# Patient Record
Sex: MALE | Race: ASIAN | NOT HISPANIC OR LATINO | Employment: UNEMPLOYED | URBAN - METROPOLITAN AREA
[De-identification: names, ages, dates, MRNs, and addresses within clinical notes are randomized per-mention and may not be internally consistent; named-entity substitution may affect disease eponyms.]

---

## 2017-02-22 ENCOUNTER — HOSPITAL ENCOUNTER (OUTPATIENT)
Facility: CLINIC | Age: 27
Setting detail: OBSERVATION
Discharge: HOME | End: 2017-02-23
Attending: INTERNAL MEDICINE | Admitting: INTERNAL MEDICINE
Payer: COMMERCIAL

## 2017-02-22 DIAGNOSIS — D49.7 PINEAL TUMOR: Primary | ICD-10-CM

## 2017-02-22 PROBLEM — R51.9 HEADACHE: Status: ACTIVE | Noted: 2017-02-22

## 2017-02-22 PROCEDURE — 25000132 ZZH RX MED GY IP 250 OP 250 PS 637: Performed by: INTERNAL MEDICINE

## 2017-02-22 PROCEDURE — 25000128 H RX IP 250 OP 636: Performed by: INTERNAL MEDICINE

## 2017-02-22 PROCEDURE — 99223 1ST HOSP IP/OBS HIGH 75: CPT | Mod: AI | Performed by: INTERNAL MEDICINE

## 2017-02-22 PROCEDURE — 40000141 ZZH STATISTIC PERIPHERAL IV START W/O US GUIDANCE

## 2017-02-22 PROCEDURE — 25000125 ZZHC RX 250: Performed by: INTERNAL MEDICINE

## 2017-02-22 RX ORDER — LORAZEPAM 2 MG/ML
.5-1 INJECTION INTRAMUSCULAR EVERY 6 HOURS PRN
Status: DISCONTINUED | OUTPATIENT
Start: 2017-02-22 | End: 2017-02-24 | Stop reason: HOSPADM

## 2017-02-22 RX ORDER — HYDROMORPHONE HYDROCHLORIDE 1 MG/ML
.3-.5 INJECTION, SOLUTION INTRAMUSCULAR; INTRAVENOUS; SUBCUTANEOUS
Status: DISCONTINUED | OUTPATIENT
Start: 2017-02-22 | End: 2017-02-24 | Stop reason: HOSPADM

## 2017-02-22 RX ORDER — ACETAMINOPHEN 325 MG/1
650 TABLET ORAL EVERY 4 HOURS PRN
Status: DISCONTINUED | OUTPATIENT
Start: 2017-02-22 | End: 2017-02-24 | Stop reason: HOSPADM

## 2017-02-22 RX ORDER — ONDANSETRON 2 MG/ML
4 INJECTION INTRAMUSCULAR; INTRAVENOUS EVERY 6 HOURS PRN
Status: DISCONTINUED | OUTPATIENT
Start: 2017-02-22 | End: 2017-02-24 | Stop reason: HOSPADM

## 2017-02-22 RX ORDER — PROCHLORPERAZINE MALEATE 5 MG
5-10 TABLET ORAL EVERY 6 HOURS PRN
Status: DISCONTINUED | OUTPATIENT
Start: 2017-02-22 | End: 2017-02-24 | Stop reason: HOSPADM

## 2017-02-22 RX ORDER — LEVETIRACETAM 750 MG/1
1500 TABLET ORAL EVERY MORNING
Status: DISCONTINUED | OUTPATIENT
Start: 2017-02-23 | End: 2017-02-24 | Stop reason: HOSPADM

## 2017-02-22 RX ORDER — NALOXONE HYDROCHLORIDE 0.4 MG/ML
.1-.4 INJECTION, SOLUTION INTRAMUSCULAR; INTRAVENOUS; SUBCUTANEOUS
Status: DISCONTINUED | OUTPATIENT
Start: 2017-02-22 | End: 2017-02-24 | Stop reason: HOSPADM

## 2017-02-22 RX ORDER — LEVETIRACETAM 750 MG/1
1875 TABLET ORAL EVERY EVENING
Status: DISCONTINUED | OUTPATIENT
Start: 2017-02-22 | End: 2017-02-23

## 2017-02-22 RX ORDER — LORAZEPAM 0.5 MG/1
.5-1 TABLET ORAL EVERY 6 HOURS PRN
Status: DISCONTINUED | OUTPATIENT
Start: 2017-02-22 | End: 2017-02-24 | Stop reason: HOSPADM

## 2017-02-22 RX ORDER — DESMOPRESSIN ACETATE 0.1 MG/1
200 TABLET ORAL 2 TIMES DAILY
Status: DISCONTINUED | OUTPATIENT
Start: 2017-02-22 | End: 2017-02-24 | Stop reason: HOSPADM

## 2017-02-22 RX ADMIN — LEVETIRACETAM 1875 MG: 750 TABLET, FILM COATED ORAL at 23:24

## 2017-02-22 RX ADMIN — DESMOPRESSIN ACETATE 200 MCG: 0.1 TABLET ORAL at 23:24

## 2017-02-22 RX ADMIN — POTASSIUM CHLORIDE: 2 INJECTION, SOLUTION, CONCENTRATE INTRAVENOUS at 23:25

## 2017-02-22 ASSESSMENT — VISUAL ACUITY: OU: NORMAL ACUITY

## 2017-02-22 NOTE — IP AVS SNAPSHOT
[address] [address]    Phone:  [phone]                                       After Visit Summary   2/22/2017    Kari Turcios    MRN: [id number]           After Visit Summary Signature Page     I have received my discharge instructions, and my questions have been answered. I have discussed any challenges I see with this plan with the nurse or doctor.    ..........................................................................................................................................  Patient/Patient Representative Signature      ..........................................................................................................................................  Patient Representative Print Name and Relationship to Patient    ..................................................               ................................................  Date                                            Time    ..........................................................................................................................................  Reviewed by Signature/Title    ...................................................              ..............................................  Date                                                            Time

## 2017-02-22 NOTE — IP AVS SNAPSHOT
MRN:[id number]                      After Visit Summary   2/22/2017    Kari Turcios    MRN: [id number]           Thank you!     Thank you for choosing [facility] for your care. Our goal is always to provide you with excellent care. Hearing back from our patients is one way we can continue to improve our services. Please take a few minutes to complete the written survey that you may receive in the mail after you visit with us. Thank you!        Patient Information     Date Of Birth          1990        About your hospital stay     You were admitted on:  February 22, 2017 You last received care in the:  [address] [facility]    You were discharged on:  February 23, 2017        Reason for your hospital stay       Admitted r/t HA, r/o acute causes.  F/u with headache specialist scheduled.                  Who to Call     For medical emergencies, please call 911.  For non-urgent questions about your medical care, please call your primary care provider or clinic, [phone]          Attending Provider     Provider Specialty    Albino Blanco MD Hematology    [facility], Edy Anderson,  Internal Medicine-Hematology & Oncology       Primary Care Provider Office Phone # Fax #    Ila Cornell [phone]       [address] [address]         When to contact your care team       Please call the [facility] [phone] for temperature greater than 100.4 F, uncontrolled nausea/vomiting/diarrhea or unrelieved constipation, pain not relieved by medications, bleeding not stopped by pressure, dizziness, chest pain, shortness of breath, changes in level of consciousness, or any other new concerning symptoms.                  After Care Instructions     Activity       Your activity upon discharge: activity as tolerated.            Diet       Follow this diet upon discharge: regular                  Follow-up Appointments      Adult [facility] Follow-up and recommended labs and tests       Please schedule follow up with Dr. Zavala in 3 months (sometime in May 2017).    Appointments on [place] and/or [facility] (with [facility] provider or service). Call [phone] if you haven't heard regarding these appointments within 7 days of discharge.                  Your next 10 appointments already scheduled     Mar 14, 2017  8:30 AM CDT   (Arrive by 8:15 AM)   New Patient Visit with CASPER Jones [facility] Neurology ([facility])    [address]  [address] [facility] [address] [phone]            Apr 17, 2017  3:45 PM CDT   LAB with  LAB   [facility] ([facility])    [address] [place] [address] [phone]           Patient must bring picture ID.  Patient should be prepared to give a urine specimen  Please do not eat 10-12 hours before your appointment if you are coming in fasting for labs on lipids, cholesterol, or glucose (sugar).  Pregnant women should follow their Care Team instructions. Water with medications is okay. Do not drink coffee or other fluids.   If you have concerns about taking  your medications, please ask at office or if scheduling via Shipzi, send a message by clicking on Secure Messaging, Message Your Care Team.            Apr 17, 2017  4:20 PM CDT   (Arrive by 4:05 PM)   CT CHEST/ABDOMEN/PELVIS W CONTRAST with [id number]   [facility] ([facility])    [address] [place] [address] [phone]           Please bring any scans or X-rays taken at other hospitals, if similar tests were done. Also bring a list of your medicines, including vitamins, minerals and over-the-counter drugs. It is safest to leave personal items at home.  Be sure to tell your doctor:   If you have any allergies.   If there s any chance you are  pregnant.   If you are breastfeeding.   If you have any special needs.  You may have contrast for this exam. To prepare:   Do not eat or drink for 2 hours before your exam. If you need to take medicine, you may take it with small sips of water. (We may ask you to take liquid medicine as well.)   The day before your exam, drink extra fluids at least six 8-ounce glasses (unless your doctor tells you to restrict your fluids).  Patients over 70 or patients with diabetes or kidney problems:   If you haven t had a blood test (creatinine test) within the last 30 days, go to your clinic or Diagnostic Imaging Department for this test.  If you have diabetes:   If your kidney function is normal, continue taking your metformin (Avandamet, Glucophage, Glucovance, Metaglip) on the day of your exam.   If your kidney function is abnormal, wait 48 hours before restarting this medicine.  You will have oral contrast for this exam:   You will drink the contrast at home. Get this from your clinic or Diagnostic Imaging Department. Please follow the directions given.  Please wear loose clothing, such as a sweat suit or jogging clothes. Avoid snaps, zippers and other metal. We may ask you to undress and put on a hospital gown.  If you have any questions, please call the Imaging Department where you will have your exam.            Apr 17, 2017  4:45 PM CDT   (Arrive by 4:30 PM)   MR BRAIN W/O & W CONTRAST with [id number]   [facility] ([facility])    [address] [address] [phone]           Take your medicines as usual, unless your doctor tells you not to. Bring a list of your current medicines to your exam (including vitamins, minerals and over-the-counter drugs).  You will be given intravenous contrast for this exam. To prepare:   The day before your exam, drink extra fluids at least six 8-ounce glasses (unless your doctor tells you to restrict your fluids).    Have a blood test (creatinine test) within 30 days of your exam. Go to your clinic or Diagnostic Imaging Department for this test.  The MRI machine uses a strong magnet. Please wear clothes without metal (snaps, zippers). A sweatsuit works well, or we may give you a hospital gown.  Please remove any body piercings and hair extensions before you arrive. You will also remove watches, jewelry, hairpins, wallets, dentures, partial dental plates and hearing aids. You may wear contact lenses, and you may be able to wear your rings. We have a safe place to keep your personal items, but it is safer to leave them at home.   **IMPORTANT** THE INSTRUCTIONS BELOW ARE ONLY FOR THOSE PATIENTS WHO HAVE BEEN TOLD THEY WILL RECEIVE SEDATION OR GENERAL ANESTHESIA DURING THEIR MRI PROCEDURE:  IF YOU WILL RECEIVE SEDATION (take medicine to help you relax during your exam):   You must get the medicine from your doctor before you arrive. Bring the medicine to the exam. Do not take it at home.   Arrive one hour early. Bring someone who can take you home after the test. Your medicine will make you sleepy. After the exam, you may not drive, take a bus or take a taxi by yourself.   No eating 8 hours before your exam. You may have clear liquids up until 4 hours before your exam. (Clear liquids include water, clear tea, black coffee and fruit juice without pulp.)  IF YOU WILL RECEIVE ANESTHESIA (be asleep for your exam):   Arrive 1 1/2 hours early. Bring someone who can take you home after the test. You may not drive, take a bus or take a taxi by yourself.   No eating 8 hours before your exam. You may have clear liquids up until 4 hours before your exam. (Clear liquids include water, clear tea, black coffee and fruit juice without pulp.)  Please call the Imaging Department at your exam site with any questions.            Apr 19, 2017  2:45 PM CDT   (Arrive by 2:30 PM)   Return Visit with Cheikh Ervin MD   [facility]  "Clinic ([facility])    [address]  2nd Floor  [facility] [address] [phone]              Additional Services     Neurology Adult Referral       Referred for acute on chronic HA.                  Pending Results     No orders found for last 3 day(s).            Statement of Approval     Ordered          02/23/17 1746  I have reviewed and agree with all the recommendations and orders detailed in this document.  EFFECTIVE NOW     Approved and electronically signed by:  Giulia Hernandez APRN CNP             Admission Information     Date & Time Provider Department Dept. Phone    2/22/2017 Edy Gray, DO [address] [facility] [place] [phone]      Your Vitals Were     Blood Pressure Pulse Temperature Respirations Height Weight    109/56 (BP Location: Right arm) 62 97.4  F (36.3  C) (Oral) 18 1.702 m (5' 7\") 74 kg (163 lb 1.6 oz)    Pulse Oximetry BMI (Body Mass Index)                99% 25.55 kg/m2          Shippo Information     [employer] gives you secure access to your electronic health record. If you see a primary care provider, you can also send messages to your care team and make appointments. If you have questions, please call your primary care clinic.  If you do not have a primary care provider, please call [phone] and they will assist you.        Care EveryWhere ID     This is your Care EveryWhere ID. This could be used by other organizations to access your [facility] medical records  [id number]           Review of your medicines      CONTINUE these medicines which may have CHANGED, or have new prescriptions. If we are uncertain of the size of tablets/capsules you have at home, strength may be listed as something that might have changed.        Dose / Directions    desmopressin 0.1 MG tablet   Commonly known as:  DDAVP   This may have changed:  Another medication with the same name was removed. Continue taking this medication, and follow the " directions you see here.   Used for:  Diabetes insipidus (H)        TAKE 2 TABLETS (200 MCG) BY MOUTH 2 TIMES DAILY   Quantity:  360 tablet   Refills:  3         CONTINUE these medicines which have NOT CHANGED        Dose / Directions    ACETAMINOPHEN PO   Indication:  Pain        Dose:  650 mg   Take 650 mg by mouth every 4 hours as needed for pain   Refills:  0       levETIRAcetam 750 MG tablet   Commonly known as:  KEPPRA   Used for:  Pineal tumor        Dose:  1500 mg   Take 1,500 mg by mouth every morning Two tablet in am and 2 1/2 tablet in pm   Quantity:  60 tablet   Refills:  0       LORazepam 0.5 MG tablet   Commonly known as:  ATIVAN   Used for:  Pineal tumor        Dose:  0.5 mg   Take 1 tablet (0.5 mg) by mouth every 4 hours as needed (Anxiety, Nausea/Vomiting or Sleep)   Quantity:  30 tablet   Refills:  3       melatonin 1 MG Tabs tablet   Used for:  Pineal tumor        Dose:  1-2 mg   Take 1-2 tablets (1-2 mg) by mouth nightly as needed for sleep   Quantity:  90 tablet   Refills:  0       tobramycin-dexamethasone ophthalmic ointment   Commonly known as:  TOBRADEX        Dose:  1 Application   Place 1 Application into both eyes 3 times daily   Refills:  0       VISINE TEARS OP        Dose:  1-2 drop   Apply 1-2 drops to eye 2 times daily as needed (for dry eyes.)   Refills:  0                Protect others around you: Learn how to safely use, store and throw away your medicines at www.disposemymeds.org.             Medication List: This is a list of all your medications and when to take them. Check marks below indicate your daily home schedule. Keep this list as a reference.      Medications           Morning Afternoon Evening Bedtime As Needed    ACETAMINOPHEN PO   Take 650 mg by mouth every 4 hours as needed for pain                                desmopressin 0.1 MG tablet   Commonly known as:  DDAVP   TAKE 2 TABLETS (200 MCG) BY MOUTH 2 TIMES DAILY   Last time this was given:  200 mcg on 2/23/2017   8:20 AM                                levETIRAcetam 750 MG tablet   Commonly known as:  KEPPRA   Take 1,500 mg by mouth every morning Two tablet in am and 2 1/2 tablet in pm   Last time this was given:  1,500 mg on 2/23/2017  8:21 AM                                LORazepam 0.5 MG tablet   Commonly known as:  ATIVAN   Take 1 tablet (0.5 mg) by mouth every 4 hours as needed (Anxiety, Nausea/Vomiting or Sleep)                                melatonin 1 MG Tabs tablet   Take 1-2 tablets (1-2 mg) by mouth nightly as needed for sleep                                tobramycin-dexamethasone ophthalmic ointment   Commonly known as:  TOBRADEX   Place 1 Application into both eyes 3 times daily                                VISINE TEARS OP   Apply 1-2 drops to eye 2 times daily as needed (for dry eyes.)

## 2017-02-23 ENCOUNTER — APPOINTMENT (OUTPATIENT)
Dept: GENERAL RADIOLOGY | Facility: CLINIC | Age: 27
End: 2017-02-23
Attending: INTERNAL MEDICINE
Payer: COMMERCIAL

## 2017-02-23 ENCOUNTER — APPOINTMENT (OUTPATIENT)
Dept: MRI IMAGING | Facility: CLINIC | Age: 27
End: 2017-02-23
Attending: INTERNAL MEDICINE
Payer: COMMERCIAL

## 2017-02-23 VITALS
WEIGHT: 163.1 LBS | TEMPERATURE: 97.4 F | SYSTOLIC BLOOD PRESSURE: 109 MMHG | RESPIRATION RATE: 18 BRPM | DIASTOLIC BLOOD PRESSURE: 56 MMHG | HEART RATE: 62 BPM | OXYGEN SATURATION: 99 % | BODY MASS INDEX: 25.6 KG/M2 | HEIGHT: 67 IN

## 2017-02-23 LAB
ANION GAP SERPL CALCULATED.3IONS-SCNC: 8 MMOL/L (ref 3–14)
BASOPHILS # BLD AUTO: 0 10E9/L (ref 0–0.2)
BASOPHILS NFR BLD AUTO: 0.2 %
BUN SERPL-MCNC: 12 MG/DL (ref 7–30)
CALCIUM SERPL-MCNC: 9.1 MG/DL (ref 8.5–10.1)
CHLORIDE SERPL-SCNC: 112 MMOL/L (ref 94–109)
CO2 SERPL-SCNC: 24 MMOL/L (ref 20–32)
CREAT SERPL-MCNC: 1.14 MG/DL (ref 0.66–1.25)
DIFFERENTIAL METHOD BLD: ABNORMAL
EOSINOPHIL # BLD AUTO: 0.1 10E9/L (ref 0–0.7)
EOSINOPHIL NFR BLD AUTO: 0.6 %
ERYTHROCYTE [DISTWIDTH] IN BLOOD BY AUTOMATED COUNT: 13.7 % (ref 10–15)
GFR SERPL CREATININE-BSD FRML MDRD: 77 ML/MIN/1.7M2
GLUCOSE SERPL-MCNC: 91 MG/DL (ref 70–99)
HCT VFR BLD AUTO: 42.3 % (ref 40–53)
HGB BLD-MCNC: 14.1 G/DL (ref 13.3–17.7)
IMM GRANULOCYTES # BLD: 0 10E9/L (ref 0–0.4)
IMM GRANULOCYTES NFR BLD: 0.3 %
LYMPHOCYTES # BLD AUTO: 1.4 10E9/L (ref 0.8–5.3)
LYMPHOCYTES NFR BLD AUTO: 11.8 %
MCH RBC QN AUTO: 28 PG (ref 26.5–33)
MCHC RBC AUTO-ENTMCNC: 33.3 G/DL (ref 31.5–36.5)
MCV RBC AUTO: 84 FL (ref 78–100)
MONOCYTES # BLD AUTO: 0.9 10E9/L (ref 0–1.3)
MONOCYTES NFR BLD AUTO: 7.6 %
NEUTROPHILS # BLD AUTO: 9.5 10E9/L (ref 1.6–8.3)
NEUTROPHILS NFR BLD AUTO: 79.5 %
NRBC # BLD AUTO: 0 10*3/UL
NRBC BLD AUTO-RTO: 0 /100
PLATELET # BLD AUTO: 186 10E9/L (ref 150–450)
POTASSIUM SERPL-SCNC: 4.1 MMOL/L (ref 3.4–5.3)
RBC # BLD AUTO: 5.03 10E12/L (ref 4.4–5.9)
SODIUM SERPL-SCNC: 144 MMOL/L (ref 133–144)
WBC # BLD AUTO: 11.9 10E9/L (ref 4–11)

## 2017-02-23 PROCEDURE — A9585 GADOBUTROL INJECTION: HCPCS | Performed by: INTERNAL MEDICINE

## 2017-02-23 PROCEDURE — 25500064 ZZH RX 255 OP 636: Performed by: INTERNAL MEDICINE

## 2017-02-23 PROCEDURE — 36415 COLL VENOUS BLD VENIPUNCTURE: CPT | Performed by: INTERNAL MEDICINE

## 2017-02-23 PROCEDURE — 85025 COMPLETE CBC W/AUTO DIFF WBC: CPT | Performed by: INTERNAL MEDICINE

## 2017-02-23 PROCEDURE — G0378 HOSPITAL OBSERVATION PER HR: HCPCS

## 2017-02-23 PROCEDURE — 25000132 ZZH RX MED GY IP 250 OP 250 PS 637: Performed by: INTERNAL MEDICINE

## 2017-02-23 PROCEDURE — 74020 XR SHUNT MALFUNCTION SURVEY: CPT

## 2017-02-23 PROCEDURE — 70250 X-RAY EXAM OF SKULL: CPT

## 2017-02-23 PROCEDURE — 99238 HOSP IP/OBS DSCHRG MGMT 30/<: CPT | Performed by: INTERNAL MEDICINE

## 2017-02-23 PROCEDURE — 80048 BASIC METABOLIC PNL TOTAL CA: CPT | Performed by: INTERNAL MEDICINE

## 2017-02-23 PROCEDURE — 70553 MRI BRAIN STEM W/O & W/DYE: CPT

## 2017-02-23 RX ORDER — GADOBUTROL 604.72 MG/ML
7.5 INJECTION INTRAVENOUS ONCE
Status: COMPLETED | OUTPATIENT
Start: 2017-02-23 | End: 2017-02-23

## 2017-02-23 RX ADMIN — DESMOPRESSIN ACETATE 200 MCG: 0.1 TABLET ORAL at 08:20

## 2017-02-23 RX ADMIN — LEVETIRACETAM 1500 MG: 750 TABLET, FILM COATED ORAL at 08:21

## 2017-02-23 RX ADMIN — GADOBUTROL 7.5 ML: 604.72 INJECTION INTRAVENOUS at 12:36

## 2017-02-23 ASSESSMENT — VISUAL ACUITY
OU: NORMAL ACUITY
OU: NORMAL ACUITY

## 2017-02-23 ASSESSMENT — PAIN DESCRIPTION - DESCRIPTORS: DESCRIPTORS: ACHING

## 2017-02-23 NOTE — CONSULTS
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  He also had a  shunt placement with a Strata valve set at 1.5 for hydrocephalus. The patient is known to Dr. Sharp who saw the patient on 6/14/2016 for intermittent headaches associated with nausea and vomiting. MR imaging at that time showed no recurrent tumor or hydrocephalus, but because there was some element of positional headache (improved when lying flat), the thesis of overdrainage was acted upon and his valve was increased to 2.0. This seemed to have helped his headaches in the short term.    However, today he returns with headache, nausea and blurred vision that began insidiously this afternoon without a trigger. He states that the pain is holocephalic with a frontal predominance without any exacerbation/alleviating factors. This improved into the evening from a 5/10 to a 3 out of 10. He says that at baseline lives with a 1-2/headache and it was the visual changes that principally brought him to the hospital. He denies diplopia, weakness, sensory changes, gait instability, infectious symptoms, recent trauma or illness.      CT and shunt series     Past Medical/Surgical History  Past Medical History   Diagnosis Date     Hypertropia      Myopia      Seizures (H)      2 months ago     Type 2 diabetes mellitus without complications (H)      Borderline       Past Surgical History   Procedure Laterality Date     Arthrotomy shoulder, bankhart repair, combined  10/29/2012     Procedure: COMBINED ARTHROTOMY SHOULDER, BANKHART REPAIR;  RIGHT OPEN SHOULDER BANKART REPAIR;  Surgeon: Lemuel Ramírez MD;  [facility]     Optical tracking system biopsy brain  3/20/2014     Procedure: OPTICAL TRACKING SYSTEM BIOPSY BRAIN;   Right Frontal Approach For endoscopic Intraventricular Mass Biopsy, 3rd Ventriculostomy, Placement of right ventriculostomy catheter.;  Surgeon: Williams Clement MD;  Location: [facility] OR     Ventriculostomy  3/20/2014     Procedure: VENTRICULOSTOMY;;  Surgeon: Williams Clement MD;  Location: [facility] OR     Optical tracking system craniotomy, excise tumor, combined  3/26/2014     Procedure: COMBINED OPTICAL TRACKING SYSTEM CRANIOTOMY, EXCISE TUMOR;  Stealth Assisted Sub-Occiptial Craniotomy, Excise Tumor ;  Surgeon: Ann Sharp MD;  Location: [facility] OR     Optical tracking system implant shunt ventriculoperitoneal  4/8/2014     Procedure: OPTICAL TRACKING SYSTEM IMPLANT SHUNT VENTRICULOPERITONEAL;  Right Stealth Guided Ventricularperitoneal Shunt Placement ;  Surgeon: Ann Sharp MD;  Location: [facility] OR     Recession resection (repair strabismus) bilateral Bilateral 8/4/2016     Procedure: RECESSION RESECTION (REPAIR STRABISMUS) BILATERAL;  Surgeon: Serafin Menendez MD;  Location: [facility] OR       Family History  Family History   Problem Relation Age of Onset     CANCER Maternal Grandmother      vaginal     CANCER Mother      breast     CANCER Maternal Grandfather      lung cancer     Glaucoma No family hx of      Macular Degeneration No family hx of        Social History  Social History   Substance Use Topics     Smoking status: Never Smoker     Smokeless tobacco: Never Used     Alcohol use No       Medications  Reviewed - keppra for seizures  DDAVP    Allergies  Allergies   Allergen Reactions     Penicillins Hives       ROS: 10 point ROS of systems including Constitutional, Eyes, Respiratory, Cardiovascular, Gastroenterology, Genitourinary, Integumentary, Muscularskeletal, Psychiatric were all negative except for pertinent positives noted in my HPI.     Physical Exam:     General:   Comfortable respiratory effort; normal cardiac rhythm.     Mental Status:   awake, alert and  oriented x3.   fluent, communicative, intact comprehension.    Cranial Nerves:  equal and reactive pupils   extraocular movements preserved   no dysarthria  preserved extraocular movements    Motor/Sensory:  5/5 strength throughout upper and lower extremities.  No deficits to pain, soft touch.     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 He has shunted hydrocephalus and recurrent headaches that are well documented and currently are of the similar character as they have been described historically. No images were yet available for us to compare to prior scans, but it remains possible that perhaps another adjustment to his shunt could alleviate his symptoms.     - please obtain a shunt series prior to MRI imaging   - agree with MRI - we will follow up these results and re-program his shunt afterward   - recommend opthalmology consult to rule out papilledema and assess visual symptoms       John (Jack) M. Leschke, M.D.     Our patient was discussed with my chief resident.

## 2017-02-23 NOTE — H&P
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    Headache, nausea, blurred vision - no focal neurological deficits. No diplopia, visual acuity is intact with good EOM and pupillary response. CT head done at outside hospital was essentially normal with no hydrocephalus, masses, or midline shift.  shunt series Xrays are normal. He had similar episode last year when the  shunt strata valve was adjusted from 1.5 to 2.0 for possible over drainage.   Consulted neurosurgery if this could be a similar problem.  Will also obtain MRI brain.    H/o seizures - well controlled on keppra. Continue home dose.  H/o diabetes insipidus - on DDAVP. Continue same. Renal function,electrolytes normal.     Regular diet as tolerated.  Full code.  Mechanical VTE prophylaxis.         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   Generalized headache, frontal. No diplopia, visual acuity and color vision intact. No emesis.   No fever, chills, voice changes, facial or extremity weakness or numbness. No neck stiffness. No CP, SOB, or any cardiorespiratory or abdominal symptoms. No seizures. He was in good health until this afternoon.         Cancer Treatment History:   Per last oncology note, 10/16-Mr Turcios  (Abelino) is a 26 year old male with history of seizures who presented with headaches accompanied by diplopia over a couple of weeks to a [facility] ED on 3/19 where MRI showed a 3cm pineal mass and hydrocephalus. He was transferred to [facility] for neurosurgical management. A biopsy was performed on 3/20 with placement of third ventriculostomy and external ventricular drain. The biopsy showed a mixed germ cell tumor. Resection of the mass was performed on 3/26/14 by a midline combined occipital and suboccipital craniotomy performed by Trinidad Xiao and Urbano. It was R1 resection and most of the macroscopic disease was resected and pathology did reveal mixed germ cell tumor. He was seen by Medical Oncology (Cheikh Ervin) and Radiation Oncology (Dr. Kayla Song) and recommended chemotherapy followed by radiation therapy per the [facility] protocol for localized CNS Germ Cell Tumors. He basically is following the stratum 1 for patients with localized non-germinomatous tumors to receive 6 cycles of chemotherapy (1, 3 & 5 -carbo/etoposide and 2, 4, & 6 are ifos/etoposide) and depending post chemotherapy radiographic (MRI) and biochemical response (tumor markers), he may be a candidate to receive less radiation therapy. He completed chemo 8/11/14 and follow up brain MRI was negative for residual/recurrence of disease. HCG and AFP markers were within normal limits (AFP was elevated at diagnosis). He received radiation to the ventricles followed by a boost (3000 cGy to ventricles and 5400 cGy to tumor bed) from 9/17/2014 through 10/28/14.         Past Medical History:     Past Medical History   Diagnosis Date     Hypertropia      Myopia      Seizures (H)      2 months ago     Type 2 diabetes mellitus without complications (H)      Borderline            Past Surgical History:      Past Surgical History   Procedure Laterality Date     Arthrotomy shoulder, bankhart repair, combined  10/29/2012     Procedure: COMBINED ARTHROTOMY  SHOULDER, BANKHART REPAIR;  RIGHT OPEN SHOULDER BANKART REPAIR;  Surgeon: Lemuel Ramírez MD;  Location:  [facility]     Optical tracking system biopsy brain  3/20/2014     Procedure: OPTICAL TRACKING SYSTEM BIOPSY BRAIN;  Right Frontal Approach For endoscopic Intraventricular Mass Biopsy, 3rd Ventriculostomy, Placement of right ventriculostomy catheter.;  Surgeon: Williams Clement MD;  [facility]     Ventriculostomy  3/20/2014     Procedure: VENTRICULOSTOMY;;  Surgeon: Williams Clement MD;  [facility]     Optical tracking system craniotomy, excise tumor, combined  3/26/2014     Procedure: COMBINED OPTICAL TRACKING SYSTEM CRANIOTOMY, EXCISE TUMOR;  Stealth Assisted Sub-Occiptial Craniotomy, Excise Tumor ;  Surgeon: Ann Sharp MD;  [facility]     Optical tracking system implant shunt ventriculoperitoneal  4/8/2014     Procedure: OPTICAL TRACKING SYSTEM IMPLANT SHUNT VENTRICULOPERITONEAL;  Right Stealth Guided Ventricularperitoneal Shunt Placement ;  Surgeon: Ann Sharp MD;  [facility]     Recession resection (repair strabismus) bilateral Bilateral 8/4/2016     Procedure: RECESSION RESECTION (REPAIR STRABISMUS) BILATERAL;  Surgeon: Serafin Menendez MD;  [facility] OR            Social History:     Social History   Substance Use Topics     Smoking status: Never Smoker     Smokeless tobacco: Never Used     Alcohol use No            Family History:     Family History   Problem Relation Age of Onset     CANCER Maternal Grandmother      vaginal     CANCER Mother      breast     CANCER Maternal Grandfather      lung cancer     Glaucoma No family hx of      Macular Degeneration No family hx of      Family history reviewed         Immunizations:     Immunization History   Administered Date(s) Administered     TDAP (ADACEL AGES 11-64) 05/15/2012            Allergies:     Allergies   Allergen Reactions     Penicillins Hives            Medications:        Current Facility-Administered Medications on File Prior to Encounter:  gadobutrol (GADAVIST) injection 7.5 mL     Current Outpatient Prescriptions on File Prior to Encounter:  desmopressin (DDAVP) 0.2 MG tablet Take 0.2 mg by mouth   desmopressin (DDAVP) 0.1 MG tablet TAKE 2 TABLETS (200 MCG) BY MOUTH 2 TIMES DAILY   tobramycin-dexamethasone (TOBRADEX) ophthalmic ointment Place 1 Application into both eyes 3 times daily   melatonin 1 MG TABS Take 1-2 tablets (1-2 mg) by mouth nightly as needed for sleep   LORazepam (ATIVAN) 0.5 MG tablet Take 1 tablet (0.5 mg) by mouth every 4 hours as needed (Anxiety, Nausea/Vomiting or Sleep)   levETIRAcetam (KEPPRA) 750 MG tablet Take 1,500 mg by mouth every morning Two tablet in am and 2 1/2 tablet in pm   Glycerin-Hypromellose- (VISINE TEARS OP) Apply 1-2 drops to eye 2 times daily as needed (for dry eyes.)              Review of Systems:   The Review of Systems is negative other than noted in the HPI         Physical Exam:   Temp: 95.8  F (35.4  C) Temp src: Oral BP: 125/63 Pulse: 59   Resp: 16 SpO2: 100 % O2 Device: None (Room air)    Constitutional: Awake, alert, cooperative, no apparent distress, and appears stated age.  Eyes: Lids and lashes normal, pupils equal, round and reactive to light, extra ocular muscles intact, sclera clear, conjunctival injection.  ENT: Normocephalic, without obvious abnormality, atraumatic, no erythema.  Lungs: No increased work of breathing, good air exchange, clear to auscultation bilaterally, no crackles or wheezing.  Cardiovascular: Regular rate and rhythm, normal S1 and S2, no S3 or S4, and no murmur noted.  Abdomen: normal bowel sounds, soft, non-distended, non-tender, no masses palpated, no hepatosplenomegally.  Genitourinary: deferred.  Musculoskeletal: No redness, warmth, or swelling of the joints.  No edema.   Neurologic: Awake, alert, oriented to name, place and time.  Cranial nerves II-XII are grossly intact.  Motor is 5  out of 5 bilaterally.   Sensory is intact.  Babinski down going,  and gait is normal.    Skin: No rashes, erythema, pallor, petechia or purpura.         Data:     CBC, BMP from outside ED, reviewed through careeverywhere are completely normal.    Attestation:  -    Harris Gerard MD

## 2017-02-23 NOTE — PROGRESS NOTES
Brief Neurosurgery Update:     Patient completed MRI.     Medtronic strata valve previously set to 2.0.   Valve setting interrogated and reset to 2.0.   Patient tolerated the procedure well.     MRI unrevealing.     Neurosurgery signing off.     Contact the neurosurgery resident on call with questions.    Dial * * *[phone]: Enter [phone] when prompted.   Herson Stephen MD, PhD  Neurosurgery PGY-2

## 2017-02-23 NOTE — UTILIZATION REVIEW
"  Admission Status; Secondary Review Determination         Under the authority of the Utilization Management Committee, the utilization review process indicated a secondary review on the above patient.  The review outcome is based on review of the medical records, discussions with staff, and applying clinical experience noted on the date of the review.          (x) Observation Status Appropriate - This patient does not meet hospital inpatient criteria and is placed in observation status. If this patient's primary payer is Medicare and was admitted as an inpatient, Condition Code 44 should be used and patient status changed to \"observation\".     RATIONALE FOR DETERMINATION   27-year-old man with mixed germ cell carcinoma, status post  shunt presenting with neurologic symptoms that mostly resolved however concerning for possible shunt malfunctioning, admitted for further workup. No concern for CNS infection, so far workup has been negative, in the past similar symptoms improved with adjustment in the valve pressure.The severity of illness, intensity of service provided, expected LOS and risk for adverse outcome make the care appropriate for further observation; however, doesn't meet criteria for hospital inpatient admission. Dr Gray  notified of this determination.    This document was produced using voice recognition software.      The information on this document is developed by the utilization review team in order for the business office to ensure compliance.  This only denotes the appropriateness of proper admission status and does not reflect the quality of care rendered.         The definitions of Inpatient Status and Observation Status used in making the determination above are those provided in the CMS Coverage Manual, Chapter 1 and Chapter 6, section 70.4.      Sincerely,     TAN SNIDER MD    System Medical Director  Utilization Management  [facility].      "

## 2017-02-23 NOTE — CONSULTS
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 Also c/o nausea and HA. HA improved throughout the day. Endorses redness. Denies diplopia, eye pain, tearing, discharge, flashes or floaters.     Opthalmology was consulted to r/o papilledema and vision change.           Review of systems were otherwise negative except for that which has been stated above.    Med Hx:   Past Medical History   Diagnosis Date     Hypertropia      Myopia      Seizures (H)      2 months ago     Type 2 diabetes mellitus without complications (H)      Borderline     Med Rx:   Current Facility-Administered Medications on File Prior to Encounter:  gadobutrol (GADAVIST) injection 7.5 mL     Current Outpatient Prescriptions on File Prior to Encounter:  melatonin 1 MG TABS Take 1-2 tablets (1-2 mg) by mouth nightly as needed for sleep   levETIRAcetam (KEPPRA) 750 MG tablet Take 1,500 mg by mouth every morning Two tablet in am and 2 1/2 tablet in pm   Glycerin-Hypromellose- (VISINE TEARS OP) Apply 1-2 drops to eye 2 times daily as needed (for dry eyes.)   desmopressin (DDAVP) 0.2 MG tablet Take 0.2 mg by mouth   desmopressin (DDAVP) 0.1 MG tablet TAKE 2 TABLETS (200 MCG) BY MOUTH 2 TIMES DAILY   tobramycin-dexamethasone (TOBRADEX) ophthalmic ointment Place 1 Application into both eyes 3 times daily   LORazepam (ATIVAN) 0.5 MG tablet Take 1 tablet (0.5 mg) by mouth every 4 hours as needed (Anxiety, Nausea/Vomiting or Sleep)     Inpatient Medications:     zz extemporaneous template  1,875 mg Oral QPM     desmopressin  200 mcg Oral BID     levETIRAcetam  1,500 mg Oral QAM      Allergies:   Allergies   Allergen Reactions     Penicillins Hives     Social Hx:   Social History   Substance Use Topics     Smoking status: Never Smoker     Smokeless tobacco: Never Used     Alcohol use No     Fam Hx:    Family History   Problem Relation Age of Onset     CANCER Maternal Grandmother      vaginal     CANCER Mother      breast     CANCER Maternal Grandfather      lung cancer     Glaucoma No family hx of      Macular Degeneration No family hx of        OCULAR/MEDICAL/SURGICAL HISTORIES:     Past Ocular History:  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/p strabismus surgery on 8/4/16    Past Medical History   Diagnosis Date     Hypertropia      Myopia      Seizures (H)      2 months ago     Type 2 diabetes mellitus without complications (H)      Borderline       Past Surgical History   Procedure Laterality Date     Arthrotomy shoulder, bankhart repair, combined  10/29/2012     Procedure: COMBINED ARTHROTOMY SHOULDER, BANKHART REPAIR;  RIGHT OPEN SHOULDER BANKART REPAIR;  Surgeon: Lemuel Ramírez MD;  Location: [facility]     Optical tracking system biopsy brain  3/20/2014     Procedure: OPTICAL TRACKING SYSTEM BIOPSY BRAIN;  Right Frontal Approach For endoscopic Intraventricular Mass Biopsy, 3rd Ventriculostomy, Placement of right ventriculostomy catheter.;  Surgeon: Williams Clement MD;  [facility]     Ventriculostomy  3/20/2014     Procedure: VENTRICULOSTOMY;;  Surgeon: Williams Clement MD;  [facility]     Optical tracking system craniotomy, excise tumor, combined  3/26/2014     Procedure: COMBINED OPTICAL TRACKING SYSTEM CRANIOTOMY, EXCISE TUMOR;  Stealth Assisted Sub-Occiptial Craniotomy, Excise Tumor ;  Surgeon: Ann Sahrp MD;  [facility]     Optical tracking system implant shunt ventriculoperitoneal  4/8/2014     Procedure: OPTICAL TRACKING SYSTEM IMPLANT SHUNT VENTRICULOPERITONEAL;  Right Stealth  Guided Ventricularperitoneal Shunt Placement ;  Surgeon: Ann Sharp MD;  [facility]     Recession resection (repair strabismus) bilateral Bilateral 8/4/2016     Procedure: RECESSION RESECTION (REPAIR STRABISMUS) BILATERAL;  Surgeon: Serafin Menendez MD;  [facility]       EXAMINATION:     Visual Acuity: Right Eye 20/20 ; Left Eye 20/20 sc assess with near card   Pupils: Minimally reactive both eyes, no APD    Intraocular Pressure: RE  14 ; LE 14  mmHg by tonopen (<5% error).   Motility: RE deficient upgaze; LE deficient upgaze  Confrontational Visual Field: RE Full; LE Full     External/Slit Lamp Exam   RIGHT EYE   Lids/Lashes: No Abnormality   Conj/Sclera: White and Quiet   Cornea:  Clear   Ant Chamber:  Deep and Quiet   Iris: Round and Reactive   Lens: Clear  LEFT   Lids/lashes: 1+ ptosis   Conj/Sclera: White and Quiet   Cornea:  Clear   Ant Chamber:  Deep and Quiet   Iris: Round and Reactive   Lens:Clear    Dilated Fundus Exam  Eyes Dilated? Yes   Time: 1210 With Phenylephrine 2.5% and Mydriacyl 1%    (Normally, dilation with the above lasts about 4-6 hours)  RIGHT:   Anterior Vitreous: Clear   Media: Clear   Optic Nerve: Normal Contours and Size   Cup to Disc Ratio: 0.5   Macula: Flat and No Pigment Abnormality   Vessels: Normal Caliber and Distribution   Retinal Periphery: Attached Without Holes or Tears Identified  LEFT:   Anterior Vitreous: Clear   Media: Clear   Optic Nerve: Normal Contours and Size   Cup to Disc Ratio: 0.5   Macula: Flat and No Pigment Abnormality   Vessels: Normal Caliber and Distribution   Retinal Periphery: Attached Without Holes or Tears Identified    Labs/Studies/Imaging Performed  MR brain pending     ASSESSMENT/PLAN:     Kari Turcios is a 27 year old male who presents with intermittent blurry vision x1d in setting of history of 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 Agree with complete neuro work-up given history. Please alert to any changes in condition.      It is our pleasure to participate in this patient's care and treatment. Please contact us with any further questions or concerns.    Discussed with Dr. Edilson Salomon MD,    Keshav Ornelas MD PGY-2  Ophthalmology  Pager [phone]

## 2017-02-23 NOTE — PROGRESS NOTES
Nursing Focus: Admission    D: Arrived at 2100 hours from [place] emergency dep[facility]ment via personal transportation. Patient accompanied by his parents. Admitted for change in vision, headache and nausea. Complains of nausea-will give zofran when available.      I: Admission process began.  Patient oriented to room, enviroment, call light.  Md. notified of patients arrival on unit.     A: Vital signs stable, afebrile.  Patient stable at this time.     P: Implement plan of care when available. Continue to monitor patient. Nursing interventions as appropriate. Notify md with changes in pt status.

## 2017-02-23 NOTE — PLAN OF CARE
"Problem: Goal Outcome Summary  Goal: Goal Outcome Summary  Outcome: No Change  VSS, afebrile. Had soft BP at approximately 0030, denied any dizziness or SOB this time. Neuro checks remain stable. Neurosurgery consulted. Patient continues to report headache but states \"it's much better\". Declined pain intervention at this time. Denies nausea at this time. Up self in room. IVF w/ KCL infusing at 75 mL/hr. Plan for MRI of brain today. Will continue to monitor closely.     Addendum: 0650: Order placed for STAT XR shunt malfunction surgery survey. XRay notified and transport to bring patient shortly.        "

## 2017-02-24 ENCOUNTER — CARE COORDINATION (OUTPATIENT)
Dept: CARDIOLOGY | Facility: CLINIC | Age: 27
End: 2017-02-24

## 2017-02-24 NOTE — PROGRESS NOTES
"[facility]  \"Hello, my name is Joaquina Covarrubias , and I am calling from the [facility].  I want to check in and see how you are doing, after leaving the hospital.  You may also receive a call from your Care Coordinator (care team), but I want to make sure you don t have any urgent needs.  I have a couple questions to review with you:     Post-Discharge Outreach                                                    Kari Turcios is a 27 year old male     Follow-up Appointments           Adult [facility] Follow-up and recommended labs and tests       Please schedule follow up with Dr. Zavala in 3 months (sometime in May 2017).     Appointments on [facility] and/or [facility] (with [facility] or [facility] provider or service). Call [phone] if you haven't heard regarding these appointments within 7 days of discharge.                       Your next 10 appointments already scheduled            Mar 14, 2017 8:30 AM CDT   (Arrive by 8:15 AM)   New Patient Visit with CASPER Jones [facility] Neurology ([facility] [place])     [address]  [address] [facility] [address] [phone]                  Apr 17, 2017 3:45 PM CDT   LAB with [facility] Lab [facility])              Care Team:    Patient Care Team       Relationship Specialty Notifications Start End    Ila Cornell MD PCP - General Family Practice  8/16/12     Phone: [phone] Fax: [phone]         [address] [address]    Cheikh Ervin MD Referring Physician Oncology Admissions 4/28/14     Comment:  referring to endorinology    Phone: [phone] Fax: [phone]         [facility] [address] [facility] [address] [facility] [address]    Lemuel Billy, PhD LP Psychologist Psychology  3/12/15     Phone: [phone] Fax: [phone]         [facility] [address] [facility] [address] " [facility] [address]    Megan Reese APRN CNP Nurse Practitioner Oncology  3/12/15     Phone: [phone] Fax: [phone]         [facility] [address] [facility] [address] [facility] [address]    Katerina Grajeda, RN Nurse Coordinator Oncology Admissions 3/18/15     Phone: [phone] Pager: [phone]        Farida Bean RN Nurse Coordinator Neurosurgery  4/10/15     Comment:  ph. [phone] Pager [phone]    Phone: [phone] Pager: [phone]        Yessenia Ramires MD MD INTERNAL MEDICINE - ENDOCRINOLOGY, DIABETES & METABOLISM  5/4/15     Phone: [phone] Fax: [phone]         [facility] [address] [facility] [address]    Sybil Dockery APRN CNP Nurse Practitioner Nurse Practitioner  2/23/17     Phone: [phone] Fax: [phone]         [facility] [address] [facility] [facility] [address]            Transition of Care Review                                                      Patient was called three times and no answer so post 24 hr DC follow up calls will be closed out               Plan                                                      Thanks for your time.  Your Care Coordinator may follow-up within the next couple days.  In the meantime if you have questions, concerns or problems call your care team.        Joaquina Covarrubias

## 2017-02-24 NOTE — PLAN OF CARE
Problem: Discharge Planning  Goal: Discharge Planning (Adult, OB, Behavioral, Peds)  AVSS. Patient went down for a MRI today.  Took meds as ordered with no difficulty.  Denied pain when asked.  Up ad aguilar.  No c/o. Patient slept most of the day with father at bedside. MD gave note to excuse father from work. Orders to discharge at this time.

## 2017-02-24 NOTE — DISCHARGE SUMMARY
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 Generalized headache, frontal. No diplopia, visual acuity and color vision intact. No emesis. No fever, chills, voice changes, facial or extremity weakness or numbness. No neck stiffness. No CP, SOB, or any cardiorespiratory or abdominal symptoms. No seizures. He was in good health until this afternoon.    Hospital Course   Abelino was admitted on 2/22 from [facility] ED for further work up of his HA.  His CT had been negative at the outside hospital for hydrocephalus, masses, or midline shift and his HA was improving at time of admission.  No neuro deficits were noted.  A neurosurgical consult was placed.  They ran there own  shunt XR consult.  This showed that his  shunt strata valve was set to 2.  These can sometimes get moved (magnets) but cellphone/ipad etc.  He then went on to have a brain MRI that was negative for acute pathology.  Neurosurg reset his strata valve s/p MRI.  He was then sent for an opthalmology consultation to evaluate vision changes he experienced during his HA.  They cleared him, said he could f/u outpatient if vision changes re-occur.  I then double checked with neuro-surg to see if d/c was appropriate.  They said yes.  They said f/u with Dr. Zavala should be set up for 3 months from now.  I sent this request as part of d/c paperwork.  I also set up f/u with Neurology clinic headache specialist (first  available was mid march).      MRI   1. No significant change since 10/17/2016. No evidence of recurrent  germ cell tumor.  2. Stable chronic punctate microhemorrhages, presumably posttreatment  Related.     shunt XR survey  Findings: Right frontal approach ventriculoperitoneal shunt with  programmable strata valve, which is not imaged in profile. Shunt  catheter tubing courses over the right neck, chest and abdomen with  tip in the lateral mid right pelvis. No evidence of catheter  discontinuity or kinking. Suboccipital craniotomy defect.  Clear lungs. Nonobstructive bowel gas pattern.  Impression: Intact right frontal approach ventriculoperitoneal shunt.    Giulia Hernandez  AGACNP-BC  [phone]  Hematology/Oncology  February 23, 2017      Pending Results   These results will be followed up by...  Unresulted Labs Ordered in the Past 30 Days of this Admission     No orders found for last 61 day(s).          Code Status   Full Code    Primary Care Physician   Ila Cornell    Physical Exam   Vital Signs with Ranges  Temp:  [95.8  F (35.4  C)-97.9  F (36.6  C)] 97.4  F (36.3  C)  Pulse:  [59-62] 62  Heart Rate:  [54-68] 54  Resp:  [16-20] 18  BP: ()/(42-63) 109/56  SpO2:  [98 %-100 %] 99 %    Constitutional: Awake, alert, cooperative, no apparent distress, and appears stated age.  Eyes: PERRL, EOMI, sclera clear, conjunctiva normal.  ENT: Normocephalic, without obvious abnormality, atraumatic, oral pharynx with moist mucus membranes, no ulcerations or thrush.  Respiratory: Non-labored breathing on RA, CTAB, no crackles or wheezing.  Cardiovascular: RRR, no murmur noted.  GI: + bowel sounds, soft, non-distended, non-tender, no masses palpated, no hepatosplenomegaly.  Skin: No rash or concerning lesions on exposed areas.   Musculoskeletal: There is no redness, warmth, or swelling of the joints.    Neurologic: A&O x 3. Cranial nerves II-XII are grossly intact.    Neuropsychiatric: Calm, affect  normal.  Vascular Access: PIV is clean and dry, without erythema, swelling, or tenderness.      Discharge Disposition   Home & in stable condition.    Discharge Orders     Neurology Adult Referral     Reason for your hospital stay   Admitted r/t HA, r/o acute causes.  F/u with headache specialist scheduled.     Adult [facility] Follow-up and recommended labs and tests   Please schedule follow up with Dr. Zavala in 3 months (sometime in May 2017).    Appointments on [place] and/or [facility] (with [facility] or [facility] provider or service). Call [phone] if you haven't heard regarding these appointments within 7 days of discharge.     Activity   Your activity upon discharge: activity as tolerated.     When to contact your care team   Please call the [facility] [phone] for temperature greater than 100.4 F, uncontrolled nausea/vomiting/diarrhea or unrelieved constipation, pain not relieved by medications, bleeding not stopped by pressure, dizziness, chest pain, shortness of breath, changes in level of consciousness, or any other new concerning symptoms.     Diet   Follow this diet upon discharge: regular       Discharge Medications   Current Discharge Medication List      CONTINUE these medications which have NOT CHANGED    Details   ACETAMINOPHEN PO Take 650 mg by mouth every 4 hours as needed for pain      melatonin 1 MG TABS Take 1-2 tablets (1-2 mg) by mouth nightly as needed for sleep  Qty: 90 tablet, Refills: 0    Associated Diagnoses: Pineal tumor      levETIRAcetam (KEPPRA) 750 MG tablet Take 1,500 mg by mouth every morning Two tablet in am and 2 1/2 tablet in pm  Qty: 60 tablet, Refills: 0    Associated Diagnoses: Pineal tumor      Glycerin-Hypromellose- (VISINE TEARS OP) Apply 1-2 drops to eye 2 times daily as needed (for dry eyes.)      desmopressin (DDAVP) 0.1 MG tablet TAKE 2 TABLETS (200 MCG) BY MOUTH 2 TIMES DAILY  Qty: 360 tablet, Refills: 3    Associated Diagnoses:  Diabetes insipidus (H)      tobramycin-dexamethasone (TOBRADEX) ophthalmic ointment Place 1 Application into both eyes 3 times daily      LORazepam (ATIVAN) 0.5 MG tablet Take 1 tablet (0.5 mg) by mouth every 4 hours as needed (Anxiety, Nausea/Vomiting or Sleep)  Qty: 30 tablet, Refills: 3    Comments: Called to [facility] in [place], [place] @ [phone] per patient's request.  Associated Diagnoses: Pineal tumor           Allergies   Allergies   Allergen Reactions     Penicillins Hives     Data   Most Recent 3 CBC's:  Recent Labs   Lab Test  02/23/17   0615  08/22/16   2103  10/05/15   1520   WBC  11.9*  7.0  6.3   HGB  14.1  15.6  13.6   MCV  84  84  83   PLT  186  258  195      Most Recent 3 BMP's:  Recent Labs   Lab Test  02/23/17   0615  10/17/16   1603  08/22/16   2103   NA  144  139  143   POTASSIUM  4.1  3.8  3.8   CHLORIDE  112*  107  106   CO2  24  24  34*   BUN  12  14  9   CR  1.14  0.92  1.08   ANIONGAP  8  8  3   DAVIDE  9.1  9.1  9.4   GLC  91  98  83     Most Recent 2 LFT's:  Recent Labs   Lab Test  10/17/16   1603  08/22/16   2103   AST  19  15   ALT  48  31   ALKPHOS  90  98   BILITOTAL  0.3  0.9     Most Recent INR's and Anticoagulation Dosing History:  Anticoagulation Dose History     Recent Dosing and Labs Latest Ref Rng & Units 3/21/2014 3/27/2014 3/28/2014 4/8/2014 4/9/2014 4/16/2014 10/1/2014    INR 0.86 - 1.14 1.06 1.19(H) 1.27(H) 0.93 0.98 0.92 0.94        Most Recent 3 Troponin's:No lab results found.  Most Recent Cholesterol Panel:No lab results found.  Most Recent 6 Bacteria Isolates From Any Culture (See EPIC Reports for Culture Details):  Recent Labs   Lab Test  07/04/14   0833  07/04/14   0830  04/10/14   1450  04/10/14   1427  04/10/14   1421  03/28/14   1830   CULT  No growth  No growth  >100,000 colonies/mL Escherichia coli*  No growth  No growth  >10 Squamous epithelial cells/low power field indicates oral contamination.  Please recollect. Canceled, Test credited*     Most Recent  TSH, T4 and A1c Labs:  Recent Labs   Lab Test  11/03/14   1045   TSH  1.36   T4  0.99

## 2017-03-06 ENCOUNTER — PRE VISIT (OUTPATIENT)
Dept: NEUROLOGY | Facility: CLINIC | Age: 27
End: 2017-03-06

## 2017-03-06 NOTE — TELEPHONE ENCOUNTER
1.  Date/reason for appt:3/14/17, Headaches  2.  Referring provider:SIDNEY MORALEZ  3.  Call to patient (Yes / No - short description): No, referred  4.  Previous care at / records requested from:    ED- progress notes and imaging are in epic.

## 2017-03-14 ENCOUNTER — OFFICE VISIT (OUTPATIENT)
Dept: NEUROLOGY | Facility: CLINIC | Age: 27
End: 2017-03-14

## 2017-03-14 VITALS
WEIGHT: 168 LBS | BODY MASS INDEX: 25.46 KG/M2 | HEART RATE: 65 BPM | HEIGHT: 68 IN | DIASTOLIC BLOOD PRESSURE: 58 MMHG | SYSTOLIC BLOOD PRESSURE: 126 MMHG

## 2017-03-14 DIAGNOSIS — R51.9 CHRONIC DAILY HEADACHE: Primary | ICD-10-CM

## 2017-03-14 DIAGNOSIS — C80.1 MIXED GERM CELL TUMOR (H): ICD-10-CM

## 2017-03-14 RX ORDER — NORTRIPTYLINE HCL 10 MG
10 CAPSULE ORAL AT BEDTIME
Qty: 30 CAPSULE | Refills: 3 | Status: SHIPPED | OUTPATIENT
Start: 2017-03-14 | End: 2017-04-28

## 2017-03-14 ASSESSMENT — ENCOUNTER SYMPTOMS
POLYDIPSIA: 0
SORE THROAT: 1
WEIGHT GAIN: 0
NIGHT SWEATS: 1
EYE REDNESS: 0
EYE IRRITATION: 0
DECREASED APPETITE: 0
ALTERED TEMPERATURE REGULATION: 1
EYE PAIN: 1
SMELL DISTURBANCE: 0
WEIGHT LOSS: 0
FATIGUE: 0
CHILLS: 1
TROUBLE SWALLOWING: 0
EYE WATERING: 0
SINUS PAIN: 0
INCREASED ENERGY: 0
HOARSE VOICE: 0
DOUBLE VISION: 1
TASTE DISTURBANCE: 0
NECK MASS: 0
FEVER: 1
HALLUCINATIONS: 0
POLYPHAGIA: 0
SINUS CONGESTION: 1

## 2017-03-14 ASSESSMENT — PAIN SCALES - GENERAL: PAINLEVEL: NO PAIN (0)

## 2017-03-14 NOTE — MR AVS SNAPSHOT
After Visit Summary   3/14/2017    Kari Turcios    MRN: [id number]           Patient Information     Date Of Birth          1990        Visit Information        Provider Department      3/14/2017 8:30 AM Sybil Dockery APRN CNP [facility]        Today's Diagnoses     Chronic daily headache    -  1      Care Instructions    Plan:  1. Headache diary  2. A trial of nortriptyline 10 mg at bedtime.   3. Reduce computer lights exposure at work.      4. Follow up in 4 weeks or sooner if needed      Patient Education    Nortriptyline Hydrochloride Oral capsule    Nortriptyline Hydrochloride Oral solution  Nortriptyline Hydrochloride Oral capsule  What is this medicine?  NORTRIPTYLINE (nor TRIP ti rosy) is used to treat depression.  This medicine may be used for other purposes; ask your health care provider or pharmacist if you have questions.  What should I tell my health care provider before I take this medicine?  They need to know if you have any of these conditions:    an alcohol problem    bipolar disorder or schizophrenia    difficulty passing urine, prostate trouble    glaucoma    heart disease or recent heart attack    liver disease    over active thyroid    seizures    thoughts or plans of suicide or a previous suicide attempt or family history of suicide attempt    an unusual or allergic reaction to nortriptyline, other medicines, foods, dyes, or preservatives    pregnant or trying to get pregnant    breast-feeding  How should I use this medicine?  Take this medicine by mouth with a glass of water. Follow the directions on the prescription label. Take your doses at regular intervals. Do not take it more often than directed. Do not stop taking this medicine suddenly except upon the advice of your doctor. Stopping this medicine too quickly may cause serious side effects or your condition may worsen.  A special MedGuide will be given to you by the pharmacist with each  prescription and refill. Be sure to read this information carefully each time.  Talk to your pediatrician regarding the use of this medicine in children. Special care may be needed.  Overdosage: If you think you have taken too much of this medicine contact a poison control center or emergency room at once.  NOTE: This medicine is only for you. Do not share this medicine with others.  What if I miss a dose?  If you miss a dose, take it as soon as you can. If it is almost time for your next dose, take only that dose. Do not take double or extra doses.  What may interact with this medicine?  Do not take this medicine with any of the following medications:    arsenic trioxide    certain medicines medicines for irregular heart beat    cisapride    halofantrine    linezolid    MAOIs like Carbex, Eldepryl, Marplan, Nardil, and Parnate    methylene blue (injected into a vein)    other medicines for mental depression    phenothiazines like perphenazine, thioridazine and chlorpromazine    pimozide    probucol    procarbazine    sparfloxacin    Nathaniel's Wort    ziprasidone  This medicine may also interact with any of the following medications:    atropine and related drugs like hyoscyamine, scopolamine, tolterodine and others    barbiturate medicines for inducing sleep or treating seizures, such as phenobarbital    cimetidine    medicines for diabetes    medicines for seizures like carbamazepine or phenytoin    reserpine    thyroid medicine  This list may not describe all possible interactions. Give your health care provider a list of all the medicines, herbs, non-prescription drugs, or dietary supplements you use. Also tell them if you smoke, drink alcohol, or use illegal drugs. Some items may interact with your medicine.  What should I watch for while using this medicine?  Tell your doctor if your symptoms do not get better or if they get worse. Visit your doctor or health care professional for regular checks on your  progress. Because it may take several weeks to see the full effects of this medicine, it is important to continue your treatment as prescribed by your doctor.  Patients and their families should watch out for new or worsening thoughts of suicide or depression. Also watch out for sudden changes in feelings such as feeling anxious, agitated, panicky, irritable, hostile, aggressive, impulsive, severely restless, overly excited and hyperactive, or not being able to sleep. If this happens, especially at the beginning of treatment or after a change in dose, call your health care professional.  You may get drowsy or dizzy. Do not drive, use machinery, or do anything that needs mental alertness until you know how this medicine affects you. Do not stand or sit up quickly, especially if you are an older patient. This reduces the risk of dizzy or fainting spells. Alcohol may interfere with the effect of this medicine. Avoid alcoholic drinks.  Do not treat yourself for coughs, colds, or allergies without asking your doctor or health care professional for advice. Some ingredients can increase possible side effects.  Your mouth may get dry. Chewing sugarless gum or sucking hard candy, and drinking plenty of water may help. Contact your doctor if the problem does not go away or is severe.  This medicine may cause dry eyes and blurred vision. If you wear contact lenses you may feel some discomfort. Lubricating drops may help. See your eye doctor if the problem does not go away or is severe.  This medicine can cause constipation. Try to have a bowel movement at least every 2 to 3 days. If you do not have a bowel movement for 3 days, call your doctor or health care professional.  This medicine can make you more sensitive to the sun. Keep out of the sun. If you cannot avoid being in the sun, wear protective clothing and use sunscreen. Do not use sun lamps or tanning beds/booths.  What side effects may I notice from receiving this  medicine?  Side effects that you should report to your doctor or health care professional as soon as possible:    allergic reactions like skin rash, itching or hives, swelling of the face, lips, or tongue    abnormal production of milk in females    breast enlargement in both males and females    breathing problems    confusion, hallucinations    fever with increased sweating    irregular or fast, pounding heartbeat    muscle stiffness, or spasms    pain or difficulty passing urine, loss of bladder control    seizures    suicidal thoughts or other mood changes    swelling of the testicles    tingling, pain, or numbness in the feet or hands    yellowing of the eyes or skin  Side effects that usually do not require medical attention (report to your doctor or health care professional if they continue or are bothersome):    change in sex drive or performance    diarrhea    nausea, vomiting    weight gain or loss  This list may not describe all possible side effects. Call your doctor for medical advice about side effects. You may report side effects to FDA at 3-723-FDA-2139.  Where should I keep my medicine?  Keep out of the reach of children.  Store at room temperature between 15 and 30 degrees C (59 and 86 degrees F). Keep container tightly closed. Throw away any unused medicine after the expiration date.  NOTE:This sheet is a summary. It may not cover all possible information. If you have questions about this medicine, talk to your doctor, pharmacist, or health care provider. Copyright  2016 [employer]              Follow-ups after your visit        Follow-up notes from your care team     Return in about 4 weeks (around 4/11/2017).      Your next 10 appointments already scheduled     Apr 17, 2017  3:45 PM CDT   LAB with  LAB    [facility] ([facility] [place])    [address] [place] [address] [phone]           Patient must bring picture ID.  Patient should be  prepared to give a urine specimen  Please do not eat 10-12 hours before your appointment if you are coming in fasting for labs on lipids, cholesterol, or glucose (sugar).  Pregnant women should follow their Care Team instructions. Water with medications is okay. Do not drink coffee or other fluids.   If you have concerns about taking  your medications, please ask at office or if scheduling via [facility], send a message by clicking on Secure Messaging, Message Your Care Team.            Apr 17, 2017  4:20 PM CDT   (Arrive by 4:05 PM)   CT CHEST/ABDOMEN/PELVIS W CONTRAST with [id number]   [facility] CT ([facility])    [address]  1st Floor  [facility] [address] [phone]           Please bring any scans or X-rays taken at other hospitals, if similar tests were done. Also bring a list of your medicines, including vitamins, minerals and over-the-counter drugs. It is safest to leave personal items at home.  Be sure to tell your doctor:   If you have any allergies.   If there s any chance you are pregnant.   If you are breastfeeding.   If you have any special needs.  You may have contrast for this exam. To prepare:   Do not eat or drink for 2 hours before your exam. If you need to take medicine, you may take it with small sips of water. (We may ask you to take liquid medicine as well.)   The day before your exam, drink extra fluids at least six 8-ounce glasses (unless your doctor tells you to restrict your fluids).  Patients over 70 or patients with diabetes or kidney problems:   If you haven t had a blood test (creatinine test) within the last 30 days, go to your clinic or Diagnostic Imaging Department for this test.  If you have diabetes:   If your kidney function is normal, continue taking your metformin (Avandamet, Glucophage, Glucovance, Metaglip) on the day of your exam.   If your kidney function is abnormal, wait 48 hours before restarting this medicine.  You will  have oral contrast for this exam:   You will drink the contrast at home. Get this from your clinic or Diagnostic Imaging Department. Please follow the directions given.  Please wear loose clothing, such as a sweat suit or jogging clothes. Avoid snaps, zippers and other metal. We may ask you to undress and put on a hospital gown.  If you have any questions, please call the Imaging Department where you will have your exam.            Apr 17, 2017  4:45 PM CDT   (Arrive by 4:30 PM)   MR BRAIN W/O & W CONTRAST with [id number]   [facility] MRI ([facility] [place])    [address]  1st Floor  [place] [address] [phone]           Take your medicines as usual, unless your doctor tells you not to. Bring a list of your current medicines to your exam (including vitamins, minerals and over-the-counter drugs).  You will be given intravenous contrast for this exam. To prepare:   The day before your exam, drink extra fluids at least six 8-ounce glasses (unless your doctor tells you to restrict your fluids).   Have a blood test (creatinine test) within 30 days of your exam. Go to your clinic or Diagnostic Imaging Department for this test.  The MRI machine uses a strong magnet. Please wear clothes without metal (snaps, zippers). A sweatsuit works well, or we may give you a hospital gown.  Please remove any body piercings and hair extensions before you arrive. You will also remove watches, jewelry, hairpins, wallets, dentures, partial dental plates and hearing aids. You may wear contact lenses, and you may be able to wear your rings. We have a safe place to keep your personal items, but it is safer to leave them at home.   **IMPORTANT** THE INSTRUCTIONS BELOW ARE ONLY FOR THOSE PATIENTS WHO HAVE BEEN TOLD THEY WILL RECEIVE SEDATION OR GENERAL ANESTHESIA DURING THEIR MRI PROCEDURE:  IF YOU WILL RECEIVE SEDATION (take medicine to help you relax during your exam):   You must get the  medicine from your doctor before you arrive. Bring the medicine to the exam. Do not take it at home.   Arrive one hour early. Bring someone who can take you home after the test. Your medicine will make you sleepy. After the exam, you may not drive, take a bus or take a taxi by yourself.   No eating 8 hours before your exam. You may have clear liquids up until 4 hours before your exam. (Clear liquids include water, clear tea, black coffee and fruit juice without pulp.)  IF YOU WILL RECEIVE ANESTHESIA (be asleep for your exam):   Arrive 1 1/2 hours early. Bring someone who can take you home after the test. You may not drive, take a bus or take a taxi by yourself.   No eating 8 hours before your exam. You may have clear liquids up until 4 hours before your exam. (Clear liquids include water, clear tea, black coffee and fruit juice without pulp.)  Please call the Imaging Department at your exam site with any questions.            Apr 19, 2017  2:45 PM CDT   (Arrive by 2:30 PM)   Return Visit with Cheikh Ervin MD   [facility] ([facility])    [address] [place] [address] [phone]            Apr 28, 2017 11:00 AM CDT   (Arrive by 10:45 AM)   Return Visit with CASPER Jones [facility]rology ([facility])    [address] [place] [address] [phone]              Who to contact     Please call your clinic at [phone] to:    Ask questions about your health    Make or cancel appointments    Discuss your medicines    Learn about your test results    Speak to your doctor   If you have compliments or concerns about an experience at your clinic, or if you wish to file a complaint, please contact [facility] Patient Relations at [phone] or email us at [email or web address]         Additional Information About Your  "Visit        [employer] Information     [employer] gives you secure access to your electronic health record. If you see a primary care provider, you can also send messages to your care team and make appointments. If you have questions, please call your primary care clinic.  If you do not have a primary care provider, please call [phone] and they will assist you.      [employer] is an electronic gateway that provides easy, online access to your medical records. With [employer], you can request a clinic appointment, read your test results, renew a prescription or communicate with your care team.     To access your existing account, please contact your [facility] or call [phone] for assistance.        Care EveryWhere ID     This is your Care EveryWhere ID. This could be used by other organizations to access your [facility] medical records  [id number]        Your Vitals Were     Pulse Height BMI (Body Mass Index)             65 1.727 m (5' 8\") 25.54 kg/m2          Blood Pressure from Last 3 Encounters:   03/14/17 126/58   02/23/17 109/56   10/19/16 109/70    Weight from Last 3 Encounters:   03/14/17 76.2 kg (168 lb)   02/22/17 74 kg (163 lb 1.6 oz)   10/19/16 73.8 kg (162 lb 12.8 oz)              Today, you had the following     No orders found for display         Today's Medication Changes          These changes are accurate as of: 3/14/17  9:48 AM.  If you have any questions, ask your nurse or doctor.               Start taking these medicines.        Dose/Directions    nortriptyline 10 MG capsule   Commonly known as:  PAMELOR   Used for:  Chronic daily headache   Started by:  Sybil Dockery APRN CNP        Dose:  10 mg   Take 1 capsule (10 mg) by mouth At Bedtime   Quantity:  30 capsule   Refills:  3            Where to get your medicines      These medications were sent to [facility]/pharmacy #[id number] [place], [place] - [address], [place] " [address]     Phone:  [phone]     nortriptyline 10 MG capsule                Primary Care Provider Office Phone # Fax #    Ila Cornell [phone]       [facility] [place] [address] [facility]  [place] [address]        Thank you!     Thank you for choosing [facility]  for your care. Our goal is always to provide you with excellent care. Hearing back from our patients is one way we can continue to improve our services. Please take a few minutes to complete the written survey that you may receive in the mail after your visit with us. Thank you!             Your Updated Medication List - Protect others around you: Learn how to safely use, store and throw away your medicines at www.disposemymeds.org.          This list is accurate as of: 3/14/17  9:48 AM.  Always use your most recent med list.                   Brand Name Dispense Instructions for use    ACETAMINOPHEN PO      Take 650 mg by mouth every 4 hours as needed for pain       desmopressin 0.1 MG tablet    DDAVP    360 tablet    TAKE 2 TABLETS (200 MCG) BY MOUTH 2 TIMES DAILY       levETIRAcetam 750 MG tablet    KEPPRA    60 tablet    Take 1,500 mg by mouth every morning Two tablet in am and 2 1/2 tablet in pm       LORazepam 0.5 MG tablet    ATIVAN    30 tablet    Take 1 tablet (0.5 mg) by mouth every 4 hours as needed (Anxiety, Nausea/Vomiting or Sleep)       melatonin 1 MG Tabs tablet     90 tablet    Take 1-2 tablets (1-2 mg) by mouth nightly as needed for sleep       nortriptyline 10 MG capsule    PAMELOR    30 capsule    Take 1 capsule (10 mg) by mouth At Bedtime       tobramycin-dexamethasone ophthalmic ointment    TOBRADEX     Place 1 Application into both eyes 3 times daily Reported on 3/14/2017       VISINE TEARS OP      Apply 1-2 drops to eye 2 times daily as needed (for dry eyes.)

## 2017-03-14 NOTE — NURSING NOTE
Chief Complaint   Patient presents with     Consult     Headaches- blurred vision     Toyin cameron

## 2017-03-14 NOTE — PATIENT INSTRUCTIONS
Plan:  1. Headache diary  2. A trial of nortriptyline 10 mg at bedtime.   3. Reduce computer lights exposure at work.      4. Follow up in 4 weeks or sooner if needed      Patient Education    Nortriptyline Hydrochloride Oral capsule    Nortriptyline Hydrochloride Oral solution  Nortriptyline Hydrochloride Oral capsule  What is this medicine?  NORTRIPTYLINE (nor TRIP ti rosy) is used to treat depression.  This medicine may be used for other purposes; ask your health care provider or pharmacist if you have questions.  What should I tell my health care provider before I take this medicine?  They need to know if you have any of these conditions:    an alcohol problem    bipolar disorder or schizophrenia    difficulty passing urine, prostate trouble    glaucoma    heart disease or recent heart attack    liver disease    over active thyroid    seizures    thoughts or plans of suicide or a previous suicide attempt or family history of suicide attempt    an unusual or allergic reaction to nortriptyline, other medicines, foods, dyes, or preservatives    pregnant or trying to get pregnant    breast-feeding  How should I use this medicine?  Take this medicine by mouth with a glass of water. Follow the directions on the prescription label. Take your doses at regular intervals. Do not take it more often than directed. Do not stop taking this medicine suddenly except upon the advice of your doctor. Stopping this medicine too quickly may cause serious side effects or your condition may worsen.  A special MedGuide will be given to you by the pharmacist with each prescription and refill. Be sure to read this information carefully each time.  Talk to your pediatrician regarding the use of this medicine in children. Special care may be needed.  Overdosage: If you think you have taken too much of this medicine contact a poison control center or emergency room at once.  NOTE: This medicine is only for you. Do not share this medicine  with others.  What if I miss a dose?  If you miss a dose, take it as soon as you can. If it is almost time for your next dose, take only that dose. Do not take double or extra doses.  What may interact with this medicine?  Do not take this medicine with any of the following medications:    arsenic trioxide    certain medicines medicines for irregular heart beat    cisapride    halofantrine    linezolid    MAOIs like Carbex, Eldepryl, Marplan, Nardil, and Parnate    methylene blue (injected into a vein)    other medicines for mental depression    phenothiazines like perphenazine, thioridazine and chlorpromazine    pimozide    probucol    procarbazine    sparfloxacin    Nathaniel's Wort    ziprasidone  This medicine may also interact with any of the following medications:    atropine and related drugs like hyoscyamine, scopolamine, tolterodine and others    barbiturate medicines for inducing sleep or treating seizures, such as phenobarbital    cimetidine    medicines for diabetes    medicines for seizures like carbamazepine or phenytoin    reserpine    thyroid medicine  This list may not describe all possible interactions. Give your health care provider a list of all the medicines, herbs, non-prescription drugs, or dietary supplements you use. Also tell them if you smoke, drink alcohol, or use illegal drugs. Some items may interact with your medicine.  What should I watch for while using this medicine?  Tell your doctor if your symptoms do not get better or if they get worse. Visit your doctor or health care professional for regular checks on your progress. Because it may take several weeks to see the full effects of this medicine, it is important to continue your treatment as prescribed by your doctor.  Patients and their families should watch out for new or worsening thoughts of suicide or depression. Also watch out for sudden changes in feelings such as feeling anxious, agitated, panicky, irritable, hostile,  aggressive, impulsive, severely restless, overly excited and hyperactive, or not being able to sleep. If this happens, especially at the beginning of treatment or after a change in dose, call your health care professional.  You may get drowsy or dizzy. Do not drive, use machinery, or do anything that needs mental alertness until you know how this medicine affects you. Do not stand or sit up quickly, especially if you are an older patient. This reduces the risk of dizzy or fainting spells. Alcohol may interfere with the effect of this medicine. Avoid alcoholic drinks.  Do not treat yourself for coughs, colds, or allergies without asking your doctor or health care professional for advice. Some ingredients can increase possible side effects.  Your mouth may get dry. Chewing sugarless gum or sucking hard candy, and drinking plenty of water may help. Contact your doctor if the problem does not go away or is severe.  This medicine may cause dry eyes and blurred vision. If you wear contact lenses you may feel some discomfort. Lubricating drops may help. See your eye doctor if the problem does not go away or is severe.  This medicine can cause constipation. Try to have a bowel movement at least every 2 to 3 days. If you do not have a bowel movement for 3 days, call your doctor or health care professional.  This medicine can make you more sensitive to the sun. Keep out of the sun. If you cannot avoid being in the sun, wear protective clothing and use sunscreen. Do not use sun lamps or tanning beds/booths.  What side effects may I notice from receiving this medicine?  Side effects that you should report to your doctor or health care professional as soon as possible:    allergic reactions like skin rash, itching or hives, swelling of the face, lips, or tongue    abnormal production of milk in females    breast enlargement in both males and females    breathing problems    confusion, hallucinations    fever with increased  sweating    irregular or fast, pounding heartbeat    muscle stiffness, or spasms    pain or difficulty passing urine, loss of bladder control    seizures    suicidal thoughts or other mood changes    swelling of the testicles    tingling, pain, or numbness in the feet or hands    yellowing of the eyes or skin  Side effects that usually do not require medical attention (report to your doctor or health care professional if they continue or are bothersome):    change in sex drive or performance    diarrhea    nausea, vomiting    weight gain or loss  This list may not describe all possible side effects. Call your doctor for medical advice about side effects. You may report side effects to FDA at 7-909-FDA-6538.  Where should I keep my medicine?  Keep out of the reach of children.  Store at room temperature between 15 and 30 degrees C (59 and 86 degrees F). Keep container tightly closed. Throw away any unused medicine after the expiration date.  NOTE:This sheet is a summary. It may not cover all possible information. If you have questions about this medicine, talk to your doctor, pharmacist, or health care provider. Copyright  2016 [employer]

## 2017-03-14 NOTE — PROGRESS NOTES
"Re: Kari Turcios      MRN# [id number]  YOB: 1990  Date of Visit: 3/14/2017    OUTPATIENT NEUROLOGY VISIT NOTE    Chief Complaint:  Headache evaluation and Hospital discharge2/22/2017 follow up    History of Present Illness  Kari Turcios is a -year-old right-handed presents to the clinic today for headache evaluation and hospital discharge. 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  Per Dr Gray, on 2/22/2017\"Abelino was admitted on 2/22 from [facility] ED for further work up of his HA. His CT had been negative at the outside hospital for hydrocephalus, masses, or midline shift and his HA was improving at time of admission. No neuro deficits were noted. A neurosurgical consult was placed. They ran there own  shunt XR consult. This showed that his  shunt strata valve was set to 2. These can sometimes get moved (magnets) but cellphone/ipad etc. He then went on to have a brain MRI that was negative for acute pathology. Neurosurg reset his strata valve s/p MRI. He was then sent for an opthalmology consultation to evaluate vision changes he experienced during his HA. They cleared him, said he could f/u outpatient if vision changes re-occur. I then double checked with neuro-surg to see if d/c was appropriate. They said yes. They said f/u with Dr. Zavala should be set up for 3 months from now. I sent this request as part of d/c paperwork. I also set up f/u with Neurology clinic headache specialist (first available was mid march)\".   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 Agree with complete neuro work-up given history. Please alert to any changes in condition.    Headache History:      Onset History: 6 months headaches and blurry vision. Reports that he smokes marijuana which ease the pain. Headaches are not new and \"on\" and \" off\" since \"tumor surgery\" in 2014    Current Headache Pattern:      Frequency (How many headache days per month?): Daily and mainly in the afternoon for about 6 months. Patient does not wake up with headache. States that he is good in the morning, gets to work every day and headache comes in the afternoon. Goes to the Gym and headache gets a little bit worse after the gym. Goes back home and cooks dinner. Sleeps about 8 hours of a good sleep.      Duration of Headache: 4+ hours     Associated Symptoms:  vision changes blurry vision, intermittent nausea, light sensitivity. Headache starts typically at work-patient has 3 computer monitor at his desk. Patient is a  at [employer]. Stress level is high.                             Headache is worse with light and coughing, \"heavy\" activity.  Headache is not positional.                              Headache is better with laying down or walking around outside, drinking cold water, tylenol 500 mg once per day.                            Patient runs a mile everyday and weight lifting for 30 minutes daily with some headache worsening.         Description of Headache Pain & Location:  bilateral sharp or throbbing pain in the frontal area      Level of Pain as headache is starting:  3-4/10      Level of Pain during the worst headache:  7-8/10      Do headaches interfere with or prevent usual activities or diminish your productivity at home or work?  No      Treatments Tried:  Tylenol    Have you needed to utilize the " Emergency Room to treat your headache symptoms?  If so, how often and when was the last time used:  Yes - 2/22/2017    Are Headaches worsening over time?  No    What makes your headaches worse or triggers your headaches?   Environment: light at work   Stress: stress  Has one cup of coffee in am and none in the afternoon    Denies history of head or neck trauma, dizziness, vertigo, loss of consciousness, double vision, hearing difficulty, speech or swallowing difficulty, weakness or numbness in face, arms or legs, urinary or bowel incontinence, coordination problems or gait difficulty, fever or chills.    Neurodiagnostic Testing    MRI results reviewed  MR BRAIN W/O & W CONTRAST 2/23/2017 1:02 PM     Provided History: Pineal gland mixture of cell tumor resection status  post  shunt placement, chemotherapy, and radiation.     Comparison: Brain MRI 10/17/2016.     Technique: Multiplanar T1-weighted, axial FLAIR, and susceptibility  images were obtained without intravenous contrast. Following  intravenous gadolinium-based contrast administration, axial  T2-weighted, diffusion, and T1-weighted images (in multiple planes)  were obtained.     Contrast: 7.5 cc Gadavist     Findings:  Postsurgical changes of occipital craniotomy for resection of pineal  gland tumor with associated underlying mild dural thickening and  enhancement, unchanged. Additional postsurgical changes of right  frontal rashawn hole with a large focus of metallic susceptibility  weighted artifact centered in the right frontal calvarium obscuring  evaluation of the high right frontal lobe. There is a right frontal  approach ventriculostomy catheter with tip resting in the right  foramen of Harper. The ventricles are decompressed and normal in size.     There is no mass effect, midline shift, or evidence of intracranial  hemorrhage.     Postcontrast images demonstrate no abnormal intracranial enhancing  lesions.     Small round focus of blooming artifact in  the left subinsular white  matter and lateral occipital lobes, presumably posttreatment related.  No new intracranial hemorrhage. Stable developmental venous anomalies  in the right cerebellar hemisphere, left frontal lobe white matter,  and right occipital lobe. No new signal abnormality of the skull  marrow signal is noted. The major vascular intracranial flow-voids are  present. Small mucus retention cyst left maxillary sinus. Otherwise,  the visualized portions of paranasal sinuses, and mastoid air cells  are relatively clear. The orbits are grossly unremarkable.         Impression:  1. No significant change since 10/17/2016. No evidence of recurrent  germ cell tumor.  2. Stable chronic punctate microhemorrhages, presumably posttreatment  related.     I have personally reviewed the examination and initial interpretation  and I agree with the findings.     ZENA GARCIA MD  Exam: XR SHUNT MALFUNCTION SURVEY 2/23/2017 7:26 AM      History: Concern for shunt malfunction      Comparison: Brain MRI 10/17/2016, CT CAP 10/17/2016.     Findings: Right frontal approach ventriculoperitoneal shunt with  programmable strata valve, which is not imaged in profile. Shunt  catheter tubing courses over the right neck, chest and abdomen with  tip in the lateral mid right pelvis. No evidence of catheter  discontinuity or kinking. Suboccipital craniotomy defect.     Clear lungs. Nonobstructive bowel gas pattern.         Impression: Intact right frontal approach ventriculoperitoneal shunt.     ISABELLA CARPENTER MD    Lab  Results for PANDA WOODARD (MRN [id number]) as of 3/14/2017 09:04   Ref. Range 2/23/2017 06:15   Sodium Latest Ref Range: 133 - 144 mmol/L 144   Potassium Latest Ref Range: 3.4 - 5.3 mmol/L 4.1   Chloride Latest Ref Range: 94 - 109 mmol/L 112 (H)   Carbon Dioxide Latest Ref Range: 20 - 32 mmol/L 24   Urea Nitrogen Latest Ref Range: 7 - 30 mg/dL 12   Creatinine Latest Ref Range: 0.66 - 1.25 mg/dL 1.14   GFR  Estimate Latest Ref Range: >60 mL/min/1.7m2 77   GFR Estimate If Black Latest Ref Range: >60 mL/min/1.7m2 >90...   Calcium Latest Ref Range: 8.5 - 10.1 mg/dL 9.1   Anion Gap Latest Ref Range: 3 - 14 mmol/L 8   Glucose Latest Ref Range: 70 - 99 mg/dL 91   WBC Latest Ref Range: 4.0 - 11.0 10e9/L 11.9 (H)   Hemoglobin Latest Ref Range: 13.3 - 17.7 g/dL 14.1   Hematocrit Latest Ref Range: 40.0 - 53.0 % 42.3   Platelet Count Latest Ref Range: 150 - 450 10e9/L 186   RBC Count Latest Ref Range: 4.4 - 5.9 10e12/L 5.03   MCV Latest Ref Range: 78 - 100 fl 84   MCH Latest Ref Range: 26.5 - 33.0 pg 28.0   MCHC Latest Ref Range: 31.5 - 36.5 g/dL 33.3   RDW Latest Ref Range: 10.0 - 15.0 % 13.7   Diff Method Unknown Automated Method   % Neutrophils Latest Units: % 79.5   % Lymphocytes Latest Units: % 11.8   % Monocytes Latest Units: % 7.6   % Eosinophils Latest Units: % 0.6   % Basophils Latest Units: % 0.2   % Immature Granulocytes Latest Units: % 0.3   Nucleated RBCs Latest Ref Range: 0 /100 0   Absolute Neutrophil Latest Ref Range: 1.6 - 8.3 10e9/L 9.5 (H)   Absolute Lymphocytes Latest Ref Range: 0.8 - 5.3 10e9/L 1.4   Absolute Monocytes Latest Ref Range: 0.0 - 1.3 10e9/L 0.9   Absolute Eosinophils Latest Ref Range: 0.0 - 0.7 10e9/L 0.1   Absolute Basophils Latest Ref Range: 0.0 - 0.2 10e9/L 0.0   Abs Immature Granulocytes Latest Ref Range: 0 - 0.4 10e9/L 0.0   Absolute Nucleated RBC Unknown 0.0     Past Medical History reviewed and verified with the patient  Past Medical History   Diagnosis Date     Hypertropia      Myopia      Seizures (H)      2 months ago     Type 2 diabetes mellitus without complications (H)      Borderline       Past Surgical History reviewed and verified with the patient  Past Surgical History   Procedure Laterality Date     Arthrotomy shoulder, bankhart repair, combined  10/29/2012     Procedure: COMBINED ARTHROTOMY SHOULDER, BANKHART REPAIR;  RIGHT OPEN SHOULDER BANKART REPAIR;  Surgeon: Darrell  MD Lemuel;  Location:  [facility]     Optical tracking system biopsy brain  3/20/2014     Procedure: OPTICAL TRACKING SYSTEM BIOPSY BRAIN;  Right Frontal Approach For endoscopic Intraventricular Mass Biopsy, 3rd Ventriculostomy, Placement of right ventriculostomy catheter.;  Surgeon: Williams Clement MD;  Location: [facility] OR     Ventriculostomy  3/20/2014     Procedure: VENTRICULOSTOMY;;  Surgeon: Williams Clement MD;  Location: [facility]     Optical tracking system craniotomy, excise tumor, combined  3/26/2014     Procedure: COMBINED OPTICAL TRACKING SYSTEM CRANIOTOMY, EXCISE TUMOR;  Stealth Assisted Sub-Occiptial Craniotomy, Excise Tumor ;  Surgeon: Ann Sharp MD;  Location: [facility] OR     Optical tracking system implant shunt ventriculoperitoneal  4/8/2014     Procedure: OPTICAL TRACKING SYSTEM IMPLANT SHUNT VENTRICULOPERITONEAL;  Right Stealth Guided Ventricularperitoneal Shunt Placement ;  Surgeon: Ann Sharp MD;  Location: [facility] OR     Recession resection (repair strabismus) bilateral Bilateral 8/4/2016     Procedure: RECESSION RESECTION (REPAIR STRABISMUS) BILATERAL;  Surgeon: Serafin Menendez MD;  Location: [facility] OR       Family History reviewed and verified with the patient  No neurological history  Social History:  Lives on his own, works full time  Social History   Substance Use Topics     Smoking status: Never Smoker     Smokeless tobacco: Never Used     Alcohol use No    reviewed and verified with the patient     Allergies   Allergen Reactions     Penicillins Hives       Current Outpatient Prescriptions   Medication Sig Dispense Refill     desmopressin (DDAVP) 0.1 MG tablet TAKE 2 TABLETS (200 MCG) BY MOUTH 2 TIMES DAILY 360 tablet 3     melatonin 1 MG TABS Take 1-2 tablets (1-2 mg) by mouth nightly as needed for sleep 90 tablet 0     levETIRAcetam (KEPPRA) 750 MG tablet Take 1,500 mg by mouth every morning Two tablet in am and 2 1/2 tablet in pm 60 tablet 0      "Glycerin-Hypromellose- (VISINE TEARS OP) Apply 1-2 drops to eye 2 times daily as needed (for dry eyes.)       ACETAMINOPHEN PO Take 650 mg by mouth every 4 hours as needed for pain       tobramycin-dexamethasone (TOBRADEX) ophthalmic ointment Place 1 Application into both eyes 3 times daily Reported on 3/14/2017       LORazepam (ATIVAN) 0.5 MG tablet Take 1 tablet (0.5 mg) by mouth every 4 hours as needed (Anxiety, Nausea/Vomiting or Sleep) (Patient not taking: Reported on 3/14/2017) 30 tablet 3   reviewed and verified with the patient    Review of Systems:   A 12-point ROS including constitutional, eyes, ENT, respiratory, cardiovascular, gastroenterology, genitourinary, integumentary, musculoskeletal, neurology, hematology and psychiatric were all reviewed with the patient and completed at the [facility] Question [facility] and as mentioned in the HPI.     General Exam:   /58  Pulse 65  Ht 1.727 m (5' 8\")  Wt 76.2 kg (168 lb)  BMI 25.54 kg/m2  GEN: Awake, NAD; good eye contact, responses appropriately, no headache reported     HEENT: Head atraumatic/Normocephalic. Scalp normal. Right  shunt. Pupils equally round, 2 mm, minimally reactive to light and accommodation, sclera and conjunctiva normal. Fundoscopic examination reveals normal vessels no papilledema.   Neck: Easily moveable without resistance  Heart: S1/S2 appreciated, RRR, no m/r/g, no carotid bruits  Lungs:Lungs are clear to auscultation bilaterally, no wheezes or crackles.   Neurological Examination:  The patient is alert and oriented times four. Has good attention and concentration.  Speech is fluent without dysarthria.   Cranial nerves:  CN I deferred.   CN II: Intact and full visual fields to confrontation bilaterally. CN III, IV, VI: Up gaze deficit (no new see opthalmology note), intact down and lateral gaze bilaterally.  There is no nystagmus. Has conjugated gaze. Intact direct and consensual pupillary light reflexes.   CN V: " Intact and symmetrical to facial sensation in the V1 through V3 bilaterally.   CN VII: Intact and symmetrical eyebrow and lid raise and eyelid closure, smiles and frown.   CN VIII: Intact to finger rub bilaterally.   CN IX and X: The palates elevates symmetrical. The uvula is midline.   CN XII: The tongue protrudes midline with no atrophy or fasciculations.   Motor exam: The patient has a normal bulk and tone throughout. There is no atrophy, fasciculations, clonus, or abnormal movements appreciated.   Strength Exam:  5/5 strength at shoulder abduction, elbow flexion or extension, wrist flexion or extension, finger abduction, , hip flexion and extension, knee flexion and extension, and dorsiflexion and plantarflexion bilaterally.   Sensation is intact to light touch  throughout. Reflexes are 2+ and symmetrical at biceps, triceps, brachioradialis, patellar, and Achilles.   Negative Babinski with downgoing toes bilaterally.   Coordination reveals finger-nose-finger, rapid alternating movements, finger tapping with normal speed and accuracy.   Station and gait is normal. There is no ataxia. Can walk on the toes, heels, and tandem walk without difficulty. Has no drift and a negative Romberg. Josemanuele's negative       Assessment and Plan:  Headache chronic for 6 months.  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 Non focal neurological exam except upgaze deficit and minimally reactive   bilaterally which is not new. Brain MRI, Xray shunt all are stable. No new changes per opthalmology evaluation 2/23/2017.  Plan:  1. Headache diary  2. A trial of nortriptyline 10 mg at bedtime.  3. Reduce computer lights exposure at work.    4. Follow up in 4 weeks or sooner if needed    Prescription nortriptyline provided. Correct use and course provided.  Expected benefits and typical side effects reviewed. Safety of concomitant medications and interactions reviewed. Patient taught signs and symptoms of adverse reactions and allergies. Patient understands teaching and accepts risks of prescribed medication regimen.    I discussed all my recommendation with Kari Turcios. The patient verbalizes understanding and comfortable with the plan. The patient has our clinic phone number to call with any questions or concerns. All of the patient's questions were answered from the best of my current knowledge.     Thank you for letting me be a part of the treatment team for Kari Turcios    Time spent with pt answering questions, discussing findings, counseling and coordinating care was more than 50% the appointment time, 57  minutes.     CASPER Black, CNP  [facility]    Answers for HPI/ROS submitted by the patient on 3/14/2017   General Symptoms: Yes  Skin Symptoms: No  HENT Symptoms: Yes  EYE SYMPTOMS: Yes  HEART SYMPTOMS: No  LUNG SYMPTOMS: No  INTESTINAL SYMPTOMS: No  URINARY SYMPTOMS: No  REPRODUCTIVE SYMPTOMS: No  SKELETAL SYMPTOMS: No  BLOOD SYMPTOMS: No  NERVOUS SYSTEM SYMPTOMS: No  MENTAL HEALTH SYMPTOMS: No  Fever: Yes  Loss of appetite: No  Weight loss: No  Weight gain: No  Fatigue: No  Night sweats: Yes  Chills: Yes  Increased stress: No  Excessive hunger: No  Excessive thirst: No  Feeling hot or cold when others believe the temperature is normal: Yes  Loss of height: No  Surgical site pain: No  Hallucinations: No  Change in or Loss of Energy: No  Hyperactivity: No  Confusion: No  Ear pain: No  Ear discharge: No  Hearing loss: No  Tinnitus: No  Nosebleeds: No  Congestion: Yes  Sinus pain: No  Trouble swallowing: No   Voice hoarseness: No  Mouth sores: No  Sore throat: Yes  Tooth pain: No  Gum tenderness: No  Bleeding gums: No  Change in taste: No  Change in sense of smell: No  Dry mouth: No  Hearing aid used: No  Neck lump: No  Eye pain:  Yes  Vision loss: No  Dry eyes: Yes  Watery eyes: No  Eye bulging: No  Double vision: Yes  Flashing of lights: No  Spots: No  Floaters: No  Redness: No  Crossed eyes: No  Tunnel Vision: No  Yellowing of eyes: No  Eye irritation: No

## 2017-03-14 NOTE — LETTER
"3/14/2017       RE: Kari Turcios  [id number]  [facility] [id number]     Dear Colleague,    Thank you for referring your patient, Kari Turcios, to the [facility] NEUROLOGY at [facility]. Please see a copy of my visit note below.    Re: Kari Turcios      MRN# [id number]  YOB: 1990  Date of Visit: 3/14/2017    OUTPATIENT NEUROLOGY VISIT NOTE    Chief Complaint:  Headache evaluation and Hospital discharge2/22/2017 follow up    History of Present Illness  Kari Turcios is a -year-old right-handed presents to the clinic today for headache evaluation and hospital discharge. 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  Per Dr Gray, on 2/22/2017\"Abelino was admitted on 2/22 from [facility] ED for further work up of his HA. His CT had been negative at the outside hospital for hydrocephalus, masses, or midline shift and his HA was improving at time of admission. No neuro deficits were noted. A neurosurgical consult was placed. They ran there own  shunt XR consult. This showed that his  shunt strata valve was set to 2. These can sometimes get moved (magnets) but cellphone/ipad etc. He then went on to have a brain MRI that was negative for acute pathology. Neurosurg reset his strata valve s/p MRI. He was then sent for an opthalmology consultation to evaluate vision changes he experienced during his HA. They cleared him, said he could f/u outpatient if vision changes re-occur. I then double checked with neuro-surg to see if d/c was appropriate. They said yes. They said f/u with Dr. Zavala should be set up for 3 months from now. I sent this request as part of d/c paperwork. I also set up f/u with Neurology clinic headache specialist (first available was mid march)\".   Opthalmology " "evaluation on 2/23/2017, \"1) transient visual disturbance  -BCVA 20/20 both eyes  -no optic disc edema on exam  -this does not rule out elevated ICP, correlate with MRI brain (pending)  - no signs on clinical exam to explain transient visual disturbance, now back to baseline      2) Supranuclear upgaze deficit and left hypertropia  -s/p strabismus surgery 8/4/2016  -stable  -no c/o diplopia     Patient can follow-up in clinic for visual fields if needed. Agree with complete neuro work-up given history. Please alert to any changes in condition.    Headache History:      Onset History: 6 months headaches and blurry vision. Reports that he smokes marijuana which ease the pain. Headaches are not new and \"on\" and \" off\" since \"tumor surgery\" in 2014    Current Headache Pattern:      Frequency (How many headache days per month?): Daily and mainly in the afternoon for about 6 months. Patient does not wake up with headache. States that he is good in the morning, gets to work every day and headache comes in the afternoon. Goes to the Gym and headache gets a little bit worse after the gym. Goes back home and cooks dinner. Sleeps about 8 hours of a good sleep.      Duration of Headache: 4+ hours     Associated Symptoms:  vision changes blurry vision, intermittent nausea, light sensitivity. Headache starts typically at work-patient has 3 computer monitor at his desk. Patient is a  at [employer]. Stress level is high.                             Headache is worse with light and coughing, \"heavy\" activity.  Headache is not positional.                              Headache is better with laying down or walking around outside, drinking cold water, tylenol 500 mg once per day.                            Patient runs a mile everyday and weight lifting for 30 minutes daily with some headache worsening.         Description of Headache Pain & Location:  bilateral sharp or throbbing pain in the frontal area      Level " of Pain as headache is starting:  3-4/10      Level of Pain during the worst headache:  7-8/10      Do headaches interfere with or prevent usual activities or diminish your productivity at home or work?  No      Treatments Tried:  Tylenol    Have you needed to utilize the Emergency Room to treat your headache symptoms?  If so, how often and when was the last time used:  Yes - 2/22/2017    Are Headaches worsening over time?  No    What makes your headaches worse or triggers your headaches?   Environment: light at work   Stress: stress  Has one cup of coffee in am and none in the afternoon    Denies history of head or neck trauma, dizziness, vertigo, loss of consciousness, double vision, hearing difficulty, speech or swallowing difficulty, weakness or numbness in face, arms or legs, urinary or bowel incontinence, coordination problems or gait difficulty, fever or chills.    Neurodiagnostic Testing    MRI results reviewed  MR BRAIN W/O & W CONTRAST 2/23/2017 1:02 PM     Provided History: Pineal gland mixture of cell tumor resection status  post  shunt placement, chemotherapy, and radiation.     Comparison: Brain MRI 10/17/2016.     Technique: Multiplanar T1-weighted, axial FLAIR, and susceptibility  images were obtained without intravenous contrast. Following  intravenous gadolinium-based contrast administration, axial  T2-weighted, diffusion, and T1-weighted images (in multiple planes)  were obtained.     Contrast: 7.5 cc Gadavist     Findings:  Postsurgical changes of occipital craniotomy for resection of pineal  gland tumor with associated underlying mild dural thickening and  enhancement, unchanged. Additional postsurgical changes of right  frontal rashawn hole with a large focus of metallic susceptibility  weighted artifact centered in the right frontal calvarium obscuring  evaluation of the high right frontal lobe. There is a right frontal  approach ventriculostomy catheter with tip resting in the right  foramen  of Harper. The ventricles are decompressed and normal in size.     There is no mass effect, midline shift, or evidence of intracranial  hemorrhage.     Postcontrast images demonstrate no abnormal intracranial enhancing  lesions.     Small round focus of blooming artifact in the left subinsular white  matter and lateral occipital lobes, presumably posttreatment related.  No new intracranial hemorrhage. Stable developmental venous anomalies  in the right cerebellar hemisphere, left frontal lobe white matter,  and right occipital lobe. No new signal abnormality of the skull  marrow signal is noted. The major vascular intracranial flow-voids are  present. Small mucus retention cyst left maxillary sinus. Otherwise,  the visualized portions of paranasal sinuses, and mastoid air cells  are relatively clear. The orbits are grossly unremarkable.         Impression:  1. No significant change since 10/17/2016. No evidence of recurrent  germ cell tumor.  2. Stable chronic punctate microhemorrhages, presumably posttreatment  related.     I have personally reviewed the examination and initial interpretation  and I agree with the findings.     ZENA GARCIA MD  Exam: XR SHUNT MALFUNCTION SURVEY 2/23/2017 7:26 AM      History: Concern for shunt malfunction      Comparison: Brain MRI 10/17/2016, CT CAP 10/17/2016.     Findings: Right frontal approach ventriculoperitoneal shunt with  programmable strata valve, which is not imaged in profile. Shunt  catheter tubing courses over the right neck, chest and abdomen with  tip in the lateral mid right pelvis. No evidence of catheter  discontinuity or kinking. Suboccipital craniotomy defect.     Clear lungs. Nonobstructive bowel gas pattern.         Impression: Intact right frontal approach ventriculoperitoneal shunt.     ISABELLA CARPENTER MD    Lab  Results for PANDA WOODARD (MRN [id number]) as of 3/14/2017 09:04   Ref. Range 2/23/2017 06:15   Sodium Latest Ref Range: 133 - 144 mmol/L  144   Potassium Latest Ref Range: 3.4 - 5.3 mmol/L 4.1   Chloride Latest Ref Range: 94 - 109 mmol/L 112 (H)   Carbon Dioxide Latest Ref Range: 20 - 32 mmol/L 24   Urea Nitrogen Latest Ref Range: 7 - 30 mg/dL 12   Creatinine Latest Ref Range: 0.66 - 1.25 mg/dL 1.14   GFR Estimate Latest Ref Range: >60 mL/min/1.7m2 77   GFR Estimate If Black Latest Ref Range: >60 mL/min/1.7m2 >90...   Calcium Latest Ref Range: 8.5 - 10.1 mg/dL 9.1   Anion Gap Latest Ref Range: 3 - 14 mmol/L 8   Glucose Latest Ref Range: 70 - 99 mg/dL 91   WBC Latest Ref Range: 4.0 - 11.0 10e9/L 11.9 (H)   Hemoglobin Latest Ref Range: 13.3 - 17.7 g/dL 14.1   Hematocrit Latest Ref Range: 40.0 - 53.0 % 42.3   Platelet Count Latest Ref Range: 150 - 450 10e9/L 186   RBC Count Latest Ref Range: 4.4 - 5.9 10e12/L 5.03   MCV Latest Ref Range: 78 - 100 fl 84   MCH Latest Ref Range: 26.5 - 33.0 pg 28.0   MCHC Latest Ref Range: 31.5 - 36.5 g/dL 33.3   RDW Latest Ref Range: 10.0 - 15.0 % 13.7   Diff Method Unknown Automated Method   % Neutrophils Latest Units: % 79.5   % Lymphocytes Latest Units: % 11.8   % Monocytes Latest Units: % 7.6   % Eosinophils Latest Units: % 0.6   % Basophils Latest Units: % 0.2   % Immature Granulocytes Latest Units: % 0.3   Nucleated RBCs Latest Ref Range: 0 /100 0   Absolute Neutrophil Latest Ref Range: 1.6 - 8.3 10e9/L 9.5 (H)   Absolute Lymphocytes Latest Ref Range: 0.8 - 5.3 10e9/L 1.4   Absolute Monocytes Latest Ref Range: 0.0 - 1.3 10e9/L 0.9   Absolute Eosinophils Latest Ref Range: 0.0 - 0.7 10e9/L 0.1   Absolute Basophils Latest Ref Range: 0.0 - 0.2 10e9/L 0.0   Abs Immature Granulocytes Latest Ref Range: 0 - 0.4 10e9/L 0.0   Absolute Nucleated RBC Unknown 0.0     Past Medical History reviewed and verified with the patient  Past Medical History   Diagnosis Date     Hypertropia      Myopia      Seizures (H)      2 months ago     Type 2 diabetes mellitus without complications (H)      Borderline       Past Surgical History  reviewed and verified with the patient  Past Surgical History   Procedure Laterality Date     Arthrotomy shoulder, bankhart repair, combined  10/29/2012     Procedure: COMBINED ARTHROTOMY SHOULDER, BANKHART REPAIR;  RIGHT OPEN SHOULDER BANKART REPAIR;  Surgeon: Lemuel Ramírez MD;  Location:  [facility]     Optical tracking system biopsy brain  3/20/2014     Procedure: OPTICAL TRACKING SYSTEM BIOPSY BRAIN;  Right Frontal Approach For endoscopic Intraventricular Mass Biopsy, 3rd Ventriculostomy, Placement of right ventriculostomy catheter.;  Surgeon: Williams Clement MD;  Location: [facility] OR     Ventriculostomy  3/20/2014     Procedure: VENTRICULOSTOMY;;  Surgeon: Williams Clement MD;  Location: [facility] OR     Optical tracking system craniotomy, excise tumor, combined  3/26/2014     Procedure: COMBINED OPTICAL TRACKING SYSTEM CRANIOTOMY, EXCISE TUMOR;  Stealth Assisted Sub-Occiptial Craniotomy, Excise Tumor ;  Surgeon: Ann Sharp MD;  Location: [facility] OR     Optical tracking system implant shunt ventriculoperitoneal  4/8/2014     Procedure: OPTICAL TRACKING SYSTEM IMPLANT SHUNT VENTRICULOPERITONEAL;  Right Stealth Guided Ventricularperitoneal Shunt Placement ;  Surgeon: Ann Sharp MD;  Location: [facility] OR     Recession resection (repair strabismus) bilateral Bilateral 8/4/2016     Procedure: RECESSION RESECTION (REPAIR STRABISMUS) BILATERAL;  Surgeon: Serafin Menendez MD;  Location: [facility] OR       Family History reviewed and verified with the patient  No neurological history  Social History:  Lives on his own, works full time  Social History   Substance Use Topics     Smoking status: Never Smoker     Smokeless tobacco: Never Used     Alcohol use No    reviewed and verified with the patient     Allergies   Allergen Reactions     Penicillins Hives       Current Outpatient Prescriptions   Medication Sig Dispense Refill     desmopressin (DDAVP) 0.1 MG tablet TAKE 2 TABLETS (200 MCG) BY  "MOUTH 2 TIMES DAILY 360 tablet 3     melatonin 1 MG TABS Take 1-2 tablets (1-2 mg) by mouth nightly as needed for sleep 90 tablet 0     levETIRAcetam (KEPPRA) 750 MG tablet Take 1,500 mg by mouth every morning Two tablet in am and 2 1/2 tablet in pm 60 tablet 0     Glycerin-Hypromellose- (VISINE TEARS OP) Apply 1-2 drops to eye 2 times daily as needed (for dry eyes.)       ACETAMINOPHEN PO Take 650 mg by mouth every 4 hours as needed for pain       tobramycin-dexamethasone (TOBRADEX) ophthalmic ointment Place 1 Application into both eyes 3 times daily Reported on 3/14/2017       LORazepam (ATIVAN) 0.5 MG tablet Take 1 tablet (0.5 mg) by mouth every 4 hours as needed (Anxiety, Nausea/Vomiting or Sleep) (Patient not taking: Reported on 3/14/2017) 30 tablet 3   reviewed and verified with the patient    Review of Systems:   A 12-point ROS including constitutional, eyes, ENT, respiratory, cardiovascular, gastroenterology, genitourinary, integumentary, musculoskeletal, neurology, hematology and psychiatric were all reviewed with the patient and completed at the Neuroscience Services Question nary and as mentioned in the HPI.     General Exam:   /58  Pulse 65  Ht 1.727 m (5' 8\")  Wt 76.2 kg (168 lb)  BMI 25.54 kg/m2  GEN: Awake, NAD; good eye contact, responses appropriately, no headache reported     HEENT: Head atraumatic/Normocephalic. Scalp normal. Right  shunt. Pupils equally round, 2 mm, minimally reactive to light and accommodation, sclera and conjunctiva normal. Fundoscopic examination reveals normal vessels no papilledema.   Neck: Easily moveable without resistance  Heart: S1/S2 appreciated, RRR, no m/r/g, no carotid bruits  Lungs:Lungs are clear to auscultation bilaterally, no wheezes or crackles.   Neurological Examination:  The patient is alert and oriented times four. Has good attention and concentration.  Speech is fluent without dysarthria.   Cranial nerves:  CN I deferred.   CN II: Intact and " full visual fields to confrontation bilaterally. CN III, IV, VI: Up gaze deficit (no new see opthalmology note), intact down and lateral gaze bilaterally.  There is no nystagmus. Has conjugated gaze. Intact direct and consensual pupillary light reflexes.   CN V: Intact and symmetrical to facial sensation in the V1 through V3 bilaterally.   CN VII: Intact and symmetrical eyebrow and lid raise and eyelid closure, smiles and frown.   CN VIII: Intact to finger rub bilaterally.   CN IX and X: The palates elevates symmetrical. The uvula is midline.   CN XII: The tongue protrudes midline with no atrophy or fasciculations.   Motor exam: The patient has a normal bulk and tone throughout. There is no atrophy, fasciculations, clonus, or abnormal movements appreciated.   Strength Exam:  5/5 strength at shoulder abduction, elbow flexion or extension, wrist flexion or extension, finger abduction, , hip flexion and extension, knee flexion and extension, and dorsiflexion and plantarflexion bilaterally.   Sensation is intact to light touch  throughout. Reflexes are 2+ and symmetrical at biceps, triceps, brachioradialis, patellar, and Achilles.   Negative Babinski with downgoing toes bilaterally.   Coordination reveals finger-nose-finger, rapid alternating movements, finger tapping with normal speed and accuracy.   Station and gait is normal. There is no ataxia. Can walk on the toes, heels, and tandem walk without difficulty. Has no drift and a negative Romberg. Delia's negative       Assessment and Plan:  Headache chronic for 6 months.  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 Non focal neurological exam except upgaze deficit and minimally reactive   bilaterally which is not new. Brain MRI, Xray shunt all are stable. No new changes per  opthalmology evaluation 2/23/2017.  Plan:  1. Headache diary  2. A trial of nortriptyline 10 mg at bedtime.  3. Reduce computer lights exposure at work.    4. Follow up in 4 weeks or sooner if needed    Prescription nortriptyline provided. Correct use and course provided. Expected benefits and typical side effects reviewed. Safety of concomitant medications and interactions reviewed. Patient taught signs and symptoms of adverse reactions and allergies. Patient understands teaching and accepts risks of prescribed medication regimen.    I discussed all my recommendation with Kari Turcios. The patient verbalizes understanding and comfortable with the plan. The patient has our clinic phone number to call with any questions or concerns. All of the patient's questions were answered from the best of my current knowledge.     Thank you for letting me be a part of the treatment team for Charlotteva Tam    Time spent with pt answering questions, discussing findings, counseling and coordinating care was more than 50% the appointment time, 57  minutes.     CASPER Black, CNP  [facility]

## 2017-04-12 ENCOUNTER — CARE COORDINATION (OUTPATIENT)
Dept: ONCOLOGY | Facility: CLINIC | Age: 27
End: 2017-04-12

## 2017-04-12 NOTE — PROGRESS NOTES
Called pt and LMVM reminding him of appointments on 4/17 for labs and scans and visit with Dr. Ervin on 4/19. Encouraged pt to contact clinic with any questions.

## 2017-04-17 DIAGNOSIS — C71.9 GERM CELL TUMOR OF BRAIN (H): ICD-10-CM

## 2017-04-17 LAB
AFP SERPL-MCNC: 2.2 UG/L (ref 0–8)
ALBUMIN SERPL-MCNC: 4 G/DL (ref 3.4–5)
ALP SERPL-CCNC: 74 U/L (ref 40–150)
ALT SERPL W P-5'-P-CCNC: 14 U/L (ref 0–70)
ANION GAP SERPL CALCULATED.3IONS-SCNC: 6 MMOL/L (ref 3–14)
AST SERPL W P-5'-P-CCNC: 7 U/L (ref 0–45)
BASOPHILS # BLD AUTO: 0 10E9/L (ref 0–0.2)
BASOPHILS NFR BLD AUTO: 0.6 %
BILIRUB SERPL-MCNC: 0.3 MG/DL (ref 0.2–1.3)
BUN SERPL-MCNC: 10 MG/DL (ref 7–30)
CALCIUM SERPL-MCNC: 8.3 MG/DL (ref 8.5–10.1)
CHLORIDE SERPL-SCNC: 101 MMOL/L (ref 94–109)
CO2 SERPL-SCNC: 29 MMOL/L (ref 20–32)
CREAT SERPL-MCNC: 0.91 MG/DL (ref 0.66–1.25)
DIFFERENTIAL METHOD BLD: ABNORMAL
EOSINOPHIL # BLD AUTO: 0 10E9/L (ref 0–0.7)
EOSINOPHIL NFR BLD AUTO: 0.4 %
ERYTHROCYTE [DISTWIDTH] IN BLOOD BY AUTOMATED COUNT: 13.2 % (ref 10–15)
GFR SERPL CREATININE-BSD FRML MDRD: ABNORMAL ML/MIN/1.7M2
GLUCOSE SERPL-MCNC: 98 MG/DL (ref 70–99)
HCT VFR BLD AUTO: 39.4 % (ref 40–53)
HGB BLD-MCNC: 12.9 G/DL (ref 13.3–17.7)
IMM GRANULOCYTES # BLD: 0 10E9/L (ref 0–0.4)
IMM GRANULOCYTES NFR BLD: 0.2 %
LDH SERPL L TO P-CCNC: 136 U/L (ref 85–227)
LYMPHOCYTES # BLD AUTO: 0.7 10E9/L (ref 0.8–5.3)
LYMPHOCYTES NFR BLD AUTO: 13.1 %
MCH RBC QN AUTO: 27.5 PG (ref 26.5–33)
MCHC RBC AUTO-ENTMCNC: 32.7 G/DL (ref 31.5–36.5)
MCV RBC AUTO: 84 FL (ref 78–100)
MONOCYTES # BLD AUTO: 0.4 10E9/L (ref 0–1.3)
MONOCYTES NFR BLD AUTO: 7.2 %
NEUTROPHILS # BLD AUTO: 4.3 10E9/L (ref 1.6–8.3)
NEUTROPHILS NFR BLD AUTO: 78.5 %
NRBC # BLD AUTO: 0 10*3/UL
NRBC BLD AUTO-RTO: 0 /100
PLATELET # BLD AUTO: 188 10E9/L (ref 150–450)
POTASSIUM SERPL-SCNC: 3.4 MMOL/L (ref 3.4–5.3)
PROT SERPL-MCNC: 7.6 G/DL (ref 6.8–8.8)
RBC # BLD AUTO: 4.69 10E12/L (ref 4.4–5.9)
SODIUM SERPL-SCNC: 136 MMOL/L (ref 133–144)
WBC # BLD AUTO: 5.4 10E9/L (ref 4–11)

## 2017-04-18 LAB — HCG-TM SERPL-ACNC: NORMAL IU/L

## 2017-04-19 ENCOUNTER — ONCOLOGY VISIT (OUTPATIENT)
Dept: ONCOLOGY | Facility: CLINIC | Age: 27
End: 2017-04-19
Attending: INTERNAL MEDICINE
Payer: COMMERCIAL

## 2017-04-19 VITALS
HEART RATE: 77 BPM | HEIGHT: 68 IN | WEIGHT: 155.1 LBS | BODY MASS INDEX: 23.51 KG/M2 | TEMPERATURE: 98.2 F | OXYGEN SATURATION: 100 % | RESPIRATION RATE: 17 BRPM | SYSTOLIC BLOOD PRESSURE: 109 MMHG | DIASTOLIC BLOOD PRESSURE: 73 MMHG

## 2017-04-19 DIAGNOSIS — C71.9 GERM CELL TUMOR OF BRAIN (H): Primary | ICD-10-CM

## 2017-04-19 PROCEDURE — 99212 OFFICE O/P EST SF 10 MIN: CPT | Mod: ZF

## 2017-04-19 PROCEDURE — 99214 OFFICE O/P EST MOD 30 MIN: CPT | Mod: ZP | Performed by: INTERNAL MEDICINE

## 2017-04-19 ASSESSMENT — PAIN SCALES - GENERAL: PAINLEVEL: NO PAIN (0)

## 2017-04-19 NOTE — PROGRESS NOTES
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 He was transferred to [facility] for neurosurgical management. A biopsy was performed on 3/20 with placement of third ventriculostomy and external ventricular drain. The biopsy showed a mixed germ cell tumor. Resection of the mass was performed on 3/26/14 by a midline combined occipital and suboccipital craniotomy performed by Trinidad Xiao and Urbano. It was R1 resection and most of the macroscopic disease was resected and pathology did reveal mixed germ cell tumor. He was seen by Medical Oncology (Cheikh Ervin) and Radiation Oncology (Dr. Kayla Song) and recommended chemotherapy followed by radiation therapy per the [facility] protocol for localized CNS Germ Cell Tumors.  He basically is following the stratum 1 for patients with localized non-germinomatous tumors to receive 6 cycles of chemotherapy (1, 3 & 5 -carbo/etoposide and 2, 4, & 6 are ifos/etoposide) and depending post chemotherapy radiographic (MRI) and biochemical response (tumor markers), he may be a candidate to receive less radiation therapy. He completed chemo 8/11/14 and follow up brain MRI was negative for residual/recurrence of disease. HCG and AFP markers were within normal limits (AFP was elevated at diagnosis). He received radiation to the ventricles followed by a boost (3000 cGy to ventricles and 5400 cGy to tumor bed) from 9/17/2014 through 10/28/14.    VIVI Roca  "(Kari) is here with his fried for a follow up visit. He did not show up for his last scheduled follow up 6 months ago.  He reports that he has noticed increasing issues with his memory, mostly short term.  He otherwise denies any new symptoms. He has stable headaches. He is able to control these. He has no neurologic complaints. Reports his seizures are well controlled. He reports his mood is good.  He denies any pain.         PAST MEDICAL HISTORY     1. Primary CNS germ cell tumor as detailed above.   2. Secondary Diabetes Insipidus  3. Secondary seizure disorder      CURRENT OUTPATIENT MEDICATIONS     Current Outpatient Prescriptions   Medication Sig     nortriptyline (PAMELOR) 10 MG capsule Take 1 capsule (10 mg) by mouth At Bedtime     ACETAMINOPHEN PO Take 650 mg by mouth every 4 hours as needed for pain     desmopressin (DDAVP) 0.1 MG tablet TAKE 2 TABLETS (200 MCG) BY MOUTH 2 TIMES DAILY     tobramycin-dexamethasone (TOBRADEX) ophthalmic ointment Place 1 Application into both eyes 3 times daily Reported on 3/14/2017     melatonin 1 MG TABS Take 1-2 tablets (1-2 mg) by mouth nightly as needed for sleep     LORazepam (ATIVAN) 0.5 MG tablet Take 1 tablet (0.5 mg) by mouth every 4 hours as needed (Anxiety, Nausea/Vomiting or Sleep)     levETIRAcetam (KEPPRA) 750 MG tablet Take 1,500 mg by mouth every morning Two tablet in am and 2 1/2 tablet in pm     Glycerin-Hypromellose- (VISINE TEARS OP) Apply 1-2 drops to eye 2 times daily as needed (for dry eyes.)     No current facility-administered medications for this visit.      Facility-Administered Medications Ordered in Other Visits   Medication     gadobutrol (GADAVIST) injection 7.5 mL           ALLERGIES     Allergies   Allergen Reactions     Penicillins Hives        REVIEW OF SYSTEMS   10 point ROS negative unless noted in HPI     PHYSICAL EXAM   /73  Pulse 77  Temp 98.2  F (36.8  C) (Oral)  Resp 17  Ht 1.727 m (5' 8\")  Wt 70.4 kg (155 lb 1.6 " oz)  SpO2 100%  BMI 23.58 kg/m2    Wt Readings from Last 4 Encounters:   04/19/17 70.4 kg (155 lb 1.6 oz)   03/14/17 76.2 kg (168 lb)   02/22/17 74 kg (163 lb 1.6 oz)   10/19/16 73.8 kg (162 lb 12.8 oz)   GEN: NAD, appears well, alert. His affect is somewhat flat.  HEENT: PERRL, EOMI, no icterus, injection or pallor. Mild esotropia of the left eye. Oropharynx is clear.  NECK: no cervical or supraclavicular lymphadenopathy  LUNGS: clear bilaterally  CV: RRR, no murmurs, rubs, or gallops  ABDOMEN: soft, non-tender, non-distended, normal bowel sounds, no hepatosplenomegaly by percussion or palpation.  Small 1 inch incision from shunt catheter  EXT: warm, well perfused, no edema  NEURO: alert, no obvious focal deficit other than esotropia of left eye  SKIN: no rashes     LABORATORY AND IMAGING STUDIES     Recent Labs   Lab Test  04/17/17   1613  02/23/17   0615  10/17/16   1603  08/22/16   2103  08/04/16   0744  10/05/15   1520   NA  136  144  139  143   --   140   POTASSIUM  3.4  4.1  3.8  3.8  4.1  4.0   CHLORIDE  101  112*  107  106   --   105   CO2  29  24  24  34*   --   30   ANIONGAP  6  8  8  3   --   5   BUN  10  12  14  9   --   11   CR  0.91  1.14  0.92  1.08  0.92  1.03   GLC  98  91  98  83  92  101*   DAVIDE  8.3*  9.1  9.1  9.4   --   9.3     Recent Labs   Lab Test  08/18/14   1305  08/15/14   0725  08/14/14   0801  08/13/14   0619  08/12/14   0713   MAG  1.8  1.7  1.9  1.8  1.9   PHOS  2.7  3.2  4.3  3.8  4.0     Recent Labs   Lab Test  04/17/17   1613  02/23/17   0615  08/22/16   2103  10/05/15   1520  07/06/15   0800   WBC  5.4  11.9*  7.0  6.3  4.3   HGB  12.9*  14.1  15.6  13.6  13.1*   PLT  188  186  258  195  184   MCV  84  84  84  83  84   NEUTROPHIL  78.5  79.5  60.3  73.7  65.5     Recent Labs   Lab Test  04/17/17   1613  10/17/16   1603  08/22/16   2103  10/05/15   1520  07/06/15   0800   BILITOTAL  0.3  0.3  0.9  0.6  1.0   ALKPHOS  74  90  98  76  85   ALT  14  48  31  18  17   AST  7  19  15   8  15   ALBUMIN  4.0  4.4  4.9  4.6  4.5   LDH  136   --    --   137  166     TSH   Date Value Ref Range Status   11/03/2014 1.36 0.40 - 4.00 mU/L Final     Comment:     Effective 7/30/2014, the reference range for this assay has changed to reflect   new instrumentation/methodology.     04/18/2014 2.18 0.4 - 5.0 mU/L Final   03/29/2014 0.36 (L) 0.4 - 5.0 mU/L Final     No results for input(s): CEA in the last 44164 hours.  Results for orders placed or performed in visit on 04/17/17   CT Chest/Abdomen/Pelvis w Contrast    Narrative    EXAMINATION: CT CHEST/ABDOMEN/PELVIS W CONTRAST, 4/17/2017 4:09 PM    TECHNIQUE:  Helical CT images from the thoracic inlet through the  symphysis pubis were obtained  with contrast.     CONTRAST DOSE: [id number]  103cc    COMPARISON: CT 10/13/2016, MR 2/23/2017    HISTORY: Pineal gland mixed germ cell tumor, status post resection in  2014 followed by chemotherapy and radiation therapy    FINDINGS:    Chest:   The thyroid gland is unremarkable. No axillary lymphadenopathy.    The heart size is within normal limits. No pericardial effusion. No  mediastinal lymphadenopathy.  The central tracheobronchial tree is patent. Clear lungs. No pleural  effusion or pneumothorax.     Abdomen and pelvis:   Stable  shunt tip in the right lower quadrant.    The liver, gallbladder, spleen and pancreas are unremarkable. Stable  subcentimeter right renal cyst. No hydronephrosis or nephrolithiasis.    The urinary bladder and prostate are unremarkable. No pelvic mass. No  abnormal bowel wall thickening or bowel obstruction. Normal appendix.    No abdominopelvic lymphadenopathy. Slightly increased small amount of  free fluid in the pelvis. No pneumatosis, free air or portal venous  gas.    Bones and soft tissues: No suspicious osseous findings.      Impression    IMPRESSION:  1. No evidence of metastatic disease in chest, abdomen or pelvis.   2. Slightly increased small amount of free fluid in the pelvis,  likely  secondary to the  shunt.     I have personally reviewed the examination and initial interpretation  and I agree with the findings.    SHAYNA DIAZ MD          ASSESSMENT AND PLAN   1. 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 DI- taking desmopressin  3. Seizure episode after cycle 3  4. Prior provoked PE, off of lovenox    ONC: S/p resection and 6 cycles of chemotherapy  (1, 3 & 5 -carbo/etoposide and 2, 4, & 6 were ifos/etoposide). MRI brain and tumor markers showed no recurrence/residual disease. He received radiation to the ventricles followed by a boost (3000 cGy to ventricles and 5400 cGy to tumor bed) from 9/17/2014 through 10/28/14.    - Restaging MRI done in February was negative for any evidence of recurrent disease  - Restaging CT scan Chest/abd/pelvis done Monday have been reviewed and negative for recurrent metastatic disease  - His tumor markers (AFP, LDH and HCG) remain negative. We will have him follow up in 6 months with tumor markers, and gets scans CT c/a/p and brain MRI annually    Depression: He has been following his pscyhologist.     PE: discontinued lovenox after 6 months of therapy (provoked clot)    ENDO:  Diabetes insipidus:  -Continue desmopressin, FU Dr. Yessenia Ramires    NEURO:    -H/o Seizures: on keppra,  -Esotropia with diplopia: stable. FU opthalmology   - Headaches. He notes that he feels better only with THC. He wants medical medical marijuana

## 2017-04-19 NOTE — NURSING NOTE
"Kari Turcios is a 27 year old male who presents for:  Chief Complaint   Patient presents with     Oncology Clinic Visit     return patient visit for EKG/MRI results/follow up related to Mixed germ cell tumor (H)        Initial Vitals:  /73  Pulse 77  Temp 98.2  F (36.8  C) (Oral)  Resp 17  Ht 1.727 m (5' 8\")  Wt 70.4 kg (155 lb 1.6 oz)  SpO2 100%  BMI 23.58 kg/m2 Estimated body mass index is 23.58 kg/(m^2) as calculated from the following:    Height as of this encounter: 1.727 m (5' 8\").    Weight as of this encounter: 70.4 kg (155 lb 1.6 oz).. Body surface area is 1.84 meters squared. BP completed using cuff size: large  No Pain (0) No LMP for male patient. Allergies and medications reviewed.     Medications: Medication refills not needed today.  Pharmacy name entered into [employer]:    [facility] [address] [place], [place] - [address]  [id number] [place], [place] - [address]    Comments: patient denied pain/discomfort.     5 minutes for nursing intake (face to face time)   Monique Little CMA        "

## 2017-04-19 NOTE — MR AVS SNAPSHOT
After Visit Summary   4/19/2017    Kari Turcios    MRN: [id number]           Patient Information     Date Of Birth          1990        Visit Information        Provider Department      4/19/2017 2:45 PM Cheikh Ervin MD [facility]        Today's Diagnoses     Germ cell tumor of brain (H)    -  1       Follow-ups after your visit        Your next 10 appointments already scheduled     Oct 16, 2017  3:30 PM CDT   [facility] Lab Draw with [facility] LAB DRAW   [facility] Lab Draw ([facility])    [address] [place] [id number]   [phone]            Oct 16, 2017  4:00 PM CDT   (Arrive by 3:45 PM)   Return Visit with Deborah Mantilla PA-C   [facility] ([facility])    [address] [place] [id number]   [phone]            Apr 16, 2018  3:45 PM CDT   Lab with  LAB   [facility] ([facility])    [address] [place] [id number]   [phone]            Apr 16, 2018  4:20 PM CDT   (Arrive by 4:05 PM)   CT CHEST/ABDOMEN/PELVIS W CONTRAST with [id number]   [facility] [place] [facility] ([facility])    [address] [place] [id number] [phone]           Please bring any scans or X-rays taken at other hospitals, if similar tests were done. Also bring a list of your medicines, including vitamins, minerals and over-the-counter drugs. It is safest to leave personal items at home.  Be sure to tell your doctor:   If you have any allergies.   If there s any chance you are pregnant.   If you are breastfeeding.   If you have any special needs.  You may have contrast for this exam. To prepare:   Do not eat or drink for 2 hours before your exam. If you need to take medicine, you may take it with small sips of water. (We may ask you to take liquid medicine  as well.)   The day before your exam, drink extra fluids at least six 8-ounce glasses (unless your doctor tells you to restrict your fluids).  Patients over 70 or patients with diabetes or kidney problems:   If you haven t had a blood test (creatinine test) within the last 30 days, go to your clinic or Diagnostic Imaging Department for this test.  If you have diabetes:   If your kidney function is normal, continue taking your metformin (Avandamet, Glucophage, Glucovance, Metaglip) on the day of your exam.   If your kidney function is abnormal, wait 48 hours before restarting this medicine.  You will have oral contrast for this exam:   You will drink the contrast at home. Get this from your clinic or Diagnostic Imaging Department. Please follow the directions given.  Please wear loose clothing, such as a sweat suit or jogging clothes. Avoid snaps, zippers and other metal. We may ask you to undress and put on a hospital gown.  If you have any questions, please call the Imaging Department where you will have your exam.            Apr 16, 2018  4:45 PM CDT   (Arrive by 4:30 PM)   MR BRAIN W/O & W CONTRAST with [id number]   [facility] ([facility])    [address] [address] [phone]           Take your medicines as usual, unless your doctor tells you not to. Bring a list of your current medicines to your exam (including vitamins, minerals and over-the-counter drugs).  You will be given intravenous contrast for this exam. To prepare:   The day before your exam, drink extra fluids at least six 8-ounce glasses (unless your doctor tells you to restrict your fluids).   Have a blood test (creatinine test) within 30 days of your exam. Go to your clinic or Diagnostic Imaging Department for this test.  The MRI machine uses a strong magnet. Please wear clothes without metal (snaps, zippers). A sweatsuit works well, or we may give you a hospital gown.   Please remove any body piercings and hair extensions before you arrive. You will also remove watches, jewelry, hairpins, wallets, dentures, partial dental plates and hearing aids. You may wear contact lenses, and you may be able to wear your rings. We have a safe place to keep your personal items, but it is safer to leave them at home.   **IMPORTANT** THE INSTRUCTIONS BELOW ARE ONLY FOR THOSE PATIENTS WHO HAVE BEEN TOLD THEY WILL RECEIVE SEDATION OR GENERAL ANESTHESIA DURING THEIR MRI PROCEDURE:  IF YOU WILL RECEIVE SEDATION (take medicine to help you relax during your exam):   You must get the medicine from your doctor before you arrive. Bring the medicine to the exam. Do not take it at home.   Arrive one hour early. Bring someone who can take you home after the test. Your medicine will make you sleepy. After the exam, you may not drive, take a bus or take a taxi by yourself.   No eating 8 hours before your exam. You may have clear liquids up until 4 hours before your exam. (Clear liquids include water, clear tea, black coffee and fruit juice without pulp.)  IF YOU WILL RECEIVE ANESTHESIA (be asleep for your exam):   Arrive 1 1/2 hours early. Bring someone who can take you home after the test. You may not drive, take a bus or take a taxi by yourself.   No eating 8 hours before your exam. You may have clear liquids up until 4 hours before your exam. (Clear liquids include water, clear tea, black coffee and fruit juice without pulp.)  Please call the Imaging Department at your exam site with any questions.            Apr 18, 2018  3:15 PM CDT   (Arrive by 3:00 PM)   Return Visit with Cheikh Ervin MD   [facility] ([facility])    [address]  2nd Floor  [facility] [address] [phone]              Future tests that were ordered for you today     Open Future Orders        Priority Expected Expires Ordered    CT Chest/Abdomen/Pelvis w Contrast Routine  "4/18/2018 5/31/2018 5/3/2017    MRI Brain w & w/o contrast Routine 4/18/2018 5/31/2018 5/3/2017            Who to contact     If you have questions or need follow up information about today's clinic visit or your schedule please contact [facility] directly at [phone].  Normal or non-critical lab and imaging results will be communicated to you by MyChart, letter or phone within 4 business days after the clinic has received the results. If you do not hear from us within 7 days, please contact the clinic through [employer] or phone. If you have a critical or abnormal lab result, we will notify you by phone as soon as possible.  Submit refill requests through [employer] or call your pharmacy and they will forward the refill request to us. Please allow 3 business days for your refill to be completed.          Additional Information About Your Visit        [employer] Information     [employer] gives you secure access to your electronic health record. If you see a primary care provider, you can also send messages to your care team and make appointments. If you have questions, please call your primary care clinic.  If you do not have a primary care provider, please call [phone] and they will assist you.        Care EveryWhere ID     This is your Care EveryWhere ID. This could be used by other organizations to access your [place] medical records  [id number]        Your Vitals Were     Pulse Temperature Respirations Height Pulse Oximetry BMI (Body Mass Index)    77 98.2  F (36.8  C) (Oral) 17 1.727 m (5' 8\") 100% 23.58 kg/m2       Blood Pressure from Last 3 Encounters:   04/28/17 134/80   04/19/17 109/73   03/14/17 126/58    Weight from Last 3 Encounters:   04/28/17 70.3 kg (155 lb)   04/19/17 70.4 kg (155 lb 1.6 oz)   03/14/17 76.2 kg (168 lb)              Today, you had the following     No orders found for display       Primary Care Provider Office Phone # Fax #    Ila Cornell [phone] " [phone]       [facility] [address]  [place] [address]        Thank you!     Thank you for choosing [facility]  for your care. Our goal is always to provide you with excellent care. Hearing back from our patients is one way we can continue to improve our services. Please take a few minutes to complete the written survey that you may receive in the mail after your visit with us. Thank you!             Your Updated Medication List - Protect others around you: Learn how to safely use, store and throw away your medicines at www.disposemymeds.org.          This list is accurate as of: 4/19/17 11:59 PM.  Always use your most recent med list.                   Brand Name Dispense Instructions for use    ACETAMINOPHEN PO      Take 650 mg by mouth every 4 hours as needed for pain       desmopressin 0.1 MG tablet    DDAVP    360 tablet    TAKE 2 TABLETS (200 MCG) BY MOUTH 2 TIMES DAILY       levETIRAcetam 750 MG tablet    KEPPRA    60 tablet    Take 1,500 mg by mouth every morning Two tablet in am and 2 1/2 tablet in pm       LORazepam 0.5 MG tablet    ATIVAN    30 tablet    Take 1 tablet (0.5 mg) by mouth every 4 hours as needed (Anxiety, Nausea/Vomiting or Sleep)       melatonin 1 MG Tabs tablet     90 tablet    Take 1-2 tablets (1-2 mg) by mouth nightly as needed for sleep       tobramycin-dexamethasone ophthalmic ointment    TOBRADEX     Place 1 Application into both eyes 3 times daily Reported on 3/14/2017       VISINE TEARS OP      Apply 1-2 drops to eye 2 times daily as needed (for dry eyes.)

## 2017-04-19 NOTE — LETTER
4/19/2017       RE: Kari Turcios  [id number]  [facility] [address]     Dear Colleague,    Thank you for referring your patient, Kari Turcios, to the [facility]. Please see a copy of my visit note below.    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 He was transferred to [facility] for neurosurgical management. A biopsy was performed on 3/20 with placement of third ventriculostomy and external ventricular drain. The biopsy showed a mixed germ cell tumor. Resection of the mass was performed on 3/26/14 by a midline combined occipital and suboccipital craniotomy performed by Trinidad Xiao and Urbano. It was R1 resection and most of the macroscopic disease was resected and pathology did reveal mixed germ cell tumor. He was seen by Medical Oncology (Cheikh Ervin) and Radiation Oncology (Dr. Kayla Song) and recommended chemotherapy followed by radiation therapy per the [facility] protocol for localized CNS Germ Cell Tumors.  He basically is following the stratum 1 for patients with localized non-germinomatous tumors to receive 6 cycles of chemotherapy (1, 3 & 5 -carbo/etoposide and 2, 4, & 6 are ifos/etoposide) and depending post chemotherapy radiographic (MRI) and biochemical response (tumor markers), he may be a candidate to receive less radiation therapy. He completed chemo 8/11/14 and follow up brain MRI was negative for residual/recurrence of disease.  HCG and AFP markers were within normal limits (AFP was elevated at diagnosis). He received radiation to the ventricles followed by a boost (3000 cGy to ventricles and 5400 cGy to tumor bed) from 9/17/2014 through 10/28/14.    VIVI Roca ([place]) is here with his fried for a follow up visit. He did not show up for his last scheduled follow up 6 months ago.  He reports that he has noticed increasing issues with his memory, mostly short term.  He otherwise denies any new symptoms. He has stable headaches. He is able to control these. He has no neurologic complaints. Reports his seizures are well controlled. He reports his mood is good.  He denies any pain.         PAST MEDICAL HISTORY     1. Primary CNS germ cell tumor as detailed above.   2. Secondary Diabetes Insipidus  3. Secondary seizure disorder      CURRENT OUTPATIENT MEDICATIONS     Current Outpatient Prescriptions   Medication Sig     nortriptyline (PAMELOR) 10 MG capsule Take 1 capsule (10 mg) by mouth At Bedtime     ACETAMINOPHEN PO Take 650 mg by mouth every 4 hours as needed for pain     desmopressin (DDAVP) 0.1 MG tablet TAKE 2 TABLETS (200 MCG) BY MOUTH 2 TIMES DAILY     tobramycin-dexamethasone (TOBRADEX) ophthalmic ointment Place 1 Application into both eyes 3 times daily Reported on 3/14/2017     melatonin 1 MG TABS Take 1-2 tablets (1-2 mg) by mouth nightly as needed for sleep     LORazepam (ATIVAN) 0.5 MG tablet Take 1 tablet (0.5 mg) by mouth every 4 hours as needed (Anxiety, Nausea/Vomiting or Sleep)     levETIRAcetam (KEPPRA) 750 MG tablet Take 1,500 mg by mouth every morning Two tablet in am and 2 1/2 tablet in pm     Glycerin-Hypromellose- (VISINE TEARS OP) Apply 1-2 drops to eye 2 times daily as needed (for dry eyes.)     No current facility-administered medications for this visit.      Facility-Administered Medications Ordered in Other Visits   Medication     gadobutrol (GADAVIST) injection 7.5 mL           ALLERGIES  "    Allergies   Allergen Reactions     Penicillins Hives        REVIEW OF SYSTEMS   10 point ROS negative unless noted in HPI     PHYSICAL EXAM   /73  Pulse 77  Temp 98.2  F (36.8  C) (Oral)  Resp 17  Ht 1.727 m (5' 8\")  Wt 70.4 kg (155 lb 1.6 oz)  SpO2 100%  BMI 23.58 kg/m2    Wt Readings from Last 4 Encounters:   04/19/17 70.4 kg (155 lb 1.6 oz)   03/14/17 76.2 kg (168 lb)   02/22/17 74 kg (163 lb 1.6 oz)   10/19/16 73.8 kg (162 lb 12.8 oz)   GEN: NAD, appears well, alert. His affect is somewhat flat.  HEENT: PERRL, EOMI, no icterus, injection or pallor. Mild esotropia of the left eye. Oropharynx is clear.  NECK: no cervical or supraclavicular lymphadenopathy  LUNGS: clear bilaterally  CV: RRR, no murmurs, rubs, or gallops  ABDOMEN: soft, non-tender, non-distended, normal bowel sounds, no hepatosplenomegaly by percussion or palpation.  Small 1 inch incision from shunt catheter  EXT: warm, well perfused, no edema  NEURO: alert, no obvious focal deficit other than esotropia of left eye  SKIN: no rashes     LABORATORY AND IMAGING STUDIES     Recent Labs   Lab Test  04/17/17   1613  02/23/17   0615  10/17/16   1603  08/22/16   2103  08/04/16   0744  10/05/15   1520   NA  136  144  139  143   --   140   POTASSIUM  3.4  4.1  3.8  3.8  4.1  4.0   CHLORIDE  101  112*  107  106   --   105   CO2  29  24  24  34*   --   30   ANIONGAP  6  8  8  3   --   5   BUN  10  12  14  9   --   11   CR  0.91  1.14  0.92  1.08  0.92  1.03   GLC  98  91  98  83  92  101*   DAVIDE  8.3*  9.1  9.1  9.4   --   9.3     Recent Labs   Lab Test  08/18/14   1305  08/15/14   0725  08/14/14   0801  08/13/14   0619  08/12/14   0713   MAG  1.8  1.7  1.9  1.8  1.9   PHOS  2.7  3.2  4.3  3.8  4.0     Recent Labs   Lab Test  04/17/17   1613  02/23/17   0615  08/22/16   2103  10/05/15   1520  07/06/15   0800   WBC  5.4  11.9*  7.0  6.3  4.3   HGB  12.9*  14.1  15.6  13.6  13.1*   PLT  188  186  258  195  184   MCV  84  84  84  83  84 "   NEUTROPHIL  78.5  79.5  60.3  73.7  65.5     Recent Labs   Lab Test  04/17/17   1613  10/17/16   1603  08/22/16   2103  10/05/15   1520  07/06/15   0800   BILITOTAL  0.3  0.3  0.9  0.6  1.0   ALKPHOS  74  90  98  76  85   ALT  14  48  31  18  17   AST  7  19  15  8  15   ALBUMIN  4.0  4.4  4.9  4.6  4.5   LDH  136   --    --   137  166     TSH   Date Value Ref Range Status   11/03/2014 1.36 0.40 - 4.00 mU/L Final     Comment:     Effective 7/30/2014, the reference range for this assay has changed to reflect   new instrumentation/methodology.     04/18/2014 2.18 0.4 - 5.0 mU/L Final   03/29/2014 0.36 (L) 0.4 - 5.0 mU/L Final     No results for input(s): CEA in the last 99225 hours.  Results for orders placed or performed in visit on 04/17/17   CT Chest/Abdomen/Pelvis w Contrast    Narrative    EXAMINATION: CT CHEST/ABDOMEN/PELVIS W CONTRAST, 4/17/2017 4:09 PM    TECHNIQUE:  Helical CT images from the thoracic inlet through the  symphysis pubis were obtained  with contrast.     CONTRAST DOSE: [id number]  103cc    COMPARISON: CT 10/13/2016, MR 2/23/2017    HISTORY: Pineal gland mixed germ cell tumor, status post resection in  2014 followed by chemotherapy and radiation therapy    FINDINGS:    Chest:   The thyroid gland is unremarkable. No axillary lymphadenopathy.    The heart size is within normal limits. No pericardial effusion. No  mediastinal lymphadenopathy.  The central tracheobronchial tree is patent. Clear lungs. No pleural  effusion or pneumothorax.     Abdomen and pelvis:   Stable  shunt tip in the right lower quadrant.    The liver, gallbladder, spleen and pancreas are unremarkable. Stable  subcentimeter right renal cyst. No hydronephrosis or nephrolithiasis.    The urinary bladder and prostate are unremarkable. No pelvic mass. No  abnormal bowel wall thickening or bowel obstruction. Normal appendix.    No abdominopelvic lymphadenopathy. Slightly increased small amount of  free fluid in the pelvis. No  pneumatosis, free air or portal venous  gas.    Bones and soft tissues: No suspicious osseous findings.      Impression    IMPRESSION:  1. No evidence of metastatic disease in chest, abdomen or pelvis.   2. Slightly increased small amount of free fluid in the pelvis, likely  secondary to the  shunt.     I have personally reviewed the examination and initial interpretation  and I agree with the findings.    SHAYNA DIAZ MD          ASSESSMENT AND PLAN   1. 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 DI- taking desmopressin  3. Seizure episode after cycle 3  4. Prior provoked PE, off of lovenox    ONC: S/p resection and 6 cycles of chemotherapy  (1, 3 & 5 -carbo/etoposide and 2, 4, & 6 were ifos/etoposide). MRI brain and tumor markers showed no recurrence/residual disease. He received radiation to the ventricles followed by a boost (3000 cGy to ventricles and 5400 cGy to tumor bed) from 9/17/2014 through 10/28/14.    - Restaging MRI done in February was negative for any evidence of recurrent disease  - Restaging CT scan Chest/abd/pelvis done Monday have been reviewed and negative for recurrent metastatic disease  - His tumor markers (AFP, LDH and HCG) remain negative. We will have him follow up in 6 months with tumor markers, and gets scans CT c/a/p and brain MRI annually    Depression: He has been following his pscyhologist.     PE: discontinued lovenox after 6 months of therapy (provoked clot)    ENDO:  Diabetes insipidus:  -Continue desmopressin, FU Dr. Yessenia Ramires    NEURO:    -H/o Seizures: on keppra,  -Esotropia with diplopia: stable. FU opthalmology   - Headaches. He notes that he feels better only with THC. He wants medical medical marijuana      Again, thank you for allowing me to participate in the care of your patient.      Sincerely,    Cheikh Ervin MD

## 2017-04-28 ENCOUNTER — OFFICE VISIT (OUTPATIENT)
Dept: NEUROLOGY | Facility: CLINIC | Age: 27
End: 2017-04-28

## 2017-04-28 VITALS
HEART RATE: 87 BPM | HEIGHT: 68 IN | RESPIRATION RATE: 16 BRPM | OXYGEN SATURATION: 100 % | DIASTOLIC BLOOD PRESSURE: 80 MMHG | BODY MASS INDEX: 23.49 KG/M2 | SYSTOLIC BLOOD PRESSURE: 134 MMHG | WEIGHT: 155 LBS

## 2017-04-28 DIAGNOSIS — R51.9 CHRONIC DAILY HEADACHE: ICD-10-CM

## 2017-04-28 RX ORDER — NORTRIPTYLINE HCL 10 MG
20 CAPSULE ORAL AT BEDTIME
Qty: 60 CAPSULE | Refills: 6 | Status: SHIPPED | OUTPATIENT
Start: 2017-04-28 | End: 2017-11-22

## 2017-04-28 ASSESSMENT — ENCOUNTER SYMPTOMS
EYE REDNESS: 0
TROUBLE SWALLOWING: 0
SINUS CONGESTION: 0
SINUS PAIN: 0
TASTE DISTURBANCE: 0
EYE PAIN: 1
SMELL DISTURBANCE: 0
NECK MASS: 0
SORE THROAT: 0
EYE IRRITATION: 1
EYE WATERING: 0
DOUBLE VISION: 1
HOARSE VOICE: 0

## 2017-04-28 ASSESSMENT — PAIN SCALES - GENERAL: PAINLEVEL: NO PAIN (0)

## 2017-04-28 NOTE — PROGRESS NOTES
"Re: Kari Turcios      MRN# [id number]  YOB: 1990  Date of Visit:4/28/2017     OUTPATIENT NEUROLOGY VISIT NOTE    Reason for Visit:  Headache follow up    Interval History:  Kari Turcios is a 27-year-old male presents to the clinic today for headache follow up  Per Dr Ervin, oncologist from last ocology visit note on 4/19/2017, \"ONC: S/p resection and 6 cycles of chemotherapy (1, 3 & 5 -carbo/etoposide and 2, 4, & 6 were ifos/etoposide). MRI brain and tumor markers showed no recurrence/residual disease. He received radiation to the ventricles followed by a boost (3000 cGy to ventricles and 5400 cGy to tumor bed) from 9/17/2014 through 10/28/14.   - Restaging MRI done in February was negative for any evidence of recurrent disease  - Restaging CT scan Chest/abd/pelvis done Monday have been reviewed and negative for recurrent metastatic disease  - His tumor markers (AFP, LDH and HCG) remain negative. We will have him follow up in 6 months with tumor markers, and gets scans CT c/a/p and brain MRI annually    Today reports that nortriptyline may be helping with headache. Reports that medication helps to sleep and he wakes up refreshed. Headache re occures while at work triggered by computer screens. Reports that an ergonomic team helped with readjusting his workplace. Headaches are still daily but somewhat less intense. THC helps a lot with the pain and patient got registered for THC. Otherwise he is doing well and no new concerns today.   Plan:  Continue nortriptyline. Increase dose to 20 mg at bedtime.   EKG due to nortriptyline dose increase in 3-4 weeks  Follow up 3 months or sooner if needed    Past Medical History reviewed   Social History   Substance Use Topics     Smoking status: Never Smoker     Smokeless tobacco: Never Used     Alcohol use 1.2 - 1.8 oz/week     2 - 3 Standard drinks or equivalent per week      Comment: OCASIONALLY    reviewed and verified with the patient    Current Outpatient " "Prescriptions   Medication Sig Dispense Refill     nortriptyline (PAMELOR) 10 MG capsule Take 1 capsule (10 mg) by mouth At Bedtime 30 capsule 3     ACETAMINOPHEN PO Take 650 mg by mouth every 4 hours as needed for pain       desmopressin (DDAVP) 0.1 MG tablet TAKE 2 TABLETS (200 MCG) BY MOUTH 2 TIMES DAILY 360 tablet 3     tobramycin-dexamethasone (TOBRADEX) ophthalmic ointment Place 1 Application into both eyes 3 times daily Reported on 3/14/2017       melatonin 1 MG TABS Take 1-2 tablets (1-2 mg) by mouth nightly as needed for sleep 90 tablet 0     LORazepam (ATIVAN) 0.5 MG tablet Take 1 tablet (0.5 mg) by mouth every 4 hours as needed (Anxiety, Nausea/Vomiting or Sleep) 30 tablet 3     levETIRAcetam (KEPPRA) 750 MG tablet Take 1,500 mg by mouth every morning Two tablet in am and 2 1/2 tablet in pm 60 tablet 0     Glycerin-Hypromellose- (VISINE TEARS OP) Apply 1-2 drops to eye 2 times daily as needed (for dry eyes.)     reviewed and verified with the patient    Review of Systems:   A 10-point ROS including constitutional, eyes, respiratory, cardiovascular, gastroenterology, genitourinary, integumentary, musculoskeletal, neurology and psychiatric were all negative except as mentioned in the interval history. Esotropia with diplopia   General Exam:   /80 (BP Location: Right arm, Patient Position: Chair, Cuff Size: Adult Regular)  Pulse 87  Resp 16  Ht 1.715 m (5' 7.5\")  Wt 70.3 kg (155 lb)  SpO2 100%  BMI 23.92 kg/m2  GEN: Awake, NAD; good eye contact, responses appropriately, head discomfort 1-2/10   HEENT: Head atraumatic/Normocephalic. Scalp normal.The patient is alert and oriented times four. Has good attention and concentration. Speech is fluent without dysarthria. Face is symmetrical. Intact and symmetrical eyebrow and lid raise and eyelid closure, smiles. Hearing Intact to conversation speech.  Normal casual gait.    Assessment and Plan:  See Interval History for discussion and plan    I " discussed all my recommendation with Kari Turcios. The patient verbalizes understanding and comfortable with the plan. The patient has our clinic phone number to call with any questions or concerns. All of the patient's questions were answered from the best of my current knowledge.   Time spent with pt answering questions, discussing findings, counseling and coordinating care was more than 50% the appointment time, 20 minutes.     CASPER Black, CNP  [facility]    Answers for HPI/ROS submitted by the patient on 4/28/2017   General Symptoms: No  Skin Symptoms: No  HENT Symptoms: Yes  EYE SYMPTOMS: Yes  HEART SYMPTOMS: No  LUNG SYMPTOMS: No  INTESTINAL SYMPTOMS: No  URINARY SYMPTOMS: No  REPRODUCTIVE SYMPTOMS: No  SKELETAL SYMPTOMS: No  BLOOD SYMPTOMS: No  NERVOUS SYSTEM SYMPTOMS: No  MENTAL HEALTH SYMPTOMS: No  Ear pain: No  Ear discharge: No  Hearing loss: No  Tinnitus: No  Nosebleeds: No  Congestion: No  Sinus pain: No  Trouble swallowing: No   Voice hoarseness: No  Mouth sores: No  Sore throat: No  Tooth pain: No  Gum tenderness: No  Bleeding gums: No  Change in taste: No  Change in sense of smell: No  Dry mouth: No  Hearing aid used: No  Neck lump: No  Eye pain: Yes  Vision loss: No  Dry eyes: Yes  Watery eyes: No  Eye bulging: No  Double vision: Yes  Flashing of lights: No  Spots: No  Floaters: No  Redness: No  Crossed eyes: No  Tunnel Vision: No  Yellowing of eyes: No  Eye irritation: Yes

## 2017-04-28 NOTE — MR AVS SNAPSHOT
After Visit Summary   4/28/2017    Kari Turcios    MRN: [id number]           Patient Information     Date Of Birth          1990        Visit Information        Provider Department      4/28/2017 11:00 AM Sybil Dockery APRN CNP [facility]        Today's Diagnoses     Chronic daily headache          Care Instructions    Plan:  Continue nortriptyline. Increase dose to 20 mg at bedtime.   Follow up 3 months or sooner if needed          Follow-ups after your visit        Future tests that were ordered for you today     Open Future Orders        Priority Expected Expires Ordered    EKG 12-lead complete w/read - Clinics Routine 5/26/2017 9/28/2017 4/28/2017            Who to contact     Please call your clinic at [phone] to:    Ask questions about your health    Make or cancel appointments    Discuss your medicines    Learn about your test results    Speak to your doctor   If you have compliments or concerns about an experience at your clinic, or if you wish to file a complaint, please contact [facility] Patient Relations at [phone] or email us at [email or web address]         Additional Information About Your Visit        MyChart Information     [employer] gives you secure access to your electronic health record. If you see a primary care provider, you can also send messages to your care team and make appointments. If you have questions, please call your primary care clinic.  If you do not have a primary care provider, please call [phone] and they will assist you.      [employer] is an electronic gateway that provides easy, online access to your medical records. With [employer], you can request a clinic appointment, read your test results, renew a prescription or communicate with your care team.     To access your existing account, please contact your [facility] Clinic or call [phone] for assistance.    "     Care EveryWhere ID     This is your Care EveryWhere ID. This could be used by other organizations to access your [facility] medical records  [id number]        Your Vitals Were     Pulse Respirations Height Pulse Oximetry BMI (Body Mass Index)       87 16 1.715 m (5' 7.5\") 100% 23.92 kg/m2        Blood Pressure from Last 3 Encounters:   04/28/17 134/80   04/19/17 109/73   03/14/17 126/58    Weight from Last 3 Encounters:   04/28/17 70.3 kg (155 lb)   04/19/17 70.4 kg (155 lb 1.6 oz)   03/14/17 76.2 kg (168 lb)                 Today's Medication Changes          These changes are accurate as of: 4/28/17 11:47 AM.  If you have any questions, ask your nurse or doctor.               These medicines have changed or have updated prescriptions.        Dose/Directions    nortriptyline 10 MG capsule   Commonly known as:  PAMELOR   This may have changed:  how much to take   Used for:  Chronic daily headache   Changed by:  Sybil Dockery APRN CNP        Dose:  20 mg   Take 2 capsules (20 mg) by mouth At Bedtime   Quantity:  60 capsule   Refills:  6            Where to get your medicines      These medications were sent to [facility]/pharmacy [address] [facility] [address], [place] [address]     Phone:  [phone]     nortriptyline 10 MG capsule                Primary Care Provider Office Phone # Fax #    Ila Cornell [phone]       [address] [address]        Thank you!     Thank you for choosing [facility]  for your care. Our goal is always to provide you with excellent care. Hearing back from our patients is one way we can continue to improve our services. Please take a few minutes to complete the written survey that you may receive in the mail after your visit with us. Thank you!             Your Updated Medication List - Protect others around you: Learn how to safely use, store and throw away " your medicines at www.disposemymeds.org.          This list is accurate as of: 4/28/17 11:47 AM.  Always use your most recent med list.                   Brand Name Dispense Instructions for use    ACETAMINOPHEN PO      Take 650 mg by mouth every 4 hours as needed for pain       desmopressin 0.1 MG tablet    DDAVP    360 tablet    TAKE 2 TABLETS (200 MCG) BY MOUTH 2 TIMES DAILY       levETIRAcetam 750 MG tablet    KEPPRA    60 tablet    Take 1,500 mg by mouth every morning Two tablet in am and 2 1/2 tablet in pm       LORazepam 0.5 MG tablet    ATIVAN    30 tablet    Take 1 tablet (0.5 mg) by mouth every 4 hours as needed (Anxiety, Nausea/Vomiting or Sleep)       melatonin 1 MG Tabs tablet     90 tablet    Take 1-2 tablets (1-2 mg) by mouth nightly as needed for sleep       nortriptyline 10 MG capsule    PAMELOR    60 capsule    Take 2 capsules (20 mg) by mouth At Bedtime       tobramycin-dexamethasone ophthalmic ointment    TOBRADEX     Place 1 Application into both eyes 3 times daily Reported on 3/14/2017       VISINE TEARS OP      Apply 1-2 drops to eye 2 times daily as needed (for dry eyes.)

## 2017-04-28 NOTE — PATIENT INSTRUCTIONS
Plan:  Continue nortriptyline. Increase dose to 20 mg at bedtime.   Follow up 3 months or sooner if needed

## 2017-05-03 DIAGNOSIS — C80.1 MIXED GERM CELL TUMOR (H): Primary | ICD-10-CM

## 2017-05-03 DIAGNOSIS — C71.9 GERM CELL TUMOR OF BRAIN (H): ICD-10-CM

## 2017-06-09 ENCOUNTER — MEDICAL CORRESPONDENCE (OUTPATIENT)
Dept: HEALTH INFORMATION MANAGEMENT | Facility: CLINIC | Age: 27
End: 2017-06-09

## 2017-08-27 DIAGNOSIS — E23.2 DIABETES INSIPIDUS (H): ICD-10-CM

## 2017-08-29 RX ORDER — DESMOPRESSIN ACETATE 0.1 MG/1
0.2 TABLET ORAL 2 TIMES DAILY
Qty: 360 TABLET | Refills: 3 | Status: SHIPPED | OUTPATIENT
Start: 2017-08-29 | End: 2018-03-19

## 2017-08-29 NOTE — TELEPHONE ENCOUNTER
desmopressin (DDAVP) 0.1 MG      Last Written Prescription Date:  8/19/16  Last Fill Quantity: 360,   # refills: 3  Last Office Visit : 9/12/16  Future Office visit:  NONE  Routing refill request to provider for review/approval because:  Drug not on refill protocol or controlled substance

## 2017-08-30 NOTE — TELEPHONE ENCOUNTER
desmopressin (DDAVP) 0.1 MG tablet    [facility]/PHARMACY #[id number] [facility] [address]    Phone: [phone]    Calling again for clarification.  Patient is getting this medication from another provider also.

## 2017-10-16 ENCOUNTER — ONCOLOGY VISIT (OUTPATIENT)
Dept: ONCOLOGY | Facility: CLINIC | Age: 27
End: 2017-10-16
Attending: PHYSICIAN ASSISTANT
Payer: COMMERCIAL

## 2017-10-16 ENCOUNTER — APPOINTMENT (OUTPATIENT)
Dept: LAB | Facility: CLINIC | Age: 27
End: 2017-10-16
Attending: INTERNAL MEDICINE
Payer: COMMERCIAL

## 2017-10-16 VITALS
SYSTOLIC BLOOD PRESSURE: 124 MMHG | BODY MASS INDEX: 25.16 KG/M2 | TEMPERATURE: 98.2 F | DIASTOLIC BLOOD PRESSURE: 70 MMHG | OXYGEN SATURATION: 97 % | HEART RATE: 75 BPM | RESPIRATION RATE: 16 BRPM | HEIGHT: 68 IN | WEIGHT: 166 LBS

## 2017-10-16 DIAGNOSIS — C71.9 GERM CELL TUMOR OF BRAIN (H): ICD-10-CM

## 2017-10-16 LAB
AFP SERPL-MCNC: 2.4 UG/L (ref 0–8)
ALBUMIN SERPL-MCNC: 4.4 G/DL (ref 3.4–5)
ALP SERPL-CCNC: 79 U/L (ref 40–150)
ALT SERPL W P-5'-P-CCNC: 44 U/L (ref 0–70)
ANION GAP SERPL CALCULATED.3IONS-SCNC: 7 MMOL/L (ref 3–14)
AST SERPL W P-5'-P-CCNC: 26 U/L (ref 0–45)
BASOPHILS # BLD AUTO: 0 10E9/L (ref 0–0.2)
BASOPHILS NFR BLD AUTO: 0.6 %
BILIRUB SERPL-MCNC: 0.3 MG/DL (ref 0.2–1.3)
BUN SERPL-MCNC: 9 MG/DL (ref 7–30)
CALCIUM SERPL-MCNC: 9.6 MG/DL (ref 8.5–10.1)
CHLORIDE SERPL-SCNC: 104 MMOL/L (ref 94–109)
CO2 SERPL-SCNC: 25 MMOL/L (ref 20–32)
CREAT SERPL-MCNC: 0.92 MG/DL (ref 0.66–1.25)
DIFFERENTIAL METHOD BLD: NORMAL
EOSINOPHIL # BLD AUTO: 0 10E9/L (ref 0–0.7)
EOSINOPHIL NFR BLD AUTO: 0.6 %
ERYTHROCYTE [DISTWIDTH] IN BLOOD BY AUTOMATED COUNT: 13 % (ref 10–15)
GFR SERPL CREATININE-BSD FRML MDRD: >90 ML/MIN/1.7M2
GLUCOSE SERPL-MCNC: 94 MG/DL (ref 70–99)
HCT VFR BLD AUTO: 44 % (ref 40–53)
HGB BLD-MCNC: 14.7 G/DL (ref 13.3–17.7)
IMM GRANULOCYTES # BLD: 0 10E9/L (ref 0–0.4)
IMM GRANULOCYTES NFR BLD: 0.4 %
LDH SERPL L TO P-CCNC: 238 U/L (ref 85–227)
LYMPHOCYTES # BLD AUTO: 1.6 10E9/L (ref 0.8–5.3)
LYMPHOCYTES NFR BLD AUTO: 21.7 %
MCH RBC QN AUTO: 27.8 PG (ref 26.5–33)
MCHC RBC AUTO-ENTMCNC: 33.4 G/DL (ref 31.5–36.5)
MCV RBC AUTO: 83 FL (ref 78–100)
MONOCYTES # BLD AUTO: 0.5 10E9/L (ref 0–1.3)
MONOCYTES NFR BLD AUTO: 6.5 %
NEUTROPHILS # BLD AUTO: 5 10E9/L (ref 1.6–8.3)
NEUTROPHILS NFR BLD AUTO: 70.2 %
NRBC # BLD AUTO: 0 10*3/UL
NRBC BLD AUTO-RTO: 0 /100
PLATELET # BLD AUTO: 238 10E9/L (ref 150–450)
POTASSIUM SERPL-SCNC: 3.9 MMOL/L (ref 3.4–5.3)
PROT SERPL-MCNC: 8.5 G/DL (ref 6.8–8.8)
RBC # BLD AUTO: 5.28 10E12/L (ref 4.4–5.9)
SODIUM SERPL-SCNC: 136 MMOL/L (ref 133–144)
WBC # BLD AUTO: 7.1 10E9/L (ref 4–11)

## 2017-10-16 PROCEDURE — 36415 COLL VENOUS BLD VENIPUNCTURE: CPT

## 2017-10-16 PROCEDURE — 83615 LACTATE (LD) (LDH) ENZYME: CPT | Performed by: INTERNAL MEDICINE

## 2017-10-16 PROCEDURE — 85025 COMPLETE CBC W/AUTO DIFF WBC: CPT | Performed by: INTERNAL MEDICINE

## 2017-10-16 PROCEDURE — 84702 CHORIONIC GONADOTROPIN TEST: CPT | Performed by: INTERNAL MEDICINE

## 2017-10-16 PROCEDURE — 99214 OFFICE O/P EST MOD 30 MIN: CPT | Mod: ZP | Performed by: PHYSICIAN ASSISTANT

## 2017-10-16 PROCEDURE — 82105 ALPHA-FETOPROTEIN SERUM: CPT | Performed by: INTERNAL MEDICINE

## 2017-10-16 PROCEDURE — 80053 COMPREHEN METABOLIC PANEL: CPT | Performed by: INTERNAL MEDICINE

## 2017-10-16 PROCEDURE — 99213 OFFICE O/P EST LOW 20 MIN: CPT | Mod: ZF

## 2017-10-16 RX ORDER — DESMOPRESSIN ACETATE 0.2 MG/1
0.2 TABLET ORAL 2 TIMES DAILY
COMMUNITY
Start: 2017-08-21 | End: 2018-03-19

## 2017-10-16 ASSESSMENT — PAIN SCALES - GENERAL: PAINLEVEL: NO PAIN (0)

## 2017-10-16 NOTE — MR AVS SNAPSHOT
After Visit Summary   10/16/2017    Kari Turcios    MRN: [id number]           Patient Information     Date Of Birth          1990        Visit Information        Provider Department      10/16/2017 4:00 PM Deborah Mantilla PA-C [facility]        Today's Diagnoses     Germ cell tumor of brain (H)           Follow-ups after your visit        Your next 10 appointments already scheduled     Apr 16, 2018  3:45 PM CDT   Lab with  LAB   [facility] ([facility])    [address] [place] [id number] [phone]            Apr 16, 2018  4:20 PM CDT   (Arrive by 4:05 PM)   CT CHEST/ABDOMEN/PELVIS W CONTRAST with [id number]   [facility] CT ([facility])    [address] [place] [id number] [phone]           Please bring any scans or X-rays taken at other hospitals, if similar tests were done. Also bring a list of your medicines, including vitamins, minerals and over-the-counter drugs. It is safest to leave personal items at home.  Be sure to tell your doctor:   If you have any allergies.   If there s any chance you are pregnant.   If you are breastfeeding.   If you have any special needs.  You may have contrast for this exam. To prepare:   Do not eat or drink for 2 hours before your exam. If you need to take medicine, you may take it with small sips of water. (We may ask you to take liquid medicine as well.)   The day before your exam, drink extra fluids at least six 8-ounce glasses (unless your doctor tells you to restrict your fluids).  Patients over 70 or patients with diabetes or kidney problems:   If you haven t had a blood test (creatinine test) within the last 30 days, go to your clinic or Diagnostic Imaging Department for this test.  If you have diabetes:   If your kidney function is normal, continue taking your metformin (Avandamet, Glucophage, Glucovance,  Metaglip) on the day of your exam.   If your kidney function is abnormal, wait 48 hours before restarting this medicine.  You will have oral contrast for this exam:   You will drink the contrast at home. Get this from your clinic or Diagnostic Imaging Department. Please follow the directions given.  Please wear loose clothing, such as a sweat suit or jogging clothes. Avoid snaps, zippers and other metal. We may ask you to undress and put on a hospital gown.  If you have any questions, please call the Imaging Department where you will have your exam.            Apr 16, 2018  4:45 PM CDT   (Arrive by 4:30 PM)   MR BRAIN W/O & W CONTRAST with [id number]   [facility] ([facility])    [address] [address] [phone]           Take your medicines as usual, unless your doctor tells you not to. Bring a list of your current medicines to your exam (including vitamins, minerals and over-the-counter drugs).  You will be given intravenous contrast for this exam. To prepare:   The day before your exam, drink extra fluids at least six 8-ounce glasses (unless your doctor tells you to restrict your fluids).   Have a blood test (creatinine test) within 30 days of your exam. Go to your clinic or Diagnostic Imaging Department for this test.  The MRI machine uses a strong magnet. Please wear clothes without metal (snaps, zippers). A sweatsuit works well, or we may give you a hospital gown.  Please remove any body piercings and hair extensions before you arrive. You will also remove watches, jewelry, hairpins, wallets, dentures, partial dental plates and hearing aids. You may wear contact lenses, and you may be able to wear your rings. We have a safe place to keep your personal items, but it is safer to leave them at home.   **IMPORTANT** THE INSTRUCTIONS BELOW ARE ONLY FOR THOSE PATIENTS WHO HAVE BEEN TOLD THEY WILL RECEIVE SEDATION OR GENERAL ANESTHESIA  DURING THEIR MRI PROCEDURE:  IF YOU WILL RECEIVE SEDATION (take medicine to help you relax during your exam):   You must get the medicine from your doctor before you arrive. Bring the medicine to the exam. Do not take it at home.   Arrive one hour early. Bring someone who can take you home after the test. Your medicine will make you sleepy. After the exam, you may not drive, take a bus or take a taxi by yourself.   No eating 8 hours before your exam. You may have clear liquids up until 4 hours before your exam. (Clear liquids include water, clear tea, black coffee and fruit juice without pulp.)  IF YOU WILL RECEIVE ANESTHESIA (be asleep for your exam):   Arrive 1 1/2 hours early. Bring someone who can take you home after the test. You may not drive, take a bus or take a taxi by yourself.   No eating 8 hours before your exam. You may have clear liquids up until 4 hours before your exam. (Clear liquids include water, clear tea, black coffee and fruit juice without pulp.)  Please call the Imaging Department at your exam site with any questions.            Apr 18, 2018  3:15 PM CDT   (Arrive by 3:00 PM)   Return Visit with Cheikh Ervin MD   [facility] ([facility])    [address] [address] [phone]              Who to contact     If you have questions or need follow up information about today's clinic visit or your schedule please contact [facility] directly at [phone].  Normal or non-critical lab and imaging results will be communicated to you by MyChart, letter or phone within 4 business days after the clinic has received the results. If you do not hear from us within 7 days, please contact the clinic through 3CLogichart or phone. If you have a critical or abnormal lab result, we will notify you by phone as soon as possible.  Submit refill requests through Just Dial or call your pharmacy and they will  "forward the refill request to us. Please allow 3 business days for your refill to be completed.          Additional Information About Your Visit        Versafehart Information     optionsXpress gives you secure access to your electronic health record. If you see a primary care provider, you can also send messages to your care team and make appointments. If you have questions, please call your primary care clinic.  If you do not have a primary care provider, please call [phone] and they will assist you.        Care EveryWhere ID     This is your Care EveryWhere ID. This could be used by other organizations to access your [facility] medical records  [id number]        Your Vitals Were     Pulse Temperature Respirations Height Pulse Oximetry BMI (Body Mass Index)    75 98.2  F (36.8  C) (Oral) 16 1.715 m (5' 7.52\") 97% 25.6 kg/m2       Blood Pressure from Last 3 Encounters:   10/16/17 124/70   04/28/17 134/80   04/19/17 109/73    Weight from Last 3 Encounters:   10/16/17 75.3 kg (166 lb)   04/28/17 70.3 kg (155 lb)   04/19/17 70.4 kg (155 lb 1.6 oz)              We Performed the Following     AFP tumor marker     CBC with platelets differential     Comprehensive metabolic panel     HCG tumor marker     Lactate Dehydrogenase        Primary Care Provider Office Phone # Fax #    Ila Cornell [phone]       [address] [address]        Equal Access to Services     [facility] : Hadii aad ku hadasho Soomaali, waaxda luqadaha, qaybta kaalmada adeegyada, frank chen adeniecy wagner . So [facility] [phone].    ATENCIÓN: Si habla español, tiene a gaffney disposición servicios gratuitos de asistencia lingüística. Llame al [phone].    We comply with applicable federal civil rights laws and [place] laws. We do not discriminate on the basis of race, color, national origin, age, disability, sex, sexual orientation, or gender identity.            Thank you!     " Thank you for choosing [facility]  for your care. Our goal is always to provide you with excellent care. Hearing back from our patients is one way we can continue to improve our services. Please take a few minutes to complete the written survey that you may receive in the mail after your visit with us. Thank you!             Your Updated Medication List - Protect others around you: Learn how to safely use, store and throw away your medicines at www.disposemymeds.org.          This list is accurate as of: 10/16/17 11:59 PM.  Always use your most recent med list.                   Brand Name Dispense Instructions for use Diagnosis    ACETAMINOPHEN PO      Take 650 mg by mouth every 4 hours as needed for pain        * desmopressin 0.2 MG tablet    DDAVP     Take 0.2 mg by mouth 2 times daily        * desmopressin 0.1 MG tablet    DDAVP    360 tablet    Take 2 tablets (200 mcg) by mouth 2 times daily *PLEASE SCHEDULE ANNUAL MD APPT.    Diabetes insipidus (H)       levETIRAcetam 750 MG tablet    KEPPRA    60 tablet    Take 1,500 mg by mouth every morning Two tablet in am and 2 1/2 tablet in pm    Pineal tumor       LORazepam 0.5 MG tablet    ATIVAN    30 tablet    Take 1 tablet (0.5 mg) by mouth every 4 hours as needed (Anxiety, Nausea/Vomiting or Sleep)    Pineal tumor       melatonin 1 MG Tabs tablet     90 tablet    Take 1-2 tablets (1-2 mg) by mouth nightly as needed for sleep    Pineal tumor       nortriptyline 10 MG capsule    PAMELOR    60 capsule    Take 2 capsules (20 mg) by mouth At Bedtime    Chronic daily headache       tobramycin-dexamethasone ophthalmic ointment    TOBRADEX     Place 1 Application into both eyes 3 times daily Reported on 3/14/2017        VISINE TEARS OP      Apply 1-2 drops to eye 2 times daily as needed (for dry eyes.)        * Notice:  This list has 2 medication(s) that are the same as other medications prescribed for you. Read the directions carefully, and ask your  doctor or other care provider to review them with you.

## 2017-10-16 NOTE — NURSING NOTE
"Oncology Rooming Note    October 16, 2017 3:41 PM   Kari Turcios is a 27 year old male who presents for:    Chief Complaint   Patient presents with     Blood Draw     vpt left arm, vitals taken      Oncology Clinic Visit     Return visit related to Mixed germ cell tumor     Initial Vitals: /70  Pulse 75  Temp 98.2  F (36.8  C) (Oral)  Resp 16  Ht 1.715 m (5' 7.52\")  Wt 75.3 kg (166 lb)  SpO2 97%  BMI 25.6 kg/m2 Estimated body mass index is 25.6 kg/(m^2) as calculated from the following:    Height as of this encounter: 1.715 m (5' 7.52\").    Weight as of this encounter: 75.3 kg (166 lb). Body surface area is 1.89 meters squared.  No Pain (0) Comment: Data Unavailable   No LMP for male patient.  Allergies reviewed: Yes  Medications reviewed: Yes    Medications: Medication refills not needed today.  Pharmacy name entered into [facility]:    [facility] [place], [place] - [address]  [id number] [place], [place] - [address]    Clinical concerns: No new concerns. Provider was notified.    10 minutes for nursing intake (face to face time)     Pattie Echevarria LPN            "

## 2017-10-16 NOTE — PROCEDURES
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 His past medical history is significant for seizures. Briefly, his oncologic history is as follows:    Patient presented with headaches accompanied by diplopia over a couple of weeks to a [facility] ED on 3/19 where MRI showed a 3cm pineal mass and hydrocephalus. He was transferred to [facility] for neurosurgical management. A biopsy was performed on 3/20 with placement of third ventriculostomy and external ventricular drain. The biopsy showed a mixed germ cell tumor. Resection of the mass was performed on 3/26/14 by a midline combined occipital and suboccipital craniotomy performed by Trinidad Xiao and Urbano. It was R1 resection and most of the macroscopic disease was resected and pathology did reveal mixed germ cell tumor. He was seen by Medical Oncology (Cheikh Ervin) and Radiation Oncology (Dr. Kayla Song) and recommended chemotherapy followed by radiation therapy per the COG protocol for localized CNS Germ Cell Tumors.  He basically is following the stratum 1 for patients with localized non-germinomatous tumors to receive 6 cycles of chemotherapy (1, 3 & 5 -carbo/etoposide and 2, 4, & 6 are ifos/etoposide) and depending post chemotherapy radiographic (MRI) and biochemical response (tumor markers), he may be a candidate to receive less radiation therapy. He completed chemo 8/11/14 and follow up brain MRI was negative for residual/recurrence of disease. HCG and AFP markers were within normal limits (AFP was elevated at diagnosis). He received radiation to the ventricles followed by a boost (3000 cGy to ventricles and 5400 cGy to tumor bed) from 9/17/2014 through 10/28/14.    Mr. Turcios was seen by Dr. Ervin in April 2017 and had negative  tumor markers (AFP, bHCG, LDH) and no signs of disease recurrence on CT CAP or Brain MRI. He returns today for follow-up with labs.    Interval History:  Mr. Turcios returns to clinic today unaccompanied. He states he has been doing really well since he was last seen. He continues to get mild headaches but these are well controlled with medical marijuana and nortriptyline 20mg at bedtime. He states he rarely has double vision and otherwise denies any vision changes including blurred vision or blind spots. He does admit he hasn't seen an ophthalmologist for quite some time. He has not had any seizures and states he can't remember the last time he did. He remains on Keppra. He denies any other neurologic symptoms including new numbness or tingling, weakness, difficulty with speech, difficulty with coordination, disequilibrium, or dizziness.     He continues to follow with endocrine for diabetes insipidus. This is well managed with desmopressin. He states his mood has been great and is no longer following with a psychologist.          Review of Systems:  Patient denies fevers, chills, night sweats, unexplained weight changes, dizziness, vision or hearing changes, new lumps or bumps, chest pain, shortness of breath, cough, abdominal pain, nausea, vomiting, changes to bowel or bladder, swelling of extremities, bleeding issues, or rash.    Current Outpatient Prescriptions   Medication Sig Dispense Refill     desmopressin (DDAVP) 0.2 MG tablet Take 0.2 mg by mouth 2 times daily       desmopressin (DDAVP) 0.1 MG tablet Take 2 tablets (200 mcg) by mouth 2 times daily *PLEASE SCHEDULE ANNUAL MD APPT. 360 tablet 3     nortriptyline (PAMELOR) 10 MG capsule Take 2 capsules (20 mg) by mouth At Bedtime 60 capsule 6     ACETAMINOPHEN PO Take 650 mg by mouth every 4 hours as needed for pain       tobramycin-dexamethasone (TOBRADEX) ophthalmic ointment Place 1 Application into both eyes 3 times daily Reported on 3/14/2017        "melatonin 1 MG TABS Take 1-2 tablets (1-2 mg) by mouth nightly as needed for sleep 90 tablet 0     LORazepam (ATIVAN) 0.5 MG tablet Take 1 tablet (0.5 mg) by mouth every 4 hours as needed (Anxiety, Nausea/Vomiting or Sleep) 30 tablet 3     levETIRAcetam (KEPPRA) 750 MG tablet Take 1,500 mg by mouth every morning Two tablet in am and 2 1/2 tablet in pm 60 tablet 0     Glycerin-Hypromellose- (VISINE TEARS OP) Apply 1-2 drops to eye 2 times daily as needed (for dry eyes.)         Physical Examination:  /70  Pulse 75  Temp 98.2  F (36.8  C) (Oral)  Resp 16  Ht 1.715 m (5' 7.52\")  Wt 75.3 kg (166 lb)  SpO2 97%  BMI 25.6 kg/m2  Wt Readings from Last 10 Encounters:   10/16/17 75.3 kg (166 lb)   04/28/17 70.3 kg (155 lb)   04/19/17 70.4 kg (155 lb 1.6 oz)   03/14/17 76.2 kg (168 lb)   02/22/17 74 kg (163 lb 1.6 oz)   10/19/16 73.8 kg (162 lb 12.8 oz)   09/12/16 72.4 kg (159 lb 9.6 oz)   08/22/16 72.6 kg (160 lb)   08/04/16 73.5 kg (162 lb 0.6 oz)   06/14/16 73.5 kg (162 lb)     Constitutional: Well-appearing male in no acute distress.  Eyes: PERRL. No scleral icterus. Impaired upward gaze, worse on the left. No nystagmus. Very mild L eye strabismus.   ENT: Oral mucosa is moist without lesions or thrush.   Lymphatic: Neck is supple without cervical or supraclavicular lymphadenopathy. No axillary lymphadenopathy.  Cardiovascular: Regular rate and rhythm. No murmurs, gallops, or rubs. No peripheral edema.  Respiratory: Clear to auscultation bilaterally. No wheezes or crackles.  Gastrointestinal: Bowel sounds present. Abdomen soft, non-tender. No palpable hepatosplenomegaly or masses.   Neurologic: Cranial nerves II through XII are intact. Full 5/5 strength throughout upper and lower extremities. Alternating hand motion intact. Gait normal. Able to walk on toes, heels, and tandem without difficulty. Negative Rhomberg's.  Skin: No rashes, petechiae, or bruising noted on exposed skin.   Laboratory " Data:  Results for PANDA WOODARD (MRN [id number]) as of 10/16/2017 16:49   10/16/2017 15:29   Sodium 136   Potassium 3.9   Chloride 104   Carbon Dioxide 25   Urea Nitrogen 9   Creatinine 0.92   GFR Estimate >90   GFR Estimate If Black >90   Calcium 9.6   Anion Gap 7   Albumin 4.4   Protein Total 8.5   Bilirubin Total 0.3   Alkaline Phosphatase 79   ALT 44   AST 26   Lactate Dehydrogenase 238 (H)   Glucose 94   WBC 7.1   Hemoglobin 14.7   Hematocrit 44.0   Platelet Count 238   RBC Count 5.28   MCV 83   MCH 27.8   MCHC 33.4   RDW 13.0   Diff Method Automated Method   % Neutrophils 70.2   % Lymphocytes 21.7   % Monocytes 6.5   % Eosinophils 0.6   % Basophils 0.6   % Immature Granulocytes 0.4   Nucleated RBCs 0   Absolute Neutrophil 5.0   Absolute Lymphocytes 1.6   Absolute Monocytes 0.5   Absolute Eosinophils 0.0   Absolute Basophils 0.0   Abs Immature Granulocytes 0.0   Absolute Nucleated RBC 0.0       Assessment and Plan:  1. History of primary CNS germ cell tumor, completed treatment 2014  Patient is s/p 6 cycles of chemotherapy (1,3, & 5 carbo/etoposide and 2,4,6 ifos/etoposide). He also received radiation to the ventricles followed by a boost (3000cGy to ventricles and 5400 cGy to tumor bed) from 9/17/2014 through 10/28/14.   -No symptoms to suggest disease recurrence. LDH is very mildly elevated today, which may be secondary to marijuana use. AFP and bHCG are pending at this time.  -Due for restaging imaging in April with CT CAP and Brain MRI, along with a follow-up with Dr. Ervin.    2. Headaches, stable  3. History of seizures, stable  Patient has had great control of his headaches with medical marijuana and nortriptyline. He was due to see Neurology in July and we will try to get this follow-up scheduled. He has not had any seizures and should continue his Keppra.     4. Mild esotropia with diplopia, stable  Encouraged patient to establish annual care with an ophthalmologist. He is going to do so. No vision  changes at this time.    5. Diabetes insipidus, stable  Continue on desmopressin and following with endocrinology    6. Mood  No current issues.    7. History of provoked PE, completed 6 months Lovenox  No shortness of breath, chest pain, cough, or leg swelling to suggest recurrent VTE.            Trip Woods PA-C  [facility]  [address] [place], [place] [address] [phone]

## 2017-10-16 NOTE — NURSING NOTE
Chief Complaint   Patient presents with     Blood Draw     vpt left arm, vitals taken      Amy Blake CMA

## 2017-10-17 LAB — HCG-TM SERPL-ACNC: <3 IU/L

## 2017-10-24 NOTE — PROGRESS NOTES
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 His past medical history is significant for seizures. Briefly, his oncologic history is as follows:    Patient presented with headaches accompanied by diplopia over a couple of weeks to a [place] ED on 3/19 where MRI showed a 3cm pineal mass and hydrocephalus. He was transferred to [facility] for neurosurgical management. A biopsy was performed on 3/20 with placement of third ventriculostomy and external ventricular drain. The biopsy showed a mixed germ cell tumor. Resection of the mass was performed on 3/26/14 by a midline combined occipital and suboccipital craniotomy performed by Trinidad Xiao and U[place]ano. It was R1 resection and most of the macroscopic disease was resected and pathology did reveal mixed germ cell tumor. He was seen by Medical Oncology (Cheikh Ervin) and Radiation Oncology (Dr. Kayla Song) and recommended chemotherapy followed by radiation therapy per the COG protocol for localized CNS Germ Cell Tumors.  He basically is following the stratum 1 for patients with localized non-germinomatous tumors to receive 6 cycles of chemotherapy (1, 3 & 5 -carbo/etoposide and 2, 4, & 6 are ifos/etoposide) and depending post chemotherapy radiographic (MRI) and biochemical response (tumor markers), he may be a candidate to receive less radiation therapy. He completed chemo 8/11/14 and follow up brain MRI was negative for residual/recurrence of disease. HCG and AFP markers were within normal limits (AFP was elevated at diagnosis). He received radiation to the ventricles followed by a boost (3000 cGy to ventricles and 5400 cGy to tumor bed) from 9/17/2014 through 10/28/14.    Mr. Turcios was seen by Dr. Ervin in April 2017 and had negative  tumor markers (AFP, bHCG, LDH) and no signs of disease recurrence on CT CAP or Brain MRI. He returns today for follow-up with labs.    Interval History:  Mr. Turcios returns to clinic today unaccompanied. He states he has been doing really well since he was last seen. He continues to get mild headaches but these are well controlled with medical marijuana and nortriptyline 20mg at bedtime. He states he rarely has double vision and otherwise denies any vision changes including blurred vision or blind spots. He does admit he hasn't seen an ophthalmologist for quite some time. He has not had any seizures and states he can't remember the last time he did. He remains on Keppra. He denies any other neurologic symptoms including new numbness or tingling, weakness, difficulty with speech, difficulty with coordination, disequilibrium, or dizziness.     He continues to follow with endocrine for diabetes insipidus. This is well managed with desmopressin. He states his mood has been great and is no longer following with a psychologist.          Review of Systems:  Patient denies fevers, chills, night sweats, unexplained weight changes, dizziness, vision or hearing changes, new lumps or bumps, chest pain, shortness of breath, cough, abdominal pain, nausea, vomiting, changes to bowel or bladder, swelling of extremities, bleeding issues, or rash.    Current Outpatient Prescriptions   Medication Sig Dispense Refill     desmopressin (DDAVP) 0.2 MG tablet Take 0.2 mg by mouth 2 times daily       desmopressin (DDAVP) 0.1 MG tablet Take 2 tablets (200 mcg) by mouth 2 times daily *PLEASE SCHEDULE ANNUAL MD APPT. 360 tablet 3     nortriptyline (PAMELOR) 10 MG capsule Take 2 capsules (20 mg) by mouth At Bedtime 60 capsule 6     ACETAMINOPHEN PO Take 650 mg by mouth every 4 hours as needed for pain       tobramycin-dexamethasone (TOBRADEX) ophthalmic ointment Place 1 Application into both eyes 3 times daily Reported on 3/14/2017        "melatonin 1 MG TABS Take 1-2 tablets (1-2 mg) by mouth nightly as needed for sleep 90 tablet 0     LORazepam (ATIVAN) 0.5 MG tablet Take 1 tablet (0.5 mg) by mouth every 4 hours as needed (Anxiety, Nausea/Vomiting or Sleep) 30 tablet 3     levETIRAcetam (KEPPRA) 750 MG tablet Take 1,500 mg by mouth every morning Two tablet in am and 2 1/2 tablet in pm 60 tablet 0     Glycerin-Hypromellose- (VISINE TEARS OP) Apply 1-2 drops to eye 2 times daily as needed (for dry eyes.)         Physical Examination:  /70  Pulse 75  Temp 98.2  F (36.8  C) (Oral)  Resp 16  Ht 1.715 m (5' 7.52\")  Wt 75.3 kg (166 lb)  SpO2 97%  BMI 25.6 kg/m2  Wt Readings from Last 10 Encounters:   10/16/17 75.3 kg (166 lb)   04/28/17 70.3 kg (155 lb)   04/19/17 70.4 kg (155 lb 1.6 oz)   03/14/17 76.2 kg (168 lb)   02/22/17 74 kg (163 lb 1.6 oz)   10/19/16 73.8 kg (162 lb 12.8 oz)   09/12/16 72.4 kg (159 lb 9.6 oz)   08/22/16 72.6 kg (160 lb)   08/04/16 73.5 kg (162 lb 0.6 oz)   06/14/16 73.5 kg (162 lb)     Constitutional: Well-appearing male in no acute distress.  Eyes: PERRL. No scleral icterus. Impaired upward gaze, worse on the left. No nystagmus. Very mild L eye strabismus.   ENT: Oral mucosa is moist without lesions or thrush.   Lymphatic: Neck is supple without cervical or supraclavicular lymphadenopathy. No axillary lymphadenopathy.  Cardiovascular: Regular rate and rhythm. No murmurs, gallops, or rubs. No peripheral edema.  Respiratory: Clear to auscultation bilaterally. No wheezes or crackles.  Gastrointestinal: Bowel sounds present. Abdomen soft, non-tender. No palpable hepatosplenomegaly or masses.   Neurologic: Cranial nerves II through XII are intact. Full 5/5 strength throughout upper and lower extremities. Alternating hand motion intact. Gait normal. Able to walk on toes, heels, and tandem without difficulty. Negative Rhomberg's.  Skin: No rashes, petechiae, or bruising noted on exposed skin.   Laboratory " Data:  Results for PANDA WOODARD (MRN [id number]) as of 10/16/2017 16:49   10/16/2017 15:29   Sodium 136   Potassium 3.9   Chloride 104   Carbon Dioxide 25   Urea Nitrogen 9   Creatinine 0.92   GFR Estimate >90   GFR Estimate If Black >90   Calcium 9.6   Anion Gap 7   Albumin 4.4   Protein Total 8.5   Bilirubin Total 0.3   Alkaline Phosphatase 79   ALT 44   AST 26   Lactate Dehydrogenase 238 (H)   Glucose 94   WBC 7.1   Hemoglobin 14.7   Hematocrit 44.0   Platelet Count 238   RBC Count 5.28   MCV 83   MCH 27.8   MCHC 33.4   RDW 13.0   Diff Method Automated Method   % Neutrophils 70.2   % Lymphocytes 21.7   % Monocytes 6.5   % Eosinophils 0.6   % Basophils 0.6   % Immature Granulocytes 0.4   Nucleated RBCs 0   Absolute Neutrophil 5.0   Absolute Lymphocytes 1.6   Absolute Monocytes 0.5   Absolute Eosinophils 0.0   Absolute Basophils 0.0   Abs Immature Granulocytes 0.0   Absolute Nucleated RBC 0.0       Assessment and Plan:  1. History of primary CNS germ cell tumor, completed treatment 2014  Patient is s/p 6 cycles of chemotherapy (1,3, & 5 carbo/etoposide and 2,4,6 ifos/etoposide). He also received radiation to the ventricles followed by a boost (3000cGy to ventricles and 5400 cGy to tumor bed) from 9/17/2014 through 10/28/14.   -No symptoms to suggest disease recurrence. LDH is very mildly elevated today, which may be secondary to marijuana use. AFP and bHCG are pending at this time.  -Due for restaging imaging in April with CT CAP and Brain MRI, along with a follow-up with Dr. Ervin.    2. Headaches, stable  3. History of seizures, stable  Patient has had great control of his headaches with medical marijuana and nortriptyline. He was due to see Neurology in July and we will try to get this follow-up scheduled. He has not had any seizures and should continue his Keppra.     4. Mild esotropia with diplopia, stable  Encouraged patient to establish annual care with an ophthalmologist. He is going to do so. No vision  changes at this time.    5. Diabetes insipidus, stable  Continue on desmopressin and following with endocrinology    6. Mood  No current issues.    7. History of provoked PE, completed 6 months Lovenox  No shortness of breath, chest pain, cough, or leg swelling to suggest recurrent VTE.            Trip Woods PA-C  [facility] [address] [address]  [phone]    I saw patient and performed the ROS and exam myself.  The assessment and plan were mutually discussed and this note was edited to reflect my findings.  Juliet Mantilla PA-C

## 2017-11-22 ENCOUNTER — OFFICE VISIT (OUTPATIENT)
Dept: NEUROLOGY | Facility: CLINIC | Age: 27
End: 2017-11-22

## 2017-11-22 VITALS
WEIGHT: 164.2 LBS | DIASTOLIC BLOOD PRESSURE: 76 MMHG | HEART RATE: 78 BPM | BODY MASS INDEX: 26.39 KG/M2 | HEIGHT: 66 IN | SYSTOLIC BLOOD PRESSURE: 132 MMHG

## 2017-11-22 DIAGNOSIS — R51.9 CHRONIC DAILY HEADACHE: ICD-10-CM

## 2017-11-22 DIAGNOSIS — C80.1 MIXED GERM CELL TUMOR (H): ICD-10-CM

## 2017-11-22 DIAGNOSIS — R51.9 CHRONIC DAILY HEADACHE: Primary | ICD-10-CM

## 2017-11-22 RX ORDER — NORTRIPTYLINE HCL 10 MG
CAPSULE ORAL
Qty: 60 CAPSULE | Refills: 6 | Status: SHIPPED | OUTPATIENT
Start: 2017-11-22 | End: 2018-07-19

## 2017-11-22 NOTE — MR AVS SNAPSHOT
After Visit Summary   11/22/2017    Kari Turcios    MRN: [id number]           Patient Information     Date Of Birth          1990        Visit Information        Provider Department      11/22/2017 1:30 PM Sybil Dockery APRN [facility]        Today's Diagnoses     Chronic daily headache    -  1    Mixed germ cell tumor (H)          Care Instructions    Plan:  Occupational therapy or occupational medicine referral or ask your employer what you have to do to obtain light protective glasses.   Continue nortriptyline and may try to take 10 mg am and 10 mg at bedtime and see its makes any difference  Vitamin B2 (riboflavin) 400 mg daily OTC for headache prevention  EKG for nortriptyline side effects monitoring  Follow up in 4-6 weeks via MyChart or sooner if needed then if stable follow up in 3-6 months in our Clinic or sooner.       Nortriptyline capsules  Brand Names: Aventyl, Pamelor  What is this medicine?  NORTRIPTYLINE (nor TRIP ti rosy) is used to treat depression.  How should I use this medicine?  Take this medicine by mouth with a glass of water. Follow the directions on the prescription label. Take your doses at regular intervals. Do not take it more often than directed. Do not stop taking this medicine suddenly except upon the advice of your doctor. Stopping this medicine too quickly may cause serious side effects or your condition may worsen.  A special MedGuide will be given to you by the pharmacist with each prescription and refill. Be sure to read this information carefully each time.  Talk to your pediatrician regarding the use of this medicine in children. Special care may be needed.  What side effects may I notice from receiving this medicine?  Side effects that you should report to your doctor or health care professional as soon as possible:    allergic reactions like skin rash, itching or hives, swelling of the face, lips, or  tongue    anxious    breathing problems    changes in vision    confusion    elevated mood, decreased need for sleep, racing thoughts, impulsive behavior    eye pain    fast, irregular heartbeat    feeling faint or lightheaded, falls    feeling agitated, angry, or irritable    fever with increased sweating    hallucination, loss of contact with reality    seizures    stiff muscles    suicidal thoughts or other mood changes    tingling, pain, or numbness in the feet or hands    trouble passing urine or change in the amount of urine    trouble sleeping    unusually weak or tired    vomiting    yellowing of the eyes or skin  Side effects that usually do not require medical attention (report to your doctor or health care professional if they continue or are bothersome):    change in sex drive or performance    change in appetite or weight    constipation    dizziness    dry mouth    nausea    tired    tremors    upset stomach  What may interact with this medicine?  Do not take this medicine with any of the following medications:    arsenic trioxide    certain medicines medicines for irregular heart beat    cisapride    halofantrine    linezolid    MAOIs like Carbex, Eldepryl, Marplan, Nardil, and Parnate    methylene blue (injected into a vein)    other medicines for mental depression    phenothiazines like perphenazine, thioridazine and chlorpromazine    pimozide    probucol    procarbazine    sparfloxacin    Nathaniel's Wort    ziprasidone  This medicine may also interact with any of the following medications:    atropine and related drugs like hyoscyamine, scopolamine, tolterodine and others    barbiturate medicines for inducing sleep or treating seizures, such as phenobarbital    cimetidine    medicines for diabetes    medicines for seizures like carbamazepine or phenytoin    reserpine    thyroid medicine  What if I miss a dose?  If you miss a dose, take it as soon as you can. If it is almost time for your next dose,  take only that dose. Do not take double or extra doses.  Where should I keep my medicine?  Keep out of the reach of children.  Store at room temperature between 15 and 30 degrees C (59 and 86 degrees F). Keep container tightly closed. Throw away any unused medicine after the expiration date.  What should I tell my health care provider before I take this medicine?  They need to know if you have any of these conditions:    an alcohol problem    bipolar disorder or schizophrenia    difficulty passing urine, prostate trouble    glaucoma    heart disease or recent heart attack    liver disease    over active thyroid    seizures    thoughts or plans of suicide or a previous suicide attempt or family history of suicide attempt    an unusual or allergic reaction to nortriptyline, other medicines, foods, dyes, or preservatives    pregnant or trying to get pregnant    breast-feeding  What should I watch for while using this medicine?  Tell your doctor if your symptoms do not get better or if they get worse. Visit your doctor or health care professional for regular checks on your progress. Because it may take several weeks to see the full effects of this medicine, it is important to continue your treatment as prescribed by your doctor.  Patients and their families should watch out for new or worsening thoughts of suicide or depression. Also watch out for sudden changes in feelings such as feeling anxious, agitated, panicky, irritable, hostile, aggressive, impulsive, severely restless, overly excited and hyperactive, or not being able to sleep. If this happens, especially at the beginning of treatment or after a change in dose, call your health care professional.  You may get drowsy or dizzy. Do not drive, use machinery, or do anything that needs mental alertness until you know how this medicine affects you. Do not stand or sit up quickly, especially if you are an older patient. This reduces the risk of dizzy or fainting  spells. Alcohol may interfere with the effect of this medicine. Avoid alcoholic drinks.  Do not treat yourself for coughs, colds, or allergies without asking your doctor or health care professional for advice. Some ingredients can increase possible side effects.  Your mouth may get dry. Chewing sugarless gum or sucking hard candy, and drinking plenty of water may help. Contact your doctor if the problem does not go away or is severe.  This medicine may cause dry eyes and blurred vision. If you wear contact lenses you may feel some discomfort. Lubricating drops may help. See your eye doctor if the problem does not go away or is severe.  This medicine can cause constipation. Try to have a bowel movement at least every 2 to 3 days. If you do not have a bowel movement for 3 days, call your doctor or health care professional.  This medicine can make you more sensitive to the sun. Keep out of the sun. If you cannot avoid being in the sun, wear protective clothing and use sunscreen. Do not use sun lamps or tanning beds/booths.  NOTE:This sheet is a summary. It may not cover all possible information. If you have questions about this medicine, talk to your doctor, pharmacist, or health care provider. Copyright  2017 [employer]                Follow-ups after your visit        Additional Services     OT Referral       *This therapy referral will be filtered to a centralized scheduling office at [facility] and the patient will receive a call to schedule an appointment at a [place] location most convenient for them. *     [facility] provides Occupational Therapy evaluation and treatment and many specialty services across the [place] system.  If requesting a specialty program, please choose from the list below.    If you have not heard from the scheduling office within 2 business days, please call [phone] for all locations, with the exception of [place], please call [phone].  "    Treatment: Evaluation & Treatment  Special Instructions/Modalities: patient needs assessment for work-for light protective or glare protective lenses and other work assessment to decrease his headache re-occurrence. Patient works at [employer] at [employer]. Please let me know if it cannot be done thru OT. Please direct the patient to someone who can do it.   Special Programs: per therapist    Please be aware that coverage of these services is subject to the terms and limitations of your health insurance plan.  Call member services at your health plan with any benefit or coverage questions.      **Note to Provider:  If you are referring outside of [place] for the therapy appointment, please list the name of the location in the \"special instructions\" above, print the referral and give to the patient to schedule the appointment.                  Your next 10 appointments already scheduled     Apr 16, 2018  3:45 PM CDT   Lab with  LAB   [facility] ([facility])    [address] [place] [id number] [phone]            Apr 16, 2018  4:20 PM CDT   (Arrive by 4:05 PM)   CT CHEST/ABDOMEN/PELVIS W CONTRAST with [id number]   [facility] ([facility])    [address] [place] [id number] [phone]           Please bring any scans or X-rays taken at other hospitals, if similar tests were done. Also bring a list of your medicines, including vitamins, minerals and over-the-counter drugs. It is safest to leave personal items at home.  Be sure to tell your doctor:   If you have any allergies.   If there s any chance you are pregnant.   If you are breastfeeding.   If you have any special needs.  You may have contrast for this exam. To prepare:   Do not eat or drink for 2 hours before your exam. If you need to take medicine, you may take it with small sips of water. (We may ask you to take liquid " medicine as well.)   The day before your exam, drink extra fluids at least six 8-ounce glasses (unless your doctor tells you to restrict your fluids).  Patients over 70 or patients with diabetes or kidney problems:   If you haven t had a blood test (creatinine test) within the last 30 days, go to your clinic or Diagnostic Imaging Department for this test.  If you have diabetes:   If your kidney function is normal, continue taking your metformin (Avandamet, Glucophage, Glucovance, Metaglip) on the day of your exam.   If your kidney function is abnormal, wait 48 hours before restarting this medicine.  You will have oral contrast for this exam:   You will drink the contrast at home. Get this from your clinic or Diagnostic Imaging Department. Please follow the directions given.  Please wear loose clothing, such as a sweat suit or jogging clothes. Avoid snaps, zippers and other metal. We may ask you to undress and put on a hospital gown.  If you have any questions, please call the Imaging Department where you will have your exam.            Apr 16, 2018  4:45 PM CDT   (Arrive by 4:30 PM)   MR BRAIN W/O & W CONTRAST with [id number]   [facility] ([facility])    [address] [address] [phone]           Take your medicines as usual, unless your doctor tells you not to. Bring a list of your current medicines to your exam (including vitamins, minerals and over-the-counter drugs).  You will be given intravenous contrast for this exam. To prepare:   The day before your exam, drink extra fluids at least six 8-ounce glasses (unless your doctor tells you to restrict your fluids).   Have a blood test (creatinine test) within 30 days of your exam. Go to your clinic or Diagnostic Imaging Department for this test.  The MRI machine uses a strong magnet. Please wear clothes without metal (snaps, zippers). A sweatsuit works well, or we may give you a  [facility].  Please remove any body piercings and hair extensions before you arrive. You will also remove watches, jewelry, hairpins, wallets, dentures, partial dental plates and hearing aids. You may wear contact lenses, and you may be able to wear your rings. We have a safe place to keep your personal items, but it is safer to leave them at home.   **IMPORTANT** THE INSTRUCTIONS BELOW ARE ONLY FOR THOSE PATIENTS WHO HAVE BEEN TOLD THEY WILL RECEIVE SEDATION OR GENERAL ANESTHESIA DURING THEIR MRI PROCEDURE:  IF YOU WILL RECEIVE SEDATION (take medicine to help you relax during your exam):   You must get the medicine from your doctor before you arrive. Bring the medicine to the exam. Do not take it at home.   Arrive one hour early. Bring someone who can take you home after the test. Your medicine will make you sleepy. After the exam, you may not drive, take a bus or take a taxi by yourself.   No eating 8 hours before your exam. You may have clear liquids up until 4 hours before your exam. (Clear liquids include water, clear tea, black coffee and fruit juice without pulp.)  IF YOU WILL RECEIVE ANESTHESIA (be asleep for your exam):   Arrive 1 1/2 hours early. Bring someone who can take you home after the test. You may not drive, take a bus or take a taxi by yourself.   No eating 8 hours before your exam. You may have clear liquids up until 4 hours before your exam. (Clear liquids include water, clear tea, black coffee and fruit juice without pulp.)  Please call the Imaging Department at your exam site with any questions.            Apr 18, 2018  3:15 PM CDT   (Arrive by 3:00 PM)   Return Visit with Cheikh Ervin MD   [facility] ([facility])    [address]  [address]  [facility] [address] [phone]              Future tests that were ordered for you today     Open Future Orders        Priority Expected Expires Ordered    EKG 12-lead complete w/read  "- Clinics Routine  11/22/2018 11/22/2017            Who to contact     Please call your clinic at [phone] to:    Ask questions about your health    Make or cancel appointments    Discuss your medicines    Learn about your test results    Speak to your doctor   If you have compliments or concerns about an experience at your clinic, or if you wish to file a complaint, please contact [facility] Patient Relations at [phone] or email us at [email or web address]         Additional Information About Your Visit        [employer] Information     WebNotes gives you secure access to your electronic health record. If you see a primary care provider, you can also send messages to your care team and make appointments. If you have questions, please call your primary care clinic.  If you do not have a primary care provider, please call [phone] and they will assist you.      WebNotes is an electronic gateway that provides easy, online access to your medical records. With WebNotes, you can request a clinic appointment, read your test results, renew a prescription or communicate with your care team.     To access your existing account, please contact your [facility] or call [phone] for assistance.        Care EveryWhere ID     This is your Care EveryWhere ID. This could be used by other organizations to access your [place] medical records  [id number]        Your Vitals Were     Pulse Height BMI (Body Mass Index)             78 1.676 m (5' 6\") 26.5 kg/m2          Blood Pressure from Last 3 Encounters:   11/22/17 132/76   10/16/17 124/70   04/28/17 134/80    Weight from Last 3 Encounters:   11/22/17 74.5 kg (164 lb 3.2 oz)   10/16/17 75.3 kg (166 lb)   04/28/17 70.3 kg (155 lb)              We Performed the Following     OT Referral          Today's Medication Changes          These changes are accurate as of: 11/22/17  2:27 PM.  If you have any " questions, ask your nurse or doctor.               These medicines have changed or have updated prescriptions.        Dose/Directions    nortriptyline 10 MG capsule   Commonly known as:  PAMELOR   This may have changed:    - how much to take  - how to take this  - when to take this  - additional instructions   Used for:  Chronic daily headache   Changed by:  Sybil Dockery APRN CNP        Take one tab am and one tab at bedtime   Quantity:  60 capsule   Refills:  6            Where to get your medicines      These medications were sent to [facility]/pharmacy #[id number] [place], [place] - [address], [place] [address]     Phone:  [phone]     nortriptyline 10 MG capsule                Primary Care Provider Office Phone # Fax #    Ila Cornell [phone]       [address] [address]        Equal Access to Services     [facility]: Hadii aad ku hadasho Soomaali, waaxda luqadaha, qaybta kaalmada adeegyada, waxay idiin hayaan griselda wagner . So [facility] [phone].    ATENCIÓN: Si rachellla español, tiene a gaffney disposición servicios gratuitos de asistencia lingüística. Ramez al [phone].    We comply with applicable federal civil rights laws and [place] laws. We do not discriminate on the basis of race, color, national origin, age, disability, sex, sexual orientation, or gender identity.            Thank you!     Thank you for choosing [facility]UR[facility]  for your care. Our goal is always to provide you with excellent care. Hearing back from our patients is one way we can continue to improve our services. Please take a few minutes to complete the written survey that you may receive in the mail after your visit with us. Thank you!             Your Updated Medication List - Protect others around you: Learn how to safely use, store and throw away your medicines at www.disposemymeds.org.          This list is  accurate as of: 11/22/17  2:27 PM.  Always use your most recent med list.                   Brand Name Dispense Instructions for use Diagnosis    ACETAMINOPHEN PO      Take 650 mg by mouth every 4 hours as needed for pain        * desmopressin 0.2 MG tablet    DDAVP     Take 0.2 mg by mouth 2 times daily        * desmopressin 0.1 MG tablet    DDAVP    360 tablet    Take 2 tablets (200 mcg) by mouth 2 times daily *PLEASE SCHEDULE ANNUAL MD APPT.    Diabetes insipidus (H)       levETIRAcetam 750 MG tablet    KEPPRA    60 tablet    Take 1,500 mg by mouth every morning Two tablet in am and 2 1/2 tablet in pm    Pineal tumor       LORazepam 0.5 MG tablet    ATIVAN    30 tablet    Take 1 tablet (0.5 mg) by mouth every 4 hours as needed (Anxiety, Nausea/Vomiting or Sleep)    Pineal tumor       melatonin 1 MG Tabs tablet     90 tablet    Take 1-2 tablets (1-2 mg) by mouth nightly as needed for sleep    Pineal tumor       nortriptyline 10 MG capsule    PAMELOR    60 capsule    Take one tab am and one tab at bedtime    Chronic daily headache       tobramycin-dexamethasone ophthalmic ointment    TOBRADEX     Place 1 Application into both eyes 3 times daily Reported on 3/14/2017        VISINE TEARS OP      Apply 1-2 drops to eye 2 times daily as needed (for dry eyes.)        * Notice:  This list has 2 medication(s) that are the same as other medications prescribed for you. Read the directions carefully, and ask your doctor or other care provider to review them with you.

## 2017-11-22 NOTE — PATIENT INSTRUCTIONS
Plan:  Occupational therapy or occupational medicine referral or ask your employer what you have to do to obtain light protective glasses.   Continue nortriptyline and may try to take 10 mg am and 10 mg at bedtime and see its makes any difference  Vitamin B2 (riboflavin) 400 mg daily OTC for headache prevention  EKG for nortriptyline side effects monitoring  Follow up in 4-6 weeks via MyChart or sooner if needed then if stable follow up in 3-6 months in our Clinic or sooner.       Nortriptyline capsules  Brand Names: James Cortes  What is this medicine?  NORTRIPTYLINE (nor TRIP ti rosy) is used to treat depression.  How should I use this medicine?  Take this medicine by mouth with a glass of water. Follow the directions on the prescription label. Take your doses at regular intervals. Do not take it more often than directed. Do not stop taking this medicine suddenly except upon the advice of your doctor. Stopping this medicine too quickly may cause serious side effects or your condition may worsen.  A special MedGuide will be given to you by the pharmacist with each prescription and refill. Be sure to read this information carefully each time.  Talk to your pediatrician regarding the use of this medicine in children. Special care may be needed.  What side effects may I notice from receiving this medicine?  Side effects that you should report to your doctor or health care professional as soon as possible:    allergic reactions like skin rash, itching or hives, swelling of the face, lips, or tongue    anxious    breathing problems    changes in vision    confusion    elevated mood, decreased need for sleep, racing thoughts, impulsive behavior    eye pain    fast, irregular heartbeat    feeling faint or lightheaded, falls    feeling agitated, angry, or irritable    fever with increased sweating    hallucination, loss of contact with reality    seizures    stiff muscles    suicidal thoughts or other mood  changes    tingling, pain, or numbness in the feet or hands    trouble passing urine or change in the amount of urine    trouble sleeping    unusually weak or tired    vomiting    yellowing of the eyes or skin  Side effects that usually do not require medical attention (report to your doctor or health care professional if they continue or are bothersome):    change in sex drive or performance    change in appetite or weight    constipation    dizziness    dry mouth    nausea    tired    tremors    upset stomach  What may interact with this medicine?  Do not take this medicine with any of the following medications:    arsenic trioxide    certain medicines medicines for irregular heart beat    cisapride    halofantrine    linezolid    MAOIs like Carbex, Eldepryl, Marplan, Nardil, and Parnate    methylene blue (injected into a vein)    other medicines for mental depression    phenothiazines like perphenazine, thioridazine and chlorpromazine    pimozide    probucol    procarbazine    sparfloxacin    [place]'s Wort    ziprasidone  This medicine may also interact with any of the following medications:    atropine and related drugs like hyoscyamine, scopolamine, tolterodine and others    barbiturate medicines for inducing sleep or treating seizures, such as phenobarbital    cimetidine    medicines for diabetes    medicines for seizures like carbamazepine or phenytoin    reserpine    thyroid medicine  What if I miss a dose?  If you miss a dose, take it as soon as you can. If it is almost time for your next dose, take only that dose. Do not take double or extra doses.  Where should I keep my medicine?  Keep out of the reach of children.  Store at room temperature between 15 and 30 degrees C (59 and 86 degrees F). Keep container tightly closed. Throw away any unused medicine after the expiration date.  What should I tell my health care provider before I take this medicine?  They need to know if you have any of these  conditions:    an alcohol problem    bipolar disorder or schizophrenia    difficulty passing urine, prostate trouble    glaucoma    heart disease or recent heart attack    liver disease    over active thyroid    seizures    thoughts or plans of suicide or a previous suicide attempt or family history of suicide attempt    an unusual or allergic reaction to nortriptyline, other medicines, foods, dyes, or preservatives    pregnant or trying to get pregnant    breast-feeding  What should I watch for while using this medicine?  Tell your doctor if your symptoms do not get better or if they get worse. Visit your doctor or health care professional for regular checks on your progress. Because it may take several weeks to see the full effects of this medicine, it is important to continue your treatment as prescribed by your doctor.  Patients and their families should watch out for new or worsening thoughts of suicide or depression. Also watch out for sudden changes in feelings such as feeling anxious, agitated, panicky, irritable, hostile, aggressive, impulsive, severely restless, overly excited and hyperactive, or not being able to sleep. If this happens, especially at the beginning of treatment or after a change in dose, call your health care professional.  You may get drowsy or dizzy. Do not drive, use machinery, or do anything that needs mental alertness until you know how this medicine affects you. Do not stand or sit up quickly, especially if you are an older patient. This reduces the risk of dizzy or fainting spells. Alcohol may interfere with the effect of this medicine. Avoid alcoholic drinks.  Do not treat yourself for coughs, colds, or allergies without asking your doctor or health care professional for advice. Some ingredients can increase possible side effects.  Your mouth may get dry. Chewing sugarless gum or sucking hard candy, and drinking plenty of water may help. Contact your doctor if the problem does not  go away or is severe.  This medicine may cause dry eyes and blurred vision. If you wear contact lenses you may feel some discomfort. Lubricating drops may help. See your eye doctor if the problem does not go away or is severe.  This medicine can cause constipation. Try to have a bowel movement at least every 2 to 3 days. If you do not have a bowel movement for 3 days, call your doctor or health care professional.  This medicine can make you more sensitive to the sun. Keep out of the sun. If you cannot avoid being in the sun, wear protective clothing and use sunscreen. Do not use sun lamps or tanning beds/booths.  NOTE:This sheet is a summary. It may not cover all possible information. If you have questions about this medicine, talk to your doctor, pharmacist, or health care provider. Copyright  2017 [employer]

## 2017-11-22 NOTE — PROGRESS NOTES
Re: Kari Turcios      MRN# [id number]  YOB: 1990  Date of Visit:11/22/2017     OUTPATIENT NEUROLOGY VISIT NOTE    Reason for Visit:  Headache follow up    Interval History:  Kari Turcios is a 27-year-old male presents to the clinic today for headache follow up. Last Neurology visit for headache management 04/28/2017, see clinic visit note for details. 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    Patient saw his oncologist on 10/16/2017 and no symptoms to suggest disease recurrence (history of primary CNS germ cell tumor, s/p treatment in 2014, s/p 6 cycles of chemotherapy) per my review of patient's  Oncology clinic visit note.  Patient was last seen for headache in our Clinic on 04/28/2017 and was recommended to continue treatment with nortriptyline.   Patient reports that he has been using medical marijuana but cartilage stopped working but in overall it was helping with the headaches. Patient reports that headaches are worse at work because he has to look at 3 computer screens. Patient reports that he is better after returning home.   Nortriptyline 20 mg at night and feels that the medication itself is beneficial. Patient did not noticed any drowsiness or any other side effects. Patient still going to the gym and able to do his daily activity.  The concern is when patient is at work and exposed to three computer monitors and screen light irritates his eyes which turns into headache. Headache improves when he is back home and relaxes.   Denies any new headache symptoms. Patient reports that he has screen protectors on his computer and dims light.   Patient reports that he tries drinking water as much as he can. Patient does not drink soda anymore. Reports that alcohol drinking is occasional.   Plan:  Occupational  therapy or occupational medicine referral or ask your employer what you have to do to obtain light protective glasses.   Continue nortriptyline and may try to take 10 mg am and 10 mg at bedtime and see its makes any difference  Vitamin B2 (riboflavin) 400 mg daily OTC for headache prevention  EKG for nortriptyline side effects monitoring  Follow up in 4-6 weeks via MyChart or sooner if needed      Past Medical History reviewed  Social History   Substance Use Topics     Smoking status: Never Smoker     Smokeless tobacco: Never Used     Alcohol use 1.2 - 1.8 oz/week     2 - 3 Standard drinks or equivalent per week      Comment: OCASIONALLY    reviewed and verified with the patient     Allergies   Allergen Reactions     Penicillins Hives       Current Outpatient Prescriptions   Medication Sig Dispense Refill     desmopressin (DDAVP) 0.2 MG tablet Take 0.2 mg by mouth 2 times daily       desmopressin (DDAVP) 0.1 MG tablet Take 2 tablets (200 mcg) by mouth 2 times daily *PLEASE SCHEDULE ANNUAL MD APPT. 360 tablet 3     nortriptyline (PAMELOR) 10 MG capsule Take 2 capsules (20 mg) by mouth At Bedtime 60 capsule 6     ACETAMINOPHEN PO Take 650 mg by mouth every 4 hours as needed for pain       tobramycin-dexamethasone (TOBRADEX) ophthalmic ointment Place 1 Application into both eyes 3 times daily Reported on 3/14/2017       melatonin 1 MG TABS Take 1-2 tablets (1-2 mg) by mouth nightly as needed for sleep 90 tablet 0     LORazepam (ATIVAN) 0.5 MG tablet Take 1 tablet (0.5 mg) by mouth every 4 hours as needed (Anxiety, Nausea/Vomiting or Sleep) 30 tablet 3     levETIRAcetam (KEPPRA) 750 MG tablet Take 1,500 mg by mouth every morning Two tablet in am and 2 1/2 tablet in pm 60 tablet 0     Glycerin-Hypromellose- (VISINE TEARS OP) Apply 1-2 drops to eye 2 times daily as needed (for dry eyes.)     reviewed and verified with the patient    Review of Systems:   A 10-point ROS including constitutional, eyes, respiratory,  "cardiovascular, gastroenterology, genitourinary, integumentary, musculoskeletal, neurology and psychiatric were reviewed and no new concerns were reported today.     General Exam:   /76  Pulse 78  Ht 1.676 m (5' 6\")  Wt 74.5 kg (164 lb 3.2 oz)  BMI 26.5 kg/m2  GEN: Awake, NAD; good eye contact, responses appropriately, healthy appearing.   HEENT: Head atraumatic/Normocephalic. Scalp normal.Speech is fluent without dysarthria. Face is symmetrical. Intact and symmetrical eyebrow and lid raise and eyelid closure, smiles. Hearing Intact to conversation speech. The palates elevates symmetrical.  Strength  5/5 in the upper and lower extremities bilaterally.  Normal casual gait.  Assessment and Plan:  See Interval History for our discussion and plan    I discussed all my recommendation with Kari Turcios. The patient verbalizes understanding and comfortable with the plan. The patient has our clinic phone number to call with any questions or concerns. All of the patient's questions were answered from the best of my current knowledge.     Time spent with pt answering questions, discussing findings, counseling and coordinating care was more than 50% the appointment time, 26  minutes.         CASPER Black, CNP  [facility]    "

## 2017-11-24 LAB — INTERPRETATION ECG - MUSE: NORMAL

## 2017-11-29 ENCOUNTER — TELEPHONE (OUTPATIENT)
Dept: NEUROLOGY | Facility: CLINIC | Age: 27
End: 2017-11-29

## 2017-11-29 NOTE — TELEPHONE ENCOUNTER
Patient called with symptoms. Nortriptyline changed last week. He was taking 2 pills every evening. It was changed to take 1 in the morning and 1 in the evening.     C/o cold, sweaty, anxious and feels like heart is racing. Has not checked temp. He feels this could be related to the change in how he takes the nortriptyline.     Neo, please advise.     Thank you,  Fide Collazo, RN Care Coordinator

## 2017-12-01 NOTE — TELEPHONE ENCOUNTER
"12/1/17: Called patient to see how he was doing.  He has not had anymore \"episodes\". He did switch to taking his nortriptyline in the morning and wonders if maybe taking it with coffee caused this. He skipped his coffee this morning and has been fine. If episode occurs again he will stop nortriptyline. Will go to ED if he experiences any chest pain. Update sent to [facility].   "

## 2017-12-04 ENCOUNTER — ONCOLOGY VISIT (OUTPATIENT)
Dept: ONCOLOGY | Facility: CLINIC | Age: 27
End: 2017-12-04
Attending: PHYSICIAN ASSISTANT
Payer: COMMERCIAL

## 2017-12-04 ENCOUNTER — HOSPITAL ENCOUNTER (OUTPATIENT)
Dept: MRI IMAGING | Facility: CLINIC | Age: 27
Discharge: HOME | End: 2017-12-04
Attending: INTERNAL MEDICINE | Admitting: INTERNAL MEDICINE
Payer: COMMERCIAL

## 2017-12-04 VITALS
HEART RATE: 92 BPM | DIASTOLIC BLOOD PRESSURE: 74 MMHG | TEMPERATURE: 98.3 F | OXYGEN SATURATION: 99 % | HEIGHT: 66 IN | SYSTOLIC BLOOD PRESSURE: 127 MMHG | RESPIRATION RATE: 18 BRPM | BODY MASS INDEX: 26.03 KG/M2 | WEIGHT: 162 LBS

## 2017-12-04 DIAGNOSIS — C71.9 GERM CELL TUMOR OF BRAIN (H): Primary | ICD-10-CM

## 2017-12-04 DIAGNOSIS — C80.1 MIXED GERM CELL TUMOR (H): ICD-10-CM

## 2017-12-04 DIAGNOSIS — C71.9 GERM CELL TUMOR OF BRAIN (H): ICD-10-CM

## 2017-12-04 LAB
AFP SERPL-MCNC: 2.2 UG/L (ref 0–8)
ALBUMIN SERPL-MCNC: 4.2 G/DL (ref 3.4–5)
ALP SERPL-CCNC: 73 U/L (ref 40–150)
ALT SERPL W P-5'-P-CCNC: 28 U/L (ref 0–70)
ANION GAP SERPL CALCULATED.3IONS-SCNC: 6 MMOL/L (ref 3–14)
AST SERPL W P-5'-P-CCNC: 14 U/L (ref 0–45)
BASOPHILS # BLD AUTO: 0 10E9/L (ref 0–0.2)
BASOPHILS NFR BLD AUTO: 0.4 %
BILIRUB SERPL-MCNC: 0.2 MG/DL (ref 0.2–1.3)
BUN SERPL-MCNC: 11 MG/DL (ref 7–30)
CALCIUM SERPL-MCNC: 9 MG/DL (ref 8.5–10.1)
CHLORIDE SERPL-SCNC: 104 MMOL/L (ref 94–109)
CO2 SERPL-SCNC: 30 MMOL/L (ref 20–32)
CREAT SERPL-MCNC: 0.93 MG/DL (ref 0.66–1.25)
DIFFERENTIAL METHOD BLD: NORMAL
EOSINOPHIL # BLD AUTO: 0.1 10E9/L (ref 0–0.7)
EOSINOPHIL NFR BLD AUTO: 1.1 %
ERYTHROCYTE [DISTWIDTH] IN BLOOD BY AUTOMATED COUNT: 12.6 % (ref 10–15)
GFR SERPL CREATININE-BSD FRML MDRD: >90 ML/MIN/1.7M2
GLUCOSE SERPL-MCNC: 114 MG/DL (ref 70–99)
HCT VFR BLD AUTO: 43.7 % (ref 40–53)
HGB BLD-MCNC: 14.2 G/DL (ref 13.3–17.7)
IMM GRANULOCYTES # BLD: 0 10E9/L (ref 0–0.4)
IMM GRANULOCYTES NFR BLD: 0.2 %
LDH SERPL L TO P-CCNC: 163 U/L (ref 85–227)
LYMPHOCYTES # BLD AUTO: 1.5 10E9/L (ref 0.8–5.3)
LYMPHOCYTES NFR BLD AUTO: 14.9 %
MCH RBC QN AUTO: 27.9 PG (ref 26.5–33)
MCHC RBC AUTO-ENTMCNC: 32.5 G/DL (ref 31.5–36.5)
MCV RBC AUTO: 86 FL (ref 78–100)
MONOCYTES # BLD AUTO: 0.5 10E9/L (ref 0–1.3)
MONOCYTES NFR BLD AUTO: 4.7 %
NEUTROPHILS # BLD AUTO: 7.8 10E9/L (ref 1.6–8.3)
NEUTROPHILS NFR BLD AUTO: 78.7 %
NRBC # BLD AUTO: 0 10*3/UL
NRBC BLD AUTO-RTO: 0 /100
PLATELET # BLD AUTO: 226 10E9/L (ref 150–450)
POTASSIUM SERPL-SCNC: 3.8 MMOL/L (ref 3.4–5.3)
PROT SERPL-MCNC: 7.9 G/DL (ref 6.8–8.8)
RBC # BLD AUTO: 5.09 10E12/L (ref 4.4–5.9)
SODIUM SERPL-SCNC: 140 MMOL/L (ref 133–144)
WBC # BLD AUTO: 10 10E9/L (ref 4–11)

## 2017-12-04 PROCEDURE — 80053 COMPREHEN METABOLIC PANEL: CPT | Performed by: PHYSICIAN ASSISTANT

## 2017-12-04 PROCEDURE — 25000128 H RX IP 250 OP 636: Performed by: INTERNAL MEDICINE

## 2017-12-04 PROCEDURE — A9585 GADOBUTROL INJECTION: HCPCS | Performed by: INTERNAL MEDICINE

## 2017-12-04 PROCEDURE — 36415 COLL VENOUS BLD VENIPUNCTURE: CPT | Performed by: PHYSICIAN ASSISTANT

## 2017-12-04 PROCEDURE — 99213 OFFICE O/P EST LOW 20 MIN: CPT | Mod: 25

## 2017-12-04 PROCEDURE — 83615 LACTATE (LD) (LDH) ENZYME: CPT | Performed by: PHYSICIAN ASSISTANT

## 2017-12-04 PROCEDURE — 84702 CHORIONIC GONADOTROPIN TEST: CPT | Performed by: PHYSICIAN ASSISTANT

## 2017-12-04 PROCEDURE — 82105 ALPHA-FETOPROTEIN SERUM: CPT | Performed by: PHYSICIAN ASSISTANT

## 2017-12-04 PROCEDURE — 85025 COMPLETE CBC W/AUTO DIFF WBC: CPT | Performed by: PHYSICIAN ASSISTANT

## 2017-12-04 PROCEDURE — 70553 MRI BRAIN STEM W/O & W/DYE: CPT

## 2017-12-04 PROCEDURE — 99214 OFFICE O/P EST MOD 30 MIN: CPT | Mod: ZP | Performed by: PHYSICIAN ASSISTANT

## 2017-12-04 RX ORDER — GADOBUTROL 604.72 MG/ML
7.5 INJECTION INTRAVENOUS ONCE
Status: COMPLETED | OUTPATIENT
Start: 2017-12-04 | End: 2017-12-04

## 2017-12-04 RX ADMIN — GADOBUTROL 7.5 ML: 604.72 INJECTION INTRAVENOUS at 07:41

## 2017-12-04 ASSESSMENT — PAIN SCALES - GENERAL: PAINLEVEL: NO PAIN (0)

## 2017-12-04 NOTE — MR AVS SNAPSHOT
After Visit Summary   12/4/2017    Kari Turcios    MRN: [id number]           Patient Information     Date Of Birth          1990        Visit Information        Provider Department      12/4/2017 4:00 PM Deboarh Mantilla PA-C [facility]        Today's Diagnoses     Germ cell tumor of brain (H)    -  1       Follow-ups after your visit        Your next 10 appointments already scheduled     Dec 06, 2017  3:00 PM CST   RETURN NEURO with Serafin Menendez MD   [facility] ([facility])    [address]  [address]  [address]  [facility] [address]   [phone]            Dec 11, 2017 10:15 AM CST   Neuro Eval with Liliane Pompa OT   [facility] ([facility])    [address] [address]   [phone]            Mar 19, 2018  8:00 AM CDT   (Arrive by 7:45 AM)   RETURN ENDOCRINE with Yessenia Ramires MD   [facility] ([facility])    [address] [place] [id number] [phone]            Apr 16, 2018  3:45 PM CDT   Lab with  LAB   [facility] ([facility])    [address] [place] [id number] [phone]            Apr 16, 2018  4:20 PM CDT   (Arrive by 4:05 PM)   CT CHEST/ABDOMEN/PELVIS W CONTRAST with UCCT1   [facility] ([facility])    [address] [place] [id number] [phone]           Please bring any scans or X-rays taken at other hospitals, if similar tests were done. Also bring a list of your medicines, including vitamins, minerals and over-the-counter drugs. It is safest to leave personal items at home.  Be sure to tell your doctor:   If you have any allergies.   If there s any chance you are pregnant.   If you are breastfeeding.   If you have any special needs.  You may  have contrast for this exam. To prepare:   Do not eat or drink for 2 hours before your exam. If you need to take medicine, you may take it with small sips of water. (We may ask you to take liquid medicine as well.)   The day before your exam, drink extra fluids at least six 8-ounce glasses (unless your doctor tells you to restrict your fluids).  Patients over 70 or patients with diabetes or kidney problems:   If you haven t had a blood test (creatinine test) within the last 30 days, go to your clinic or Diagnostic Imaging Department for this test.  If you have diabetes:   If your kidney function is normal, continue taking your metformin (Avandamet, Glucophage, Glucovance, Metaglip) on the day of your exam.   If your kidney function is abnormal, wait 48 hours before restarting this medicine.  You will have oral contrast for this exam:   You will drink the contrast at home. Get this from your clinic or Diagnostic Imaging Department. Please follow the directions given.  Please wear loose clothing, such as a sweat suit or jogging clothes. Avoid snaps, zippers and other metal. We may ask you to undress and put on a hospital gown.  If you have any questions, please call the Imaging Department where you will have your exam.            Apr 18, 2018  3:15 PM CDT   (Arrive by 3:00 PM)   Return Visit with Cheikh Ervin MD   [facility] ([facility])    [address] [address] [phone]              Who to contact     If you have questions or need follow up information about today's clinic visit or your schedule please contact [facility] directly at [phone].  Normal or non-critical lab and imaging results will be communicated to you by MyChart, letter or phone within 4 business days after the clinic has received the results. If you do not hear from us within 7 days, please contact the clinic through [employer] or  "phone. If you have a critical or abnormal lab result, we will notify you by phone as soon as possible.  Submit refill requests through [employer] or call your pharmacy and they will forward the refill request to us. Please allow 3 business days for your refill to be completed.          Additional Information About Your Visit        Robert Applebaum MDhart Information     [employer] gives you secure access to your electronic health record. If you see a primary care provider, you can also send messages to your care team and make appointments. If you have questions, please call your primary care clinic.  If you do not have a primary care provider, please call [phone] and they will assist you.        Care EveryWhere ID     This is your Care EveryWhere ID. This could be used by other organizations to access your [place] medical records  [id number]        Your Vitals Were     Pulse Temperature Respirations Height Pulse Oximetry BMI (Body Mass Index)    92 98.3  F (36.8  C) (Tympanic) 18 1.676 m (5' 5.98\") 99% 26.16 kg/m2       Blood Pressure from Last 3 Encounters:   12/04/17 127/74   11/22/17 132/76   10/16/17 124/70    Weight from Last 3 Encounters:   12/04/17 73.5 kg (162 lb)   11/22/17 74.5 kg (164 lb 3.2 oz)   10/16/17 75.3 kg (166 lb)              We Performed the Following     AFP tumor marker     CBC with platelets differential     Comprehensive metabolic panel     HCG tumor marker     Lactate Dehydrogenase        Primary Care Provider Office Phone # Fax #    Ila Cornell [phone]       [address] [address]        Equal Access to Services     [facility]: Hadii zeus fischer hadashray Soshagufta, waaxda luqadaha, qaybta kaalmainga mcfadden, frank alvarez. So [facility] [phone].    ATENCIÓN: Si habla español, tiene a gaffney disposición servicios gratuitos de asistencia lingüística. Llame al [phone].    We comply with applicable federal civil rights " laws and [place] laws. We do not discriminate on the basis of race, color, national origin, age, disability, sex, sexual orientation, or gender identity.            Thank you!     Thank you for choosing [facility]  for your care. Our goal is always to provide you with excellent care. Hearing back from our patients is one way we can continue to improve our services. Please take a few minutes to complete the written survey that you may receive in the mail after your visit with us. Thank you!             Your Updated Medication List - Protect others around you: Learn how to safely use, store and throw away your medicines at www.disposemymeds.org.          This list is accurate as of: 12/4/17  5:19 PM.  Always use your most recent med list.                   Brand Name Dispense Instructions for use Diagnosis    ACETAMINOPHEN PO      Take 650 mg by mouth every 4 hours as needed for pain        * desmopressin 0.2 MG tablet    DDAVP     Take 0.2 mg by mouth 2 times daily        * desmopressin 0.1 MG tablet    DDAVP    360 tablet    Take 2 tablets (200 mcg) by mouth 2 times daily *PLEASE SCHEDULE ANNUAL MD APPT.    Diabetes insipidus (H)       levETIRAcetam 750 MG tablet    KEPPRA    60 tablet    Take 1,500 mg by mouth every morning Two tablet in am and 2 1/2 tablet in pm    Pineal tumor       LORazepam 0.5 MG tablet    ATIVAN    30 tablet    Take 1 tablet (0.5 mg) by mouth every 4 hours as needed (Anxiety, Nausea/Vomiting or Sleep)    Pineal tumor       melatonin 1 MG Tabs tablet     90 tablet    Take 1-2 tablets (1-2 mg) by mouth nightly as needed for sleep    Pineal tumor       nortriptyline 10 MG capsule    PAMELOR    60 capsule    Take one tab am and one tab at bedtime    Chronic daily headache       tobramycin-dexamethasone ophthalmic ointment    TOBRADEX     Place 1 Application into both eyes 3 times daily Reported on 3/14/2017        VISINE TEARS OP      Apply 1-2 drops to eye 2 times daily as  needed (for dry eyes.)        * Notice:  This list has 2 medication(s) that are the same as other medications prescribed for you. Read the directions carefully, and ask your doctor or other care provider to review them with you.

## 2017-12-04 NOTE — PROGRESS NOTES
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 His past medical history is significant for seizures. Briefly, his oncologic history is as follows:    Patient presented with headaches accompanied by diplopia over a couple of weeks to a [facility] ED on 3/19 where MRI showed a 3cm pineal mass and hydrocephalus. He was transferred to [facility] for neurosurgical management. A biopsy was performed on 3/20 with placement of third ventriculostomy and external ventricular drain. The biopsy showed a mixed germ cell tumor. Resection of the mass was performed on 3/26/14 by a midline combined occipital and suboccipital craniotomy performed by Trinidad Xiao and Urbano. It was R1 resection and most of the macroscopic disease was resected and pathology did reveal mixed germ cell tumor. He was seen by Medical Oncology (Cheikh Ervin) and Radiation Oncology (Dr. Kayla Song) and recommended chemotherapy followed by radiation therapy per the COG protocol for localized CNS Germ Cell Tumors.  He basically is following the stratum 1 for patients with localized non-germinomatous tumors to receive 6 cycles of chemotherapy (1, 3 & 5 -carbo/etoposide and 2, 4, & 6 are ifos/etoposide) and depending post chemotherapy radiographic (MRI) and biochemical response (tumor markers), he may be a candidate to receive less radiation therapy. He completed chemo 8/11/14 and follow up brain MRI was negative for residual/recurrence of disease. HCG and AFP markers were within normal limits (AFP was elevated at diagnosis). He received radiation to the ventricles followed by a boost (3000 cGy to ventricles and 5400 cGy to tumor bed) from 9/17/2014 through 10/28/14.    Mr. Turcios was seen by Dr. Ervin in April 2017 and had negative  "tumor markers (AFP, bHCG, LDH) and no signs of disease recurrence on CT CAP or Brain MRI.     He called into triage last week concerned about worsening headaches.     Interval History:  Abelino returns to clinic today unaccompanied. I saw him in clinic roughly 2 months ago and he stated his headaches were under control with medical marijuana and nortriptyline 20mg at bedtime. Today he states that is not the case.  Everyday he is getting a headache by the end of the work day.  He has maintained a good relationship with his employer through his whole cancer diagnosis- they have given him ergonomic chairs, decreased the light by his desk, given him screen protectors to decrease his work-induced headaches.  However every afternoon, a headache starts M-F and usually towards the end of the week, they are so bad he goes right home to lay down.  Medical marijuana helps, but he doesn't like to feel \"high.\"  He is actually tearful today- he wants to keep working for [employer]- he enjoys his job, but he is very sick of getting a headache every day.  He forgot to take his desmopressin today and has had increased thirst/urination.     He states he rarely has double vision and otherwise denies any vision changes including blurred vision or blind spots. He does admit he hasn't seen an ophthalmologist for quite some time. He has not had any seizures and states he can't remember the last time he did. He remains on Keppra. He denies any other neurologic symptoms including new numbness or tingling, weakness, difficulty with speech, difficulty with coordination, disequilibrium, or dizziness.     He continues to follow with endocrine for diabetes insipidus. This is well managed with desmopressin. He states his mood has been great and is no longer following with a psychologist.          Review of Systems:  Patient denies fevers, chills, night sweats, unexplained weight changes, dizziness, vision or hearing changes, new lumps or bumps, " "chest pain, shortness of breath, cough, abdominal pain, nausea, vomiting, changes to bowel or bladder, swelling of extremities, bleeding issues, or rash.    Current Outpatient Prescriptions   Medication Sig Dispense Refill     desmopressin (DDAVP) 0.2 MG tablet Take 0.2 mg by mouth 2 times daily       desmopressin (DDAVP) 0.1 MG tablet Take 2 tablets (200 mcg) by mouth 2 times daily *PLEASE SCHEDULE ANNUAL MD APPT. 360 tablet 3     nortriptyline (PAMELOR) 10 MG capsule Take 2 capsules (20 mg) by mouth At Bedtime 60 capsule 6     ACETAMINOPHEN PO Take 650 mg by mouth every 4 hours as needed for pain       tobramycin-dexamethasone (TOBRADEX) ophthalmic ointment Place 1 Application into both eyes 3 times daily Reported on 3/14/2017       melatonin 1 MG TABS Take 1-2 tablets (1-2 mg) by mouth nightly as needed for sleep 90 tablet 0     LORazepam (ATIVAN) 0.5 MG tablet Take 1 tablet (0.5 mg) by mouth every 4 hours as needed (Anxiety, Nausea/Vomiting or Sleep) 30 tablet 3     levETIRAcetam (KEPPRA) 750 MG tablet Take 1,500 mg by mouth every morning Two tablet in am and 2 1/2 tablet in pm 60 tablet 0     Glycerin-Hypromellose- (VISINE TEARS OP) Apply 1-2 drops to eye 2 times daily as needed (for dry eyes.)         Physical Examination:  /74  Pulse 92  Temp 98.3  F (36.8  C) (Tympanic)  Resp 18  Ht 1.676 m (5' 5.98\")  Wt 73.5 kg (162 lb)  SpO2 99%  BMI 26.16 kg/m2  Wt Readings from Last 4 Encounters:   12/04/17 73.5 kg (162 lb)   11/22/17 74.5 kg (164 lb 3.2 oz)   10/16/17 75.3 kg (166 lb)   04/28/17 70.3 kg (155 lb)     Full exam not performed today.   Laboratory Data:     4/17/2017 16:13 10/16/2017 15:29 12/4/2017 15:53   Sodium 136 136 140   Potassium 3.4 3.9 3.8   Chloride 101 104 104   Carbon Dioxide 29 25 30   Urea Nitrogen 10 9 11   Creatinine 0.91 0.92 0.93   GFR Estimate >90... >90 >90   GFR Estimate If Black >90... >90 >90   Calcium 8.3 (L) 9.6 9.0   Anion Gap 6 7 6   Albumin 4.0 4.4 4.2 "   Protein Total 7.6 8.5 7.9   Bilirubin Total 0.3 0.3 0.2   Alkaline Phosphatase 74 79 73   ALT 14 44 28   AST 7 26 14   Alpha Fetoprotein 2.2 2.4    Beta-HCG Tumor Marker <3... <3    Lactate Dehydrogenase 136 238 (H) 163   Glucose 98 94 114 (H)   WBC 5.4 7.1 10.0   Hemoglobin 12.9 (L) 14.7 14.2   Hematocrit 39.4 (L) 44.0 43.7   Platelet Count 188 238 226   RBC Count 4.69 5.28 5.09   MCV 84 83 86       BRAIN MRI Impression: No evidence of tumor recurrence. Unchanged posttreatment  changes. Stable ventricular size.       Assessment and Plan:  1. History of primary CNS germ cell tumor, completed treatment 2014  Patient is s/p 6 cycles of chemotherapy (1,3, & 5 carbo/etoposide and 2,4,6 ifos/etoposide). He also received radiation to the ventricles followed by a boost (3000cGy to ventricles and 5400 cGy to tumor bed) from 9/17/2014 through 10/28/14.   -No symptoms to suggest disease recurrence. LDH wnl.  AFP and bHCG are pending at this time.  -Brain MRI WNL today, no hydro,  shunt stable    2. Headaches, no worse than normal, but Abelino feeling like they aren't managed well.  He is getting a HA every day in the afternoon of work.  He is taking 500 mg of Tylenol at noon, encouraged 1000 mg of Tyelnol and then 400 mg of Ibuprofen at 2pm. I wrote a note to [facility] to see if they would allow him to try a work from home day.  Abelino does not get headaches on the weekend.  He really enjoys his job and doesn't want to get a different one, so we will try a few things to see if we can get his QOL better.     3. History of seizures, stable, continue Keppra    4. Mild esotropia with diplopia, stable  Encouraged patient to follow up with optho to make sure it isn't a vision issue causing his headaches.     5. Diabetes insipidus, stable  Continue on desmopressin and following with endocrinology.  Increased thirst/urination today, but forgot his noon desmopressin.     6. Mood  No current issues.    7. History of provoked PE,  completed 6 months Lovenox  No shortness of breath, chest pain, cough, or leg swelling to suggest recurrent VTE.          Juliet Mantilla PA-C

## 2017-12-04 NOTE — LETTER
December 4, 2017      To whom this may concern,     Abelino Turcios is a patient of mine at the [facility].  We are working closely with Abelino to help minimize his daily headaches, likely a sequela from his prior treatments to his brain tumor.  It would be helpful if he could do a trial of days working from home to see if changing the lighting or computer would diminish the frequency of the headaches.  Trying one day a week, each week for a month would be a helpful trial.  We could re-evaluate this in a month to deem whether its helpful or not.            Sincerely,      Deborah Mantilla PA-C

## 2017-12-04 NOTE — NURSING NOTE
"Oncology Rooming Note    December 4, 2017 4:18 PM   Kari Turcios is a 27 year old male who presents for:    Chief Complaint   Patient presents with     Oncology Clinic Visit     Return visit related to Germ Cell Tumor of brain     Initial Vitals: /74  Pulse 92  Temp 98.3  F (36.8  C) (Tympanic)  Resp 18  Ht 1.676 m (5' 5.98\")  Wt 73.5 kg (162 lb)  SpO2 99%  BMI 26.16 kg/m2 Estimated body mass index is 26.16 kg/(m^2) as calculated from the following:    Height as of this encounter: 1.676 m (5' 5.98\").    Weight as of this encounter: 73.5 kg (162 lb). Body surface area is 1.85 meters squared.  No Pain (0) Comment: Data Unavailable   No LMP for male patient.  Allergies reviewed: Yes  Medications reviewed: Yes    Medications: Medication refills not needed today.  Pharmacy name entered into "[employer]:    [id number] - [place] - [address]  [id number] [place], [place] - [address]    Clinical concerns: No new concerns. Provider was notified.    10 minutes for nursing intake (face to face time)     Pattie Echevarria LPN            "

## 2017-12-05 DIAGNOSIS — H53.2 DOUBLE VISION: Primary | ICD-10-CM

## 2017-12-05 LAB — HCG-TM SERPL-ACNC: <3 IU/L

## 2017-12-06 ENCOUNTER — OFFICE VISIT (OUTPATIENT)
Dept: OPHTHALMOLOGY | Facility: CLINIC | Age: 27
End: 2017-12-06
Attending: OPHTHALMOLOGY
Payer: COMMERCIAL

## 2017-12-06 DIAGNOSIS — H51.0 PARINAUD'S SYNDROME: Primary | ICD-10-CM

## 2017-12-06 DIAGNOSIS — H53.2 DOUBLE VISION: ICD-10-CM

## 2017-12-06 PROCEDURE — 92015 DETERMINE REFRACTIVE STATE: CPT | Mod: ZF | Performed by: TECHNICIAN/TECHNOLOGIST

## 2017-12-06 PROCEDURE — 99214 OFFICE O/P EST MOD 30 MIN: CPT | Mod: 25,ZF | Performed by: TECHNICIAN/TECHNOLOGIST

## 2017-12-06 ASSESSMENT — EXTERNAL EXAM - RIGHT EYE: OD_EXAM: NORMAL

## 2017-12-06 ASSESSMENT — CONF VISUAL FIELD
METHOD: COUNTING FINGERS
OS_NORMAL: 1
OD_NORMAL: 1

## 2017-12-06 ASSESSMENT — VISUAL ACUITY
METHOD_MR_RETINOSCOPY: 1
OD_SC+: -2
OS_PH_SC: 20/25-3
OD_SC: 20/25
OS_SC: 20/40
OD_PH_SC: 20/20
METHOD: SNELLEN - LINEAR

## 2017-12-06 ASSESSMENT — REFRACTION_MANIFEST
OS_SPHERE: -1.00
OD_SPHERE: -1.00
OS_CYLINDER: SPHERE
OD_CYLINDER: SPHERE

## 2017-12-06 ASSESSMENT — SLIT LAMP EXAM - LIDS
COMMENTS: NORMAL
COMMENTS: NORMAL

## 2017-12-06 ASSESSMENT — TONOMETRY
OS_IOP_MMHG: 12
IOP_METHOD: ICARE
OD_IOP_MMHG: 10

## 2017-12-06 ASSESSMENT — EXTERNAL EXAM - LEFT EYE: OS_EXAM: NORMAL

## 2017-12-06 ASSESSMENT — CUP TO DISC RATIO
OS_RATIO: 0.45
OD_RATIO: 0.5

## 2017-12-06 NOTE — MR AVS SNAPSHOT
After Visit Summary   12/6/2017    Kari Turcios    MRN: [id number]           Patient Information     Date Of Birth          1990        Visit Information        Provider Department      12/6/2017 3:00 PM Serafin [facility]endez MD [facility]        Today's Diagnoses     Parinaud's syndrome    -  1    Double vision           Follow-ups after your visit        Your next 10 appointments already scheduled     Dec 11, 2017 10:15 AM CST   Neuro Eval with Liliane Pompa OT   [facility] ([facility])    [address] [facility] [address]   [phone]            Mar 19, 2018  8:00 AM CDT   (Arrive by 7:45 AM)   RETURN ENDOCRINE with Yessenia Ramires MD   [facility] ([facility])    [address] [place] [id number]   [phone]            Apr 16, 2018  3:45 PM CDT   Lab with UC LAB   [facility] ([facility])    [address] [place] [id number]   [phone]            Apr 16, 2018  4:20 PM CDT   (Arrive by 4:05 PM)   CT CHEST/ABDOMEN/PELVIS W CONTRAST with UCCT1   [facility] [place] [facility] ([facility])    [address] [place] [id number] [phone]           Please bring any scans or X-rays taken at other hospitals, if similar tests were done. Also bring a list of your medicines, including vitamins, minerals and over-the-counter drugs. It is safest to leave personal items at home.  Be sure to tell your doctor:   If you have any allergies.   If there s any chance you are pregnant.   If you are breastfeeding.   If you have any special needs.  You may have contrast for this exam. To prepare:   Do not eat or drink for 2 hours before your exam. If you need to take medicine, you may take it with small sips of water. (We may ask you to take liquid medicine as well.)   The day  before your exam, drink extra fluids at least six 8-ounce glasses (unless your doctor tells you to restrict your fluids).  Patients over 70 or patients with diabetes or kidney problems:   If you haven t had a blood test (creatinine test) within the last 30 days, go to your clinic or Diagnostic Imaging Department for this test.  If you have diabetes:   If your kidney function is normal, continue taking your metformin (Avandamet, Glucophage, Glucovance, Metaglip) on the day of your exam.   If your kidney function is abnormal, wait 48 hours before restarting this medicine.  You will have oral contrast for this exam:   You will drink the contrast at home. Get this from your clinic or Diagnostic Imaging Department. Please follow the directions given.  Please wear loose clothing, such as a sweat suit or jogging clothes. Avoid snaps, zippers and other metal. We may ask you to undress and put on a hospital gown.  If you have any questions, please call the Imaging Department where you will have your exam.            Apr 18, 2018  3:15 PM CDT   (Arrive by 3:00 PM)   Return Visit with Cheikh Ervin MD   [facility] ([facility])    [address] [place] [address] [phone]              Who to contact     Please call your clinic at [phone] to:    Ask questions about your health    Make or cancel appointments    Discuss your medicines    Learn about your test results    Speak to your doctor   If you have compliments or concerns about an experience at your clinic, or if you wish to file a complaint, please contact [facility] Patient Relations at [phone] or email us at [email or web address]         Additional Information About Your Visit        MyChart Information     [employer] gives you secure access to your electronic health record. If you see a primary care provider, you can also send messages to your  care team and make appointments. If you have questions, please call your primary care clinic.  If you do not have a primary care provider, please call [phone] and they will assist you.      [employer] is an electronic gateway that provides easy, online access to your medical records. With [employer], you can request a clinic appointment, read your test results, renew a prescription or communicate with your care team.     To access your existing account, please contact your [facility] or call [phone] for assistance.        Care EveryWhere ID     This is your Care EveryWhere ID. This could be used by other organizations to access your [facility] medical records  [id number]         Blood Pressure from Last 3 Encounters:   12/04/17 127/74   11/22/17 132/76   10/16/17 124/70    Weight from Last 3 Encounters:   12/04/17 73.5 kg (162 lb)   11/22/17 74.5 kg (164 lb 3.2 oz)   10/16/17 75.3 kg (166 lb)              We Performed the Following     IOP Measurement        Primary Care Provider Office Phone # Fax #    Ila Cornell [phone]       [facility] [address] [facility] [address]        Equal Access to Services     NOE COLLAZO : Hadii aad ku hadasho Soomaali, waaxda luqadaha, qaybta kaalmada adeegyada, waxay idiin haytezn griselda mckinney laisael . So [facility] [phone].    ATENCIÓN: Si habla español, tiene a gaffney disposición servicios gratuitos de asistencia lingüística. Llame al [phone].    We comply with applicable federal civil rights laws and [place] laws. We do not discriminate on the basis of race, color, national origin, age, disability, sex, sexual orientation, or gender identity.            Thank you!     Thank you for choosing [facility]  for your care. Our goal is always to provide you with excellent care. Hearing back from our patients is one way we can continue to improve our services. Please take a few minutes to complete the  written survey that you may receive in the mail after your visit with us. Thank you!             Your Updated Medication List - Protect others around you: Learn how to safely use, store and throw away your medicines at www.disposemymeds.org.          This list is accurate as of: 12/6/17 11:59 PM.  Always use your most recent med list.                   Brand Name Dispense Instructions for use Diagnosis    ACETAMINOPHEN PO      Take 650 mg by mouth every 4 hours as needed for pain        * desmopressin 0.2 MG tablet    DDAVP     Take 0.2 mg by mouth 2 times daily        * desmopressin 0.1 MG tablet    DDAVP    360 tablet    Take 2 tablets (200 mcg) by mouth 2 times daily *PLEASE SCHEDULE ANNUAL MD APPT.    Diabetes insipidus (H)       levETIRAcetam 750 MG tablet    KEPPRA    60 tablet    Take 1,500 mg by mouth every morning Two tablet in am and 2 1/2 tablet in pm    Pineal tumor       LORazepam 0.5 MG tablet    ATIVAN    30 tablet    Take 1 tablet (0.5 mg) by mouth every 4 hours as needed (Anxiety, Nausea/Vomiting or Sleep)    Pineal tumor       melatonin 1 MG Tabs tablet     90 tablet    Take 1-2 tablets (1-2 mg) by mouth nightly as needed for sleep    Pineal tumor       nortriptyline 10 MG capsule    PAMELOR    60 capsule    Take one tab am and one tab at bedtime    Chronic daily headache       tobramycin-dexamethasone ophthalmic ointment    TOBRADEX     Place 1 Application into both eyes 3 times daily Reported on 3/14/2017        VISINE TEARS OP      Apply 1-2 drops to eye 2 times daily as needed (for dry eyes.)        * Notice:  This list has 2 medication(s) that are the same as other medications prescribed for you. Read the directions carefully, and ask your doctor or other care provider to review them with you.

## 2017-12-06 NOTE — NURSING NOTE
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 Right inferior rectus recession 7.0 mm  2. Left inferior rectus recession 6.0 mm    MRI Impression: (12/04/2017)  No evidence of tumor recurrence. Unchanged posttreatment  changes. Stable ventricular size.     Patient states headaches at work, wondering if there is any option to computer glasses.   Patient states severe headaches rank from 7-8 out of 10 being the worst.   Patient states smoking (marijuana) helps alleviate headaches.   EVON Yoon 12/6/2017 3:14 PM

## 2017-12-08 NOTE — PROGRESS NOTES
1. 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 No neuro-ophthalmologic explanation today for increasing headaches    - For a more thorough description of the disease presentation, please see   my letter from the patient's initial visit with me    Excellent surgical outcome following strabismus surgery on 8/4/16:     1. Right inferior rectus recession 7.0 mm   2. Left inferior rectus recession 6.0 mm    Patient has very rare binocular diplopia now following surgery.  Does not require prism glasses.    No evidence of papilledema on examination nor any other findings to suggest increased intracranial pressure.  Reviewed patient's recent MRI which likewise shows no evidence of increased intracranial pressure nor tumor recurrence.    In conclusion on careful examination today I see no neuro-ophthalmologic explanation for this patient's recent worsening of headaches.  This patient should continue to undergo once yearly routine eye examinations by primary eye care provider.    I did not make a follow-up appointment, but I would be happy to see the patient back in the future should any new neuro-ophthalmic concern arise.         Complete documentation of historical and exam elements from today's encounter can be found in the full encounter summary report (not reduplicated in this progress note).  I personally obtained the chief complaint(s) and history of present illness.  I confirmed and edited as necessary the review of systems, past medical/surgical history, family history, social history, and examination findings as documented by others; and I examined the patient myself.  I personally reviewed the relevant tests, images, and reports as documented above.  I formulated and edited as necessary the assessment and plan and discussed the findings and management plan with the patient and family     Serafin  MD Shon

## 2018-01-04 ENCOUNTER — HOSPITAL ENCOUNTER (OUTPATIENT)
Dept: OCCUPATIONAL THERAPY | Facility: CLINIC | Age: 28
Setting detail: THERAPIES SERIES
End: 2018-01-04
Attending: NURSE PRACTITIONER
Payer: COMMERCIAL

## 2018-01-04 PROCEDURE — 97166 OT EVAL MOD COMPLEX 45 MIN: CPT | Mod: GO | Performed by: OCCUPATIONAL THERAPIST

## 2018-01-04 PROCEDURE — 40000249 ZZH STATISTIC VISIT LOW VISION CLINIC: Performed by: OCCUPATIONAL THERAPIST

## 2018-01-04 PROCEDURE — 97535 SELF CARE MNGMENT TRAINING: CPT | Mod: GO | Performed by: OCCUPATIONAL THERAPIST

## 2018-01-04 ASSESSMENT — ACTIVITIES OF DAILY LIVING (ADL): PRIOR_ADL/IADL_STATUS: INDEPENDENT PRIOR TO ONSET

## 2018-01-06 NOTE — PROGRESS NOTES
01/04/18 0800   Visit Type   Type of Visit Initial   General Information   Start Of Care Date 01/04/18   Referring Physician Sybil Dockery, CASPER CNP    Orders Evaluate And Treat As Indicated   Orders Comment patient needs assessment for work-for light protective or glare protective lenses and other work assessment to decrease his headache re-occurrence. Patient works at [employer].   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 Resection of the mass was performed on 3/26/14 by a midline combined occipital and suboccipital craniotomy.  He received chemo and radiation and since then has had no signs of disease recurrence.   Patient had c/o H/A's after tumor dx and was able to get by and push through for quite some time, but has had increase in H/A's this past year and has sought medical treatment for this.  He is taking nortriptyline, has made some changes at work and has tried medical marijuana but doesn't like to feel high.  He experienced diplopia at onset of tumor dx.  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  Does not require prism glasses and there is no explanation for increasing headaches from ophthalmology standpoint.  Other past medical history is significant for seizures, diabetes insipidus, history of provoked PE.   Prior ADL/IADL status Independent prior to onset    Prior Status Comment Independent with all prior to tumor dx   Patient/family Goals Statement get rid of his H/A's, stay at same job and be able to tolerate full days without getting H/A (or at least as bad as he has had)   General information comments Patient reports he has been working 40 hours/week for quite some time now (since above dx) - not able to give timeline for likely a couple of years.  Per chart review, patient  has maintained a good relationship with his employer through his whole cancer diagnosis- they have given him ergonomic chairs, decreased the light by his desk, given him screen protectors to decrease his work-induced headaches.  However every afternoon, a headache starts M-F and usually towards the end of the week, they are so bad he goes right home to lay down.   He wants to keep working for [employer]- he enjoys his job, but he is very sick of getting a headache every day.  He does not get H/A's on the weekends.   Social History/Home Environment   Living Environment Apartment/condo  (alone in apartment)   Current Community Support Family/friend caregiver  (parents - close by, sister)   Patient Role/employment History  Employed  ()   Avocational go to the gym, play video games   Social/Environment Comment Work: on computer all day long at work and works between 3 monitors. Email on L monitor - monitor is placed vertically (portrait), search web on the Right, main monitor is middle monitor; has screen protectors on all to decrease blue light.  Walls for his cubicle about 4 feet tall (solid).   Pain Assessment   Pain Reported No   Comments no H/A currently - has tody and tomorrow off from work (vacation days).  Patient reports H/A level on 0-10 scale was 7-8 before recently using new glasses at work that he got from his sister for Suzy (allie glasses that help with reflection and glare), now H/A rated at 3-4 with wearing these new glasses.   Fall Risk Screen   Fall screen  completed by OT   Have you fallen 2 or more times in the past year? No   Have you fallen and had an injury in the past year? No   Is patient a fall risk? No   Cognitive/Behavioral   Communication Intact   Cognitive Status Intact for evaluation process  (however, will monitor, difficulty remembering eye appt 12/17)   Behavior Appropriate   Patient/family aware of diagnosis Yes   How well do you understand your eye condition? Well   Cognitive/Behavioral Comment Patient reports his memory isn't very good at times.  He did receive prior OT for cognition after his tumor dx and treatment.  Patient with good outlook overall with dx and thankful that he has had no recurrence of tumor.  Motivated to keep his job.   Physical Status/Equipment   Physical Status No problems observed/reported   Visual Report   Functional Complaints Work related tasks   Visual Complaints Visual fatigue;Light sensitivity   Nicolás Bonnet Symptoms? No   Reading glasses (doesn't wear corrective lenses)   Equipment comments Patient with no c/o double vision since eye surgery years ago despite dyconjugate vision noted at times during evaluation.  He is unable to actively move his eyes superiorly, knows he needs to move his head to see up.  Other screens aside from computer at work: TV, smart phone   Lighting and Glare   Is your lighting adequate? Yes/ at home;Yes/ at work   Is glare a problem? Yes/ indoors;Yes/ outdoors   Are you satisfied with your sunglasses? (doesn't wear sunglasses, not sure why)   Contrast Sensitivity   Contrast sensitivity (score/25) 25/25   [place] Read   Smallest print size read .5M ambient light (.4M with task light on chart)  (increased squinting/near closure of L eye with smaller print)   Critical print size .6M ambient light (.5M with task light)   Words per minute at critical print size 171 ambient light (171 task light)   Functional Reading Screen   Reading screen comments Convergence: increased eye strain and double vision at  "10\", 10\" and 12\" from nose bridge on 3 trials.  Patient is R eye dominant.   Clinical Impression, OT Eval   Criteria for Skilled Therapeutic Interventions Met yes   Therapy  Diagnosis: Impaired ADL/IADL with deficits in Work performance;Cognitive function   Assessment of Occupational Performance 3-5 Performance Deficits   Identified Performance Deficits difficulty with work based tasks, decreased memory for ADL/IADL (likely at baseline since finished prior therapy a few years ago after dx), difficulty with glare and lights   Clinical Decision Making (Complexity) Moderate complexity   OT Visual Rehabilitation Evaluation Plan   Therapy Plan Occupational therapy intervention   Planned Interventions Instruction in environmental adaptations for glare;Instruction in environmental adaptations for contrast;Instruction in environmental adaptations for lighting;Computer modifications   Frequency / Duration 1x/week, decreasing frequency prn x6 weeks   Risks and Benefits of Treatment have been explained. Yes   Patient, Family in agreement with plan of care Yes   GOALS   Goals Near Vision;Environmental Modification   Goal 1 - Near Vision   Near Vision Goal Comment Patient to verbalize and demonstrate at least 2 strategies to utilize during functional tasks (computer-related and reading-based ADL) to increase visibility and compensate for visual deficits for increased independence and decreased H/A occurrence.   Target Date 02/15/18   Goal 3 - Environmental modification   Goal Description: Environment modification Patient will demonstrate understanding of the impact of lighting, contrast and glare on ADL activities, in conjunction with environmentally-based ADL modifications   Environmental Modification Goal Comment inclue IADLs   Target Date 02/15/18   Total Evaluation Time   Total Evaluation Time 25 minutes     "

## 2018-01-16 ENCOUNTER — HOSPITAL ENCOUNTER (OUTPATIENT)
Dept: OCCUPATIONAL THERAPY | Facility: CLINIC | Age: 28
Setting detail: THERAPIES SERIES
End: 2018-01-16
Attending: NURSE PRACTITIONER
Payer: COMMERCIAL

## 2018-01-16 PROCEDURE — 97535 SELF CARE MNGMENT TRAINING: CPT | Mod: GO | Performed by: OCCUPATIONAL THERAPIST

## 2018-01-16 PROCEDURE — 40000249 ZZH STATISTIC VISIT LOW VISION CLINIC: Performed by: OCCUPATIONAL THERAPIST

## 2018-01-16 NOTE — PROGRESS NOTES
OCCUPATIONAL THERAPY VISUAL REHABILITATION DISCHARGE SUMMARY     Patient: Panda Turcios  : 1990  Insurance:   Payor/Plan Subscriber Name Rel Member # Group #   [employer]  PANDA SOLANO  [id number]      [address]       Beginning/End Dates of Reporting Period:  18 to 2018    Therapy Diagnosis:    Chronic daily headache, mixed germ cell tumor     Client Self Report: Patient reports his employer put filters on surrounding fluorescent lights as recommended.  Today was the 1st day he took a 30 minute lunch away from screens - too soon to tell if it helped.  He came straight from work and H/A currently a 3 (0-10 scale).  He ordered the boysenberry cocoons/filters but hasn't gotten them in yet.    GOALS     Goal 1 - Near vision        Near Vision Goal Comment: Patient to verbalize and demonstrate at least 2 strategies to utilize during functional tasks (computer-related and reading-based ADL) to increase visibility and compensate for visual deficits for increased independence and decreased H/A occurrence.   Target Date: 02/15/18        Goal 2 - Visual field                     Goal 3 - Environmental modification    Goal Description: Environment modification: Patient will demonstrate understanding of the impact of lighting, contrast and glare on ADL activities, in conjunction with environmentally-based ADL modifications   Environmental Modification Goal Comment: inclue IADLs   Target Date: 02/15/18        Goal 4 - Resource education                     Goal 5 - Distance viewing                     Goal 6 - Cognition                     Goal 7                 Goal 8                   Progress Toward Goals  All goals met    Reason for Discharge  Patient has met all goals.    Adaptive Equipment/Optical Devices Recommended:  Reading lenses 1.0 cheaters for small print  Tint for glare management Boysenberry Cocoons for work/computer and cloudy days; Extra Dark Plum for kandi days   Recommend  f/u with Dr. Kaplan re: possibility of getting prescription glasses for computer distance (straining and closing L eye for smaller print where unable to enlarge).    Discharge Plan  Patient to continue home program/techniques

## 2018-03-19 ENCOUNTER — OFFICE VISIT (OUTPATIENT)
Dept: ENDOCRINOLOGY | Facility: CLINIC | Age: 28
End: 2018-03-19
Payer: COMMERCIAL

## 2018-03-19 VITALS
HEIGHT: 65 IN | DIASTOLIC BLOOD PRESSURE: 80 MMHG | SYSTOLIC BLOOD PRESSURE: 133 MMHG | HEART RATE: 81 BPM | WEIGHT: 169.9 LBS | BODY MASS INDEX: 28.31 KG/M2

## 2018-03-19 DIAGNOSIS — E23.2 DIABETES INSIPIDUS SECONDARY TO VASOPRESSIN DEFICIENCY (H): ICD-10-CM

## 2018-03-19 DIAGNOSIS — E23.2 DIABETES INSIPIDUS SECONDARY TO VASOPRESSIN DEFICIENCY (H): Primary | ICD-10-CM

## 2018-03-19 LAB
ANION GAP SERPL CALCULATED.3IONS-SCNC: 4 MMOL/L (ref 3–14)
BUN SERPL-MCNC: 10 MG/DL (ref 7–30)
CALCIUM SERPL-MCNC: 9.5 MG/DL (ref 8.5–10.1)
CHLORIDE SERPL-SCNC: 102 MMOL/L (ref 94–109)
CO2 SERPL-SCNC: 31 MMOL/L (ref 20–32)
CREAT SERPL-MCNC: 0.93 MG/DL (ref 0.66–1.25)
GFR SERPL CREATININE-BSD FRML MDRD: >90 ML/MIN/1.7M2
GLUCOSE SERPL-MCNC: 97 MG/DL (ref 70–99)
POTASSIUM SERPL-SCNC: 4.3 MMOL/L (ref 3.4–5.3)
SODIUM SERPL-SCNC: 137 MMOL/L (ref 133–144)

## 2018-03-19 RX ORDER — DESMOPRESSIN ACETATE 0.2 MG/1
0.2 TABLET ORAL 2 TIMES DAILY
Qty: 180 TABLET | Refills: 3 | Status: SHIPPED | OUTPATIENT
Start: 2018-03-19 | End: 2019-03-29

## 2018-03-19 RX ORDER — DESMOPRESSIN ACETATE 0.1 MG/1
TABLET ORAL
Qty: 90 TABLET | Refills: 3 | Status: SHIPPED | OUTPATIENT
Start: 2018-03-19 | End: 2018-10-12

## 2018-03-19 ASSESSMENT — PAIN SCALES - GENERAL: PAINLEVEL: NO PAIN (0)

## 2018-03-19 NOTE — MR AVS SNAPSHOT
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      Your next 10 appointments already scheduled     Apr 16, 2018  3:45 PM CDT   Lab with  LAB   [facility] ([facility])    [address] [place] [address] [id number] [phone]            Apr 16, 2018  4:20 PM CDT   (Arrive by 4:05 PM)   CT CHEST/ABDOMEN/PELVIS W CONTRAST with UCCT1   [facility] ([facility])    [address] [place] [address] [id number] [phone]           Please bring any scans or X-rays taken at other hospitals, if similar tests were done. Also bring a list of your medicines, including vitamins, minerals and over-the-counter drugs. It is safest to leave personal items at home.  Be sure to tell your doctor:   If you have any allergies.   If there s any chance you are pregnant.   If you are breastfeeding.  You may have contrast for this exam. To prepare:   Do not eat or drink for 2 hours before your exam. If you need to take medicine, you may take it with small sips of water. (We may ask you to take liquid medicine as well.)   The day before your exam, drink extra fluids at least six 8-ounce glasses (unless your doctor tells you to restrict your fluids).   You will be given instructions on how to drink the contrast.  Patients over 70 or patients with diabetes or kidney problems:   If you haven t had a blood test (creatinine test) within the last 30 days, the Cardiologist/Radiologist may  "require you to get this test prior to your exam.  If you have diabetes:   Continue to take your metformin medication on the day of your exam  Please wear loose clothing, such as a sweat suit or jogging clothes. Avoid snaps, zippers and other metal. We may ask you to undress and put on a hospital gown.  If you have any questions, please call the Imaging Department where you will have your exam.            Apr 18, 2018  3:30 PM CDT   (Arrive by 3:15 PM)   Return Visit with Cheikh Ervin MD   [facility] ([facility])    [address]  [address]  [facility] [id number] [phone]              Who to contact     Please call your clinic at [phone] to:    Ask questions about your health    Make or cancel appointments    Discuss your medicines    Learn about your test results    Speak to your doctor            Additional Information About Your Visit        [employer] Information     [employer] gives you secure access to your electronic health record. If you see a primary care provider, you can also send messages to your care team and make appointments. If you have questions, please call your primary care clinic.  If you do not have a primary care provider, please call [phone] and they will assist you.      [employer] is an electronic gateway that provides easy, online access to your medical records. With [employer], you can request a clinic appointment, read your test results, renew a prescription or communicate with your care team.     To access your existing account, please contact your [facility] or call [phone] for assistance.        Care EveryWhere ID     This is your Care EveryWhere ID. This could be used by other organizations to access your [facility] medical records  [id number]        Your Vitals Were     Pulse Height BMI (Body Mass Index)             81 1.651 m (5' 5\") 28.27 kg/m2          Blood Pressure from Last 3 " Encounters:   03/19/18 133/80   12/04/17 127/74   11/22/17 132/76    Weight from Last 3 Encounters:   03/19/18 77.1 kg (169 lb 14.4 oz)   12/04/17 73.5 kg (162 lb)   11/22/17 74.5 kg (164 lb 3.2 oz)                 Today's Medication Changes          These changes are accurate as of 3/19/18  2:56 PM.  If you have any questions, ask your nurse or doctor.               These medicines have changed or have updated prescriptions.        Dose/Directions    * desmopressin 0.2 MG tablet   Commonly known as:  DDAVP   This may have changed:  Another medication with the same name was changed. Make sure you understand how and when to take each.   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   Quantity:  90 tablet   Refills:  3       * Notice:  This list has 2 medication(s) that are the same as other medications prescribed for you. Read the directions carefully, and ask your doctor or other care provider to review them with you.         Where to get your medicines      These medications were sent to [facility]/pharmacy #[id number] [address], [place] [address]     Phone:  [phone]     desmopressin 0.1 MG tablet    desmopressin 0.2 MG tablet                Primary Care Provider Office Phone # Fax #    Ila Cornell [phone]       [address] [address]        Equal Access to Services     NOE COLLAZO AH: Aliyah meneses  Reyna, roddyda luqadaha, qaybta kakervin mcfadden, frank jamesin hayaan shaynaniecy henrychapin laStellalisa kim. So [facility] [phone].    ATENCIÓN: Si erin valera, tiene a gaffney disposición servicios gratuitos de asistencia lingüística. Ramez al [phone].    We comply with applicable federal civil rights laws and [place] laws. We do not discriminate on the basis of race, color, national origin, age, disability, sex, sexual orientation, or gender identity.            Thank you!     Thank you for choosing [facility]  for your care. Our goal is always to provide you with excellent care. Hearing back from our patients is one way we can continue to improve our services. Please take a few minutes to complete the written survey that you may receive in the mail after your visit with us. Thank you!             Your Updated Medication List - Protect others around you: Learn how to safely use, store and throw away your medicines at www.disposemymeds.org.          This list is accurate as of 3/19/18  2:56 PM.  Always use your most recent med list.                   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)       levETIRAcetam 750 MG tablet    KEPPRA    60 tablet    Take 1,500 mg by mouth every morning Two tablet in am and 2 1/2 tablet in pm    Pineal tumor       LORazepam 0.5 MG tablet    ATIVAN    30 tablet    Take 1 tablet (0.5 mg) by mouth every 4 hours as needed (Anxiety, Nausea/Vomiting or Sleep)    Pineal tumor       melatonin 1 MG Tabs tablet     90 tablet    Take 1-2 tablets (1-2 mg) by mouth nightly as needed for sleep     Pineal tumor       nortriptyline 10 MG capsule    PAMELOR    60 capsule    Take one tab am and one tab at bedtime    Chronic daily headache       tobramycin-dexamethasone ophthalmic ointment    TOBRADEX     Place 1 Application into both eyes 3 times daily Reported on 3/14/2017        VISINE TEARS OP      Apply 1-2 drops to eye 2 times daily as needed (for dry eyes.)        * Notice:  This list has 2 medication(s) that are the same as other medications prescribed for you. Read the directions carefully, and ask your doctor or other care provider to review them with you.

## 2018-03-19 NOTE — PROGRESS NOTES
The patient is seen in followup. He is accompanied by his mother.   Abelino Turcios is a 28 year old male who was admitted on 3/19/14 after presenting to the emergency room with headache, nausea, 2 weeks of left side vision blurring.  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 He underwent an endoscopic 3rd ventriculostomy and EVD placement and surgery stealth assisted suboccipital craniotomy with the excision of the tumor on 3/26/14. 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 Focal mature teratoma (~5%) with mature squamous epithelial and mesenchymal elements (focal cartilage) were seen. Lesser components of yolk sac (~5%), (~2%) embryonal and (~3%) germinoma were present. 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   The patient developed polyuria and increased thirst a couple of months prior to his hospitalization. A diagnosis of DI was confirmed during hospitalization and the patient was discharged on desmopressin. The investigation of pituitary function postop revealed normal anterior pituitary function.  He completed the radiation therapy in October of 2014.  Current dose of desmopressin is 200  g PO in the morning (7:30 AM), 100 micrograms at lunch and 200  g around dinner (6:30-7 PM).  Reports being compliant in taking the desmopressin in the morning and evening.  He only takes the lunch dose approximately 5 days a week, mainly when he is at work.    Overall, he has been doing good.  The headaches are now present only a couple of times a week, usually relieved by Tylenol.  He mainly develops them while at work and he attributes them to computer exposure.  His vision has remained stable.  On average, he uses the restroom 3 times during daytime and he does not wake up during the night.  He has not been drinking as much water.  On average, he thinks he drinks  approximately 1 L of water daily, in addition to other liquids like coffee.    Today, he took the desmopressin at 7 AM.      Chest, abd and pelvic CT from 4/17/17 and MRI 12/4/17 - no evidence of tumor recurrence.   Na 140 on labs from 12/4/17.     Past Medical History:   Diagnosis Date     Hypertropia      Myopia      Seizures (H)     2 months ago     Type 2 diabetes mellitus without complications (H)     Borderline   Encephalitis 2012  History of seizure disorders post encephalitis     Past Surgical History:   Procedure Laterality Date     ARTHROTOMY SHOULDER, BANKHART REPAIR, COMBINED  10/29/2012    Procedure: COMBINED ARTHROTOMY SHOULDER, BANKHART REPAIR;  RIGHT OPEN SHOULDER BANKART REPAIR;  Surgeon: Lemuel Ramírez MD;  Location: [facility]     OPTICAL TRACKING SYSTEM BIOPSY BRAIN  3/20/2014    Procedure: OPTICAL TRACKING SYSTEM BIOPSY BRAIN;  Right Frontal Approach For endoscopic Intraventricular Mass Biopsy, 3rd Ventriculostomy, Placement of right ventriculostomy catheter.;  Surgeon: Williams Clement MD;  Location[facility]     OPTICAL TRACKING SYSTEM CRANIOTOMY, EXCISE TUMOR, COMBINED  3/26/2014    Procedure: COMBINED OPTICAL TRACKING SYSTEM CRANIOTOMY, EXCISE TUMOR;  Stealth Assisted Sub-Occiptial Craniotomy, Excise Tumor ;  Surgeon: Ann Sharp MD;  [facility]     OPTICAL TRACKING SYSTEM IMPLANT SHUNT VENTRICULOPERITONEAL  4/8/2014    Procedure: OPTICAL TRACKING SYSTEM IMPLANT SHUNT VENTRICULOPERITONEAL;  Right Stealth Guided Ventricularperitoneal Shunt Placement ;  Surgeon: Ann Sharp MD;  [facility]     RECESSION RESECTION (REPAIR STRABISMUS) BILATERAL Bilateral 8/4/2016    Procedure: RECESSION RESECTION (REPAIR STRABISMUS) BILATERAL;  Surgeon: Serafin Menendez MD;  [facility]     VENTRICULOSTOMY  3/20/2014    Procedure: VENTRICULOSTOMY;;  Surgeon: Williams Clement MD;  Location: [facility]       Current Medications    Current Outpatient  Prescriptions:      nortriptyline (PAMELOR) 10 MG capsule, Take one tab am and one tab at bedtime, Disp: 60 capsule, Rfl: 6     desmopressin (DDAVP) 0.2 MG tablet, Take 0.2 mg by mouth 2 times daily, Disp: , Rfl:      desmopressin (DDAVP) 0.1 MG tablet, Take 2 tablets (200 mcg) by mouth 2 times daily *PLEASE SCHEDULE ANNUAL MD APPT., Disp: 360 tablet, Rfl: 3     ACETAMINOPHEN PO, Take 650 mg by mouth every 4 hours as needed for pain, Disp: , Rfl:      tobramycin-dexamethasone (TOBRADEX) ophthalmic ointment, Place 1 Application into both eyes 3 times daily Reported on 3/14/2017, Disp: , Rfl:      melatonin 1 MG TABS, Take 1-2 tablets (1-2 mg) by mouth nightly as needed for sleep, Disp: 90 tablet, Rfl: 0     LORazepam (ATIVAN) 0.5 MG tablet, Take 1 tablet (0.5 mg) by mouth every 4 hours as needed (Anxiety, Nausea/Vomiting or Sleep), Disp: 30 tablet, Rfl: 3     levETIRAcetam (KEPPRA) 750 MG tablet, Take 1,500 mg by mouth every morning Two tablet in am and 2 1/2 tablet in pm, Disp: 60 tablet, Rfl: 0     Glycerin-Hypromellose- (VISINE TEARS OP), Apply 1-2 drops to eye 2 times daily as needed (for dry eyes.), Disp: , Rfl:   No current facility-administered medications for this visit.     Facility-Administered Medications Ordered in Other Visits:      gadobutrol (GADAVIST) injection 7.5 mL, 7.5 mL, Intravenous, Once, Ann Sharp MD    Family history  Denies family history of diabetes, thyroid disorders or cancer.    Social History  He denies smoking, drinking alcohol or using illicit drugs.  Quit [employer] in 8/2016.  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    Review of Systems   Systemic:              No fatigue   Eye:                      Stable visual impairement  Jay-Laryngeal:     no dysphagia, no hoarseness, no cough   Breast:                  No breast symptoms  Cardiovascular:    No cardiovascular symptoms, no CP or palpitations   Pulmonary:           No SOB    Gastrointestinal:   No  "gastrointestinal symptoms, no diarrhea or constipation   Genitourinary:       As above  Endocrine:            No heat or cold intolerance. Denies night sweats or hot flashes.  No erectile dysfunction.  Neurological:        no tremor, no numbness or tingling sensation  Musculoskeletal:  No musculoskeletal symptoms, no muscle or joint pain   Skin:                     No skin symptoms, no dry skin, no hair falling out   Psychological:      Denies depression or anxiety               Vital Signs     Previous Weights:    Wt Readings from Last 10 Encounters:   03/19/18 77.1 kg (169 lb 14.4 oz)   12/04/17 73.5 kg (162 lb)   11/22/17 74.5 kg (164 lb 3.2 oz)   10/16/17 75.3 kg (166 lb)   04/28/17 70.3 kg (155 lb)   04/19/17 70.4 kg (155 lb 1.6 oz)   03/14/17 76.2 kg (168 lb)   02/22/17 74 kg (163 lb 1.6 oz)   10/19/16 73.8 kg (162 lb 12.8 oz)   09/12/16 72.4 kg (159 lb 9.6 oz)        /80  Pulse 81  Ht 1.651 m (5' 5\")  Wt 77.1 kg (169 lb 14.4 oz)  BMI 28.27 kg/m2    Physical Exam  General Appearance: he is well developed, well nourished and in no distress     Eyes:  conjutivae are normal                                    extraocular movements - impaired upward gaze left eye   HEENT:   oropharynx clear and moist, no JVD, no bruits      no thyromegaly, no palpable nodules  Cardiovascular:  regular rhythm, no murmurs, distal pulse palpable, no edema  Respiratory:        chest clear, no rales, no rhonchi   Gastrointestinal:  abdomen soft, non-tender, non-distended, normal bowel sounds,    no organomegaly  Musculoskeletal:  normal tone and strength  Psychological:          affect and judgment normal  Skin:  pale, normal axillary hair, no gynecomastia; mild area of depigmentation R upper abd quadrant - birthmark   Neurological:  reflexes normal and symmetric, no resting tremor.     Labs:   I reviewed prior lab results documented in EPIC.   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 He is now s/p chemo and radiation therapy and there is no evidence of tumor recurrence on the most recent MRI from December 2017. AFP has remained stable and and Beta HCG has been undetectable since 2015. With the exception of diabetes insipidus, the patient doesn't have signs or symptoms suggestive of pituitary dysfunction.   Clinically, he does not endorse polyuria and polydipsia and the headaches do not appear to be of concern.  I am going to check a sodium level today, keeping in mind that he took the morning desmopressin dose at 7 AM.    Orders Placed This Encounter   Procedures     Basic metabolic panel

## 2018-03-19 NOTE — LETTER
3/19/2018       RE: Kari Turcios  [id number]  [facility] [id number]     Dear Colleague,    Thank you for referring your patient, Kari Turcios, to the [facility] ENDOCRINOLOGY at [facility]. Please see a copy of my visit note below.      The patient is seen in followup. He is accompanied by his mother.   Abelino Turcios is a 28 year old male who was admitted on 3/19/14 after presenting to the emergency room with headache, nausea, 2 weeks of left side vision blurring.  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 He underwent an endoscopic 3rd ventriculostomy and EVD placement and surgery stealth assisted suboccipital craniotomy with the excision of the tumor on 3/26/14. 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 Focal mature teratoma (~5%) with mature squamous epithelial and mesenchymal elements (focal cartilage) were seen. Lesser components of yolk sac (~5%), (~2%) embryonal and (~3%) germinoma were present. 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   The patient developed polyuria and increased thirst a couple of months prior to his hospitalization. A diagnosis of DI was confirmed during hospitalization and the patient was discharged on desmopressin. The investigation of pituitary function postop revealed normal anterior pituitary function.  He completed the radiation therapy in October of 2014.  Current dose of desmopressin is 200  g PO in the morning (7:30 AM), 100 micrograms at lunch and 200  g  around dinner (6:30-7 PM).  Reports being compliant in taking the desmopressin in the morning and evening.  He only takes the lunch dose approximately 5 days a week, mainly when he is at work.    Overall, he has been doing good.  The headaches are now present only a couple of times a week, usually relieved by Tylenol.  He mainly  develops them while at work and he attributes them to computer exposure.  His vision has remained stable.  On average, he uses the restroom 3 times during daytime and he does not wake up during the night.  He has not been drinking as much water.  On average, he thinks he drinks approximately 1 L of water daily, in addition to other liquids like coffee.    Today, he took the desmopressin at 7 AM.      Chest, abd and pelvic CT from 4/17/17 and MRI 12/4/17 - no evidence of tumor recurrence.   Na 140 on labs from 12/4/17.     Past Medical History:   Diagnosis Date     Hypertropia      Myopia      Seizures (H)     2 months ago     Type 2 diabetes mellitus without complications (H)     Borderline   Encephalitis 2012  History of seizure disorders post encephalitis     Past Surgical History:   Procedure Laterality Date     ARTHROTOMY SHOULDER, BANKHART REPAIR, COMBINED  10/29/2012    Procedure: COMBINED ARTHROTOMY SHOULDER, BANKHART REPAIR;  RIGHT OPEN SHOULDER BANKART REPAIR;  Surgeon: Lemuel Ramírez MD;  Location: [facility]     OPTICAL TRACKING SYSTEM BIOPSY BRAIN  3/20/2014    Procedure: OPTICAL TRACKING SYSTEM BIOPSY BRAIN;  Right Frontal Approach For endoscopic Intraventricular Mass Biopsy, 3rd Ventriculostomy, Placement of right ventriculostomy catheter.;  Surgeon: Williams Clement MD;  [facility]     OPTICAL TRACKING SYSTEM CRANIOTOMY, EXCISE TUMOR, COMBINED  3/26/2014    Procedure: COMBINED OPTICAL TRACKING SYSTEM CRANIOTOMY, EXCISE TUMOR;  Stealth Assisted Sub-Occiptial Craniotomy, Excise Tumor ;  Surgeon: Ann Sharp MD;  [facility]     OPTICAL TRACKING SYSTEM IMPLANT SHUNT VENTRICULOPERITONEAL  4/8/2014    Procedure: OPTICAL TRACKING SYSTEM IMPLANT SHUNT VENTRICULOPERITONEAL;  Right Stealth Guided Ventricularperitoneal Shunt Placement ;  Surgeon: Ann Sharp MD;  [facility]     RECESSION RESECTION (REPAIR STRABISMUS) BILATERAL Bilateral 8/4/2016    Procedure:  RECESSION RESECTION (REPAIR STRABISMUS) BILATERAL;  Surgeon: Serafin Menendez MD;  [facility]     VENTRICULOSTOMY  3/20/2014    Procedure: VENTRICULOSTOMY;;  Surgeon: Williams Clement MD;  [facility]       Current Medications    Current Outpatient Prescriptions:      nortriptyline (PAMELOR) 10 MG capsule, Take one tab am and one tab at bedtime, Disp: 60 capsule, Rfl: 6     desmopressin (DDAVP) 0.2 MG tablet, Take 0.2 mg by mouth 2 times daily, Disp: , Rfl:      desmopressin (DDAVP) 0.1 MG tablet, Take 2 tablets (200 mcg) by mouth 2 times daily *PLEASE SCHEDULE ANNUAL MD APPT., Disp: 360 tablet, Rfl: 3     ACETAMINOPHEN PO, Take 650 mg by mouth every 4 hours as needed for pain, Disp: , Rfl:      tobramycin-dexamethasone (TOBRADEX) ophthalmic ointment, Place 1 Application into both eyes 3 times daily Reported on 3/14/2017, Disp: , Rfl:      melatonin 1 MG TABS, Take 1-2 tablets (1-2 mg) by mouth nightly as needed for sleep, Disp: 90 tablet, Rfl: 0     LORazepam (ATIVAN) 0.5 MG tablet, Take 1 tablet (0.5 mg) by mouth every 4 hours as needed (Anxiety, Nausea/Vomiting or Sleep), Disp: 30 tablet, Rfl: 3     levETIRAcetam (KEPPRA) 750 MG tablet, Take 1,500 mg by mouth every morning Two tablet in am and 2 1/2 tablet in pm, Disp: 60 tablet, Rfl: 0     Glycerin-Hypromellose- (VISINE TEARS OP), Apply 1-2 drops to eye 2 times daily as needed (for dry eyes.), Disp: , Rfl:   No current facility-administered medications for this visit.     Facility-Administered Medications Ordered in Other Visits:      gadobutrol (GADAVIST) injection 7.5 mL, 7.5 mL, Intravenous, Once, Ann Sharp MD    Family history  Denies family history of diabetes, thyroid disorders or cancer.    Social History  He denies smoking, drinking alcohol or using illicit drugs.  Quit [employer] in 8/2016.  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    Review of Systems   Systemic:              No fatigue   Eye:             "          Stable visual impairement  Jay-Laryngeal:     no dysphagia, no hoarseness, no cough   Breast:                  No breast symptoms  Cardiovascular:    No cardiovascular symptoms, no CP or palpitations   Pulmonary:           No SOB    Gastrointestinal:   No gastrointestinal symptoms, no diarrhea or constipation   Genitourinary:       As above  Endocrine:            No heat or cold intolerance. Denies night sweats or hot flashes.  No erectile dysfunction.  Neurological:        no tremor, no numbness or tingling sensation  Musculoskeletal:  No musculoskeletal symptoms, no muscle or joint pain   Skin:                     No skin symptoms, no dry skin, no hair falling out   Psychological:      Denies depression or anxiety               Vital Signs     Previous Weights:    Wt Readings from Last 10 Encounters:   03/19/18 77.1 kg (169 lb 14.4 oz)   12/04/17 73.5 kg (162 lb)   11/22/17 74.5 kg (164 lb 3.2 oz)   10/16/17 75.3 kg (166 lb)   04/28/17 70.3 kg (155 lb)   04/19/17 70.4 kg (155 lb 1.6 oz)   03/14/17 76.2 kg (168 lb)   02/22/17 74 kg (163 lb 1.6 oz)   10/19/16 73.8 kg (162 lb 12.8 oz)   09/12/16 72.4 kg (159 lb 9.6 oz)        /80  Pulse 81  Ht 1.651 m (5' 5\")  Wt 77.1 kg (169 lb 14.4 oz)  BMI 28.27 kg/m2    Physical Exam  General Appearance: he is well developed, well nourished and in no distress     Eyes:  conjutivae are normal                                    extraocular movements - impaired upward gaze left eye   HEENT:   oropharynx clear and moist, no JVD, no bruits      no thyromegaly, no palpable nodules  Cardiovascular:  regular rhythm, no murmurs, distal pulse palpable, no edema  Respiratory:        chest clear, no rales, no rhonchi   Gastrointestinal:  abdomen soft, non-tender, non-distended, normal bowel sounds,    no organomegaly  Musculoskeletal:  normal tone and strength  Psychological:          affect and judgment normal  Skin:  pale, normal axillary hair, no gynecomastia; mild " area of depigmentation R upper abd quadrant - birthmark   Neurological:  reflexes normal and symmetric, no resting tremor.     Labs:   I reviewed prior lab results documented in EPIC.   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 He is now s/p chemo and radiation therapy and there is no evidence of tumor recurrence on the most recent MRI from December 2017. AFP has remained stable and and Beta HCG has been undetectable since 2015. With the exception of diabetes insipidus, the patient doesn't have signs or symptoms suggestive of pituitary dysfunction.   Clinically, he does not endorse polyuria and polydipsia and the headaches do not appear to be of concern.  I am going to check a sodium level today, keeping in mind that he took the morning desmopressin dose at 7 AM.    Orders Placed This Encounter   Procedures     Basic metabolic panel              Sincerely,    Yessenia Ramires MD

## 2018-04-11 ENCOUNTER — RADIANT APPOINTMENT (OUTPATIENT)
Dept: CT IMAGING | Facility: CLINIC | Age: 28
End: 2018-04-11
Attending: INTERNAL MEDICINE
Payer: COMMERCIAL

## 2018-04-11 ENCOUNTER — APPOINTMENT (OUTPATIENT)
Dept: LAB | Facility: CLINIC | Age: 28
End: 2018-04-11
Payer: COMMERCIAL

## 2018-04-11 DIAGNOSIS — C80.1 MIXED GERM CELL TUMOR (H): ICD-10-CM

## 2018-04-11 DIAGNOSIS — C71.9 GERM CELL TUMOR OF BRAIN (H): ICD-10-CM

## 2018-04-11 RX ORDER — IOPAMIDOL 755 MG/ML
104 INJECTION, SOLUTION INTRAVASCULAR ONCE
Status: COMPLETED | OUTPATIENT
Start: 2018-04-11 | End: 2018-04-11

## 2018-04-11 RX ADMIN — IOPAMIDOL 104 ML: 755 INJECTION, SOLUTION INTRAVASCULAR at 16:05

## 2018-04-11 NOTE — DISCHARGE INSTRUCTIONS

## 2018-04-12 ENCOUNTER — ONCOLOGY VISIT (OUTPATIENT)
Dept: ONCOLOGY | Facility: CLINIC | Age: 28
End: 2018-04-12
Attending: INTERNAL MEDICINE
Payer: COMMERCIAL

## 2018-04-12 VITALS
SYSTOLIC BLOOD PRESSURE: 132 MMHG | TEMPERATURE: 98.1 F | BODY MASS INDEX: 27.21 KG/M2 | RESPIRATION RATE: 16 BRPM | DIASTOLIC BLOOD PRESSURE: 84 MMHG | HEIGHT: 65 IN | HEART RATE: 77 BPM | OXYGEN SATURATION: 99 % | WEIGHT: 163.3 LBS

## 2018-04-12 DIAGNOSIS — K62.5 BRIGHT RED BLOOD PER RECTUM: ICD-10-CM

## 2018-04-12 DIAGNOSIS — C71.9 GERM CELL TUMOR OF BRAIN (H): Primary | ICD-10-CM

## 2018-04-12 LAB
AFP SERPL-MCNC: 1.8 UG/L (ref 0–8)
ALBUMIN SERPL-MCNC: 4.5 G/DL (ref 3.4–5)
ALP SERPL-CCNC: 90 U/L (ref 40–150)
ALT SERPL W P-5'-P-CCNC: 26 U/L (ref 0–70)
ANION GAP SERPL CALCULATED.3IONS-SCNC: 6 MMOL/L (ref 3–14)
AST SERPL W P-5'-P-CCNC: 18 U/L (ref 0–45)
BASOPHILS # BLD AUTO: 0 10E9/L (ref 0–0.2)
BASOPHILS NFR BLD AUTO: 0.6 %
BILIRUB SERPL-MCNC: 0.3 MG/DL (ref 0.2–1.3)
BUN SERPL-MCNC: 10 MG/DL (ref 7–30)
CALCIUM SERPL-MCNC: 9.1 MG/DL (ref 8.5–10.1)
CHLORIDE SERPL-SCNC: 103 MMOL/L (ref 94–109)
CO2 SERPL-SCNC: 29 MMOL/L (ref 20–32)
CREAT SERPL-MCNC: 0.93 MG/DL (ref 0.66–1.25)
DIFFERENTIAL METHOD BLD: NORMAL
EOSINOPHIL # BLD AUTO: 0.1 10E9/L (ref 0–0.7)
EOSINOPHIL NFR BLD AUTO: 1 %
ERYTHROCYTE [DISTWIDTH] IN BLOOD BY AUTOMATED COUNT: 12.2 % (ref 10–15)
GFR SERPL CREATININE-BSD FRML MDRD: >90 ML/MIN/1.7M2
GLUCOSE SERPL-MCNC: 102 MG/DL (ref 70–99)
HCT VFR BLD AUTO: 45.7 % (ref 40–53)
HGB BLD-MCNC: 15.5 G/DL (ref 13.3–17.7)
IMM GRANULOCYTES # BLD: 0 10E9/L (ref 0–0.4)
IMM GRANULOCYTES NFR BLD: 0.2 %
LDH SERPL L TO P-CCNC: 181 U/L (ref 85–227)
LYMPHOCYTES # BLD AUTO: 1.5 10E9/L (ref 0.8–5.3)
LYMPHOCYTES NFR BLD AUTO: 23.4 %
MCH RBC QN AUTO: 28.1 PG (ref 26.5–33)
MCHC RBC AUTO-ENTMCNC: 33.9 G/DL (ref 31.5–36.5)
MCV RBC AUTO: 83 FL (ref 78–100)
MONOCYTES # BLD AUTO: 0.3 10E9/L (ref 0–1.3)
MONOCYTES NFR BLD AUTO: 5.1 %
NEUTROPHILS # BLD AUTO: 4.4 10E9/L (ref 1.6–8.3)
NEUTROPHILS NFR BLD AUTO: 69.7 %
NRBC # BLD AUTO: 0 10*3/UL
NRBC BLD AUTO-RTO: 0 /100
PLATELET # BLD AUTO: 248 10E9/L (ref 150–450)
POTASSIUM SERPL-SCNC: 4.1 MMOL/L (ref 3.4–5.3)
PROT SERPL-MCNC: 8.6 G/DL (ref 6.8–8.8)
RBC # BLD AUTO: 5.51 10E12/L (ref 4.4–5.9)
SODIUM SERPL-SCNC: 137 MMOL/L (ref 133–144)
WBC # BLD AUTO: 6.3 10E9/L (ref 4–11)

## 2018-04-12 PROCEDURE — 99214 OFFICE O/P EST MOD 30 MIN: CPT | Mod: ZP | Performed by: INTERNAL MEDICINE

## 2018-04-12 PROCEDURE — G0463 HOSPITAL OUTPT CLINIC VISIT: HCPCS

## 2018-04-12 PROCEDURE — 80053 COMPREHEN METABOLIC PANEL: CPT | Performed by: INTERNAL MEDICINE

## 2018-04-12 PROCEDURE — 85025 COMPLETE CBC W/AUTO DIFF WBC: CPT | Performed by: INTERNAL MEDICINE

## 2018-04-12 PROCEDURE — 84702 CHORIONIC GONADOTROPIN TEST: CPT | Performed by: INTERNAL MEDICINE

## 2018-04-12 PROCEDURE — 83615 LACTATE (LD) (LDH) ENZYME: CPT | Performed by: INTERNAL MEDICINE

## 2018-04-12 PROCEDURE — 82105 ALPHA-FETOPROTEIN SERUM: CPT | Performed by: INTERNAL MEDICINE

## 2018-04-12 ASSESSMENT — PAIN SCALES - GENERAL: PAINLEVEL: NO PAIN (0)

## 2018-04-12 NOTE — NURSING NOTE
"Oncology Rooming Note    April 12, 2018 4:17 PM   Kari Turcios is a 28 year old male who presents for:    Chief Complaint   Patient presents with     Oncology Clinic Visit     ONC:germ cell tumor stage III - 6 month f/u-r/s task/Christina-pt aware     Initial Vitals: Ht 1.651 m (5' 5\")  Wt 74.1 kg (163 lb 4.8 oz)  BMI 27.17 kg/m2 Estimated body mass index is 27.17 kg/(m^2) as calculated from the following:    Height as of this encounter: 1.651 m (5' 5\").    Weight as of this encounter: 74.1 kg (163 lb 4.8 oz). Body surface area is 1.84 meters squared.  No Pain (0) Comment: Data Unavailable   No LMP for male patient.  Allergies reviewed: Yes  Medications reviewed: Yes    Medications: Medication refills not needed today.  Pharmacy name entered into [employer]:    [facility] - [place], [place] - [address]  [id number] [place], [place] - [address]    Clinical concerns:  Arcadio was notified.    10 minutes for nursing intake (face to face time)     Gaurav Osuna CMA              "

## 2018-04-12 NOTE — LETTER
4/12/2018       RE: Kari Turcios  [id number]  [facility] [id number]     Dear Colleague,    Thank you for referring your patient, Kari Turcios, to the [facility]. Please see a copy of my visit note below.    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 He was transferred to [facility] for neurosurgical management. A biopsy was performed on 3/20 with placement of third ventriculostomy and external ventricular drain. The biopsy showed a mixed germ cell tumor. Resection of the mass was performed on 3/26/14 by a midline combined occipital and suboccipital craniotomy performed by Trinidad Xiao and Urbano. It was R1 resection and most of the macroscopic disease was resected and pathology did reveal mixed germ cell tumor. He was seen by Medical Oncology (Cheikh Ervin) and Radiation Oncology (Dr. Kayla Song) and recommended chemotherapy followed by radiation therapy per the [facility] protocol for localized CNS Germ Cell Tumors.  He basically is following the stratum 1 for patients with localized non-germinomatous tumors to receive 6 cycles of chemotherapy (1, 3 & 5 -carbo/etoposide and 2, 4, & 6 are ifos/etoposide) and depending post chemotherapy radiographic (MRI) and biochemical response (tumor markers), he may be a candidate to receive less radiation therapy. He completed chemo 8/11/14 and follow up brain MRI was negative for residual/recurrence of disease.  HCG and AFP markers were within normal limits (AFP was elevated at diagnosis). He received radiation to the ventricles followed by a boost (3000 cGy to ventricles and 5400 cGy to tumor bed) from 9/17/2014 through 10/28/14.    VIVI Roca ([facility]) is here for his follow up of his primary CNS germ cell tumor    He complains of bright red blood in stools. He felt that blood was dripping both times he had stools today. He feels that it was a lot of blood on both occasions.        PAST MEDICAL HISTORY     1. Primary CNS germ cell tumor as detailed above.   2. Secondary Diabetes Insipidus  3. Secondary seizure disorder      CURRENT OUTPATIENT MEDICATIONS     Current Outpatient Prescriptions   Medication Sig     desmopressin (DDAVP) 0.2 MG tablet Take 1 tablet (200 mcg) by mouth 2 times daily     desmopressin (DDAVP) 0.1 MG tablet Take one tablet at lunch.     nortriptyline (PAMELOR) 10 MG capsule Take one tab am and one tab at bedtime     ACETAMINOPHEN PO Take 650 mg by mouth every 4 hours as needed for pain     tobramycin-dexamethasone (TOBRADEX) ophthalmic ointment Place 1 Application into both eyes 3 times daily Reported on 3/14/2017     melatonin 1 MG TABS Take 1-2 tablets (1-2 mg) by mouth nightly as needed for sleep     LORazepam (ATIVAN) 0.5 MG tablet Take 1 tablet (0.5 mg) by mouth every 4 hours as needed (Anxiety, Nausea/Vomiting or Sleep)     levETIRAcetam (KEPPRA) 750 MG tablet Take 1,500 mg by mouth every morning Two tablet in am and 2 1/2 tablet in pm     Glycerin-Hypromellose- (VISINE TEARS OP) Apply 1-2 drops to eye 2 times daily as needed (for dry eyes.)     No current facility-administered medications for this visit.      Facility-Administered Medications Ordered in Other Visits   Medication     gadobutrol (GADAVIST) injection 7.5 mL           ALLERGIES     Allergies   Allergen Reactions     Penicillins Hives        REVIEW OF SYSTEMS   10 point ROS negative unless noted in HPI     PHYSICAL  "EXAM   Ht 1.651 m (5' 5\")  Wt 74.1 kg (163 lb 4.8 oz)  BMI 27.17 kg/m2    Wt Readings from Last 4 Encounters:   03/19/18 77.1 kg (169 lb 14.4 oz)   12/04/17 73.5 kg (162 lb)   11/22/17 74.5 kg (164 lb 3.2 oz)   10/16/17 75.3 kg (166 lb)   GEN: NAD, appears well, alert. His affect is somewhat flat.  HEENT: PERRL, EOMI, no icterus, injection or pallor. Mild esotropia of the left eye. Oropharynx is clear.  NECK: no cervical or supraclavicular lymphadenopathy  LUNGS: clear bilaterally  CV: RRR, no murmurs, rubs, or gallops  ABDOMEN: soft, non-tender, non-distended, normal bowel sounds, no hepatosplenomegaly by percussion or palpation.  Small 1 inch incision from shunt catheter  EXT: warm, well perfused, no edema  NEURO: alert, no obvious focal deficit other than esotropia of left eye  SKIN: no rashes     LABORATORY AND IMAGING STUDIES     Recent Labs   Lab Test  03/19/18   0833  12/04/17   1553  10/16/17   1529  04/17/17   1613  02/23/17   0615   NA  137  140  136  136  144   POTASSIUM  4.3  3.8  3.9  3.4  4.1   CHLORIDE  102  104  104  101  112*   CO2  31  30  25  29  24   ANIONGAP  4  6  7  6  8   BUN  10  11  9  10  12   CR  0.93  0.93  0.92  0.91  1.14   GLC  97  114*  94  98  91   DAVIDE  9.5  9.0  9.6  8.3*  9.1     Recent Labs   Lab Test  08/18/14   1305  08/15/14   0725  08/14/14   0801  08/13/14   0619  08/12/14   0713   MAG  1.8  1.7  1.9  1.8  1.9   PHOS  2.7  3.2  4.3  3.8  4.0     Recent Labs   Lab Test  12/04/17   1553  10/16/17   1529  04/17/17   1613  02/23/17   0615  08/22/16   2103   WBC  10.0  7.1  5.4  11.9*  7.0   HGB  14.2  14.7  12.9*  14.1  15.6   PLT  226  238  188  186  258   MCV  86  83  84  84  84   NEUTROPHIL  78.7  70.2  78.5  79.5  60.3     Recent Labs   Lab Test  12/04/17   1553  10/16/17   1529  04/17/17   1613   BILITOTAL  0.2  0.3  0.3   ALKPHOS  73  79  74   ALT  28  44  14   AST  14  26  7   ALBUMIN  4.2  4.4  4.0   LDH  163  238*  136     TSH   Date Value Ref Range Status "   11/03/2014 1.36 0.40 - 4.00 mU/L Final     Comment:     Effective 7/30/2014, the reference range for this assay has changed to reflect   new instrumentation/methodology.     04/18/2014 2.18 0.4 - 5.0 mU/L Final   03/29/2014 0.36 (L) 0.4 - 5.0 mU/L Final     No results for input(s): CEA in the last 48795 hours.  Results for orders placed or performed in visit on 04/11/18   CT Chest/Abdomen/Pelvis w Contrast    Narrative    EXAMINATION: CT CHEST/ABDOMEN/PELVIS W CONTRAST, 4/11/2018 4:21 PM    TECHNIQUE:  Helical CT images from the thoracic inlet through the  symphysis pubis were obtained  with contrast. Contrast dose: Isovue  370 104cc    COMPARISON: CT chest abdomen pelvis on 4/17/2018    HISTORY: ; Mixed germ cell tumor (H); Germ cell tumor of brain (H)    FINDINGS:    Chest: Ventricular peritoneal shunt the courses in the subcutis tissue  of the anterior right chest wall anterior the peritoneal cavity in the  right upper quadrant terminating into the left upper quadrant. Heart  size is within normal limits. No pericardial effusion. The calibers of  the main pulmonary artery and ascending aorta are within normal  limits. Bilateral gynecomastia.    No evidence of pleural effusion or pneumothorax. No significant  nodules.    Abdomen and pelvis: The liver, spleen, kidneys, gallbladder and  pancreas and adrenal glands are unremarkable. No abnormally dilated  bowel loops. Trace amount of free fluid in the pelvis, likely related  to the presence of ventricular peritoneal shunt. No significant  retroperitoneal or mesenteric lymphadenopathy.    Bones and soft tissues: No worrisome osseous or soft tissue lesion.      Impression    IMPRESSION: In this patient with history of germ cell tumor of the brain, there is:  1. No evidence of metastatic disease in the chest, abdomen and pelvis.  2. Trace amount of free fluid in the pelvis, likely related to the presence of ventriculoperitoneal shunt catheter.     I have personally  reviewed the examination and initial interpretation and I agree with the findings.    SHAYNA DIAZ MD          ASSESSMENT AND PLAN   1. 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 DI- taking desmopressin  3. Seizure episode after cycle 3  4. Bright Red Blood per rectum  5. Prior provoked PE, off lovenox    ONC: S/p resection and 6 cycles of chemotherapy  (1, 3 & 5 -carbo/etoposide and 2, 4, & 6 were ifos/etoposide). MRI brain and tumor markers showed no recurrence/residual disease. He received radiation to the ventricles followed by a boost (3000 cGy to ventricles and 5400 cGy to tumor bed) from 9/17/2014 through 10/28/14.    - Restaging MRI done in Dec 2017 was negative for any evidence of recurrent disease  - Restaging CT scan Chest/abd/pelvis done yesterday. I have reviewed actual images and the scan remains negative for recurrent metastatic disease  - He did not have labs drawn as yet. I will get labs.   - He does not have a visit with Dr. Song. I will have this scheduled after MRI. Beyond that visit we will follow with annual scans and biannual tumor markers (AFP, LDH and HCG).     We will have him follow up in 6 months with tumor markers, and gets scans CT c/a/p and brain MRI annually    BRBPR: He feels that he has been losing blood in stools since this morning. 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   - I will get CBC drawn now. He denies dizziness, palpitations, light headedness, fatigue.   - He does not have tachycardia or hypotension  - He does not have a hemodynamically significant bleeding.   - I will try to get urgent GI consult.     Depression: He has been following his pscyhologist.     PE: discontinued lovenox after 6 months of therapy (provoked clot)    ENDO:  Diabetes  insipidus:  -Continue desmopressin, FU Dr. Yessenia Ramires    NEURO:    -H/o Seizures: on keppra,  -Esotropia with diplopia: stable. FU opthalmology   - His keppra dose has been adjusted    Over 25 min of direct face to face time spent with patient with more than 50% time spent in counseling and coordinating care.      Again, thank you for allowing me to participate in the care of your patient.      Sincerely,    Cheikh Ervin MD

## 2018-04-12 NOTE — PROGRESS NOTES
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 He was transferred to [facility] for neurosurgical management. A biopsy was performed on 3/20 with placement of third ventriculostomy and external ventricular drain. The biopsy showed a mixed germ cell tumor. Resection of the mass was performed on 3/26/14 by a midline combined occipital and suboccipital craniotomy performed by Trinidad Xiao and Urbano. It was R1 resection and most of the macroscopic disease was resected and pathology did reveal mixed germ cell tumor. He was seen by Medical Oncology (Cheikh Ervin) and Radiation Oncology (Dr. Kayla Song) and recommended chemotherapy followed by radiation therapy per the [facility] protocol for localized CNS Germ Cell Tumors.  He basically is following the stratum 1 for patients with localized non-germinomatous tumors to receive 6 cycles of chemotherapy (1, 3 & 5 -carbo/etoposide and 2, 4, & 6 are ifos/etoposide) and depending post chemotherapy radiographic (MRI) and biochemical response (tumor markers), he may be a candidate to receive less radiation therapy. He completed chemo 8/11/14 and follow up brain MRI was negative for residual/recurrence of disease. HCG and AFP markers were within normal limits (AFP was elevated at diagnosis). He received radiation to the ventricles followed by a boost (3000 cGy to ventricles and 5400 cGy to tumor bed) from 9/17/2014 through 10/28/14.    VIVI Roca  "(Kari) is here for his follow up of his primary CNS germ cell tumor    He complains of bright red blood in stools. He felt that blood was dripping both times he had stools today. He feels that it was a lot of blood on both occasions.        PAST MEDICAL HISTORY     1. Primary CNS germ cell tumor as detailed above.   2. Secondary Diabetes Insipidus  3. Secondary seizure disorder      CURRENT OUTPATIENT MEDICATIONS     Current Outpatient Prescriptions   Medication Sig     desmopressin (DDAVP) 0.2 MG tablet Take 1 tablet (200 mcg) by mouth 2 times daily     desmopressin (DDAVP) 0.1 MG tablet Take one tablet at lunch.     nortriptyline (PAMELOR) 10 MG capsule Take one tab am and one tab at bedtime     ACETAMINOPHEN PO Take 650 mg by mouth every 4 hours as needed for pain     tobramycin-dexamethasone (TOBRADEX) ophthalmic ointment Place 1 Application into both eyes 3 times daily Reported on 3/14/2017     melatonin 1 MG TABS Take 1-2 tablets (1-2 mg) by mouth nightly as needed for sleep     LORazepam (ATIVAN) 0.5 MG tablet Take 1 tablet (0.5 mg) by mouth every 4 hours as needed (Anxiety, Nausea/Vomiting or Sleep)     levETIRAcetam (KEPPRA) 750 MG tablet Take 1,500 mg by mouth every morning Two tablet in am and 2 1/2 tablet in pm     Glycerin-Hypromellose- (VISINE TEARS OP) Apply 1-2 drops to eye 2 times daily as needed (for dry eyes.)     No current facility-administered medications for this visit.      Facility-Administered Medications Ordered in Other Visits   Medication     gadobutrol (GADAVIST) injection 7.5 mL           ALLERGIES     Allergies   Allergen Reactions     Penicillins Hives        REVIEW OF SYSTEMS   10 point ROS negative unless noted in HPI     PHYSICAL EXAM   Ht 1.651 m (5' 5\")  Wt 74.1 kg (163 lb 4.8 oz)  BMI 27.17 kg/m2    Wt Readings from Last 4 Encounters:   03/19/18 77.1 kg (169 lb 14.4 oz)   12/04/17 73.5 kg (162 lb)   11/22/17 74.5 kg (164 lb 3.2 oz)   10/16/17 75.3 kg (166 lb)   GEN: " NAD, appears well, alert. His affect is somewhat flat.  HEENT: PERRL, EOMI, no icterus, injection or pallor. Mild esotropia of the left eye. Oropharynx is clear.  NECK: no cervical or supraclavicular lymphadenopathy  LUNGS: clear bilaterally  CV: RRR, no murmurs, rubs, or gallops  ABDOMEN: soft, non-tender, non-distended, normal bowel sounds, no hepatosplenomegaly by percussion or palpation.  Small 1 inch incision from shunt catheter  EXT: warm, well perfused, no edema  NEURO: alert, no obvious focal deficit other than esotropia of left eye  SKIN: no rashes     LABORATORY AND IMAGING STUDIES     Recent Labs   Lab Test  03/19/18   0833  12/04/17   1553  10/16/17   1529  04/17/17   1613  02/23/17   0615   NA  137  140  136  136  144   POTASSIUM  4.3  3.8  3.9  3.4  4.1   CHLORIDE  102  104  104  101  112*   CO2  31  30  25  29  24   ANIONGAP  4  6  7  6  8   BUN  10  11  9  10  12   CR  0.93  0.93  0.92  0.91  1.14   GLC  97  114*  94  98  91   DAVIDE  9.5  9.0  9.6  8.3*  9.1     Recent Labs   Lab Test  08/18/14   1305  08/15/14   0725  08/14/14   0801  08/13/14   0619  08/12/14   0713   MAG  1.8  1.7  1.9  1.8  1.9   PHOS  2.7  3.2  4.3  3.8  4.0     Recent Labs   Lab Test  12/04/17   1553  10/16/17   1529  04/17/17   1613  02/23/17   0615  08/22/16   2103   WBC  10.0  7.1  5.4  11.9*  7.0   HGB  14.2  14.7  12.9*  14.1  15.6   PLT  226  238  188  186  258   MCV  86  83  84  84  84   NEUTROPHIL  78.7  70.2  78.5  79.5  60.3     Recent Labs   Lab Test  12/04/17   1553  10/16/17   1529  04/17/17   1613   BILITOTAL  0.2  0.3  0.3   ALKPHOS  73  79  74   ALT  28  44  14   AST  14  26  7   ALBUMIN  4.2  4.4  4.0   LDH  163  238*  136     TSH   Date Value Ref Range Status   11/03/2014 1.36 0.40 - 4.00 mU/L Final     Comment:     Effective 7/30/2014, the reference range for this assay has changed to reflect   new instrumentation/methodology.     04/18/2014 2.18 0.4 - 5.0 mU/L Final   03/29/2014 0.36 (L) 0.4 - 5.0 mU/L Final      No results for input(s): CEA in the last 83450 hours.  Results for orders placed or performed in visit on 04/11/18   CT Chest/Abdomen/Pelvis w Contrast    Narrative    EXAMINATION: CT CHEST/ABDOMEN/PELVIS W CONTRAST, 4/11/2018 4:21 PM    TECHNIQUE:  Helical CT images from the thoracic inlet through the  symphysis pubis were obtained  with contrast. Contrast dose: Isovue  370 104cc    COMPARISON: CT chest abdomen pelvis on 4/17/2018    HISTORY: ; Mixed germ cell tumor (H); Germ cell tumor of brain (H)    FINDINGS:    Chest: Ventricular peritoneal shunt the courses in the subcutis tissue  of the anterior right chest wall anterior the peritoneal cavity in the  right upper quadrant terminating into the left upper quadrant. Heart  size is within normal limits. No pericardial effusion. The calibers of  the main pulmonary artery and ascending aorta are within normal  limits. Bilateral gynecomastia.    No evidence of pleural effusion or pneumothorax. No significant  nodules.    Abdomen and pelvis: The liver, spleen, kidneys, gallbladder and  pancreas and adrenal glands are unremarkable. No abnormally dilated  bowel loops. Trace amount of free fluid in the pelvis, likely related  to the presence of ventricular peritoneal shunt. No significant  retroperitoneal or mesenteric lymphadenopathy.    Bones and soft tissues: No worrisome osseous or soft tissue lesion.      Impression    IMPRESSION: In this patient with history of germ cell tumor of the brain, there is:  1. No evidence of metastatic disease in the chest, abdomen and pelvis.  2. Trace amount of free fluid in the pelvis, likely related to the presence of ventriculoperitoneal shunt catheter.     I have personally reviewed the examination and initial interpretation and I agree with the findings.    SHAYNA DIAZ MD          ASSESSMENT AND PLAN   1. 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 DI- taking desmopressin  3. Seizure episode after cycle 3  4. Bright Red Blood per rectum  5. Prior provoked PE, off lovenox    ONC: S/p resection and 6 cycles of chemotherapy  (1, 3 & 5 -carbo/etoposide and 2, 4, & 6 were ifos/etoposide). MRI brain and tumor markers showed no recurrence/residual disease. He received radiation to the ventricles followed by a boost (3000 cGy to ventricles and 5400 cGy to tumor bed) from 9/17/2014 through 10/28/14.    - Restaging MRI done in Dec 2017 was negative for any evidence of recurrent disease  - Restaging CT scan Chest/abd/pelvis done yesterday. I have reviewed actual images and the scan remains negative for recurrent metastatic disease  - He did not have labs drawn as yet. I will get labs.   - He does not have a visit with Dr. Song. I will have this scheduled after MRI. Beyond that visit we will follow with annual scans and biannual tumor markers (AFP, LDH and HCG).     We will have him follow up in 6 months with tumor markers, and gets scans CT c/a/p and brain MRI annually    BRBPR: He feels that he has been losing blood in stools since this morning. 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   - I will get CBC drawn now. He denies dizziness, palpitations, light headedness, fatigue.   - He does not have tachycardia or hypotension  - He does not have a hemodynamically significant bleeding.   - I will try to get urgent GI consult.     Depression: He has been following his pscyhologist.     PE: discontinued lovenox after 6 months of therapy (provoked clot)    ENDO:  Diabetes insipidus:  -Continue desmopressin, FU Dr. Yessenia Ramires    NEURO:    -H/o Seizures: on keppra,  -Esotropia with diplopia: stable. FU opthalmology   - His keppra dose has been adjusted    Over 25 min of direct face to face time spent with patient with more than 50%  time spent in counseling and coordinating care.

## 2018-04-12 NOTE — MR AVS SNAPSHOT
After Visit Summary   4/12/2018    Kari Turcios    MRN: [id number]           Patient Information     Date Of Birth          1990        Visit Information        Provider Department      4/12/2018 4:00 PM Cheikh Ervin MD [facility]        Today's Diagnoses     Germ cell tumor of brain (H)    -  1    Bright red blood per rectum           Follow-ups after your visit        Additional Services     GASTROENTEROLOGY ADULT REF CONSULT ONLY       Preferred Location: [facility], [facility] [phone]      Please be aware that coverage of these services is subject to the terms and limitations of your health insurance plan.  Call member services at your health plan with any benefit or coverage questions.  Any procedures must be performed at a [place] facility OR coordinated by your clinic's referral office.    Please bring the following with you to your appointment:    (1) Any X-Rays, CTs or MRIs which have been performed.  Contact the facility where they were done to arrange for  prior to your scheduled appointment.    (2) List of current medications   (3) This referral request   (4) Any documents/labs given to you for this referral                  Your next 10 appointments already scheduled     May 14, 2018 11:15 AM CDT   (Arrive by 11:00 AM)   New Patient Visit with CASPER Melvin [facility] ([facility])    [address] [place] [address] [phone]              Future tests that were ordered for you today     Open Standing Orders        Priority Remaining Interval Expires Ordered    AFP tumor marker Routine 23/25 4/12/2019 4/12/2018    Comprehensive metabolic panel Routine 24/25 4/12/2019 4/12/2018    CBC with platelets differential Routine 24/25 4/12/2019 4/12/2018    Lactate Dehydrogenase Routine 24/25 4/12/2019 4/12/2018    HCG tumor marker Routine 24/25   "4/12/2019 4/12/2018            Who to contact     If you have questions or need follow up information about today's clinic visit or your schedule please contact [facility] directly at [phone].  Normal or non-critical lab and imaging results will be communicated to you by "[employer]hart, letter or phone within 4 business days after the clinic has received the results. If you do not hear from us within 7 days, please contact the clinic through "[employer]hart or phone. If you have a critical or abnormal lab result, we will notify you by phone as soon as possible.  Submit refill requests through [employer] or call your pharmacy and they will forward the refill request to us. Please allow 3 business days for your refill to be completed.          Additional Information About Your Visit        "[email or web address] Information     [employer] gives you secure access to your electronic health record. If you see a primary care provider, you can also send messages to your care team and make appointments. If you have questions, please call your primary care clinic.  If you do not have a primary care provider, please call [phone] and they will assist you.        Care EveryWhere ID     This is your Care EveryWhere ID. This could be used by other organizations to access your [place] medical records  [id number]        Your Vitals Were     Pulse Temperature Respirations Height Pulse Oximetry BMI (Body Mass Index)    77 98.1  F (36.7  C) (Oral) 16 1.651 m (5' 5\") 99% 27.17 kg/m2       Blood Pressure from Last 3 Encounters:   04/12/18 132/84   03/19/18 133/80   12/04/17 127/74    Weight from Last 3 Encounters:   04/12/18 74.1 kg (163 lb 4.8 oz)   03/19/18 77.1 kg (169 lb 14.4 oz)   12/04/17 73.5 kg (162 lb)              We Performed the Following     AFP tumor marker     CBC with platelets differential     Comprehensive metabolic panel     GASTROENTEROLOGY ADULT REF CONSULT ONLY     HCG tumor marker     Lactate Dehydrogenase        " Primary Care Provider Office Phone # Fax #    Ila Cornell [phone]       [facility] [address] [facility]  [place] [address]        Equal Access to Services     NOE COLLAZO : Aliyah zeus fischer silvestreo Soclaudyali, waaxda luqadaha, qaybta kaalmada adeegyada, frank mckinney kristy alvarez. So [facility] [phone].    ATENCIÓN: Si habla español, tiene a gaffney disposición servicios gratuitos de asistencia lingüística. Llame al [phone].    We comply with applicable federal civil rights laws and [place] laws. We do not discriminate on the basis of race, color, national origin, age, disability, sex, sexual orientation, or gender identity.            Thank you!     Thank you for choosing [facility]  for your care. Our goal is always to provide you with excellent care. Hearing back from our patients is one way we can continue to improve our services. Please take a few minutes to complete the written survey that you may receive in the mail after your visit with us. Thank you!             Your Updated Medication List - Protect others around you: Learn how to safely use, store and throw away your medicines at www.disposemymeds.org.          This list is accurate as of 4/12/18  5:25 PM.  Always use your most recent med list.                   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)       levETIRAcetam 750 MG tablet    KEPPRA    60 tablet    Take 1,500 mg by mouth every morning Two tablet in am and 2 1/2 tablet in pm    Pineal tumor       LORazepam 0.5 MG tablet     ATIVAN    30 tablet    Take 1 tablet (0.5 mg) by mouth every 4 hours as needed (Anxiety, Nausea/Vomiting or Sleep)    Pineal tumor       melatonin 1 MG Tabs tablet     90 tablet    Take 1-2 tablets (1-2 mg) by mouth nightly as needed for sleep    Pineal tumor       nortriptyline 10 MG capsule    PAMELOR    60 capsule    Take one tab am and one tab at bedtime    Chronic daily headache       tobramycin-dexamethasone ophthalmic ointment    TOBRADEX     Place 1 Application into both eyes 3 times daily Reported on 3/14/2017        VISINE TEARS OP      Apply 1-2 drops to eye 2 times daily as needed (for dry eyes.)        * Notice:  This list has 2 medication(s) that are the same as other medications prescribed for you. Read the directions carefully, and ask your doctor or other care provider to review them with you.

## 2018-04-13 LAB — HCG-TM SERPL-ACNC: <3 IU/L

## 2018-04-17 DIAGNOSIS — C71.9 GERM CELL TUMOR OF BRAIN (H): Primary | ICD-10-CM

## 2018-05-10 ENCOUNTER — OFFICE VISIT (OUTPATIENT)
Dept: RADIATION ONCOLOGY | Facility: CLINIC | Age: 28
End: 2018-05-10
Attending: RADIOLOGY
Payer: COMMERCIAL

## 2018-05-10 ENCOUNTER — CARE COORDINATION (OUTPATIENT)
Dept: ONCOLOGY | Facility: CLINIC | Age: 28
End: 2018-05-10

## 2018-05-10 ENCOUNTER — HOSPITAL ENCOUNTER (OUTPATIENT)
Dept: MRI IMAGING | Facility: CLINIC | Age: 28
Discharge: HOME | End: 2018-05-10
Attending: INTERNAL MEDICINE | Admitting: INTERNAL MEDICINE
Payer: COMMERCIAL

## 2018-05-10 VITALS
HEART RATE: 108 BPM | DIASTOLIC BLOOD PRESSURE: 85 MMHG | SYSTOLIC BLOOD PRESSURE: 130 MMHG | OXYGEN SATURATION: 95 % | BODY MASS INDEX: 28.29 KG/M2 | WEIGHT: 170 LBS

## 2018-05-10 DIAGNOSIS — C80.1 MIXED GERM CELL TUMOR (H): Primary | ICD-10-CM

## 2018-05-10 DIAGNOSIS — C71.9 GERM CELL TUMOR OF BRAIN (H): ICD-10-CM

## 2018-05-10 PROCEDURE — 70553 MRI BRAIN STEM W/O & W/DYE: CPT

## 2018-05-10 PROCEDURE — A9585 GADOBUTROL INJECTION: HCPCS | Performed by: INTERNAL MEDICINE

## 2018-05-10 PROCEDURE — 25000128 H RX IP 250 OP 636: Performed by: INTERNAL MEDICINE

## 2018-05-10 RX ORDER — GADOBUTROL 604.72 MG/ML
7.5 INJECTION INTRAVENOUS ONCE
Status: COMPLETED | OUTPATIENT
Start: 2018-05-10 | End: 2018-05-10

## 2018-05-10 RX ADMIN — GADOBUTROL 7.5 ML: 604.72 INJECTION INTRAVENOUS at 11:07

## 2018-05-10 ASSESSMENT — PAIN SCALES - GENERAL: PAINLEVEL: NO PAIN (0)

## 2018-05-10 NOTE — LETTER
5/10/2018       RE: Kari Turcios  [address]  [place] [address]     Dear Colleague,    Thank you for referring your patient, Kari Turcios, to the RADIATION ONCOLOGY CLINIC. Please see a copy of my visit note below.    FOLLOW-UP VISIT    Patient Name: Kari Turcios      : 1990     Age: 28 year old        ______________________________________________________________________________     Chief Complaint   Patient presents with     Cancer     Follow up for Mixed germ cell tumor: Whole Ventricle 5400 cGy completed 10/28/14     /85  Pulse 108  Wt 77.1 kg (170 lb)  SpO2 95%  BMI 28.29 kg/m2     Date Radiation Completed: 10/28/14    Pain  Denies    Labs  Other Labs: Yes: 18    Imaging  MRI: 18          Other Appointments:     MD Name:  Appointment Date:    MD Name: Appointment Date:   MD Name: Appointment Date:   Other Appointment Notes:     Residual Radiation side effect: denies side effects      Additional Instructions:     Nurse face-to-face time: Level 2:  5 min face to face time          RADIATION ONCOLOGY FOLLOW-UP VISIT  DATE: May 10, 2018    NAME: Kari Turcios  MRN: [id number]    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 Whole Ventricle, 3060 cGy in 17 fractions  1b. Ventricle boost, 2340 cGy in 13 fractions (total 5,400 cGy), completed 10/28/14.    INTERVAL SINCE COMPLETION OF RT: Approximately 3.5 years since completion on 10/28/2014.    SUBJECTIVE:  Mr Turcios (Abelino) is a 24 year old male with history of seizures who presented with headaches accompanied by diplopia. MRI of the brain on 3/19/14 showed a 3 cm pineal mass and hydrocephalus. Biopsy was performed on 3/20/14 along with placement of third ventriculostomy and external ventricular drain. Pathology demonstrated a mixed germ cell tumor. Abelino underwent resection of the mass on 3/26/14 under the care of Trinidad Xiao and Urbano. It was R1 resection and most  of the macroscopic disease was resected and pathology revealed mixed germ cell tumor - immature teratoma (~85%), mature teratoma (~5%), yolk sac (~5%) as the major components. His B-HCG was normal but his AFP was elevated at 56. CSF was negative for malignant cells, and his spine MRI was clear of drop metastasis. He received chemotherapy per the [facility] protocol for localized CNS Germ Cell Tumors. He essentially followed the stratum 1 for patients with localized non-germinomatous tumors and received 6 cycles of chemotherapy (1, 3 & 5 -carbo/etoposide and 2, 4, & 6 are ifos/etoposide). His AFP  normalized by July of this year. MRI showed stable postsurgical changes of the pineal gland mixed germ cell tumor resection with residual hemosiderin staining along cerebellar sulci and deposition of hemorrhagic products at the resection site. No evidence of residual or recurrent tumor noted. He was therefore recommended to undergo adjuvant radiotherapy, which he completed as detailed above.       Mr. Turcios tolerated treatment well; He did not require any unplanned breaks from therapy. He felt depressed during the treatment and was seeing his therapist off and on. He had grade 1 nausea initially and was able to wean off Decadron taper without any symptoms. He continued to use recreational marijuana for anxiety and increased appetite.    He returns today for the first time since completing therapy 3.5 years ago at the re-referral of Dr. Ervin, given that he had a restaging brain MRI today. He reports that he is doing very well; he works at [employer] in the [employer] division, and has work accommodations at his computer including reducing ambient light, particularly from above, and limiting his case-load, but generally reports a successful and satisfying work life. He did report bright red blood per rectum to Dr. Ervin at their last visit on 4/12/18, so he has stopped his Lovenox. He still has some blood per rectum, but his hemoglobin  in April was normal. He reports a history of hemorrhoids, but no painful external hemorrhoids at this time. Dr. Ervin also made a referral to gastroenterology for evaluation and/or treatment.    Abelino's MRI today shows no evidence of recurrence and no significant change in the last 5 months. His ventricular shunt appears to be in place and functioning well. Abelino has no new neurologic complaints. He continues to follow with neurology (Neo Dockery, CASPER GILL), ophthalmology (Dr. Menendez) and endocrinology (Dr. Andrew) regularly. He is on ddAVP, Keppra and nortiptyline.    PHYSICAL EXAM:  VITALS: /85  Pulse 108  Wt 77.1 kg (170 lb)  SpO2 95%  BMI 28.29 kg/m2  GEN: Appears well, alert, oriented, and in NAD  HEENT: Supranuclear gaze palsy, normal conjunctiva, MMM  NECK: Full ROM, no cervical or supraclavicular lymphadenopathy  CV: Good distal perfusion  RESP: Breathing comfortably on room air  SKIN: Normal color and turgor  NEURO: Supranuclear gaze palsy, but otherwise no gross deficits, CN 3-12 grossly intact, normal and symmetric motor and sensory exam in bilateral upper and lower extremities, normal gait  PSYCH: Appropriate mood and affect    LABS AND IMAGING: Reviewed. Explained that his MRI today shows no sign of recurrence.    IMPRESSION:   Mr. 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 He is now 3.5 years out from adjuvant radiotherapy following surgical resection and chemotherapy as per the Stratum 1 of [facility] nongerminomatous germ cell protocol.    RECOMMENDATIONS:  Repeat MRI in 1 year. We would like to see him again in about 2 years with another MRI to assess for any late toxicities (as of today, he has tolerated RT very well without definite evidence of late toxicity).    Mr. Turcios was seen and discussed with staff, Dr. Bridgett Hardy MD PGY-4  Radiation Oncology Resident, [facility]  Phone: [phone]    I  saw the patient with the resident.  I agree with the resident note and plan of care.      Kayla Urias MD     CC  Patient Care Team:  Ila Cornell MD as PCP - General (Family Practice)  Cheikh Ervin MD as Referring Physician (Oncology)  Lemuel Billy, PhD LP as Psychologist (Psychology)  Megan Reese APRN CNP as Nurse Practitioner (Oncology)  Katerina Grajeda, RN as Nurse Coordinator (Oncology)  Farida Bean RN as Nurse Coordinator (Neurosurgery)  Yessenia Ramires MD as MD (INTERNAL MEDICINE - ENDOCRINOLOGY, DIABETES & METABOLISM)  Sybil Dockery APRN CNP as Nurse Practitioner (Nurse Practitioner)  Yessenia Barba, RN as Nurse Coordinator (Neurology)

## 2018-05-10 NOTE — PROGRESS NOTES
FOLLOW-UP VISIT    Patient Name: Kari Turcios      : 1990     Age: 28 year old        ______________________________________________________________________________     Chief Complaint   Patient presents with     Cancer     Follow up for Mixed germ cell tumor: Whole Ventricle 5400 cGy completed 10/28/14     /85  Pulse 108  Wt 77.1 kg (170 lb)  SpO2 95%  BMI 28.29 kg/m2     Date Radiation Completed: 10/28/14    Pain  Denies    Labs  Other Labs: Yes: 18    Imaging  MRI: 18          Other Appointments:     MD Name:  Appointment Date:    MD Name: Appointment Date:   MD Name: Appointment Date:   Other Appointment Notes:     Residual Radiation side effect: denies side effects      Additional Instructions:     Nurse face-to-face time: Level 2:  5 min face to face time

## 2018-05-10 NOTE — PROGRESS NOTES
Received call from David [employer] tech with [facility]. David states pt was there for a brain MRI this morning and needs his shunt to be reset due to the MRI. David states he called the neurosurgery department and was told they do not know this pt which is why he is calling oncology. Pt has seen Dr. Xiao with neurosurgery in the past so it is unclear why neurosurgery would not be familiar with this pt.     RN called the neurosurgery clinic and spoke with the , Bright. Informed Bright pt needs his shunt to be reset. Bright stated he would contact pt to schedule shunt reset.

## 2018-05-10 NOTE — MR AVS SNAPSHOT
After Visit Summary   5/10/2018    Kari Turcios    MRN: [id number]           Patient Information     Date Of Birth          1990        Visit Information        Provider Department      5/10/2018 3:30 PM Kayla Urias MD Radiation Oncology Clinic        Today's Diagnoses     Mixed germ cell tumor (H)    -  1       Follow-ups after your visit        Your next 10 appointments already scheduled     Oct 10, 2018 11:30 AM CDT   Lab with UC LAB   [facility] ([facility])    [address] [place] [address] [phone]            Oct 10, 2018 12:00 PM CDT   CT CHEST/ABDOMEN/PELVIS W CONTRAST with UCCT2   [facility] CT ([facility])    [address] [place] [address] [phone]           Please bring any scans or X-rays taken at other hospitals, if similar tests were done. Also bring a list of your medicines, including vitamins, minerals and over-the-counter drugs. It is safest to leave personal items at home.  Be sure to tell your doctor:   If you have any allergies.   If there s any chance you are pregnant.   If you are breastfeeding.  How to prepare:   Do not eat or drink for 2 hours before your exam. If you need to take medicine, you may take it with small sips of water. (We may ask you to take liquid medicine as well.)   Please wear loose clothing, such as a sweat suit or jogging clothes. Avoid snaps, zippers and other metal. We may ask you to undress and put on a hospital gown.  Please arrive 30 minutes early for your CT. Once in the department you might be asked to drink water 15-20 minutes prior to your exam.  If indicated you may be asked to drink an oral contrast in advance of your CT.  If this is the case, the imaging team will let you know or be in contact with you prior to your appointment  Patients over 70 or patients with diabetes or kidney problems:   If you  haven t had a blood test (creatinine test) within the last 30 days, the Cardiologist/Radiologist may require you to get this test prior to your exam.  If you have diabetes:   Continue to take your metformin medication on the day of your exam  If you have any questions, please call the Imaging Department where you will have your exam.            Oct 10, 2018  3:00 PM CDT   (Arrive by 2:45 PM)   Return Visit with Cheikh Ervin MD   [facility] ([facility])    [address]  [address]  [facility] [address] [phone]              Who to contact     Please call your clinic at [phone] to:    Ask questions about your health    Make or cancel appointments    Discuss your medicines    Learn about your test results    Speak to your doctor            Additional Information About Your Visit        [employer]hart Information     [employer] gives you secure access to your electronic health record. If you see a primary care provider, you can also send messages to your care team and make appointments. If you have questions, please call your primary care clinic.  If you do not have a primary care provider, please call [phone] and they will assist you.      [employer] is an electronic gateway that provides easy, online access to your medical records. With [employer], you can request a clinic appointment, read your test results, renew a prescription or communicate with your care team.     To access your existing account, please contact your [facility] or call [phone] for assistance.        Care EveryWhere ID     This is your Care EveryWhere ID. This could be used by other organizations to access your [place] medical records  [id number]        Your Vitals Were     Pulse Pulse Oximetry BMI (Body Mass Index)             108 95% 28.29 kg/m2          Blood Pressure from Last 3 Encounters:   05/14/18 132/85   05/10/18 130/85   04/12/18 132/84     Weight from Last 3 Encounters:   05/14/18 78.3 kg (172 lb 9.6 oz)   05/10/18 77.1 kg (170 lb)   04/12/18 74.1 kg (163 lb 4.8 oz)              Today, you had the following     No orders found for display       Primary Care Provider Office Phone # Fax #    Ila Cornell [phone]       [facility] [address] [facility] [address]        Equal Access to Services     JULIANNE COLLAZO : Hadii aad ku hadasho Soomaali, waaxda luqadaha, qaybta kaalmada adeegyada, waxay idiin hayaan adeniecy khchapin wagner . So [facility] [phone].    ATENCIÓN: Si habla español, tiene a gaffney disposición servicios gratuitos de asistencia lingüística. [facility] [phone].    We comply with applicable federal civil rights laws and [place] laws. We do not discriminate on the basis of race, color, national origin, age, disability, sex, sexual orientation, or gender identity.            Thank you!     Thank you for choosing [facility]  for your care. Our goal is always to provide you with excellent care. Hearing back from our patients is one way we can continue to improve our services. Please take a few minutes to complete the written survey that you may receive in the mail after your visit with us. Thank you!             Your Updated Medication List - Protect others around you: Learn how to safely use, store and throw away your medicines at www.disposemymeds.org.          This list is accurate as of 5/10/18 11:59 PM.  Always use your most recent med list.                   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    Diabetes insipidus secondary to  vasopressin deficiency (H)       levETIRAcetam 750 MG tablet    KEPPRA    60 tablet    Take 1,500 mg by mouth every morning Two tablet in am and 2 1/2 tablet in pm    Pineal tumor       LORazepam 0.5 MG tablet    ATIVAN    30 tablet    Take 1 tablet (0.5 mg) by mouth every 4 hours as needed (Anxiety, Nausea/Vomiting or Sleep)    Pineal tumor       melatonin 1 MG Tabs tablet     90 tablet    Take 1-2 tablets (1-2 mg) by mouth nightly as needed for sleep    Pineal tumor       nortriptyline 10 MG capsule    PAMELOR    60 capsule    Take one tab am and one tab at bedtime    Chronic daily headache       tobramycin-dexamethasone ophthalmic ointment    TOBRADEX     Place 1 Application into both eyes 3 times daily Reported on 3/14/2017        VISINE TEARS OP      Apply 1-2 drops to eye 2 times daily as needed (for dry eyes.)        * Notice:  This list has 2 medication(s) that are the same as other medications prescribed for you. Read the directions carefully, and ask your doctor or other care provider to review them with you.

## 2018-05-11 ENCOUNTER — TELEPHONE (OUTPATIENT)
Dept: NEUROSURGERY | Facility: CLINIC | Age: 28
End: 2018-05-11

## 2018-05-11 NOTE — PROGRESS NOTES
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 Whole Ventricle, 3060 cGy in 17 fractions  1b. Ventricle boost, 2340 cGy in 13 fractions (total 5,400 cGy), completed 10/28/14.    INTERVAL SINCE COMPLETION OF RT: Approximately 3.5 years since completion on 10/28/2014.    SUBJECTIVE:  Mr Turcios (Abelino) is a 24 year old male with history of seizures who presented with headaches accompanied by diplopia. MRI of the brain on 3/19/14 showed a 3 cm pineal mass and hydrocephalus. Biopsy was performed on 3/20/14 along with placement of third ventriculostomy and external ventricular drain. Pathology demonstrated a mixed germ cell tumor. Abelino underwent resection of the mass on 3/26/14 under the care of Trinidad Xiao and Urbano. It was R1 resection and most of the macroscopic disease was resected and pathology revealed mixed germ cell tumor - immature teratoma (~85%), mature teratoma (~5%), yolk sac (~5%) as the major components. His B-HCG was normal but his AFP was elevated at 56. CSF was negative for malignant cells, and his spine MRI was clear of drop metastasis. He received chemotherapy per the COG protocol for localized CNS Germ Cell Tumors. He essentially followed the stratum 1 for patients with localized non-germinomatous tumors and received 6 cycles of chemotherapy (1, 3 & 5 -carbo/etoposide and 2, 4, & 6 are ifos/etoposide). His AFP  normalized by July of this year. MRI showed stable postsurgical changes of the pineal gland mixed germ cell tumor resection with residual hemosiderin staining along cerebellar sulci and deposition of hemorrhagic products at the resection site. No evidence of residual or recurrent tumor noted. He was therefore recommended to undergo adjuvant radiotherapy, which he completed as detailed above.       Mr. Turcios tolerated treatment well; He did not  require any unplanned breaks from therapy. He felt depressed during the treatment and was seeing his therapist off and on. He had grade 1 nausea initially and was able to wean off Decadron taper without any symptoms. He continued to use recreational marijuana for anxiety and increased appetite.    He returns today for the first time since completing therapy 3.5 years ago at the re-referral of Dr. Ervin, given that he had a restaging brain MRI today. He reports that he is doing very well; he works at [employer] in the [employer] division, and has work accommodations at his computer including reducing ambient light, particularly from above, and limiting his case-load, but generally reports a successful and satisfying work life. He did report bright red blood per rectum to Dr. Ervin at their last visit on 4/12/18, so he has stopped his Lovenox. He still has some blood per rectum, but his hemoglobin in April was normal. He reports a history of hemorrhoids, but no painful external hemorrhoids at this time. Dr. Ervin also made a referral to gastroenterology for evaluation and/or treatment.    Abelino's MRI today shows no evidence of recurrence and no significant change in the last 5 months. His ventricular shunt appears to be in place and functioning well. Abelino has no new neurologic complaints. He continues to follow with neurology (Neo Dockery, CASPER GILL), ophthalmology (Dr. Menendez) and endocrinology (Dr. Andrew) regularly. He is on ddAVP, Keppra and nortiptyline.    PHYSICAL EXAM:  VITALS: /85  Pulse 108  Wt 77.1 kg (170 lb)  SpO2 95%  BMI 28.29 kg/m2  GEN: Appears well, alert, oriented, and in NAD  HEENT: Supranuclear gaze palsy, normal conjunctiva, MMM  NECK: Full ROM, no cervical or supraclavicular lymphadenopathy  CV: Good distal perfusion  RESP: Breathing comfortably on room air  SKIN: Normal color and turgor  NEURO: Supranuclear gaze palsy, but otherwise no gross deficits, CN 3-12 grossly intact, normal  and symmetric motor and sensory exam in bilateral upper and lower extremities, normal gait  PSYCH: Appropriate mood and affect    LABS AND IMAGING: Reviewed. Explained that his MRI today shows no sign of recurrence.    IMPRESSION:   Mr. 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 He is now 3.5 years out from adjuvant radiotherapy following surgical resection and chemotherapy as per the Stratum 1 of COG nongerminomatous germ cell protocol.    RECOMMENDATIONS:  Repeat MRI in 1 year. We would like to see him again in about 2 years with another MRI to assess for any late toxicities (as of today, he has tolerated RT very well without definite evidence of late toxicity).    Mr. Turcios was seen and discussed with staff, Dr. Bridgett Hardy MD PGY-4  Radiation Oncology Resident, [facility]  Phone: [phone]    I saw the patient with the resident.  I agree with the resident note and plan of care.      Kayla Urias MD     CC  Patient Care Team:  Ila Cornell MD as PCP - General (Family Practice)  Cheikh Ervin MD as Referring Physician (Oncology)  Lemuel Billy, PhD LP as Psychologist (Psychology)  Megan Reese APRN CNP as Nurse Practitioner (Oncology)  Katerina Grajeda, RN as Nurse Coordinator (Oncology)  Farida Bean RN as Nurse Coordinator (Neurosurgery)  Yessenia Ramires MD as MD (INTERNAL MEDICINE - ENDOCRINOLOGY, DIABETES & METABOLISM)  Sybil Dockery APRN CNP as Nurse Practitioner (Nurse Practitioner)  Yessenia Barba, RN as Nurse Coordinator (Neurology)  ILA CORNELL

## 2018-05-11 NOTE — TELEPHONE ENCOUNTER
Invalid phone number for patient. Per provider writer called the above phone number. Patient was a no show for his appointment on 05/11/2018 for a show for a shunt pressure setting. Per provider the patient needs to be seen withing 48 hours. Patient message is to call [phone] and ask for the Neurologist on call to be seen for shunt pressure adjustment on 05/12/2018. Writer reported the above to the provider.

## 2018-05-14 ENCOUNTER — OFFICE VISIT (OUTPATIENT)
Dept: SURGERY | Facility: CLINIC | Age: 28
End: 2018-05-14
Payer: COMMERCIAL

## 2018-05-14 ENCOUNTER — OFFICE VISIT (OUTPATIENT)
Dept: NEUROSURGERY | Facility: CLINIC | Age: 28
End: 2018-05-14

## 2018-05-14 VITALS
BODY MASS INDEX: 28.76 KG/M2 | HEART RATE: 82 BPM | DIASTOLIC BLOOD PRESSURE: 85 MMHG | TEMPERATURE: 98.7 F | OXYGEN SATURATION: 93 % | WEIGHT: 172.6 LBS | HEIGHT: 65 IN | SYSTOLIC BLOOD PRESSURE: 132 MMHG

## 2018-05-14 DIAGNOSIS — Z98.2 S/P VP SHUNT: Primary | ICD-10-CM

## 2018-05-14 DIAGNOSIS — K64.8 INTERNAL HEMORRHOIDS: ICD-10-CM

## 2018-05-14 DIAGNOSIS — K62.5 HEMORRHAGE OF RECTUM AND ANUS: Primary | ICD-10-CM

## 2018-05-14 ASSESSMENT — PAIN SCALES - GENERAL: PAINLEVEL: NO PAIN (0)

## 2018-05-14 NOTE — NURSING NOTE
"Chief Complaint   Patient presents with     Rectal Problem     BRBPR       Vitals:    05/14/18 1111   BP: 132/85   Pulse: 82   Temp: 98.7  F (37.1  C)   TempSrc: Oral   SpO2: 93%   Weight: 172 lb 9.6 oz   Height: 5' 5\"       Body mass index is 28.72 kg/(m^2).      Diane JOHNSTON LPN                 "

## 2018-05-14 NOTE — MR AVS SNAPSHOT
After Visit Summary   5/14/2018    Kari Turcios    MRN: [id number]           Patient Information     Date Of Birth          1990        Visit Information        Provider Department      5/14/2018 11:15 AM Giulia Perry APRN [facility] Colon and Rectal Surgery        Care Instructions    1. Start on a daily fiber supplement such as Citrucel or Metamucil POWDER. Start once a day and can slowly increase up to three times a day if needed.  2. Drink a lot of water  3. Can add in a laxative such as Miralax in addition if needed  4. Return to clinic for any further bleeding after 3-4 weeks          Follow-ups after your visit        Your next 10 appointments already scheduled     Oct 10, 2018 11:30 AM CDT   Lab with  LAB   [facility])    [address] [place] [id number] [phone]            Oct 10, 2018 12:00 PM CDT   CT CHEST/ABDOMEN/PELVIS W CONTRAST with UCCT2   [facility] Imaging [place] [facility] ([facility])    [address] [place] [id number] [phone]           Please bring any scans or X-rays taken at other hospitals, if similar tests were done. Also bring a list of your medicines, including vitamins, minerals and over-the-counter drugs. It is safest to leave personal items at home.  Be sure to tell your doctor:   If you have any allergies.   If there s any chance you are pregnant.   If you are breastfeeding.  How to prepare:   Do not eat or drink for 2 hours before your exam. If you need to take medicine, you may take it with small sips of water. (We may ask you to take liquid medicine as well.)   Please wear loose clothing, such as a sweat suit or jogging clothes. Avoid snaps, zippers and other metal. We may ask you to undress and put on a hospital gown.  Please arrive 30 minutes early for your CT. Once in the department you might be asked to drink water  15-20 minutes prior to your exam.  If indicated you may be asked to drink an oral contrast in advance of your CT.  If this is the case, the imaging team will let you know or be in contact with you prior to your appointment  Patients over 70 or patients with diabetes or kidney problems:   If you haven t had a blood test (creatinine test) within the last 30 days, the Cardiologist/Radiologist may require you to get this test prior to your exam.  If you have diabetes:   Continue to take your metformin medication on the day of your exam  If you have any questions, please call the Imaging Department where you will have your exam.            Oct 10, 2018  3:00 PM CDT   (Arrive by 2:45 PM)   Return Visit with Cheikh Ervin MD   [facility] ([facility])    [address]  [address]  [place] [address] [phone]              Who to contact     Please call your clinic at [phone] to:    Ask questions about your health    Make or cancel appointments    Discuss your medicines    Learn about your test results    Speak to your doctor            Additional Information About Your Visit        [employer] Information     [email or web address] gives you secure access to your electronic health record. If you see a primary care provider, you can also send messages to your care team and make appointments. If you have questions, please call your primary care clinic.  If you do not have a primary care provider, please call [phone] and they will assist you.      [email or web address] is an electronic gateway that provides easy, online access to your medical records. With [email or web address], you can request a clinic appointment, read your test results, renew a prescription or communicate with your care team.     To access your existing account, please contact your [facility] or call [phone] for assistance.        Care EveryWhere ID     This is your Care EveryWhere ID. This  "could be used by other organizations to access your [facility] medical records  [id number]        Your Vitals Were     Pulse Temperature Height Pulse Oximetry BMI (Body Mass Index)       82 98.7  F (37.1  C) (Oral) 5' 5\" 93% 28.72 kg/m2        Blood Pressure from Last 3 Encounters:   05/14/18 132/85   05/10/18 130/85   04/12/18 132/84    Weight from Last 3 Encounters:   05/14/18 172 lb 9.6 oz   05/10/18 170 lb   04/12/18 163 lb 4.8 oz              Today, you had the following     No orders found for display       Primary Care Provider Office Phone # Fax #    Ila Cornell [phone]       [facility] [address] [address]        Equal Access to Services     [facility]: Hadii aad amado hadasho Soomaali, waaxda luqadaha, qaybta kaalmada adeegyada, frank wagner . So [facility] [phone].    ATENCIÓN: Si habla español, tiene a gaffney disposición servicios gratuitos de asistencia lingüística. Ramez al [phone].    We comply with applicable federal civil rights laws and [place] laws. We do not discriminate on the basis of race, color, national origin, age, disability, sex, sexual orientation, or gender identity.            Thank you!     Thank you for choosing [facility] COLON AND RECTAL SURGERY  for your care. Our goal is always to provide you with excellent care. Hearing back from our patients is one way we can continue to improve our services. Please take a few minutes to complete the written survey that you may receive in the mail after your visit with us. Thank you!             Your Updated Medication List - Protect others around you: Learn how to safely use, store and throw away your medicines at www.disposemymeds.org.          This list is accurate as of 5/14/18 11:36 AM.  Always use your most recent med list.                   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)       levETIRAcetam 750 MG tablet    KEPPRA    60 tablet    Take 1,500 mg by mouth every morning Two tablet in am and 2 1/2 tablet in pm    Pineal tumor       LORazepam 0.5 MG tablet    ATIVAN    30 tablet    Take 1 tablet (0.5 mg) by mouth every 4 hours as needed (Anxiety, Nausea/Vomiting or Sleep)    Pineal tumor       melatonin 1 MG Tabs tablet     90 tablet    Take 1-2 tablets (1-2 mg) by mouth nightly as needed for sleep    Pineal tumor       nortriptyline 10 MG capsule    PAMELOR    60 capsule    Take one tab am and one tab at bedtime    Chronic daily headache       tobramycin-dexamethasone ophthalmic ointment    TOBRADEX     Place 1 Application into both eyes 3 times daily Reported on 3/14/2017        VISINE TEARS OP      Apply 1-2 drops to eye 2 times daily as needed (for dry eyes.)        * Notice:  This list has 2 medication(s) that are the same as other medications prescribed for you. Read the directions carefully, and ask your doctor or other care provider to review them with you.

## 2018-05-14 NOTE — LETTER
"5/14/2018       RE: Kari Trucios  [address]  [place] [address]     Dear Colleague,    Thank you for referring your patient, Kari Turcios, to the [facility] NEUROSURGERY at [facility]. Please see a copy of my visit note below.    Strata Valve Programming     Notes reviewed from electronic medical record. Last neurosurgical visit was with Dr. Sharp. Shunt was set to performance level 1.5 at that time.     Patient had a recent MRI this past week.     Shunt was reset to performance Level 1.5. This was verified x 2.     George \"Guillaume\" MD Demetria   Neurosurgery, PGY-1    Please contact neurosurgery resident on call with questions.    Dial * * *[phone], enter [phone] when prompted.          "

## 2018-05-14 NOTE — PROGRESS NOTES
Colon and Rectal Surgery Consult Clinic Note    Date: 2018     Referring provider:  Cheikh Ervin MD  [address]  [place] [address]     RE: Kari Turcios  : 1990  ALIVIA: 2018    Kari Turcios is a very pleasant 28 year old male with a history of CNS mixed germ cell tumor with a recent diagnosis of rectal bleeding.  Given these findings they were subsequently sent to the Colon and Rectal Surgery Clinic for an opinion on this and a new patient consultation.     Abelino has had rectal bleeding for the last few weeks.  He can feel external tissue when he wipes and this is not always comfortable but he denies any pain or tissue that comes out with bowel movements or needs to be manually reduced.  He denies any pain with bowel movements.  The blood is always bright red and is sometimes mixed with his stool, sometimes drips into the toilet, and is sometimes just with wiping.  His bowel movements are typically soft in the morning after having coffee but that he does have intermittent hard stools.  He denies any diarrhea.  He denies any nausea, vomiting, abdominal pain, or unexplained weight loss.  He denies any family history of any inflammatory bowel disease or colon cancer.  He has never had a colonoscopy.    Assessment/Plan: 28 year old male with rectal bleeding.  I think this is likely due to internal hemorrhoids and intermittent constipation.  I recommended starting on a daily fiber supplements and can add in a laxative in addition as needed for constipation.  However, if his bleeding persists in the next 3-4 weeks, I would like him to have a flexible sigmoidoscopy.  We discussed managing hemorrhoids with hemorrhoid banding but he would like to defer at this time and would rather try management of constipation to see if this resolves his symptoms.  Hemoglobin was recently 15.5. Patient's questions were answered to his stated satisfaction and he is in agreement with this  plan.    Medical history:  Past Medical History:   Diagnosis Date     Hypertropia      Myopia      Seizures (H)     2 months ago     Type 2 diabetes mellitus without complications (H)     Borderline       Surgical history:  Past Surgical History:   Procedure Laterality Date     ARTHROTOMY SHOULDER, BANKHART REPAIR, COMBINED  10/29/2012    Procedure: COMBINED ARTHROTOMY SHOULDER, BANKHART REPAIR;  RIGHT OPEN SHOULDER BANKART REPAIR;  Surgeon: Lemuel Ramírez MD;  Location: [facility]     OPTICAL TRACKING SYSTEM BIOPSY BRAIN  3/20/2014    Procedure: OPTICAL TRACKING SYSTEM BIOPSY BRAIN;  Right Frontal Approach For endoscopic Intraventricular Mass Biopsy, 3rd Ventriculostomy, Placement of right ventriculostomy catheter.;  Surgeon: Williams Clement MD;  Location: [facility] OR     OPTICAL TRACKING SYSTEM CRANIOTOMY, EXCISE TUMOR, COMBINED  3/26/2014    Procedure: COMBINED OPTICAL TRACKING SYSTEM CRANIOTOMY, EXCISE TUMOR;  Stealth Assisted Sub-Occiptial Craniotomy, Excise Tumor ;  Surgeon: Ann Sharp MD;  Location: [facility] OR     OPTICAL TRACKING SYSTEM IMPLANT SHUNT VENTRICULOPERITONEAL  4/8/2014    Procedure: OPTICAL TRACKING SYSTEM IMPLANT SHUNT VENTRICULOPERITONEAL;  Right Stealth Guided Ventricularperitoneal Shunt Placement ;  Surgeon: Ann Sharp MD;  Location: [facility] OR     RECESSION RESECTION (REPAIR STRABISMUS) BILATERAL Bilateral 8/4/2016    Procedure: RECESSION RESECTION (REPAIR STRABISMUS) BILATERAL;  Surgeon: Serafin Menendez MD;  Location: [facility] OR     VENTRICULOSTOMY  3/20/2014    Procedure: VENTRICULOSTOMY;;  Surgeon: Williams Clement MD;  Location: [facility] OR       Problem list:    Patient Active Problem List    Diagnosis Date Noted     Bright red blood per rectum 04/12/2018     Priority: Medium     Headache 02/22/2017     Priority: Medium     Diabetes insipidus, neurohypophyseal (H) 06/29/2015     Priority: Medium     Major depressive disorder, single episode, mild (H)  04/06/2015     Priority: Medium     Major depressive disorder, single episode, moderate (H) 02/03/2015     Priority: Medium     Anxiety state 02/03/2015     Priority: Medium     Problem list name updated by automated process. 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 90 tablet 3     desmopressin (DDAVP) 0.2 MG tablet Take 1 tablet (200 mcg) by mouth 2 times daily 180 tablet 3     Glycerin-Hypromellose- (VISINE TEARS OP) Apply 1-2 drops to eye 2 times daily as needed (for dry eyes.)       levETIRAcetam (KEPPRA) 750 MG tablet Take 1,500 mg by mouth every morning Two tablet in am and 2 1/2 tablet in pm 60 tablet 0     LORazepam (ATIVAN) 0.5 MG tablet Take 1 tablet (0.5 mg) by mouth every 4 hours as needed (Anxiety, Nausea/Vomiting or Sleep) 30 tablet 3     melatonin 1 MG TABS Take 1-2 tablets (1-2 mg) by mouth nightly as needed for sleep 90 tablet 0     nortriptyline (PAMELOR) 10 MG capsule Take one tab am and one tab at bedtime 60 capsule 6     tobramycin-dexamethasone (TOBRADEX) ophthalmic ointment Place 1 Application into both eyes 3 times daily Reported on 3/14/2017         Allergies:  Allergies   Allergen Reactions     Penicillins Hives       Family  "history:  Family History   Problem Relation Age of Onset     CANCER Maternal Grandmother      vaginal     CANCER Mother      breast     CANCER Maternal Grandfather      lung cancer     Glaucoma No family hx of      Macular Degeneration No family hx of        Social history:  Social History   Substance Use Topics     Smoking status: Never Smoker     Smokeless tobacco: Never Used     Alcohol use 1.2 - 1.8 oz/week     2 - 3 Standard drinks or equivalent per week      Comment: OCASIONALLY    Marital status: single.  Occupation: .    Nursing Notes:   Diane Gutierrez LPN  5/14/2018 11:13 AM  Signed  Chief Complaint   Patient presents with     Rectal Problem     BRBPR       Vitals:    05/14/18 1111   BP: 132/85   Pulse: 82   Temp: 98.7  F (37.1  C)   TempSrc: Oral   SpO2: 93%   Weight: 172 lb 9.6 oz   Height: 5' 5\"       Body mass index is 28.72 kg/(m^2).      Diane JOHNSTON LPN                    Physical Examination: Exam was chaperoned by Diane Gutierrez LPN  /85  Pulse 82  Temp 98.7  F (37.1  C) (Oral)  Ht 5' 5\"  Wt 172 lb 9.6 oz  SpO2 93%  BMI 28.72 kg/m2  General: alert, oriented, in no acute distress, sitting comfortably  HEENT: mucous membranes moist  Perianal external examination:  Perianal skin: Intact with no excoriation or lichenification.  Lesions: No evidence of an external lesion, nodularity, or induration in the perianal region.  Eversion of buttocks: There was not evidence of an anal fissure. Details: N/A.  Skin tags or external hemorrhoids: Yes: small skin tag in the posterior midline.  Digital rectal examination: Was performed.   Sphincter tone: Good.  Palpable lesions: No.  Prostate: Normal.  Other: None.    Anoscopy: Was performed.   Hemorrhoids: Yes. Grade 2 internal hemorrhoids without active bleeding  Lesions: No    Total face to face time was 30 minutes, >50% counseling.    CASPER Fay, NP-C  Colon and Rectal Surgery   [facility]    This note was created using speech recognition software and may contain unintended word substitutions.

## 2018-05-14 NOTE — LETTER
2018      RE: Kari Turcios  [id number]  [facility] [address]       Colon and Rectal Surgery Consult Clinic Note    Date: 2018     Referring provider:  Cheikh Ervin MD  [address]  [place] [address]     RE: Kari Turcios  : 1990  ALIVIA: 2018    Kari Turcios is a very pleasant 28 year old male with a history of CNS mixed germ cell tumor with a recent diagnosis of rectal bleeding.  Given these findings they were subsequently sent to the [facility] for an opinion on this and a new patient consultation.     Abelino has had rectal bleeding for the last few weeks.  He can feel external tissue when he wipes and this is not always comfortable but he denies any pain or tissue that comes out with bowel movements or needs to be manually reduced.  He denies any pain with bowel movements.  The blood is always bright red and is sometimes mixed with his stool, sometimes drips into the toilet, and is sometimes just with wiping.  His bowel movements are typically soft in the morning after having coffee but that he does have intermittent hard stools.  He denies any diarrhea.  He denies any nausea, vomiting, abdominal pain, or unexplained weight loss.  He denies any family history of any inflammatory bowel disease or colon cancer.  He has never had a colonoscopy.    Assessment/Plan: 28 year old male with rectal bleeding.  I think this is likely due to internal hemorrhoids and intermittent constipation.  I recommended starting on a daily fiber supplements and can add in a laxative in addition as needed for constipation.  However, if his bleeding persists in the next 3-4 weeks, I would like him to have a flexible sigmoidoscopy.  We discussed managing hemorrhoids with hemorrhoid banding but he would like to defer at this time and would rather try management of constipation to see if this resolves his symptoms.  Hemoglobin was recently 15.5. Patient's  questions were answered to his stated satisfaction and he is in agreement with this plan.    Medical history:  Past Medical History:   Diagnosis Date     Hypertropia      Myopia      Seizures (H)     2 months ago     Type 2 diabetes mellitus without complications (H)     Borderline       Surgical history:  Past Surgical History:   Procedure Laterality Date     ARTHROTOMY SHOULDER, BANKHART REPAIR, COMBINED  10/29/2012    Procedure: COMBINED ARTHROTOMY SHOULDER, BANKHART REPAIR;  RIGHT OPEN SHOULDER BANKART REPAIR;  Surgeon: Lemuel Ramírez MD;  Location: [facility]     OPTICAL TRACKING SYSTEM BIOPSY BRAIN  3/20/2014    Procedure: OPTICAL TRACKING SYSTEM BIOPSY BRAIN;  Right Frontal Approach For endoscopic Intraventricular Mass Biopsy, 3rd Ventriculostomy, Placement of right ventriculostomy catheter.;  Surgeon: Williams Clement MD;  Location: [facility] OR     OPTICAL TRACKING SYSTEM CRANIOTOMY, EXCISE TUMOR, COMBINED  3/26/2014    Procedure: COMBINED OPTICAL TRACKING SYSTEM CRANIOTOMY, EXCISE TUMOR;  Stealth Assisted Sub-Occiptial Craniotomy, Excise Tumor ;  Surgeon: Ann Sharp MD;  Location: [facility] OR     OPTICAL TRACKING SYSTEM IMPLANT SHUNT VENTRICULOPERITONEAL  4/8/2014    Procedure: OPTICAL TRACKING SYSTEM IMPLANT SHUNT VENTRICULOPERITONEAL;  Right Stealth Guided Ventricularperitoneal Shunt Placement ;  Surgeon: Ann Sharp MD;  Location: [facility] OR     RECESSION RESECTION (REPAIR STRABISMUS) BILATERAL Bilateral 8/4/2016    Procedure: RECESSION RESECTION (REPAIR STRABISMUS) BILATERAL;  Surgeon: Serafin Menendez MD;  Location: [facility] OR     VENTRICULOSTOMY  3/20/2014    Procedure: VENTRICULOSTOMY;;  Surgeon: Williams Clement MD;  Location: [facility] OR       Problem list:    Patient Active Problem List    Diagnosis Date Noted     Bright red blood per rectum 04/12/2018     Priority: Medium     Headache 02/22/2017     Priority: Medium     Diabetes insipidus, neurohypophyseal (H) 06/29/2015      Priority: Medium     Major depressive disorder, single episode, mild (H) 04/06/2015     Priority: Medium     Major depressive disorder, single episode, moderate (H) 02/03/2015     Priority: Medium     Anxiety state 02/03/2015     Priority: Medium     Problem list name updated by automated process. 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 90 tablet 3     desmopressin (DDAVP) 0.2 MG tablet Take 1 tablet (200 mcg) by mouth 2 times daily 180 tablet 3     Glycerin-Hypromellose- (VISINE TEARS OP) Apply 1-2 drops to eye 2 times daily as needed (for dry eyes.)       levETIRAcetam (KEPPRA) 750 MG tablet Take 1,500 mg by mouth every morning Two tablet in am and 2 1/2 tablet in pm 60 tablet 0     LORazepam (ATIVAN) 0.5 MG tablet Take 1 tablet (0.5 mg) by mouth every 4 hours as needed (Anxiety, Nausea/Vomiting or Sleep) 30 tablet 3     melatonin 1 MG TABS Take 1-2 tablets (1-2 mg) by mouth nightly as needed for sleep 90 tablet 0     nortriptyline (PAMELOR) 10 MG capsule Take one tab am and one tab at bedtime 60 capsule 6     tobramycin-dexamethasone (TOBRADEX) ophthalmic ointment Place 1 Application into both eyes 3 times daily Reported on 3/14/2017    "      Allergies:  Allergies   Allergen Reactions     Penicillins Hives       Family history:  Family History   Problem Relation Age of Onset     CANCER Maternal Grandmother      vaginal     CANCER Mother      breast     CANCER Maternal Grandfather      lung cancer     Glaucoma No family hx of      Macular Degeneration No family hx of        Social history:  Social History   Substance Use Topics     Smoking status: Never Smoker     Smokeless tobacco: Never Used     Alcohol use 1.2 - 1.8 oz/week     2 - 3 Standard drinks or equivalent per week      Comment: OCASIONALLY    Marital status: single.  Occupation: .    Nursing Notes:   Diane Gutierrez LPN  5/14/2018 11:13 AM  Signed  Chief Complaint   Patient presents with     Rectal Problem     BRBPR       Vitals:    05/14/18 1111   BP: 132/85   Pulse: 82   Temp: 98.7  F (37.1  C)   TempSrc: Oral   SpO2: 93%   Weight: 172 lb 9.6 oz   Height: 5' 5\"       Body mass index is 28.72 kg/(m^2).      Diane JOHNSTON LPN                    Physical Examination: Exam was chaperoned by Diane Gutierrez LPN  /85  Pulse 82  Temp 98.7  F (37.1  C) (Oral)  Ht 5' 5\"  Wt 172 lb 9.6 oz  SpO2 93%  BMI 28.72 kg/m2  General: alert, oriented, in no acute distress, sitting comfortably  HEENT: mucous membranes moist  Perianal external examination:  Perianal skin: Intact with no excoriation or lichenification.  Lesions: No evidence of an external lesion, nodularity, or induration in the perianal region.  Eversion of buttocks: There was not evidence of an anal fissure. Details: N/A.  Skin tags or external hemorrhoids: Yes: small skin tag in the posterior midline.  Digital rectal examination: Was performed.   Sphincter tone: Good.  Palpable lesions: No.  Prostate: Normal.  Other: None.    Anoscopy: Was performed.   Hemorrhoids: Yes. Grade 2 internal hemorrhoids without active bleeding  Lesions: No    Total face to face time was 30 minutes, >50% counseling.    Giulia mon" CASPER Castro, NP-C  Colon and Rectal Surgery   [facility]    This note was created using speech recognition software and may contain unintended word substitutions.      CASPER Small CNP

## 2018-05-14 NOTE — PROGRESS NOTES
"[employer] Programming     Notes reviewed from electronic medical record. Last neurosurgical visit was with Dr. Sharp. Shunt was set to performance level 1.5 at that time.     Patient had a recent MRI this past week.     Shunt was reset to performance Level 1.5. This was verified x 2.     George \"Guillaume\" MD [place]   Neurosurgery, PGY-1    Please contact neurosurgery resident on call with questions.    Dial * * [phone], enter [phone] when prompted.     "

## 2018-05-14 NOTE — PATIENT INSTRUCTIONS
1. Start on a daily fiber supplement such as Citrucel or Metamucil POWDER. Start once a day and can slowly increase up to three times a day if needed.  2. Drink a lot of water  3. Can add in a laxative such as Miralax in addition if needed  4. Return to clinic for any further bleeding after 3-4 weeks

## 2018-07-18 ENCOUNTER — TELEPHONE (OUTPATIENT)
Dept: NEUROSURGERY | Facility: CLINIC | Age: 28
End: 2018-07-18

## 2018-07-18 NOTE — TELEPHONE ENCOUNTER
"Patient calling with symptoms of urinary frequency and slight burning on urination the past 24 hours..  Pt states awoke several times last night with need to urinate.  Pt asking if it is a \"shunt issue\", no not likely.  Please see PCP for urination issues.    Voices understanding.  "

## 2018-07-19 DIAGNOSIS — R51.9 CHRONIC DAILY HEADACHE: ICD-10-CM

## 2018-07-19 RX ORDER — NORTRIPTYLINE HCL 10 MG
CAPSULE ORAL
Qty: 30 CAPSULE | Refills: 0 | Status: SHIPPED | OUTPATIENT
Start: 2018-07-19 | End: 2018-08-01

## 2018-08-01 DIAGNOSIS — R51.9 CHRONIC DAILY HEADACHE: ICD-10-CM

## 2018-08-06 RX ORDER — NORTRIPTYLINE HCL 10 MG
CAPSULE ORAL
Qty: 30 CAPSULE | Refills: 3 | Status: SHIPPED | OUTPATIENT
Start: 2018-08-06 | End: 2018-10-02

## 2018-08-13 ENCOUNTER — OFFICE VISIT (OUTPATIENT)
Dept: NEUROLOGY | Facility: CLINIC | Age: 28
End: 2018-08-13
Payer: COMMERCIAL

## 2018-08-13 VITALS
SYSTOLIC BLOOD PRESSURE: 132 MMHG | HEART RATE: 96 BPM | BODY MASS INDEX: 30.34 KG/M2 | RESPIRATION RATE: 20 BRPM | DIASTOLIC BLOOD PRESSURE: 89 MMHG | HEIGHT: 65 IN | WEIGHT: 182.1 LBS | OXYGEN SATURATION: 96 %

## 2018-08-13 DIAGNOSIS — G43.719 INTRACTABLE CHRONIC MIGRAINE WITHOUT AURA AND WITHOUT STATUS MIGRAINOSUS: Primary | ICD-10-CM

## 2018-08-13 RX ORDER — TOPIRAMATE 25 MG/1
TABLET, FILM COATED ORAL
Qty: 90 TABLET | Refills: 3 | Status: SHIPPED | OUTPATIENT
Start: 2018-08-13 | End: 2018-12-05

## 2018-08-13 ASSESSMENT — PAIN SCALES - GENERAL: PAINLEVEL: NO PAIN (0)

## 2018-08-13 NOTE — PATIENT INSTRUCTIONS
Plan:  Continue nortriptyline 20 mg at bedtime  A trial of topiramate 25 mg at bedtime for one week, then 25 mg am and 25 mg at bedtime or may take 50 mg at bedtime if tolerated, then 25 mg am and 50 mg at bedtime. May cause drowsiness, dizziness, paresthesia, kidney stones, glaucoma development, cognitive effect, weight loss due to decreased appetite, see after visit summary for more details about side effects. Stay hydrated. Call with any memory problems.   Acetaminophen as needed but may consider rizatriptan as needed if acetaminophen were not effective but would need to Ok it. Limit use of all of your analgesics to no more than 2 days per week  Follow up in 8 weeks or sooner if needed. Call our Clinic or MyChart with updates or concerns about your medications for headache prevention  Botox for headache prevention can be tried.               Patient Education    Topiramate Oral capsule, extended-release    Topiramate Oral capsule, sprinkles    Topiramate Oral tablet  Topiramate Oral tablet  What is this medicine?  TOPIRAMATE (toe PYRE a mate) is used to treat seizures in adults or children with epilepsy. It is also used for the prevention of migraine headaches.  This medicine may be used for other purposes; ask your health care provider or pharmacist if you have questions.  What should I tell my health care provider before I take this medicine?  They need to know if you have any of these conditions:    bleeding disorders    cirrhosis of the liver or liver disease    diarrhea    glaucoma    kidney stones or kidney disease    low blood counts, like low white cell, platelet, or red cell counts    lung disease like asthma, obstructive pulmonary disease, emphysema    metabolic acidosis    on a ketogenic diet    schedule for surgery or a procedure    suicidal thoughts, plans, or attempt; a previous suicide attempt by you or a family member    an unusual or allergic reaction to topiramate, other medicines, foods, dyes,  or preservatives    pregnant or trying to get pregnant    breast-feeding  How should I use this medicine?  Take this medicine by mouth with a glass of water. Follow the directions on the prescription label. Do not crush or chew. You may take this medicine with meals. Take your medicine at regular intervals. Do not take it more often than directed.  Talk to your pediatrician regarding the use of this medicine in children. Special care may be needed. While this drug may be prescribed for children as young as 2 years of age for selected conditions, precautions do apply.  Overdosage: If you think you have taken too much of this medicine contact a poison control center or emergency room at once.  NOTE: This medicine is only for you. Do not share this medicine with others.  What if I miss a dose?  If you miss a dose, take it as soon as you can. If your next dose is to be taken in less than 6 hours, then do not take the missed dose. Take the next dose at your regular time. Do not take double or extra doses.  What may interact with this medicine?  Do not take this medicine with any of the following medications:    probenecid  This medicine may also interact with the following medications:    acetazolamide    alcohol    amitriptyline    aspirin and aspirin-like medicines    birth control pills    certain medicines for depression    certain medicines for seizures    certain medicines that treat or prevent blood clots like warfarin, enoxaparin, dalteparin, apixaban, dabigatran, and rivaroxaban    digoxin    hydrochlorothiazide    lithium    medicines for pain, sleep, or muscle relaxation    metformin    methazolamide    NSAIDS, medicines for pain and inflammation, like ibuprofen or naproxen    pioglitazone    risperidone  This list may not describe all possible interactions. Give your health care provider a list of all the medicines, herbs, non-prescription drugs, or dietary supplements you use. Also tell them if you smoke,  drink alcohol, or use illegal drugs. Some items may interact with your medicine.  What should I watch for while using this medicine?  Visit your doctor or health care professional for regular checks on your progress. Do not stop taking this medicine suddenly. This increases the risk of seizures if you are using this medicine to control epilepsy. Wear a medical identification bracelet or chain to say you have epilepsy or seizures, and carry a card that lists all your medicines.  This medicine can decrease sweating and increase your body temperature. Watch for signs of  sweating or fever, especially in children. Avoid extreme heat, hot baths, and saunas. Be careful about exercising, especially in hot weather. Contact your health care provider right away if you notice a fever or decrease in sweating.  You should drink plenty of fluids while taking this medicine. If you have had kidney stones in the past, this will help to reduce your chances of forming kidney stones.  If you have stomach pain, with nausea or vomiting and yellowing of your eyes or skin, call your doctor immediately.  You may get drowsy, dizzy, or have blurred vision. Do not drive, use machinery, or do anything that needs mental alertness until you know how this medicine affects you. To reduce dizziness, do not sit or stand up quickly, especially if you are an older patient. Alcohol can increase drowsiness and dizziness. Avoid alcoholic drinks.  If you notice blurred vision, eye pain, or other eye problems, seek medical attention at once for an eye exam.  The use of this medicine may increase the chance of suicidal thoughts or actions. Pay special attention to how you are responding while on this medicine. Any worsening of mood, or thoughts of suicide or dying should be reported to your health care professional right away.  This medicine may increase the chance of developing metabolic acidosis. If left untreated, this can cause kidney stones,  bone disease, or slowed growth in children. Symptoms include breathing fast, fatigue, loss of appetite, irregular heartbeat, or loss of consciousness. Call your doctor immediately if you experience any of these side effects. Also, tell your doctor about any surgery you plan on having while taking this medicine since this may increase your risk for metabolic acidosis.  Birth control pills may not work properly while you are taking this medicine. Talk to your doctor about using an extra method of birth control.  Women who become pregnant while using this medicine may enroll in the [place] Antiepileptic Drug Pregnancy Registry by calling 1-738.572.7270. This registry collects information about the safety of antiepileptic drug use during pregnancy.  What side effects may I notice from receiving this medicine?  Side effects that you should report to your doctor or health care professional as soon as possible:    allergic reactions like skin rash, itching or hives, swelling of the face, lips, or tongue    decreased sweating and/or rise in body temperature    depression    difficulty breathing, fast or irregular breathing patterns    difficulty speaking    difficulty walking or controlling muscle movements    hearing impairment    redness, blistering, peeling or loosening of the skin, including inside the mouth    tingling, pain or numbness in the hands or feet    unusual bleeding or bruising    unusually weak or tired    worsening of mood, thoughts or actions of suicide or dying  Side effects that usually do not require medical attention (report to your doctor or health care professional if they continue or are bothersome):    altered taste    back pain, joint or muscle aches and pains    diarrhea, or constipation    headache    loss of appetite    nausea    stomach upset, indigestion    tremors  This list may not describe all possible side effects. Call your doctor for medical advice about side effects. You may  report side effects to FDA at 7-402-FDA-5019.  Where should I keep my medicine?  Keep out of the reach of children.  Store at room temperature between 15 and 30 degrees C (59 and 86 degrees F) in a tightly closed container. Protect from moisture. Throw away any unused medicine after the expiration date.  NOTE:This sheet is a summary. It may not cover all possible information. If you have questions about this medicine, talk to your doctor, pharmacist, or health care provider. Copyright  2016 [employer]

## 2018-08-13 NOTE — MR AVS SNAPSHOT
After Visit Summary   8/13/2018    Kari Turcios    MRN: [id number]           Patient Information     Date Of Birth          1990        Visit Information        Provider Department      8/13/2018 12:30 PM Sybil Dockery APRN CNP [facility]        Today's Diagnoses     Intractable chronic migraine without aura and without status migrainosus    -  1      Care Instructions    Plan:  Continue nortriptyline 20 mg at bedtime  A trial of topiramate 25 mg at bedtime for one week, then 25 mg am and 25 mg at bedtime or may take 50 mg at bedtime if tolerated, then 25 mg am and 50 mg at bedtime. May cause drowsiness, dizziness, paresthesia, kidney stones, glaucoma development, cognitive effect, weight loss due to decreased appetite, see after visit summary for more details about side effects. Stay hydrated. Call with any memory problems.   Acetaminophen as needed but may consider rizatriptan as needed if acetaminophen were not effective but would need to Ok it. Limit use of all of your analgesics to no more than 2 days per week  Follow up in 8 weeks or sooner if needed. Call our Clinic or MyChart with updates or concerns about your medications for headache prevention  Botox for headache prevention can be tried.               Patient Education    Topiramate Oral capsule, extended-release    Topiramate Oral capsule, sprinkles    Topiramate Oral tablet  Topiramate Oral tablet  What is this medicine?  TOPIRAMATE (toe PYRE a mate) is used to treat seizures in adults or children with epilepsy. It is also used for the prevention of migraine headaches.  This medicine may be used for other purposes; ask your health care provider or pharmacist if you have questions.  What should I tell my health care provider before I take this medicine?  They need to know if you have any of these conditions:    bleeding disorders    cirrhosis of the liver or liver disease    diarrhea    glaucoma    kidney  stones or kidney disease    low blood counts, like low white cell, platelet, or red cell counts    lung disease like asthma, obstructive pulmonary disease, emphysema    metabolic acidosis    on a ketogenic diet    schedule for surgery or a procedure    suicidal thoughts, plans, or attempt; a previous suicide attempt by you or a family member    an unusual or allergic reaction to topiramate, other medicines, foods, dyes, or preservatives    pregnant or trying to get pregnant    breast-feeding  How should I use this medicine?  Take this medicine by mouth with a glass of water. Follow the directions on the prescription label. Do not crush or chew. You may take this medicine with meals. Take your medicine at regular intervals. Do not take it more often than directed.  Talk to your pediatrician regarding the use of this medicine in children. Special care may be needed. While this drug may be prescribed for children as young as 2 years of age for selected conditions, precautions do apply.  Overdosage: If you think you have taken too much of this medicine contact a poison control center or emergency room at once.  NOTE: This medicine is only for you. Do not share this medicine with others.  What if I miss a dose?  If you miss a dose, take it as soon as you can. If your next dose is to be taken in less than 6 hours, then do not take the missed dose. Take the next dose at your regular time. Do not take double or extra doses.  What may interact with this medicine?  Do not take this medicine with any of the following medications:    probenecid  This medicine may also interact with the following medications:    acetazolamide    alcohol    amitriptyline    aspirin and aspirin-like medicines    birth control pills    certain medicines for depression    certain medicines for seizures    certain medicines that treat or prevent blood clots like warfarin, enoxaparin, dalteparin, apixaban, dabigatran, and  rivaroxaban    digoxin    hydrochlorothiazide    lithium    medicines for pain, sleep, or muscle relaxation    metformin    methazolamide    NSAIDS, medicines for pain and inflammation, like ibuprofen or naproxen    pioglitazone    risperidone  This list may not describe all possible interactions. Give your health care provider a list of all the medicines, herbs, non-prescription drugs, or dietary supplements you use. Also tell them if you smoke, drink alcohol, or use illegal drugs. Some items may interact with your medicine.  What should I watch for while using this medicine?  Visit your doctor or health care professional for regular checks on your progress. Do not stop taking this medicine suddenly. This increases the risk of seizures if you are using this medicine to control epilepsy. Wear a medical identification bracelet or chain to say you have epilepsy or seizures, and carry a card that lists all your medicines.  This medicine can decrease sweating and increase your body temperature. Watch for signs of  sweating or fever, especially in children. Avoid extreme heat, hot baths, and saunas. Be careful about exercising, especially in hot weather. Contact your health care provider right away if you notice a fever or decrease in sweating.  You should drink plenty of fluids while taking this medicine. If you have had kidney stones in the past, this will help to reduce your chances of forming kidney stones.  If you have stomach pain, with nausea or vomiting and yellowing of your eyes or skin, call your doctor immediately.  You may get drowsy, dizzy, or have blurred vision. Do not drive, use machinery, or do anything that needs mental alertness until you know how this medicine affects you. To reduce dizziness, do not sit or stand up quickly, especially if you are an older patient. Alcohol can increase drowsiness and dizziness. Avoid alcoholic drinks.  If you notice blurred vision, eye pain, or other eye  problems, seek medical attention at once for an eye exam.  The use of this medicine may increase the chance of suicidal thoughts or actions. Pay special attention to how you are responding while on this medicine. Any worsening of mood, or thoughts of suicide or dying should be reported to your health care professional right away.  This medicine may increase the chance of developing metabolic acidosis. If left untreated, this can cause kidney stones, bone disease, or slowed growth in children. Symptoms include breathing fast, fatigue, loss of appetite, irregular heartbeat, or loss of consciousness. Call your doctor immediately if you experience any of these side effects. Also, tell your doctor about any surgery you plan on having while taking this medicine since this may increase your risk for metabolic acidosis.  Birth control pills may not work properly while you are taking this medicine. Talk to your doctor about using an extra method of birth control.  Women who become pregnant while using this medicine may enroll in the [place] Antiepileptic Drug Pregnancy Registry by calling 1-372.180.9318. This registry collects information about the safety of antiepileptic drug use during pregnancy.  What side effects may I notice from receiving this medicine?  Side effects that you should report to your doctor or health care professional as soon as possible:    allergic reactions like skin rash, itching or hives, swelling of the face, lips, or tongue    decreased sweating and/or rise in body temperature    depression    difficulty breathing, fast or irregular breathing patterns    difficulty speaking    difficulty walking or controlling muscle movements    hearing impairment    redness, blistering, peeling or loosening of the skin, including inside the mouth    tingling, pain or numbness in the hands or feet    unusual bleeding or bruising    unusually weak or tired    worsening of mood, thoughts or actions of suicide  or dying  Side effects that usually do not require medical attention (report to your doctor or health care professional if they continue or are bothersome):    altered taste    back pain, joint or muscle aches and pains    diarrhea, or constipation    headache    loss of appetite    nausea    stomach upset, indigestion    tremors  This list may not describe all possible side effects. Call your doctor for medical advice about side effects. You may report side effects to FDA at 4-303-VHJ-9128.  Where should I keep my medicine?  Keep out of the reach of children.  Store at room temperature between 15 and 30 degrees C (59 and 86 degrees F) in a tightly closed container. Protect from moisture. Throw away any unused medicine after the expiration date.  NOTE:This sheet is a summary. It may not cover all possible information. If you have questions about this medicine, talk to your doctor, pharmacist, or health care provider. Copyright  2016 [employer]                Follow-ups after your visit        Follow-up notes from your care team     Return in about 8 weeks (around 10/8/2018).      Your next 10 appointments already scheduled     Oct 08, 2018 12:30 PM CDT   (Arrive by 12:15 PM)   Return Visit with CASPER Jones [facility] ([facility])    [address] [place] [address] [id number] [phone]            Oct 10, 2018 11:30 AM CDT   Lab with  LAB   [facility] ([facility])    [address] [place] [address] [id number] [phone]            Oct 10, 2018 12:00 PM CDT   CT CHEST/ABDOMEN/PELVIS W CONTRAST with UCCT2   [facility] ([facility])    [address] [place] [address] [id number] [phone]           Please bring any scans or X-rays taken at other hospitals, if similar tests were done. Also bring a list of your medicines,  including vitamins, minerals and over-the-counter drugs. It is safest to leave personal items at home.  Be sure to tell your doctor:   If you have any allergies.   If there s any chance you are pregnant.   If you are breastfeeding.  How to prepare:   Do not eat or drink for 2 hours before your exam. If you need to take medicine, you may take it with small sips of water. (We may ask you to take liquid medicine as well.)   Please wear loose clothing, such as a sweat suit or jogging clothes. Avoid snaps, zippers and other metal. We may ask you to undress and put on a hospital gown.  Please arrive 30 minutes early for your CT. Once in the department you might be asked to drink water 15-20 minutes prior to your exam.  If indicated you may be asked to drink an oral contrast in advance of your CT.  If this is the case, the imaging team will let you know or be in contact with you prior to your appointment  Patients over 70 or patients with diabetes or kidney problems:   If you haven t had a blood test (creatinine test) within the last 30 days, the Cardiologist/Radiologist may require you to get this test prior to your exam.  If you have diabetes:   Continue to take your metformin medication on the day of your exam  If you have any questions, please call the Imaging Department where you will have your exam.            Oct 10, 2018  3:00 PM CDT   (Arrive by 2:45 PM)   Return Visit with Cheikh Ervin MD   [facility] ([facility])    [address] [place] [address]  [address]  [facility] [id number] [phone]              Who to contact     Please call your clinic at [phone] to:    Ask questions about your health    Make or cancel appointments    Discuss your medicines    Learn about your test results    Speak to your doctor            Additional Information About Your Visit        [employer] Information     [employer] gives you secure access to your electronic health record. If you  "see a primary care provider, you can also send messages to your care team and make appointments. If you have questions, please call your primary care clinic.  If you do not have a primary care provider, please call [phone] and they will assist you.      [employer] is an electronic gateway that provides easy, online access to your medical records. With [employer], you can request a clinic appointment, read your test results, renew a prescription or communicate with your care team.     To access your existing account, please contact your [facility] or call [phone] for assistance.        Care EveryWhere ID     This is your Care EveryWhere ID. This could be used by other organizations to access your [facility] medical records  [id number]        Your Vitals Were     Pulse Respirations Height Pulse Oximetry BMI (Body Mass Index)       96 20 1.651 m (5' 5\") 96% 30.3 kg/m2        Blood Pressure from Last 3 Encounters:   08/13/18 132/89   05/14/18 132/85   05/10/18 130/85    Weight from Last 3 Encounters:   08/13/18 82.6 kg (182 lb 1.6 oz)   05/14/18 78.3 kg (172 lb 9.6 oz)   05/10/18 77.1 kg (170 lb)              Today, you had the following     No orders found for display         Today's Medication Changes          These changes are accurate as of 8/13/18  1:39 PM.  If you have any questions, ask your nurse or doctor.               Start taking these medicines.        Dose/Directions    topiramate 25 MG tablet   Commonly known as:  TOPAMAX   Used for:  Intractable chronic migraine without aura and without status migrainosus   Started by:  Sybil Dockery APRN CNP        Take 25 mg at PM for one week, then 25 mg AM and 25 mg PM or may take 50 mg PM if tolerated, then 25 mg AM and 50 mg PM.   Quantity:  90 tablet   Refills:  3            Where to get your medicines      These medications were sent to [facility]pharmacy #[id number] [facility] [address] [place], [facility] [address]     Phone:  [phone]     topiramate 25 MG tablet                Primary Care Provider Office Phone # Fax #    Ila Cornell [phone]       [address] [place] [address] [facility] [address]        Equal Access to Services     NOE COLLAZO : Hadii aad ku hadasho Soomaali, waaxda luqadaha, qaybta kaalmada adeegyada, waxay idiin hayaan adeeg khmiguelyomi laroxin . So [facility] [phone].    ATENCIÓN: Si habla español, tiene a gaffney disposición servicios gratuitos de asistencia lingüística. [facility] [phone].    We comply with applicable federal civil rights laws and [place] laws. We do not discriminate on the basis of race, color, national origin, age, disability, sex, sexual orientation, or gender identity.            Thank you!     Thank you for choosing [facility]  for your care. Our goal is always to provide you with excellent care. Hearing back from our patients is one way we can continue to improve our services. Please take a few minutes to complete the written survey that you may receive in the mail after your visit with us. Thank you!             Your Updated Medication List - Protect others around you: Learn how to safely use, store and throw away your medicines at www.disposemymeds.org.          This list is accurate as of 8/13/18  1:39 PM.  Always use your most recent med list.                   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)       levETIRAcetam 750 MG tablet    KEPPRA    60 tablet    Take 1,500 mg by mouth every morning Two  tablet in am and 2 1/2 tablet in pm    Pineal tumor       LORazepam 0.5 MG tablet    ATIVAN    30 tablet    Take 1 tablet (0.5 mg) by mouth every 4 hours as needed (Anxiety, Nausea/Vomiting or Sleep)    Pineal tumor       melatonin 1 MG Tabs tablet     90 tablet    Take 1-2 tablets (1-2 mg) by mouth nightly as needed for sleep    Pineal tumor       nortriptyline 10 MG capsule    PAMELOR    30 capsule    TAKE ONE TAB BY MOUTH EVERY MORNING AND ONE TAB AT BEDTIME    Chronic daily headache       topiramate 25 MG tablet    TOPAMAX    90 tablet    Take 25 mg at PM for one week, then 25 mg AM and 25 mg PM or may take 50 mg PM if tolerated, then 25 mg AM and 50 mg PM.    Intractable chronic migraine without aura and without status migrainosus       VISINE TEARS OP      Apply 1-2 drops to eye 2 times daily as needed (for dry eyes.)        * Notice:  This list has 2 medication(s) that are the same as other medications prescribed for you. Read the directions carefully, and ask your doctor or other care provider to review them with you.

## 2018-08-13 NOTE — PROGRESS NOTES
"Re: Kari Turcios      MRN# [id number]  YOB: 1990  Date of Visit:8/13/2018     OUTPATIENT NEUROLOGY VISIT NOTE    Reason for Visit:  Headache follow up    Interval History:  Kari Turcios is a 28-year-old male presents to the clinic today for headache follow up.   Last Neurology visit for headache management 04/28/2017, see clinic visit note for details. 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   Oncology visit  on May 10th, 2018 and no symptoms to suggest disease recurrence (history of primary CNS germ cell tumor, s/p treatment in 2014, s/p 6 cycles of chemotherapy) per my review of patient's  Oncology clinic visit note.  Per patient's oncologist Dr Urias's note from 5/10/2018, \"Abelino's MRI today shows no evidence of recurrence and no significant change in the last 5 months. His ventricular shunt appears to be in place and functioning well. Abelino has no new neurologic complaints. He continues to follow with neurology (CASPER Oviedo CNP), ophthalmology (Dr. Menendez) and endocrinology (Dr. Andrew) regularly. He is on ddAVP, Keppra and nortiptyline.  Patient was last seen for headache in our Clinic on 11/22/2017 and was recommended to continue treatment with nortriptyline.   Patient has been taking nortriptyline 10 mg am and pm since November of 2017. Reports that nortriptyline helps but makes him drowsy and he has to take 2 cups of coffee in am to get him more active. Reports that headaches are more intense at work where he has to be exposed to many computer screens as a . Patient reports that he enjoys doing his job but " "\"would like to have his life back\" due to persistent pain. No ED visits due to headaches.   Patient reports that he \"does not remember what is life before starting nortriptyline\"  Headache is in the temples and throbbing at times. Associated with light sensitivity. It feels better in the dark. Headache frequency is daily but intensity varies-about 3-4 times per week and duration about 4 hours. Pain intensity   Patient reports short term memory so topiramate is least preferable but he has been making an effort to keep things to remeber. Patient is currently on Keppra for sometime for seizure prevention.levETIRAcetam (KEPPRA) 750 mg tablet 1500 mg in the morning and 1875 mg at bedtime 0  Patient tried depakote for seizures and does not remember the effect  Denies history of renal stones.   Patient reports that he has been exercising daily so beta blockers are not preferable due to possible reducing exercise tolerance. Patient reports that he exercises 5 days per week and going to the Gym is most relaxing for him.   Patient reports that he takes acetaminophen at lunch about 3 days per week and helps with the headache. Reports that it takes 15-30 minutes and helps with the headache.       Discussed other headache treatment options. Spoke to one of our pharmacist from [facility]  and nortriptyline should be Ok in the small dose with history of seizures-should not lower seizure threshold    Plan:  Continue nortriptyline 20 mg at bedtime  A trial of topiramate 25 mg at bedtime for one week, then 25 mg am and 25 mg at bedtime or may take 50 mg at bedtime if tolerated, then 25 mg am and 50 mg at bedtime. May cause drowsiness, dizziness, paresthesia, kidney stones, glaucoma development, cognitive effect, weight loss due to decreased appetite, see after visit summary for more details about side effects. Stay hydrated. Call with any memory problems.   Acetaminophen as needed but may consider rizatriptan as needed if " acetaminophen were not effective but would need to Ok it. Limit use of all of your analgesics to no more than 2 days per week  Follow up in 8 weeks or sooner if needed. Call our Clinic or MyChart with updates or concerns about your medications for headache prevention  Botox for headache prevention recommended. Discussed Botox indications briefly and side effects     Neurodiagnostic Testing  EKG in November of 2017 and normal QT interval     Labs reviewed  Results for PANDA WOODARD (MRN [id number]) as of 8/13/2018 12:51   Ref. Range 4/12/2018 17:07   Sodium Latest Ref Range: 133 - 144 mmol/L 137   Potassium Latest Ref Range: 3.4 - 5.3 mmol/L 4.1   Chloride Latest Ref Range: 94 - 109 mmol/L 103   Carbon Dioxide Latest Ref Range: 20 - 32 mmol/L 29   Urea Nitrogen Latest Ref Range: 7 - 30 mg/dL 10   Creatinine Latest Ref Range: 0.66 - 1.25 mg/dL 0.93   GFR Estimate Latest Ref Range: >60 mL/min/1.7m2 >90   GFR Estimate If Black Latest Ref Range: >60 mL/min/1.7m2 >90   Calcium Latest Ref Range: 8.5 - 10.1 mg/dL 9.1   Anion Gap Latest Ref Range: 3 - 14 mmol/L 6   Albumin Latest Ref Range: 3.4 - 5.0 g/dL 4.5   Protein Total Latest Ref Range: 6.8 - 8.8 g/dL 8.6   Bilirubin Total Latest Ref Range: 0.2 - 1.3 mg/dL 0.3   Alkaline Phosphatase Latest Ref Range: 40 - 150 U/L 90   ALT Latest Ref Range: 0 - 70 U/L 26   AST Latest Ref Range: 0 - 45 U/L 18   Alpha Fetoprotein Latest Ref Range: 0 - 8 ug/L 1.8   Beta-HCG Tumor Marker Latest Ref Range: <5 IU/L <3   Lactate Dehydrogenase Latest Ref Range: 85 - 227 U/L 181     Results for PANDA WOODARD (MRN [id number]) as of 8/13/2018 12:51   Ref. Range 4/12/2018 17:07   WBC Latest Ref Range: 4.0 - 11.0 10e9/L 6.3   Hemoglobin Latest Ref Range: 13.3 - 17.7 g/dL 15.5   Hematocrit Latest Ref Range: 40.0 - 53.0 % 45.7   Platelet Count Latest Ref Range: 150 - 450 10e9/L 248   RBC Count Latest Ref Range: 4.4 - 5.9 10e12/L 5.51   MCV Latest Ref Range: 78 - 100 fl 83   MCH Latest Ref Range:  26.5 - 33.0 pg 28.1   MCHC Latest Ref Range: 31.5 - 36.5 g/dL 33.9   RDW Latest Ref Range: 10.0 - 15.0 % 12.2   Diff Method Unknown Automated Method   % Neutrophils Latest Units: % 69.7   % Lymphocytes Latest Units: % 23.4   % Monocytes Latest Units: % 5.1   % Eosinophils Latest Units: % 1.0   % Basophils Latest Units: % 0.6   % Immature Granulocytes Latest Units: % 0.2   Nucleated RBCs Latest Ref Range: 0 /100 0   Absolute Neutrophil Latest Ref Range: 1.6 - 8.3 10e9/L 4.4   Absolute Lymphocytes Latest Ref Range: 0.8 - 5.3 10e9/L 1.5   Absolute Monocytes Latest Ref Range: 0.0 - 1.3 10e9/L 0.3   Absolute Eosinophils Latest Ref Range: 0.0 - 0.7 10e9/L 0.1   Absolute Basophils Latest Ref Range: 0.0 - 0.2 10e9/L 0.0   Abs Immature Granulocytes Latest Ref Range: 0 - 0.4 10e9/L 0.0   Absolute Nucleated RBC Unknown 0.0     Past Medical History reviewed and verified with the patient  Past Medical History:   Diagnosis Date     Hypertropia      Myopia      Seizures (H)     2 months ago     Type 2 diabetes mellitus without complications (H)     Borderline     Social History   Substance Use Topics     Smoking status: Never Smoker     Smokeless tobacco: Never Used     Alcohol use 1.2 - 1.8 oz/week     2 - 3 Standard drinks or equivalent per week      Comment: OCASIONALLY    reviewed and verified with the patient     Allergies   Allergen Reactions     Penicillins Hives       Current Outpatient Prescriptions   Medication Sig Dispense Refill     ACETAMINOPHEN PO Take 650 mg by mouth every 4 hours as needed for pain       desmopressin (DDAVP) 0.1 MG tablet Take one tablet at lunch. 90 tablet 3     desmopressin (DDAVP) 0.2 MG tablet Take 1 tablet (200 mcg) by mouth 2 times daily 180 tablet 3     Glycerin-Hypromellose- (VISINE TEARS OP) Apply 1-2 drops to eye 2 times daily as needed (for dry eyes.)       levETIRAcetam (KEPPRA) 750 MG tablet Take 1,500 mg by mouth every morning Two tablet in am and 2 1/2 tablet in pm 60 tablet 0  "    LORazepam (ATIVAN) 0.5 MG tablet Take 1 tablet (0.5 mg) by mouth every 4 hours as needed (Anxiety, Nausea/Vomiting or Sleep) 30 tablet 3     melatonin 1 MG TABS Take 1-2 tablets (1-2 mg) by mouth nightly as needed for sleep 90 tablet 0     nortriptyline (PAMELOR) 10 MG capsule TAKE ONE TAB BY MOUTH EVERY MORNING AND ONE TAB AT BEDTIME 30 capsule 3   reviewed and verified with the patient    Review of Systems:   A 10-point ROS including constitutional, eyes, respiratory, cardiovascular, gastroenterology, genitourinary, integumentary, musculoskeletal, neurology and psychiatric were all negative except as mentioned in the interval history.      General Exam:   /89 (BP Location: Right arm, Patient Position: Sitting, Cuff Size: Adult Large)  Pulse 96  Resp 20  Ht 1.651 m (5' 5\")  Wt 82.6 kg (182 lb 1.6 oz)  SpO2 96%  BMI 30.3 kg/m2   PHQ-2 Score:     PHQ-2 ( 1999 [employer]) 8/13/2018 10/9/2015   Q1: Little interest or pleasure in doing things 0 1   Q2: Feeling down, depressed or hopeless 0 1   PHQ-2 Score 0 2     GEN: Awake, NAD; good eye contact, responses appropriately, headache today is mild and about 3/10  HEENT: Head atraumatic/Normocephalic. Scalp normal. Pupils equally round, 4 mm, reactive to light and accommodation, sclera and conjunctiva normal. Speech is fluent without dysarthria. EOM intact. There is no nystagmus. Has conjugated gaze. Face is symmetrical. Intact and symmetrical eyebrow and lid raise and eyelid closure, smiles. Strength  5/5 in the upper and lower extremities bilaterally. Sensation is intact to  touch throughout.  Reflexes symmetrical at biceps, triceps, brachioradialis, patellar, and Achilles. Negative Babinski with downgoing toes bilaterally. Coordination reveals finger-nose-finger, rapid alternating movements with normal speed and accuracy. Normal casual gait.    Assessment and Plan:  See interval History for our discussion and plan    Prescription for topiramate provided. " Correct use and course provided. Expected benefits and typical side effects reviewed. Safety of concomitant medications and interactions reviewed. Patient taught signs and symptoms of adverse reactions and allergies. Patient understands teaching and accepts risks of prescribed medication regimen.    I discussed all my recommendation with Kari Turcios. The patient verbalizes understanding and comfortable with the plan. The patient has our clinic phone number to call with any questions or concerns. All of the patient's questions were answered from the best of my current knowledge.   Time spent with pt answering questions, discussing findings, counseling and coordinating care was more than 50% the appointment time,  34 minutes.         CASPER Black, CNP  [facility]

## 2018-09-19 ENCOUNTER — TELEPHONE (OUTPATIENT)
Dept: ENDOCRINOLOGY | Facility: CLINIC | Age: 28
End: 2018-09-19

## 2018-09-19 NOTE — TELEPHONE ENCOUNTER
----- Message from Cass Vickers CMA sent at 3/19/2018  8:18 AM CDT -----  Regardin YR F/U  1 YR FOLLOW UP 3/2019

## 2018-09-20 ENCOUNTER — TELEPHONE (OUTPATIENT)
Dept: NEUROSURGERY | Facility: CLINIC | Age: 28
End: 2018-09-20

## 2018-09-20 NOTE — TELEPHONE ENCOUNTER
[facility] Call Center    Phone Message    May a detailed message be left on voicemail: yes [phone]    Reason for Call: Other: Needs Shunt to be reprogramed.     Action Taken: Message routed to:  [facility] ([facility] Neurosurgery       Kathleen M Doege RN

## 2018-09-24 ENCOUNTER — PATIENT OUTREACH (OUTPATIENT)
Dept: NEUROSURGERY | Facility: CLINIC | Age: 28
End: 2018-09-24

## 2018-09-26 NOTE — PROGRESS NOTES
Outbound call to patient, left message to please return call concerning appointment tomorrow concerning his shunt.  Please call .

## 2018-09-27 ENCOUNTER — OFFICE VISIT (OUTPATIENT)
Dept: NEUROSURGERY | Facility: CLINIC | Age: 28
End: 2018-09-27
Payer: COMMERCIAL

## 2018-09-27 VITALS
HEIGHT: 68 IN | HEART RATE: 97 BPM | WEIGHT: 177.6 LBS | BODY MASS INDEX: 26.92 KG/M2 | DIASTOLIC BLOOD PRESSURE: 65 MMHG | OXYGEN SATURATION: 99 % | SYSTOLIC BLOOD PRESSURE: 129 MMHG

## 2018-09-27 DIAGNOSIS — E23.2 DIABETES INSIPIDUS (H): ICD-10-CM

## 2018-09-27 DIAGNOSIS — Z98.2 S/P VP SHUNT: ICD-10-CM

## 2018-09-27 DIAGNOSIS — Z92.3 S/P RADIATION THERAPY: ICD-10-CM

## 2018-09-27 DIAGNOSIS — D44.5 PINEAL GERM CELL TUMOR (H): ICD-10-CM

## 2018-09-27 DIAGNOSIS — Z98.890 S/P CRANIOTOMY: ICD-10-CM

## 2018-09-27 ASSESSMENT — PAIN SCALES - GENERAL: PAINLEVEL: NO PAIN (0)

## 2018-09-27 NOTE — LETTER
9/27/2018       RE: Kari Turcios  [address]  [place] [facility] [place] [address]     Dear Colleague,    Thank you for referring your patient, Kari Turcios, to the [facility] NEUROSURGERY at [facility]. Please see a copy of my visit note below.    NEUROSURGERY PROGRESS NOTE      HISTORY OF PRESENT ILLNESS:  I saw Mr. Turcios at his request because of polyuria which raised his concern that this might be related to his  shunt.    To briefly summarize, this is a 28 year old male  with a history of pineal region mass with moderate hydrocephalus. He underwent an endoscopic 3rd ventriculostomy and suboccipital craniotomy with the excision of the tumor on 3/26/14 follow by right sided  shunt (Strata valve) on 4/8/2014 by Dr. Sharp. 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 Focal mature teratoma (~5%) with mature squamous epithelial and mesenchymal elements (focal cartilage) were seen. Lesser components of yolk sac (~5%), (~2%) embryonal and (~3%) germinoma were present. 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 The patient subsequently completed chemotherapy and extrernal beam radiation before the end of 2014.   Of note, the patient developed polyuria and increased thirst a couple of months prior to his hospitalization in 2014. A diagnosis of DI was confirmed during hospitalization and the patient was discharged on desmopressin. The investigation of pituitary function postop revealed normal anterior pituitary function. He has been followed by Dr. Ramires in Endocrinology. Current dose of desmopressin is 200  g PO in the morning (7:30 AM), 100 micrograms at lunch and 200  g around dinner (6:30-7 PM). He was last seen by Dr. Ramires on 3/19/2018. Records review showed that he had been compliant with taking the medication and was doing well.    Today, the patient  reports that he has been experiencing urinary frequency and polyuria started in 7/2018. He denies polydipsia, dysuria, hematuria, abdominal pain, fever, or chills. He denies unusual headaches, changes in cognitive function or vision and balance disturbance. His Strata valve was rechecked by our serivce after the last MRI of brain in 5/2018. The study reportedly show no evidence of tumor recurrence and no hydrocephalus. He denies recent MRI or exposure to magnetic field. The patient worries that his urinary symptom might be related to the shunt.      INTERVAL HEALTH HISTORY:  Past Medical History:   Diagnosis Date     Hypertropia      Myopia      Seizures (H)     2 months ago     Type 2 diabetes mellitus without complications (H)     Borderline       CURRENT MEDICATIONS:  Current Outpatient Prescriptions   Medication Sig Dispense Refill     ACETAMINOPHEN PO Take 650 mg by mouth every 4 hours as needed for pain       Botulinum Toxin Type A (BOTOX) 200 units injection Inject 155 Units into the muscle every 3 months 155 Units 4     desmopressin (DDAVP) 0.1 MG tablet Take one tablet at lunch. 90 tablet 3     desmopressin (DDAVP) 0.2 MG tablet Take 1 tablet (200 mcg) by mouth 2 times daily 180 tablet 3     levETIRAcetam (KEPPRA) 750 MG tablet Take 1,500 mg by mouth every morning Two tablet in am and 2 1/2 tablet in pm 60 tablet 0     LORazepam (ATIVAN) 0.5 MG tablet Take 1 tablet (0.5 mg) by mouth every 4 hours as needed (Anxiety, Nausea/Vomiting or Sleep) 30 tablet 3     melatonin 1 MG TABS Take 1-2 tablets (1-2 mg) by mouth nightly as needed for sleep 90 tablet 0     nortriptyline (PAMELOR) 10 MG capsule TAKE ONE TAB BY MOUTH EVERY MORNING AND ONE TAB AT BEDTIME 30 capsule 3     topiramate (TOPAMAX) 25 MG tablet Take 25 mg at PM for one week, then 25 mg AM and 25 mg PM or may take 50 mg PM if tolerated, then 25 mg AM and 50 mg PM. 90 tablet 3     Glycerin-Hypromellose- (VISINE TEARS OP) Apply 1-2 drops to eye 2  "times daily as needed (for dry eyes.)         ALLERGIES:  Penicillins      REVIEW OF SYSTEMS:  13 point ROS neg other than the symptoms noted above in the HPI.      PHYSICAL EXAMINATION:    Filed Vitals:  /65 (BP Location: Left arm, Patient Position: Chair, Cuff Size: Adult Large)  Pulse 97  Ht 1.727 m (5' 8\")  Wt 80.6 kg (177 lb 9.6 oz)  SpO2 99%  BMI 27 kg/m2     Patient Supplied Answers To the  Pain Questionnaire  [facility] -  Patient Entered Questionnaire/Answers 9/27/2018   What number best describes your pain right now:  0 = No pain  to  10 = Worst pain imaginable 1   Which of the following improve or reduce your pain?  relaxation   What number best describes your LOWEST pain in past 24 hours:  0 = No pain  to  10 = Worst pain imaginable 1   What number best describes your WORST pain in past 24 hours:  0 = No pain  to  10 = Worst pain imaginable 7   When is your pain worst? AM, PM   Have you tried treating your pain with medication?  No   Are you currently taking medications for your pain? Yes   General: Comfortable and relaxed  HENT:   Normocephalic. Normal hearing and external ear bilaterally.  Neck:  Supple. Normal ROM  Cardiovascular:   Normal heart rate and rhythm  Pulmonary:   Breath sounds normal  Abdominal:   Soft. Normal bowel sounds  Focus Neurologic Exam:  LOC and Cognition:  Alert and oriented to time, person and place for age, Appropriate and fluent speech for age, Follows commands appropriately for age, Affect pleasant, behavior cooperative, No expressive or receptive aphasia and General knowledge and fund of knowledge are appropriate for age.  He continues to have light - near pupil dissociation.  He still has limited upward gaze. Extraocular movements are normal elsewhere.   The remaining neurologic exam is non focal. Interrogation of his Strata valve showed a performance level of 2.0 as what Dr. Sharp last set it in 6/2016.      IMAGING:  None new.      IMPRESSION AND PLAN OF " CARE:  I reassured Mr. Turcios that he does not have signs and symptoms suggestive of shunt not functioning. His neurologic exam is at his baseline today. In addition, his MRI of brain in 5/2018 showed no recurrent tumor and no hydrocephalus.     Given his history of DI, which is being treated with DDAVP, I recommend that he follow up with Dr. Smith for his current polyuria. We are able to obtain an appointment in Endocrinology for Monday 10/1. We will look for Dr. Smith's evaluation and recommendation.    All the patient's questions have been answered and they demonstrate good understanding of the above.  The patient has our contact information and is aware that he should call if he has questions comments or concerns.    Thank you very much for allowing us to participate in the care of this patient. Please do not hesitate to contact us with questions. We will keep you informed of his progress.     Case and plan of care were discussed with and agreed by Dr. Sharp.          Again, thank you for allowing me to participate in the care of your patient.      Sincerely,    CASPER Cano CNP

## 2018-09-27 NOTE — MR AVS SNAPSHOT
After Visit Summary   9/27/2018    Kari Turcios    MRN: [id number]           Patient Information     Date Of Birth          1990        Visit Information        Provider Department      9/27/2018 2:00 PM Marlyn Rascon APRN CNP [facility] Neurosurgery         Follow-ups after your visit        Your next 10 appointments already scheduled     Oct 01, 2018 10:30 AM CDT   (Arrive by 10:15 AM)   RETURN ENDOCRINE with Yessenia Ramires MD   [facility] Endocrinology ([facility])    [address] [place] [id number]   [phone]            Oct 08, 2018 12:30 PM CDT   (Arrive by 12:15 PM)   Return Visit with CASPER Jones CNP   [facility] Neurology ([facility])    [address] [place] [id number]   [phone]            Oct 10, 2018 11:30 AM CDT   Lab with  LAB   [facility] Lab ([facility])    [address] [place] [id number]   [phone]            Oct 10, 2018 12:00 PM CDT   CT CHEST/ABDOMEN/PELVIS W CONTRAST with UCCT2   [facility] CT ([facility])    [address] [place] [id number] [phone]           How do I prepare for my exam? (Food and drink instructions) To prepare: Do not eat or drink for 2 hours before your exam. If you need to take medicine, you may take it with small sips of water. (We may ask you to take liquid medicine as well.)  How do I prepare for my exam? (Other instructions) Please arrive 30 minutes early for your CT.  Once in the department you might be asked to drink water 15-20 minutes prior to your exam.  If indicated you may be asked to drink an oral contrast in advance of your CT.  If this is the case, the imaging team will let you know or be in contact with you prior to your appointment  Patients over 70 or patients with  diabetes or kidney problems: If you haven t had a blood test (creatinine test) within the last 30 days, the Cardiologist/Radiologist may require you to get this test prior to your exam.  If you have diabetes:  Continue to take your metformin medication on the day of your exam  What should I wear: Please wear loose clothing, such as a sweat suit or jogging clothes. Avoid snaps, zippers and other metal. We may ask you to undress and put on a hospital gown.  How long does the exam take: Most scans take less than 20 minutes.  What should I bring: Please bring any scans or X-rays taken at other hospitals, if similar tests were done. Also bring a list of your medicines, including vitamins, minerals and over-the-counter drugs. It is safest to leave personal items at home.  Do I need a : No  is needed.  What do I need to tell my doctor? Be sure to tell your doctor: * If you have any allergies. * If there s any chance you are pregnant. * If you are breastfeeding.  What should I do after the exam: No restrictions, You may resume normal activities.  What is this test: A CT (computed tomography) scan is a series of pictures that allows us to look inside your body. The scanner creates images of the body in cross sections, much like slices of bread. This helps us see any problems more clearly. You may receive contrast (X-ray dye) before or during your scan. You will be asked to drink the contrast.  Who should I call with questions: If you have any questions, please call the Imaging Department where you will have your exam. Directions, parking instructions, and other information is available on our website, [email or web address].            Oct 24, 2018  5:00 PM CDT   (Arrive by 4:45 PM)   Return Visit with Cheikh Ervin MD   [facility] ([facility])    [address]  [address]  [facility] [address] [phone]              Who to contact     Please call  "your clinic at [phone] to:    Ask questions about your health    Make or cancel appointments    Discuss your medicines    Learn about your test results    Speak to your doctor            Additional Information About Your Visit        DailyTickethart Information     [facility] gives you secure access to your electronic health record. If you see a primary care provider, you can also send messages to your care team and make appointments. If you have questions, please call your primary care clinic.  If you do not have a primary care provider, please call [phone] and they will assist you.      [facility] is an electronic gateway that provides easy, online access to your medical records. With [facility], you can request a clinic appointment, read your test results, renew a prescription or communicate with your care team.     To access your existing account, please contact your [facility] or call [phone] for assistance.        Care EveryWhere ID     This is your Care EveryWhere ID. This could be used by other organizations to access your [place] medical records  [id number]        Your Vitals Were     Pulse Height Pulse Oximetry BMI (Body Mass Index)          97 1.727 m (5' 8\") 99% 27 kg/m2         Blood Pressure from Last 3 Encounters:   09/27/18 129/65   08/13/18 132/89   05/14/18 132/85    Weight from Last 3 Encounters:   09/27/18 80.6 kg (177 lb 9.6 oz)   08/13/18 82.6 kg (182 lb 1.6 oz)   05/14/18 78.3 kg (172 lb 9.6 oz)              Today, you had the following     No orders found for display       Primary Care Provider Office Phone # Fax #    Ila Cornell [phone]       [facility] [address] [address]        Equal Access to Services     JULIANNE COLLAZO : Hadcrispin Orellana, bijal santos, shannon mcfadden, frank alvarez. So [facility] [phone].    ATENCIÓN: Si erin espjosé miguel, stella a gaffney " disposición servicios gratuitos de asistencia lingüística. Ramez cm [phone].    We comply with applicable federal civil rights laws and [place] laws. We do not discriminate on the basis of race, color, national origin, age, disability, sex, sexual orientation, or gender identity.            Thank you!     Thank you for choosing [facility] NEUROSURGERY  for your care. Our goal is always to provide you with excellent care. Hearing back from our patients is one way we can continue to improve our services. Please take a few minutes to complete the written survey that you may receive in the mail after your visit with us. Thank you!             Your Updated Medication List - Protect others around you: Learn how to safely use, store and throw away your medicines at www.disposemymeds.org.          This list is accurate as of 9/27/18  2:36 PM.  Always use your most recent med list.                   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)       levETIRAcetam 750 MG tablet    KEPPRA    60 tablet    Take 1,500 mg by mouth every morning Two tablet in am and 2 1/2 tablet in pm    Pineal tumor       LORazepam 0.5 MG tablet    ATIVAN    30 tablet    Take 1 tablet (0.5 mg) by mouth every 4 hours as needed (Anxiety, Nausea/Vomiting or Sleep)    Pineal tumor       melatonin 1 MG Tabs tablet     90 tablet    Take 1-2 tablets (1-2 mg)  by mouth nightly as needed for sleep    Pineal tumor       nortriptyline 10 MG capsule    PAMELOR    30 capsule    TAKE ONE TAB BY MOUTH EVERY MORNING AND ONE TAB AT BEDTIME    Chronic daily headache       topiramate 25 MG tablet    TOPAMAX    90 tablet    Take 25 mg at PM for one week, then 25 mg AM and 25 mg PM or may take 50 mg PM if tolerated, then 25 mg AM and 50 mg PM.    Intractable chronic migraine without aura and without status migrainosus       VISINE TEARS OP      Apply 1-2 drops to eye 2 times daily as needed (for dry eyes.)        * Notice:  This list has 2 medication(s) that are the same as other medications prescribed for you. Read the directions carefully, and ask your doctor or other care provider to review them with you.

## 2018-09-27 NOTE — PATIENT INSTRUCTIONS
Your shunt performance level is set at 2.0. You do not need shunt adjustment today for increased urinary frequency.     Follow up with your endocrinologist Dr. Ramires about your concern with increased urinary frequency and possible adjustment of DDAVP.

## 2018-10-01 ENCOUNTER — OFFICE VISIT (OUTPATIENT)
Dept: ENDOCRINOLOGY | Facility: CLINIC | Age: 28
End: 2018-10-01
Payer: COMMERCIAL

## 2018-10-01 VITALS
HEART RATE: 88 BPM | SYSTOLIC BLOOD PRESSURE: 127 MMHG | DIASTOLIC BLOOD PRESSURE: 79 MMHG | BODY MASS INDEX: 27.43 KG/M2 | WEIGHT: 181 LBS | HEIGHT: 68 IN

## 2018-10-01 DIAGNOSIS — R35.89 POLYURIA: ICD-10-CM

## 2018-10-01 DIAGNOSIS — D44.5 PINEAL GERM CELL TUMOR (H): ICD-10-CM

## 2018-10-01 DIAGNOSIS — Z92.3 PERSONAL HISTORY OF RADIATION EXPOSURE: ICD-10-CM

## 2018-10-01 DIAGNOSIS — E23.2 DIABETES INSIPIDUS (H): Primary | ICD-10-CM

## 2018-10-01 DIAGNOSIS — E23.2 DIABETES INSIPIDUS SECONDARY TO VASOPRESSIN DEFICIENCY (H): ICD-10-CM

## 2018-10-01 ASSESSMENT — PAIN SCALES - GENERAL: PAINLEVEL: NO PAIN (0)

## 2018-10-01 NOTE — PROGRESS NOTES
NEUROSURGERY PROGRESS NOTE      HISTORY OF PRESENT ILLNESS:  I saw Mr. Turcios at his request because of polyuria which raised his concern that this might be related to his  shunt.    To briefly summarize, this is a 28 year old male with a history of pineal region mass with moderate hydrocephalus. He underwent an endoscopic 3rd ventriculostomy and suboccipital craniotomy with the excision of the tumor on 3/26/14 follow by right sided  shunt (Strata valve) on 4/8/2014 by Dr. Sharp. 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 Focal mature teratoma (~5%) with mature squamous epithelial and mesenchymal elements (focal cartilage) were seen. Lesser components of yolk sac (~5%), (~2%) embryonal and (~3%) germinoma were present. 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 The patient subsequently completed chemotherapy and extrernal beam radiation before the end of 2014.   Of note, the patient developed polyuria and increased thirst a couple of months prior to his hospitalization in 2014. A diagnosis of DI was confirmed during hospitalization and the patient was discharged on desmopressin. The investigation of pituitary function postop revealed normal anterior pituitary function. He has been followed by Dr. Ramires in Endocrinology. Current dose of desmopressin is 200  g PO in the morning (7:30 AM), 100 micrograms at lunch and 200  g around dinner (6:30-7 PM). He was last seen by Dr. Ramires on 3/19/2018. Records review showed that he had been compliant with taking the medication and was doing well.    Today, the patient reports that he has been experiencing urinary frequency and polyuria started in 7/2018. He denies polydipsia, dysuria, hematuria, abdominal pain, fever, or chills. He denies unusual headaches, changes in cognitive function or vision and balance disturbance. His Strata valve was  rechecked by our serivce after the last MRI of brain in 5/2018. The study reportedly show no evidence of tumor recurrence and no hydrocephalus. He denies recent MRI or exposure to magnetic field. The patient worries that his urinary symptom might be related to the shunt.      INTERVAL HEALTH HISTORY:  Past Medical History:   Diagnosis Date     Hypertropia      Myopia      Seizures (H)     2 months ago     Type 2 diabetes mellitus without complications (H)     Borderline       CURRENT MEDICATIONS:  Current Outpatient Prescriptions   Medication Sig Dispense Refill     ACETAMINOPHEN PO Take 650 mg by mouth every 4 hours as needed for pain       Botulinum Toxin Type A (BOTOX) 200 units injection Inject 155 Units into the muscle every 3 months 155 Units 4     desmopressin (DDAVP) 0.1 MG tablet Take one tablet at lunch. 90 tablet 3     desmopressin (DDAVP) 0.2 MG tablet Take 1 tablet (200 mcg) by mouth 2 times daily 180 tablet 3     levETIRAcetam (KEPPRA) 750 MG tablet Take 1,500 mg by mouth every morning Two tablet in am and 2 1/2 tablet in pm 60 tablet 0     LORazepam (ATIVAN) 0.5 MG tablet Take 1 tablet (0.5 mg) by mouth every 4 hours as needed (Anxiety, Nausea/Vomiting or Sleep) 30 tablet 3     melatonin 1 MG TABS Take 1-2 tablets (1-2 mg) by mouth nightly as needed for sleep 90 tablet 0     nortriptyline (PAMELOR) 10 MG capsule TAKE ONE TAB BY MOUTH EVERY MORNING AND ONE TAB AT BEDTIME 30 capsule 3     topiramate (TOPAMAX) 25 MG tablet Take 25 mg at PM for one week, then 25 mg AM and 25 mg PM or may take 50 mg PM if tolerated, then 25 mg AM and 50 mg PM. 90 tablet 3     Glycerin-Hypromellose- (VISINE TEARS OP) Apply 1-2 drops to eye 2 times daily as needed (for dry eyes.)         ALLERGIES:  Penicillins      REVIEW OF SYSTEMS:  13 point ROS neg other than the symptoms noted above in the HPI.      PHYSICAL EXAMINATION:    Filed Vitals:  /65 (BP Location: Left arm, Patient Position: Chair, Cuff Size: Adult  "Large)  Pulse 97  Ht 1.727 m (5' 8\")  Wt 80.6 kg (177 lb 9.6 oz)  SpO2 99%  BMI 27 kg/m2     Patient Supplied Answers To the  Pain Questionnaire  [facility] Pain -  Patient Entered Questionnaire/Answers 9/27/2018   What number best describes your pain right now:  0 = No pain  to  10 = Worst pain imaginable 1   Which of the following improve or reduce your pain?  relaxation   What number best describes your LOWEST pain in past 24 hours:  0 = No pain  to  10 = Worst pain imaginable 1   What number best describes your WORST pain in past 24 hours:  0 = No pain  to  10 = Worst pain imaginable 7   When is your pain worst? AM, PM   Have you tried treating your pain with medication?  No   Are you currently taking medications for your pain? Yes   General: Comfortable and relaxed  HENT:   Normocephalic. Normal hearing and external ear bilaterally.  Neck:  Supple. Normal ROM  Cardiovascular:   Normal heart rate and rhythm  Pulmonary:   Breath sounds normal  Abdominal:   Soft. Normal bowel sounds  Focus Neurologic Exam:  LOC and Cognition:  Alert and oriented to time, person and place for age, Appropriate and fluent speech for age, Follows commands appropriately for age, Affect pleasant, behavior cooperative, No expressive or receptive aphasia and General knowledge and fund of knowledge are appropriate for age.  He continues to have light - near pupil dissociation.  He still has limited upward gaze. Extraocular movements are normal elsewhere.   The remaining neurologic exam is non focal. Interrogation of his Strata valve showed a performance level of 2.0 as what Dr. Sharp last set it in 6/2016.      IMAGING:  None new.      IMPRESSION AND PLAN OF CARE:  I reassured Mr. Turcios that he does not have signs and symptoms suggestive of shunt not functioning. His neurologic exam is at his baseline today. In addition, his MRI of brain in 5/2018 showed no recurrent tumor and no hydrocephalus.     Given his history of DI, which is being " treated with DDAVP, I recommend that he follow up with Dr. Smith for his current polyuria. We are able to obtain an appointment in Endocrinology for Monday 10/1. We will look for Dr. Smith's evaluation and recommendation.    All the patient's questions have been answered and they demonstrate good understanding of the above.  The patient has our contact information and is aware that he should call if he has questions comments or concerns.    Thank you very much for allowing us to participate in the care of this patient. Please do not hesitate to contact us with questions. We will keep you informed of his progress.     Case and plan of care were discussed with and agreed by Dr. Sharp.        Marlyn Rascon, DNP, APRN, FNP-BC  Department of Neurosurgery

## 2018-10-01 NOTE — LETTER
10/1/2018       RE: Kari Turcios  [address]  [facility] [address]     Dear Colleague,    Thank you for referring your patient, Kari Turcios, to the [facility] ENDOCRINOLOGY at [facility]. Please see a copy of my visit note below.      The patient is seen in followup.  Abelino Turcios is a 28 year old male who was admitted on 3/19/14 after presenting to the emergency room with headache, nausea, 2 weeks of left side vision blurring.  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 He underwent an endoscopic 3rd ventriculostomy and EVD placement and surgery stealth assisted suboccipital craniotomy with the excision of the tumor on 3/26/14. 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 Focal mature teratoma (~5%) with mature squamous epithelial and mesenchymal elements (focal cartilage) were seen. Lesser components of yolk sac (~5%), (~2%) embryonal and (~3%) germinoma were present. 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   The patient developed polyuria and increased thirst a couple of months prior to his hospitalization. A diagnosis of DI was confirmed during hospitalization and the patient was discharged on desmopressin. The investigation of pituitary function postop revealed normal anterior pituitary function.  He completed the radiation therapy in October of 2014.  Prior to radiation therapy, he had sperm retrieved, for cryopreservation.  Current dose of desmopressin is 200  g PO in the morning (7:30 AM), 100 micrograms at lunch and 200  g around dinner (7-8 PM).      He reports being compliant in taking desmopressin consistently.  For the last month, he started to wake up to use the restroom around 2 or 3 AM.  With the above the desmopressin regimen, up until 1 month ago, he did not had to use the restroom during the night.   Daytime, he uses the restroom a couple of times in between breakfast and lunch and lunch and dinner.  The overall daily fluid intake has not changed.  The headaches have stable and controlled with Tylenol, which he takes a couple of times a week, around lunchtime.  His vision has remained unchanged.  The last MRI from May 2018 did not show evidence of tumor recurrence.  Most recent sodium level was 137, in April 2018.  Beta hCG has remained undetectable on follow-up labs.  In May 2018, the alpha-fetoprotein was slightly lower, at 1.2 (baseline 2.2).    Today, he took the morning dose of desmopressin at 9 AM.     Past Medical History:   Diagnosis Date     Hypertropia      Myopia      Seizures (H)     2 months ago     Type 2 diabetes mellitus without complications (H)     Borderline   Encephalitis 2012  History of seizure disorders post encephalitis     Past Surgical History:   Procedure Laterality Date     ARTHROTOMY SHOULDER, BANKHART REPAIR, COMBINED  10/29/2012    Procedure: COMBINED ARTHROTOMY SHOULDER, BANKHART REPAIR;  RIGHT OPEN SHOULDER BANKART REPAIR;  Surgeon: Lemuel Ramírez MD;  Location: [facility]     OPTICAL TRACKING SYSTEM BIOPSY BRAIN  3/20/2014    Procedure: OPTICAL TRACKING SYSTEM BIOPSY BRAIN;  Right Frontal Approach For endoscopic Intraventricular Mass Biopsy, 3rd Ventriculostomy, Placement of right ventriculostomy catheter.;  Surgeon: Williams Clement MD;  Location: [facility] OR     OPTICAL TRACKING SYSTEM CRANIOTOMY, EXCISE TUMOR, COMBINED  3/26/2014    Procedure: COMBINED OPTICAL TRACKING SYSTEM CRANIOTOMY, EXCISE TUMOR;  Stealth Assisted Sub-Occiptial Craniotomy, Excise Tumor ;  Surgeon: Ann Sharp MD;  [facility]     OPTICAL TRACKING SYSTEM IMPLANT SHUNT VENTRICULOPERITONEAL  4/8/2014    Procedure: OPTICAL TRACKING SYSTEM IMPLANT SHUNT VENTRICULOPERITONEAL;  Right Stealth Guided Ventricularperitoneal Shunt Placement ;  Surgeon: Ann Sharp MD;  Location:  [facility] OR     RECESSION RESECTION (REPAIR STRABISMUS) BILATERAL Bilateral 8/4/2016    Procedure: RECESSION RESECTION (REPAIR STRABISMUS) BILATERAL;  Surgeon: Serafin Menendez MD;  Location: [facility] OR     VENTRICULOSTOMY  3/20/2014    Procedure: VENTRICULOSTOMY;;  Surgeon: Williams Clement MD;  Location: [facility] OR       Current Medications    Current Outpatient Prescriptions:      ACETAMINOPHEN PO, Take 650 mg by mouth every 4 hours as needed for pain, Disp: , Rfl:      Botulinum Toxin Type A (BOTOX) 200 units injection, Inject 155 Units into the muscle every 3 months, Disp: 155 Units, Rfl: 4     desmopressin (DDAVP) 0.1 MG tablet, Take one tablet at lunch., Disp: 90 tablet, Rfl: 3     desmopressin (DDAVP) 0.2 MG tablet, Take 1 tablet (200 mcg) by mouth 2 times daily, Disp: 180 tablet, Rfl: 3     Glycerin-Hypromellose- (VISINE TEARS OP), Apply 1-2 drops to eye 2 times daily as needed (for dry eyes.), Disp: , Rfl:      levETIRAcetam (KEPPRA) 750 MG tablet, Take 1,500 mg by mouth every morning Two tablet in am and 2 1/2 tablet in pm, Disp: 60 tablet, Rfl: 0     LORazepam (ATIVAN) 0.5 MG tablet, Take 1 tablet (0.5 mg) by mouth every 4 hours as needed (Anxiety, Nausea/Vomiting or Sleep), Disp: 30 tablet, Rfl: 3     melatonin 1 MG TABS, Take 1-2 tablets (1-2 mg) by mouth nightly as needed for sleep, Disp: 90 tablet, Rfl: 0     nortriptyline (PAMELOR) 10 MG capsule, TAKE ONE TAB BY MOUTH EVERY MORNING AND ONE TAB AT BEDTIME, Disp: 30 capsule, Rfl: 3     topiramate (TOPAMAX) 25 MG tablet, Take 25 mg at PM for one week, then 25 mg AM and 25 mg PM or may take 50 mg PM if tolerated, then 25 mg AM and 50 mg PM., Disp: 90 tablet, Rfl: 3  No current facility-administered medications for this visit.     Facility-Administered Medications Ordered in Other Visits:      gadobutrol (GADAVIST) injection 7.5 mL, 7.5 mL, Intravenous, Once, Aron, Ramachandra Nguyen, MD    Family history  Denies family history of diabetes,  "thyroid disorders or cancer.    Social History  He denies smoking, drinking alcohol or using illicit drugs.  Quit [employer] in 8/2016.  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              Vital Signs     Previous Weights:    Wt Readings from Last 10 Encounters:   10/01/18 82.1 kg (181 lb)   09/27/18 80.6 kg (177 lb 9.6 oz)   08/13/18 82.6 kg (182 lb 1.6 oz)   05/14/18 78.3 kg (172 lb 9.6 oz)   05/10/18 77.1 kg (170 lb)   04/12/18 74.1 kg (163 lb 4.8 oz)   03/19/18 77.1 kg (169 lb 14.4 oz)   12/04/17 73.5 kg (162 lb)   11/22/17 74.5 kg (164 lb 3.2 oz)   10/16/17 75.3 kg (166 lb)        /79  Pulse 88  Ht 1.727 m (5' 7.99\")  Wt 82.1 kg (181 lb)  BMI 27.53 kg/m2    Physical Exam  General Appearance: he is well developed, well nourished and in no distress     Mild B/L gynecomastia, 3-4 cm in diameter   Eyes:  conjutivae are normal                                    extraocular movements - impaired upward gaze left eye   HEENT:   oropharynx clear and moist, no JVD, no bruits      no thyromegaly, no palpable nodules  Cardiovascular:  regular rhythm, no murmurs, distal pulse palpable, no edema  Respiratory:        chest clear, no rales, no rhonchi   Gastrointestinal:  abdomen soft, non-tender, non-distended, normal bowel sounds,    no organomegaly  Musculoskeletal:  normal tone and strength  Psychological:          affect and judgment normal  Skin:  pale, normal axillary hair, no gynecomastia; mild area of depigmentation R upper abd quadrant - birthmark   Neurological:  reflexes normal and symmetric, no resting tremor.     Labs:   I reviewed prior lab results documented in EPIC.     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 He is now s/p chemo and radiation therapy and there is no evidence of tumor recurrence on the most recent MRI from May 2018. AFP has remained stable and and Beta HCG has been undetectable since 2015.     The " patient endorses increased urination for the last month. Clinically, he doesn't endorse any other signs or symptoms suggestive of pituitary dysfunction. He is scheduled to have labs and f/up CT this month.     Recommendations:   - check Na level today, 2-3 hrs after taking the morning dose of desmopressin;   - in view of the prior h/o radiation therapy, reevaluate the pituitary function, although the patient is asymptomatic. The patient was instructed to have the labs done a couple of hrs after waking up in the morning.   - check fasting BG with his next labs.     Orders Placed This Encounter   Procedures     TSH     T4 free     Testosterone Free and Total     Follicle stimulating hormone     Lutropin     Cortisol     Basic metabolic panel     Basic metabolic panel                  Again, thank you for allowing me to participate in the care of your patient.      Sincerely,    Yessenia Ramires MD

## 2018-10-01 NOTE — MR AVS SNAPSHOT
After Visit Summary   10/1/2018    Kari Turcios    MRN: [id number]           Patient Information     Date Of Birth          1990        Visit Information        Provider Department      10/1/2018 10:30 AM Yessenia Ramires MD [facility] Endocrinology        Today's Diagnoses     Diabetes insipidus (H)    -  1    Pineal germ cell tumor (H)        Radiation exposure          Care Instructions    Take the dose of desmopressin 2 hrs prior to having labs done.           Follow-ups after your visit        Follow-up notes from your care team     Return in about 1 year (around 10/1/2019) for change lab apt from 11:30 am to 9:30 am on 10/10 , labs today.      Your next 10 appointments already scheduled     Oct 08, 2018 12:30 PM CDT   (Arrive by 12:15 PM)   Return Visit with CASPER Jones [facility] Neurology ([facility])    [address] [place] [id number] [phone]            Oct 10, 2018 11:30 AM CDT   Lab with  LAB   [facility] Lab ([facility])    [address] [place] [id number] [phone]            Oct 10, 2018 12:00 PM CDT   CT CHEST/ABDOMEN/PELVIS W CONTRAST with UCCT2   [facility] Imaging [place] [facility] ([facility])    [address] [place] [id number] [phone]           How do I prepare for my exam? (Food and drink instructions) To prepare: Do not eat or drink for 2 hours before your exam. If you need to take medicine, you may take it with small sips of water. (We may ask you to take liquid medicine as well.)  How do I prepare for my exam? (Other instructions) Please arrive 30 minutes early for your CT.  Once in the department you might be asked to drink water 15-20 minutes prior to your exam.  If indicated you may be asked to drink an oral contrast in advance of your CT.  If this is the case, the imaging  team will let you know or be in contact with you prior to your appointment  Patients over 70 or patients with diabetes or kidney problems: If you haven t had a blood test (creatinine test) within the last 30 days, the Cardiologist/Radiologist may require you to get this test prior to your exam.  If you have diabetes:  Continue to take your metformin medication on the day of your exam  What should I wear: Please wear loose clothing, such as a sweat suit or jogging clothes. Avoid snaps, zippers and other metal. We may ask you to undress and put on a hospital gown.  How long does the exam take: Most scans take less than 20 minutes.  What should I bring: Please bring any scans or X-rays taken at other hospitals, if similar tests were done. Also bring a list of your medicines, including vitamins, minerals and over-the-counter drugs. It is safest to leave personal items at home.  Do I need a : No  is needed.  What do I need to tell my doctor? Be sure to tell your doctor: * If you have any allergies. * If there s any chance you are pregnant. * If you are breastfeeding.  What should I do after the exam: No restrictions, You may resume normal activities.  What is this test: A CT (computed tomography) scan is a series of pictures that allows us to look inside your body. The scanner creates images of the body in cross sections, much like slices of bread. This helps us see any problems more clearly. You may receive contrast (X-ray dye) before or during your scan. You will be asked to drink the contrast.  Who should I call with questions: If you have any questions, please call the Imaging Department where you will have your exam. Directions, parking instructions, and other information is available on our website, [email or web address].            Oct 24, 2018  5:00 PM CDT   (Arrive by 4:45 PM)   Return Visit with Cheikh Ervin MD   [facility] ([facility] [place])    [address] [id number] [phone]              Future tests that were ordered for you today     Open Future Orders        Priority Expected Expires Ordered    Basic metabolic panel Routine 10/1/2018 10/1/2019 10/1/2018    TSH Routine 10/10/2018 10/1/2019 10/1/2018    T4 free Routine 10/10/2018 10/1/2019 10/1/2018    Testosterone Free and Total Routine 10/10/2018 10/1/2019 10/1/2018    Follicle stimulating hormone Routine 10/10/2018 10/1/2019 10/1/2018    Lutropin Routine 10/10/2018 10/1/2019 10/1/2018    Cortisol Routine 10/10/2018 10/1/2019 10/1/2018    Basic metabolic panel Routine 10/10/2018 10/1/2019 10/1/2018            Who to contact     Please call your clinic at [phone] to:    Ask questions about your health    Make or cancel appointments    Discuss your medicines    Learn about your test results    Speak to your doctor            Additional Information About Your Visit        [employer] Information     [employer] gives you secure access to your electronic health record. If you see a primary care provider, you can also send messages to your care team and make appointments. If you have questions, please call your primary care clinic.  If you do not have a primary care provider, please call [phone] and they will assist you.      [employer] is an electronic gateway that provides easy, online access to your medical records. With [employer], you can request a clinic appointment, read your test results, renew a prescription or communicate with your care team.     To access your existing account, please contact your [facility] or call [phone] for assistance.        Care EveryWhere ID     This is your Care EveryWhere ID. This could be used by other organizations to access your [facility] medical records  [id number]        Your Vitals Were     Pulse Height BMI (Body Mass Index)             88 1.727 m (5' 7.99\") 27.53 kg/m2          Blood Pressure " from Last 3 Encounters:   10/01/18 127/79   09/27/18 129/65   08/13/18 132/89    Weight from Last 3 Encounters:   10/01/18 82.1 kg (181 lb)   09/27/18 80.6 kg (177 lb 9.6 oz)   08/13/18 82.6 kg (182 lb 1.6 oz)               Primary Care Provider Office Phone # Fax #    Ila Cornell [phone]       [facility] [address] [facility] [address]        Equal Access to Services     [facility]: Hadii aad ku hadasho Soomaali, waaxda luqadaha, qaybta kaalmada adereanna, frank wagner . [facility] [phone].    ATENCIÓN: Si habla español, tiene a gaffney disposición servicios gratuitos de asistencia lingüística. [facility] [phone].    We comply with applicable federal civil rights laws and [place] laws. We do not discriminate on the basis of race, color, national origin, age, disability, sex, sexual orientation, or gender identity.            Thank you!     Thank you for choosing [facility]  for your care. Our goal is always to provide you with excellent care. Hearing back from our patients is one way we can continue to improve our services. Please take a few minutes to complete the written survey that you may receive in the mail after your visit with us. Thank you!             Your Updated Medication List - Protect others around you: Learn how to safely use, store and throw away your medicines at www.disposemymeds.org.          This list is accurate as of 10/1/18 11:06 AM.  Always use your most recent med list.                   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)       levETIRAcetam 750 MG tablet    KEPPRA    60 tablet    Take 1,500 mg by mouth every morning Two tablet in am and 2 1/2 tablet in pm    Pineal tumor       LORazepam 0.5 MG tablet    ATIVAN    30 tablet    Take 1 tablet (0.5 mg) by mouth every 4 hours as needed (Anxiety, Nausea/Vomiting or Sleep)    Pineal tumor       melatonin 1 MG Tabs tablet     90 tablet    Take 1-2 tablets (1-2 mg) by mouth nightly as needed for sleep    Pineal tumor       nortriptyline 10 MG capsule    PAMELOR    30 capsule    TAKE ONE TAB BY MOUTH EVERY MORNING AND ONE TAB AT BEDTIME    Chronic daily headache       topiramate 25 MG tablet    TOPAMAX    90 tablet    Take 25 mg at PM for one week, then 25 mg AM and 25 mg PM or may take 50 mg PM if tolerated, then 25 mg AM and 50 mg PM.    Intractable chronic migraine without aura and without status migrainosus       VISINE TEARS OP      Apply 1-2 drops to eye 2 times daily as needed (for dry eyes.)        * Notice:  This list has 2 medication(s) that are the same as other medications prescribed for you. Read the directions carefully, and ask your doctor or other care provider to review them with you.

## 2018-10-01 NOTE — PROGRESS NOTES
The patient is seen in followup.  Abelino Turcios is a 28 year old male who was admitted on 3/19/14 after presenting to the emergency room with headache, nausea, 2 weeks of left side vision blurring.  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 He underwent an endoscopic 3rd ventriculostomy and EVD placement and surgery stealth assisted suboccipital craniotomy with the excision of the tumor on 3/26/14. 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 Focal mature teratoma (~5%) with mature squamous epithelial and mesenchymal elements (focal cartilage) were seen. Lesser components of yolk sac (~5%), (~2%) embryonal and (~3%) germinoma were present. 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   The patient developed polyuria and increased thirst a couple of months prior to his hospitalization. A diagnosis of DI was confirmed during hospitalization and the patient was discharged on desmopressin. The investigation of pituitary function postop revealed normal anterior pituitary function.  He completed the radiation therapy in October of 2014.  Prior to radiation therapy, he had sperm retrieved, for cryopreservation.  Current dose of desmopressin is 200  g PO in the morning (7:30 AM), 100 micrograms at lunch and 200  g around dinner (7-8 PM).      He reports being compliant in taking desmopressin consistently.  For the last month, he started to wake up to use the restroom around 2 or 3 AM.  With the above the desmopressin regimen, up until 1 month ago, he did not had to use the restroom during the night.  Daytime, he uses the restroom a couple of times in between breakfast and lunch and lunch and dinner.  The overall daily fluid intake has not changed.  The headaches have stable and controlled with Tylenol, which he takes a couple of times a week, around lunchtime.  His vision has  remained unchanged.  The last MRI from May 2018 did not show evidence of tumor recurrence.  Most recent sodium level was 137, in April 2018.  Beta hCG has remained undetectable on follow-up labs.  In May 2018, the alpha-fetoprotein was slightly lower, at 1.2 (baseline 2.2).    Today, he took the morning dose of desmopressin at 9 AM.     Past Medical History:   Diagnosis Date     Hypertropia      Myopia      Seizures (H)     2 months ago     Type 2 diabetes mellitus without complications (H)     Borderline   Encephalitis 2012  History of seizure disorders post encephalitis     Past Surgical History:   Procedure Laterality Date     ARTHROTOMY SHOULDER, BANKHART REPAIR, COMBINED  10/29/2012    Procedure: COMBINED ARTHROTOMY SHOULDER, BANKHART REPAIR;  RIGHT OPEN SHOULDER BANKART REPAIR;  Surgeon: Lemuel Ramírez MD;  Location: [facility]     OPTICAL TRACKING SYSTEM BIOPSY BRAIN  3/20/2014    Procedure: OPTICAL TRACKING SYSTEM BIOPSY BRAIN;  Right Frontal Approach For endoscopic Intraventricular Mass Biopsy, 3rd Ventriculostomy, Placement of right ventriculostomy catheter.;  Surgeon: Williams Clement MD;  Location: [facility] OR     OPTICAL TRACKING SYSTEM CRANIOTOMY, EXCISE TUMOR, COMBINED  3/26/2014    Procedure: COMBINED OPTICAL TRACKING SYSTEM CRANIOTOMY, EXCISE TUMOR;  Stealth Assisted Sub-Occiptial Craniotomy, Excise Tumor ;  Surgeon: Ann Sharp MD;  Location: [facility] OR     OPTICAL TRACKING SYSTEM IMPLANT SHUNT VENTRICULOPERITONEAL  4/8/2014    Procedure: OPTICAL TRACKING SYSTEM IMPLANT SHUNT VENTRICULOPERITONEAL;  Right Stealth Guided Ventricularperitoneal Shunt Placement ;  Surgeon: Ann Sharp MD;  Location: [facility] OR     RECESSION RESECTION (REPAIR STRABISMUS) BILATERAL Bilateral 8/4/2016    Procedure: RECESSION RESECTION (REPAIR STRABISMUS) BILATERAL;  Surgeon: Serafin Menendez MD;  Location: [facility] OR     VENTRICULOSTOMY  3/20/2014    Procedure: VENTRICULOSTOMY;;  Surgeon: Urbano  Williams Paula MD;  Location:  OR       Current Medications    Current Outpatient Prescriptions:      ACETAMINOPHEN PO, Take 650 mg by mouth every 4 hours as needed for pain, Disp: , Rfl:      Botulinum Toxin Type A (BOTOX) 200 units injection, Inject 155 Units into the muscle every 3 months, Disp: 155 Units, Rfl: 4     desmopressin (DDAVP) 0.1 MG tablet, Take one tablet at lunch., Disp: 90 tablet, Rfl: 3     desmopressin (DDAVP) 0.2 MG tablet, Take 1 tablet (200 mcg) by mouth 2 times daily, Disp: 180 tablet, Rfl: 3     Glycerin-Hypromellose- (VISINE TEARS OP), Apply 1-2 drops to eye 2 times daily as needed (for dry eyes.), Disp: , Rfl:      levETIRAcetam (KEPPRA) 750 MG tablet, Take 1,500 mg by mouth every morning Two tablet in am and 2 1/2 tablet in pm, Disp: 60 tablet, Rfl: 0     LORazepam (ATIVAN) 0.5 MG tablet, Take 1 tablet (0.5 mg) by mouth every 4 hours as needed (Anxiety, Nausea/Vomiting or Sleep), Disp: 30 tablet, Rfl: 3     melatonin 1 MG TABS, Take 1-2 tablets (1-2 mg) by mouth nightly as needed for sleep, Disp: 90 tablet, Rfl: 0     nortriptyline (PAMELOR) 10 MG capsule, TAKE ONE TAB BY MOUTH EVERY MORNING AND ONE TAB AT BEDTIME, Disp: 30 capsule, Rfl: 3     topiramate (TOPAMAX) 25 MG tablet, Take 25 mg at PM for one week, then 25 mg AM and 25 mg PM or may take 50 mg PM if tolerated, then 25 mg AM and 50 mg PM., Disp: 90 tablet, Rfl: 3  No current facility-administered medications for this visit.     Facility-Administered Medications Ordered in Other Visits:      gadobutrol (GADAVIST) injection 7.5 mL, 7.5 mL, Intravenous, Once, Ann Sharp MD    Family history  Denies family history of diabetes, thyroid disorders or cancer.    Social History  He denies smoking, drinking alcohol or using illicit drugs.  Quit [employer] in 8/2016.  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    Review of Systems   Systemic:              No fatigue; weight up 10 lbs or so - he has been exercicing 5  "days a week - treadmill for 10 minutes and weights for one hour   Eye:                      Stable visual impairement  Jay-Laryngeal:     no dysphagia, no hoarseness, no cough   Breast:                  No breast symptoms  Cardiovascular:    No cardiovascular symptoms, no CP or palpitations   Pulmonary:           No SOB    Gastrointestinal:   No no N/V, no diarrhea or constipation   Genitourinary:       As above  Endocrine:            No heat or cold intolerance. Denies night sweats or hot flashes.  No erectile dysfunction.  No changes of the facial hair.  Neurological:        no tremor, no numbness or tingling sensation  Musculoskeletal:  No musculoskeletal symptoms, no muscle or joint pain   Skin:                     No skin symptoms, no dry skin, no hair falling out   Psychological:      Denies depression or anxiety               Vital Signs     Previous Weights:    Wt Readings from Last 10 Encounters:   10/01/18 82.1 kg (181 lb)   09/27/18 80.6 kg (177 lb 9.6 oz)   08/13/18 82.6 kg (182 lb 1.6 oz)   05/14/18 78.3 kg (172 lb 9.6 oz)   05/10/18 77.1 kg (170 lb)   04/12/18 74.1 kg (163 lb 4.8 oz)   03/19/18 77.1 kg (169 lb 14.4 oz)   12/04/17 73.5 kg (162 lb)   11/22/17 74.5 kg (164 lb 3.2 oz)   10/16/17 75.3 kg (166 lb)        /79  Pulse 88  Ht 1.727 m (5' 7.99\")  Wt 82.1 kg (181 lb)  BMI 27.53 kg/m2    Physical Exam  General Appearance: he is well developed, well nourished and in no distress     Mild B/L gynecomastia, 3-4 cm in diameter   Eyes:  conjutivae are normal                                    extraocular movements - impaired upward gaze left eye   HEENT:   oropharynx clear and moist, no JVD, no bruits      no thyromegaly, no palpable nodules  Cardiovascular:  regular rhythm, no murmurs, distal pulse palpable, no edema  Respiratory:        chest clear, no rales, no rhonchi   Gastrointestinal:  abdomen soft, non-tender, non-distended, normal bowel sounds,    no organomegaly  Musculoskeletal:  " normal tone and strength  Psychological:          affect and judgment normal  Skin:  pale, normal axillary hair, no gynecomastia; mild area of depigmentation R upper abd quadrant - birthmark   Neurological:  reflexes normal and symmetric, no resting tremor.     Labs:   I reviewed prior lab results documented in EPIC.     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 He is now s/p chemo and radiation therapy and there is no evidence of tumor recurrence on the most recent MRI from May 2018. AFP has remained stable and and Beta HCG has been undetectable since 2015.     The patient endorses increased urination for the last month. Clinically, he doesn't endorse any other signs or symptoms suggestive of pituitary dysfunction. He is scheduled to have labs and f/up CT this month.     Recommendations:   - check Na level today, 2-3 hrs after taking the morning dose of desmopressin;   - in view of the prior h/o radiation therapy, reevaluate the pituitary function, although the patient is asymptomatic. The patient was instructed to have the labs done a couple of hrs after waking up in the morning.   - check fasting BG with his next labs.     Orders Placed This Encounter   Procedures     TSH     T4 free     Testosterone Free and Total     Follicle stimulating hormone     Lutropin     Cortisol     Basic metabolic panel     Basic metabolic panel

## 2018-10-02 DIAGNOSIS — R51.9 CHRONIC DAILY HEADACHE: ICD-10-CM

## 2018-10-03 RX ORDER — NORTRIPTYLINE HCL 10 MG
CAPSULE ORAL
Qty: 30 CAPSULE | Refills: 3 | Status: SHIPPED | OUTPATIENT
Start: 2018-10-03 | End: 2018-12-01

## 2018-10-10 ENCOUNTER — RADIANT APPOINTMENT (OUTPATIENT)
Dept: CT IMAGING | Facility: CLINIC | Age: 28
End: 2018-10-10
Attending: INTERNAL MEDICINE
Payer: COMMERCIAL

## 2018-10-10 DIAGNOSIS — E23.2 DIABETES INSIPIDUS (H): ICD-10-CM

## 2018-10-10 DIAGNOSIS — C71.9 GERM CELL TUMOR OF BRAIN (H): ICD-10-CM

## 2018-10-10 DIAGNOSIS — K62.5 BRIGHT RED BLOOD PER RECTUM: ICD-10-CM

## 2018-10-10 DIAGNOSIS — D44.5 PINEAL GERM CELL TUMOR (H): ICD-10-CM

## 2018-10-10 DIAGNOSIS — Z92.3 PERSONAL HISTORY OF RADIATION EXPOSURE: ICD-10-CM

## 2018-10-10 LAB
AFP SERPL-MCNC: 2.1 UG/L (ref 0–8)
ALBUMIN SERPL-MCNC: 3.8 G/DL (ref 3.4–5)
ALP SERPL-CCNC: 85 U/L (ref 40–150)
ALT SERPL W P-5'-P-CCNC: 37 U/L (ref 0–70)
ANION GAP SERPL CALCULATED.3IONS-SCNC: 7 MMOL/L (ref 3–14)
AST SERPL W P-5'-P-CCNC: 27 U/L (ref 0–45)
BASOPHILS # BLD AUTO: 0 10E9/L (ref 0–0.2)
BASOPHILS NFR BLD AUTO: 0.7 %
BILIRUB SERPL-MCNC: 0.5 MG/DL (ref 0.2–1.3)
BUN SERPL-MCNC: 14 MG/DL (ref 7–30)
CALCIUM SERPL-MCNC: 8.2 MG/DL (ref 8.5–10.1)
CHLORIDE SERPL-SCNC: 103 MMOL/L (ref 94–109)
CO2 SERPL-SCNC: 24 MMOL/L (ref 20–32)
CORTIS SERPL-MCNC: 8.3 UG/DL (ref 4–22)
CREAT SERPL-MCNC: 1.08 MG/DL (ref 0.66–1.25)
DIFFERENTIAL METHOD BLD: NORMAL
EOSINOPHIL # BLD AUTO: 0.1 10E9/L (ref 0–0.7)
EOSINOPHIL NFR BLD AUTO: 2 %
ERYTHROCYTE [DISTWIDTH] IN BLOOD BY AUTOMATED COUNT: 13.7 % (ref 10–15)
FSH SERPL-ACNC: 7.7 IU/L (ref 0.7–10.8)
GFR SERPL CREATININE-BSD FRML MDRD: 81 ML/MIN/1.7M2
GLUCOSE SERPL-MCNC: 80 MG/DL (ref 70–99)
HCT VFR BLD AUTO: 44.6 % (ref 40–53)
HGB BLD-MCNC: 14.2 G/DL (ref 13.3–17.7)
IMM GRANULOCYTES # BLD: 0 10E9/L (ref 0–0.4)
IMM GRANULOCYTES NFR BLD: 0.3 %
LDH SERPL L TO P-CCNC: 242 U/L (ref 85–227)
LH SERPL-ACNC: 5.2 IU/L (ref 1.5–9.3)
LYMPHOCYTES # BLD AUTO: 1.2 10E9/L (ref 0.8–5.3)
LYMPHOCYTES NFR BLD AUTO: 19.8 %
MCH RBC QN AUTO: 27 PG (ref 26.5–33)
MCHC RBC AUTO-ENTMCNC: 31.8 G/DL (ref 31.5–36.5)
MCV RBC AUTO: 85 FL (ref 78–100)
MONOCYTES # BLD AUTO: 0.5 10E9/L (ref 0–1.3)
MONOCYTES NFR BLD AUTO: 8.6 %
NEUTROPHILS # BLD AUTO: 4 10E9/L (ref 1.6–8.3)
NEUTROPHILS NFR BLD AUTO: 68.6 %
NRBC # BLD AUTO: 0 10*3/UL
NRBC BLD AUTO-RTO: 0 /100
PLATELET # BLD AUTO: 220 10E9/L (ref 150–450)
POTASSIUM SERPL-SCNC: 3.9 MMOL/L (ref 3.4–5.3)
PROT SERPL-MCNC: 7.3 G/DL (ref 6.8–8.8)
RBC # BLD AUTO: 5.25 10E12/L (ref 4.4–5.9)
SODIUM SERPL-SCNC: 134 MMOL/L (ref 133–144)
T4 FREE SERPL-MCNC: 0.95 NG/DL (ref 0.76–1.46)
TSH SERPL DL<=0.005 MIU/L-ACNC: 1.18 MU/L (ref 0.4–4)
WBC # BLD AUTO: 5.9 10E9/L (ref 4–11)

## 2018-10-10 RX ORDER — IOPAMIDOL 755 MG/ML
111 INJECTION, SOLUTION INTRAVASCULAR ONCE
Status: COMPLETED | OUTPATIENT
Start: 2018-10-10 | End: 2018-10-10

## 2018-10-10 RX ADMIN — IOPAMIDOL 111 ML: 755 INJECTION, SOLUTION INTRAVASCULAR at 09:50

## 2018-10-10 NOTE — DISCHARGE INSTRUCTIONS

## 2018-10-11 LAB
HCG-TM SERPL-ACNC: <3 IU/L
SHBG SERPL-SCNC: 17 NMOL/L (ref 11–80)
TESTOST FREE SERPL-MCNC: 7.41 NG/DL (ref 4.7–24.4)
TESTOST SERPL-MCNC: 274 NG/DL (ref 240–950)

## 2018-10-12 LAB — IGF-I BLD-MCNC: 134 NG/ML (ref 87–255)

## 2018-10-12 RX ORDER — DESMOPRESSIN ACETATE 0.1 MG/1
TABLET ORAL
Qty: 135 TABLET | Refills: 3 | Status: SHIPPED | OUTPATIENT
Start: 2018-10-12 | End: 2020-04-23

## 2018-10-12 NOTE — PROGRESS NOTES
Called patient with lab results.  He continues to urinate during the night.  No other symptoms.  The sodium level checked 2 hours after taking the morning dose of desmopressin, of 200 mcg, was 134. He continues to experience nocturia.  Drinking habits have remained unchanged.  I recommended only to increase the evening dose of desmopressin minimally, to 250 mcg, to see if this corrects the polyuria.  I reviewed with him the signs and symptoms of hyponatremia: Worsening headaches, nausea, difficulty concentrating, etc.

## 2018-10-24 ENCOUNTER — ONCOLOGY VISIT (OUTPATIENT)
Dept: ONCOLOGY | Facility: CLINIC | Age: 28
End: 2018-10-24
Attending: INTERNAL MEDICINE
Payer: COMMERCIAL

## 2018-10-24 VITALS
HEART RATE: 94 BPM | TEMPERATURE: 97.8 F | SYSTOLIC BLOOD PRESSURE: 127 MMHG | BODY MASS INDEX: 27.04 KG/M2 | OXYGEN SATURATION: 99 % | WEIGHT: 177.8 LBS | DIASTOLIC BLOOD PRESSURE: 79 MMHG

## 2018-10-24 DIAGNOSIS — C71.9 GERM CELL TUMOR OF BRAIN (H): Primary | ICD-10-CM

## 2018-10-24 PROCEDURE — G0463 HOSPITAL OUTPT CLINIC VISIT: HCPCS | Mod: ZF

## 2018-10-24 PROCEDURE — 99214 OFFICE O/P EST MOD 30 MIN: CPT | Mod: ZP | Performed by: INTERNAL MEDICINE

## 2018-10-24 ASSESSMENT — PAIN SCALES - GENERAL: PAINLEVEL: NO PAIN (0)

## 2018-10-24 NOTE — MR AVS SNAPSHOT
After Visit Summary   10/24/2018    Kari Turcios    MRN: [id number]           Patient Information     Date Of Birth          1990        Visit Information        Provider Department      10/24/2018 5:00 PM Cheikh Ervin MD [facility]         Follow-ups after your visit        Your next 10 appointments already scheduled     Oct 08, 2018 12:30 PM CDT   (Arrive by 12:15 PM)   Return Visit with CASPER Jones [facility] ([facility])    [address] [place] [address]  3rd [facility] [id number] [phone]            Oct 10, 2018 11:30 AM CDT   Lab with  LAB   [facility] ([facility])    [address] [place] [id number] [phone]            Oct 10, 2018 12:00 PM CDT   CT CHEST/ABDOMEN/PELVIS W CONTRAST with UCCT2   [facility] CT ([facility])    [address] [place] [id number] [phone]           How do I prepare for my exam? (Food and drink instructions) To prepare: Do not eat or drink for 2 hours before your exam. If you need to take medicine, you may take it with small sips of water. (We may ask you to take liquid medicine as well.)  How do I prepare for my exam? (Other instructions) Please arrive 30 minutes early for your CT.  Once in the department you might be asked to drink water 15-20 minutes prior to your exam.  If indicated you may be asked to drink an oral contrast in advance of your CT.  If this is the case, the imaging team will let you know or be in contact with you prior to your appointment  Patients over 70 or patients with diabetes or kidney problems: If you haven t had a blood test (creatinine test) within the last 30 days, the Cardiologist/Radiologist may require you to get this test prior to your exam.  If you have diabetes:  Continue to take your metformin  medication on the day of your exam  What should I wear: Please wear loose clothing, such as a sweat suit or jogging clothes. Avoid snaps, zippers and other metal. We may ask you to undress and put on a hospital gown.  How long does the exam take: Most scans take less than 20 minutes.  What should I bring: Please bring any scans or X-rays taken at other hospitals, if similar tests were done. Also bring a list of your medicines, including vitamins, minerals and over-the-counter drugs. It is safest to leave personal items at home.  Do I need a : No  is needed.  What do I need to tell my doctor? Be sure to tell your doctor: * If you have any allergies. * If there s any chance you are pregnant. * If you are breastfeeding.  What should I do after the exam: No restrictions, You may resume normal activities.  What is this test: A CT (computed tomography) scan is a series of pictures that allows us to look inside your body. The scanner creates images of the body in cross sections, much like slices of bread. This helps us see any problems more clearly. You may receive contrast (X-ray dye) before or during your scan. You will be asked to drink the contrast.  Who should I call with questions: If you have any questions, please call the Imaging Department where you will have your exam. Directions, parking instructions, and other information is available on our website, [email or web address].            Oct 24, 2018  5:00 PM CDT   (Arrive by 4:45 PM)   Return Visit with Cheikh Ervin MD   [facility] ([facility])    [address]  [address]  [facility] [address] [phone]              Who to contact     If you have questions or need follow up information about today's clinic visit or your schedule please contact [facility] directly at [phone].  Normal or non-critical lab and imaging results will be communicated to you by Anitha  letter or phone within 4 business days after the clinic has received the results. If you do not hear from us within 7 days, please contact the clinic through P[employer] or phone. If you have a critical or abnormal lab result, we will notify you by phone as soon as possible.  Submit refill requests through Pairy or call your pharmacy and they will forward the refill request to us. Please allow 3 business days for your refill to be completed.          Additional Information About Your Visit        [employer] Information     [employer] gives you secure access to your electronic health record. If you see a primary care provider, you can also send messages to your care team and make appointments. If you have questions, please call your primary care clinic.  If you do not have a primary care provider, please call [phone] and they will assist you.        Care EveryWhere ID     This is your Care EveryWhere ID. This could be used by other organizations to access your [facility] medical records  [id number]         Blood Pressure from Last 3 Encounters:   08/13/18 132/89   05/14/18 132/85   05/10/18 130/85    Weight from Last 3 Encounters:   08/13/18 82.6 kg (182 lb 1.6 oz)   05/14/18 78.3 kg (172 lb 9.6 oz)   05/10/18 77.1 kg (170 lb)              Today, you had the following     No orders found for display       Primary Care Provider Office Phone # Fax #    Ila Cornell [phone]       [facility] [address] [address]        Equal Access to Services     JULIANNE COLLAZO : Hadii aad ku hadasho Soomaali, waaxda luqadaha, qaybta kaalmada adeegyada, frank alvarez. So [facility] [phone].    ATENCIÓN: Si habla español, tiene a gaffney disposición servicios gratuitos de asistencia lingüística. Llame al [phone].    We comply with applicable federal civil rights laws and [place] laws. We do not discriminate on the basis of race, color, national origin, age,  disability, sex, sexual orientation, or gender identity.            Thank you!     Thank you for choosing [facility]  for your care. Our goal is always to provide you with excellent care. Hearing back from our patients is one way we can continue to improve our services. Please take a few minutes to complete the written survey that you may receive in the mail after your visit with us. Thank you!             Your Updated Medication List - Protect others around you: Learn how to safely use, store and throw away your medicines at www.disposemymeds.org.          This list is accurate as of 9/14/18  2:49 PM.  Always use your most recent med list.                   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)       levETIRAcetam 750 MG tablet    KEPPRA    60 tablet    Take 1,500 mg by mouth every morning Two tablet in am and 2 1/2 tablet in pm    Pineal tumor       LORazepam 0.5 MG tablet    ATIVAN    30 tablet    Take 1 tablet (0.5 mg) by mouth every 4 hours as needed (Anxiety, Nausea/Vomiting or Sleep)    Pineal tumor       melatonin 1 MG Tabs tablet     90 tablet    Take 1-2 tablets (1-2 mg) by mouth nightly as needed for sleep    Pineal tumor       nortriptyline 10 MG capsule    PAMELOR    30 capsule    TAKE ONE TAB BY MOUTH EVERY MORNING AND ONE TAB AT BEDTIME    Chronic daily headache       topiramate 25 MG  tablet    TOPAMAX    90 tablet    Take 25 mg at PM for one week, then 25 mg AM and 25 mg PM or may take 50 mg PM if tolerated, then 25 mg AM and 50 mg PM.    Intractable chronic migraine without aura and without status migrainosus       VISINE TEARS OP      Apply 1-2 drops to eye 2 times daily as needed (for dry eyes.)        * Notice:  This list has 2 medication(s) that are the same as other medications prescribed for you. Read the directions carefully, and ask your doctor or other care provider to review them with you.

## 2018-10-24 NOTE — PROGRESS NOTES
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 He was transferred to [facility] for neurosurgical management. A biopsy was performed on 3/20 with placement of third ventriculostomy and external ventricular drain. The biopsy showed a mixed germ cell tumor. Resection of the mass was performed on 3/26/14 by a midline combined occipital and suboccipital craniotomy performed by Trinidad Xiao and Urbano. It was R1 resection and most of the macroscopic disease was resected and pathology did reveal mixed germ cell tumor. He was seen by Medical Oncology (Cheikh Ervin) and Radiation Oncology (Dr. Kayla Song) and recommended chemotherapy followed by radiation therapy per the [facility] protocol for localized CNS Germ Cell Tumors.  He basically is following the stratum 1 for patients with localized non-germinomatous tumors to receive 6 cycles of chemotherapy (1, 3 & 5 -carbo/etoposide and 2, 4, & 6 are ifos/etoposide) and depending post chemotherapy radiographic (MRI) and biochemical response (tumor markers), he may be a candidate to receive less radiation therapy. He completed chemo 8/11/14 and follow up brain MRI was negative for residual/recurrence of disease. HCG and AFP markers were within normal limits (AFP was elevated at diagnosis). He received radiation to the ventricles followed by a boost (3000 cGy to ventricles and 5400 cGy to tumor bed) from 9/17/2014 through 10/28/14.    VIVI Roca  (Kari) is here for his follow up of his primary CNS germ cell tumor    Abelino comes alone for this clinic visit. He has been doing well and has no new complains. He is active and has been going out for running and lifting weights. He cannot play soccer any more. He denies any rectal bleeding since last visit.        PAST MEDICAL HISTORY     1. Primary CNS germ cell tumor as detailed above.   2. Secondary Diabetes Insipidus  3. Secondary seizure disorder      CURRENT OUTPATIENT MEDICATIONS     Current Outpatient Prescriptions   Medication Sig     ACETAMINOPHEN PO Take 650 mg by mouth every 4 hours as needed for pain     desmopressin (DDAVP) 0.1 MG tablet Take one tablet at lunch and half tablet at bedtime.     desmopressin (DDAVP) 0.2 MG tablet Take 1 tablet (200 mcg) by mouth 2 times daily     Glycerin-Hypromellose- (VISINE TEARS OP) Apply 1-2 drops to eye 2 times daily as needed (for dry eyes.)     levETIRAcetam (KEPPRA) 750 MG tablet Take 1,500 mg by mouth every morning Two tablet in am and 2 1/2 tablet in pm     LORazepam (ATIVAN) 0.5 MG tablet Take 1 tablet (0.5 mg) by mouth every 4 hours as needed (Anxiety, Nausea/Vomiting or Sleep)     Botulinum Toxin Type A (BOTOX) 200 units injection Inject 155 Units into the muscle every 3 months (Patient not taking: Reported on 10/24/2018)     melatonin 1 MG TABS Take 1-2 tablets (1-2 mg) by mouth nightly as needed for sleep (Patient not taking: Reported on 10/24/2018)     nortriptyline (PAMELOR) 10 MG capsule TAKE ONE TAB BY MOUTH EVERY MORNING AND ONE TAB AT BEDTIME (Patient not taking: Reported on 10/24/2018)     topiramate (TOPAMAX) 25 MG tablet Take 25 mg at PM for one week, then 25 mg AM and 25 mg PM or may take 50 mg PM if tolerated, then 25 mg AM and 50 mg PM. (Patient not taking: Reported on 10/24/2018)     No current facility-administered medications for this visit.      Facility-Administered Medications Ordered in Other Visits   Medication     gadobutrol  (GADAVIST) injection 7.5 mL           ALLERGIES     Allergies   Allergen Reactions     Penicillins Hives        REVIEW OF SYSTEMS   10 point ROS negative unless noted in HPI     PHYSICAL EXAM   /79  Pulse 94  Temp 97.8  F (36.6  C) (Oral)  Wt 80.6 kg (177 lb 12.8 oz)  SpO2 99%  BMI 27.04 kg/m2    Wt Readings from Last 4 Encounters:   10/24/18 80.6 kg (177 lb 12.8 oz)   10/01/18 82.1 kg (181 lb)   09/27/18 80.6 kg (177 lb 9.6 oz)   08/13/18 82.6 kg (182 lb 1.6 oz)   GEN: NAD, appears well, alert. His affect is somewhat flat.  HEENT: PERRL, EOMI, no icterus, injection or pallor. Mild esotropia of the left eye. Oropharynx is clear.  NECK: no cervical or supraclavicular lymphadenopathy  LUNGS: clear bilaterally  CV: RRR, no murmurs, rubs, or gallops  ABDOMEN: soft, non-tender, non-distended, normal bowel sounds, no hepatosplenomegaly by percussion or palpation.  Small 1 inch incision from shunt catheter  EXT: warm, well perfused, no edema  NEURO: alert, no obvious focal deficit other than esotropia of left eye  SKIN: no rashes     LABORATORY AND IMAGING STUDIES     Recent Labs   Lab Test  10/10/18   1008  04/12/18   1707  03/19/18   0833  12/04/17   1553  10/16/17   1529   NA  134  137  137  140  136   POTASSIUM  3.9  4.1  4.3  3.8  3.9   CHLORIDE  103  103  102  104  104   CO2  24  29  31  30  25   ANIONGAP  7  6  4  6  7   BUN  14  10  10  11  9   CR  1.08  0.93  0.93  0.93  0.92   GLC  80  102*  97  114*  94   DAVIDE  8.2*  9.1  9.5  9.0  9.6     Recent Labs   Lab Test  08/18/14   1305  08/15/14   0725  08/14/14   0801  08/13/14   0619  08/12/14   0713   MAG  1.8  1.7  1.9  1.8  1.9   PHOS  2.7  3.2  4.3  3.8  4.0     Recent Labs   Lab Test  10/10/18   1008  04/12/18   1707  12/04/17   1553  10/16/17   1529  04/17/17   1613   WBC  5.9  6.3  10.0  7.1  5.4   HGB  14.2  15.5  14.2  14.7  12.9*   PLT  220  248  226  238  188   MCV  85  83  86  83  84   NEUTROPHIL  68.6  69.7  78.7  70.2  78.5     Recent Labs    Lab Test  10/10/18   1008  04/12/18   1707  12/04/17   1553   BILITOTAL  0.5  0.3  0.2   ALKPHOS  85  90  73   ALT  37  26  28   AST  27  18  14   ALBUMIN  3.8  4.5  4.2   LDH  242*  181  163     TSH   Date Value Ref Range Status   10/10/2018 1.18 0.40 - 4.00 mU/L Final   11/03/2014 1.36 0.40 - 4.00 mU/L Final     Comment:     Effective 7/30/2014, the reference range for this assay has changed to reflect   new instrumentation/methodology.     04/18/2014 2.18 0.4 - 5.0 mU/L Final     No results for input(s): CEA in the last 46013 hours.  Results for orders placed or performed in visit on 10/10/18   CT Chest/Abdomen/Pelvis w Contrast    Narrative    EXAMINATION: CT CHEST/ABDOMEN/PELVIS W CONTRAST, 10/10/2018 9:59 AM    TECHNIQUE:  Helical CT images from the thoracic inlet through the  symphysis pubis were obtained  with contrast. Contrast dose: 111mL  Isovue-370    COMPARISON: CT 4/11/2018    HISTORY: ; Germ cell tumor of brain (H)    FINDINGS:    Chest: Normal appearance of the thyroid. Heart is not enlarged. No  pericardial effusion. No main or lobar pulmonary embolism. A few tiny  mediastinal nodes are again seen, unchanged. For example a right  paratracheal node on series 3, image 50. No thoracic lymphadenopathy.  Right-sided ventriculoperitoneal shunt catheter courses through the  anterior subcutaneous soft tissues of the right chest wall. Trachea  and central airways are clear. No pulmonary parenchymal consolidation  or pleural effusion. No suspicious pulmonary nodule. Increased  attenuation in the anterior superior mediastinum, potentially thymic  rebound.    Abdomen and pelvis:   Ventricular peritoneal shunt catheter enters the perineal cavity in  the right mid abdomen, with tip in the right hemipelvis. No adjacent  fluid collection to suggest CSF-claudy.    Focal steatosis adjacent to the fossa for ligamentum teres. Otherwise  normal appearance of the liver. Hepatic and portal venous systems  are  patent.Gallbladder is normal; no dense stone.  Normal appearance of  the spleen. Small left renal cysts, unchanged. Negative adrenal  glands. Pancreas is normal. Main pancreatic duct is not dilated. No  abdominal aortic aneurysm.  There is no bowel obstruction. Normal  appendix. No abdominal pelvic lymphadenopathy. Urinary bladder is  incompletely distended. Trace fluid in the deep pelvis is again seen.    Bones and soft tissues: No suspicious lesions in the bones. Mottled  sclerotic appearance of the femoral heads could be secondary to AVM.  This is unchanged.      Impression    IMPRESSION:   1. No convincing evidence of metastatic disease in the chest abdomen or pelvis.  2. Trace fluid is again seen in the deep pelvis, likely explained by  the presence of a ventriculoperitoneal shunt catheter.    ARNALDO ADAME MD     Recent Labs   Lab Test  10/10/18   1008  04/12/18   1707  12/04/17   1553  10/16/17   1529  04/17/17   1613   FETO  2.1  1.8  2.2  2.4  2.2   HCGTM  <3  <3  <3  <3  <3   LDH  242*  181  163  238*  136          ASSESSMENT AND PLAN   1. 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 DI- taking desmopressin  3. Seizure episode after cycle 3  4. Prior provoked PE, off lovenox    ONC: S/p resection and 6 cycles of chemotherapy  (1, 3 & 5 -carbo/etoposide and 2, 4, & 6 were ifos/etoposide). MRI brain and tumor markers showed no recurrence/residual disease. He received radiation to the ventricles followed by a boost (3000 cGy to ventricles and 5400 cGy to tumor bed) from 9/17/2014 through 10/28/14.    - Restaging MRI done in May 2018 was negative for any evidence of recurrent disease  - Restaging CT scan Chest/abd/pelvis done last week. I have reviewed actual images and the scan remains negative for recurrent metastatic disease  - His tumor markers are normal except for a  marginally elevated LDH which is non-specific and not clinically significant.   - I will follow him in 6 months with tumor markers (AFP, LDH and HCG) and brain MRI     Depression:   He has been following his pscyhologist.   He is doing much better now    PE: discontinued lovenox after 6 months of therapy (provoked clot)    ENDO:  Diabetes insipidus:  -Continue desmopressin, FU Dr. Yessenia Ramires    NEURO:    -H/o Seizures: on keppra,  -Esotropia with diplopia: stable. FU opthalmology   - His keppra dose has been adjusted    Over 25 min of direct face to face time spent with patient with more than 50% time spent in counseling and coordinating care.

## 2018-10-24 NOTE — NURSING NOTE
"Oncology Rooming Note    October 24, 2018 4:29 PM   Kari Turcios is a 28 year old male who presents for:    Chief Complaint   Patient presents with     Oncology Clinic Visit     Return; Germ Cell Tumor of the Brain 6 Month F/U     Initial Vitals: /79  Pulse 94  Temp 97.8  F (36.6  C) (Oral)  Wt 80.6 kg (177 lb 12.8 oz)  SpO2 99%  BMI 27.04 kg/m2 Estimated body mass index is 27.04 kg/(m^2) as calculated from the following:    Height as of 10/1/18: 1.727 m (5' 7.99\").    Weight as of this encounter: 80.6 kg (177 lb 12.8 oz). Body surface area is 1.97 meters squared.  No Pain (0) Comment: Data Unavailable   No LMP for male patient.  Allergies reviewed: Yes  Medications reviewed: Yes    Medications: Medication refills not needed today.  Pharmacy name entered into [employer]: [id number] [place], [place] - [address]    Clinical concerns: No Concerns [facility] was NOT notified.    7 minutes for nursing intake (face to face time)     Lynn Gauthier MA              "

## 2018-10-24 NOTE — LETTER
10/24/2018       RE: Kari Turcios  [address]  [place] [address]     Dear Colleague,    Thank you for referring your patient, Kari Turcios, to the [facility]. Please see a copy of my visit note below.    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 He was transferred to [facility] for neurosurgical management. A biopsy was performed on 3/20 with placement of third ventriculostomy and external ventricular drain. The biopsy showed a mixed germ cell tumor. Resection of the mass was performed on 3/26/14 by a midline combined occipital and suboccipital craniotomy performed by Trinidad Xiao and Urbano. It was R1 resection and most of the macroscopic disease was resected and pathology did reveal mixed germ cell tumor. He was seen by Medical Oncology (Cheikh Ervin) and Radiation Oncology (Dr. Kayla Song) and recommended chemotherapy followed by radiation therapy per the [facility] protocol for localized CNS Germ Cell Tumors.  He basically is following the stratum 1 for patients with localized non-germinomatous tumors to receive 6 cycles of chemotherapy (1, 3 & 5 -carbo/etoposide and 2, 4, & 6 are ifos/etoposide) and depending post chemotherapy radiographic (MRI) and biochemical response (tumor markers), he may be a candidate to receive less radiation therapy. He completed chemo 8/11/14 and follow up brain MRI was negative for residual/recurrence of disease.  HCG and AFP markers were within normal limits (AFP was elevated at diagnosis). He received radiation to the ventricles followed by a boost (3000 cGy to ventricles and 5400 cGy to tumor bed) from 9/17/2014 through 10/28/14.    SUBJECTIVE   Abelino ([facility]) is here for his follow up of his primary CNS germ cell tumor    Abelino comes alone for this clinic visit. He has been doing well and has no new complains. He is active and has been going out for running and lifting weights. He cannot play soccer any more. He denies any rectal bleeding since last visit.        PAST MEDICAL HISTORY     1. Primary CNS germ cell tumor as detailed above.   2. Secondary Diabetes Insipidus  3. Secondary seizure disorder      CURRENT OUTPATIENT MEDICATIONS     Current Outpatient Prescriptions   Medication Sig     ACETAMINOPHEN PO Take 650 mg by mouth every 4 hours as needed for pain     desmopressin (DDAVP) 0.1 MG tablet Take one tablet at lunch and half tablet at bedtime.     desmopressin (DDAVP) 0.2 MG tablet Take 1 tablet (200 mcg) by mouth 2 times daily     Glycerin-Hypromellose- (VISINE TEARS OP) Apply 1-2 drops to eye 2 times daily as needed (for dry eyes.)     levETIRAcetam (KEPPRA) 750 MG tablet Take 1,500 mg by mouth every morning Two tablet in am and 2 1/2 tablet in pm     LORazepam (ATIVAN) 0.5 MG tablet Take 1 tablet (0.5 mg) by mouth every 4 hours as needed (Anxiety, Nausea/Vomiting or Sleep)     Botulinum Toxin Type A (BOTOX) 200 units injection Inject 155 Units into the muscle every 3 months (Patient not taking: Reported on 10/24/2018)     melatonin 1 MG TABS Take 1-2 tablets (1-2 mg) by mouth nightly as needed for sleep (Patient not taking: Reported on 10/24/2018)     nortriptyline (PAMELOR) 10 MG capsule TAKE ONE TAB BY MOUTH EVERY MORNING AND ONE TAB AT BEDTIME (Patient not taking: Reported on 10/24/2018)     topiramate (TOPAMAX) 25 MG tablet Take 25 mg at PM for one week, then 25 mg AM and 25 mg PM or may take 50 mg  PM if tolerated, then 25 mg AM and 50 mg PM. (Patient not taking: Reported on 10/24/2018)     No current facility-administered medications for this visit.      Facility-Administered Medications Ordered in Other Visits   Medication     gadobutrol (GADAVIST) injection 7.5 mL           ALLERGIES     Allergies   Allergen Reactions     Penicillins Hives        REVIEW OF SYSTEMS   10 point ROS negative unless noted in HPI     PHYSICAL EXAM   /79  Pulse 94  Temp 97.8  F (36.6  C) (Oral)  Wt 80.6 kg (177 lb 12.8 oz)  SpO2 99%  BMI 27.04 kg/m2    Wt Readings from Last 4 Encounters:   10/24/18 80.6 kg (177 lb 12.8 oz)   10/01/18 82.1 kg (181 lb)   09/27/18 80.6 kg (177 lb 9.6 oz)   08/13/18 82.6 kg (182 lb 1.6 oz)   GEN: NAD, appears well, alert. His affect is somewhat flat.  HEENT: PERRL, EOMI, no icterus, injection or pallor. Mild esotropia of the left eye. Oropharynx is clear.  NECK: no cervical or supraclavicular lymphadenopathy  LUNGS: clear bilaterally  CV: RRR, no murmurs, rubs, or gallops  ABDOMEN: soft, non-tender, non-distended, normal bowel sounds, no hepatosplenomegaly by percussion or palpation.  Small 1 inch incision from shunt catheter  EXT: warm, well perfused, no edema  NEURO: alert, no obvious focal deficit other than esotropia of left eye  SKIN: no rashes     LABORATORY AND IMAGING STUDIES     Recent Labs   Lab Test  10/10/18   1008  04/12/18   1707  03/19/18   0833  12/04/17   1553  10/16/17   1529   NA  134  137  137  140  136   POTASSIUM  3.9  4.1  4.3  3.8  3.9   CHLORIDE  103  103  102  104  104   CO2  24  29  31  30  25   ANIONGAP  7  6  4  6  7   BUN  14  10  10  11  9   CR  1.08  0.93  0.93  0.93  0.92   GLC  80  102*  97  114*  94   DAVIDE  8.2*  9.1  9.5  9.0  9.6     Recent Labs   Lab Test  08/18/14   1305  08/15/14   0725  08/14/14   0801  08/13/14   0619  08/12/14   0713   MAG  1.8  1.7  1.9  1.8  1.9   PHOS  2.7  3.2  4.3  3.8  4.0     Recent Labs   Lab Test  10/10/18   1008   04/12/18   1707  12/04/17   1553  10/16/17   1529  04/17/17   1613   WBC  5.9  6.3  10.0  7.1  5.4   HGB  14.2  15.5  14.2  14.7  12.9*   PLT  220  248  226  238  188   MCV  85  83  86  83  84   NEUTROPHIL  68.6  69.7  78.7  70.2  78.5     Recent Labs   Lab Test  10/10/18   1008  04/12/18   1707  12/04/17   1553   BILITOTAL  0.5  0.3  0.2   ALKPHOS  85  90  73   ALT  37  26  28   AST  27  18  14   ALBUMIN  3.8  4.5  4.2   LDH  242*  181  163     TSH   Date Value Ref Range Status   10/10/2018 1.18 0.40 - 4.00 mU/L Final   11/03/2014 1.36 0.40 - 4.00 mU/L Final     Comment:     Effective 7/30/2014, the reference range for this assay has changed to reflect   new instrumentation/methodology.     04/18/2014 2.18 0.4 - 5.0 mU/L Final     No results for input(s): CEA in the last 94240 hours.  Results for orders placed or performed in visit on 10/10/18   CT Chest/Abdomen/Pelvis w Contrast    Narrative    EXAMINATION: CT CHEST/ABDOMEN/PELVIS W CONTRAST, 10/10/2018 9:59 AM    TECHNIQUE:  Helical CT images from the thoracic inlet through the  symphysis pubis were obtained  with contrast. Contrast dose: 111mL  Isovue-370    COMPARISON: CT 4/11/2018    HISTORY: ; Germ cell tumor of brain (H)    FINDINGS:    Chest: Normal appearance of the thyroid. Heart is not enlarged. No  pericardial effusion. No main or lobar pulmonary embolism. A few tiny  mediastinal nodes are again seen, unchanged. For example a right  paratracheal node on series 3, image 50. No thoracic lymphadenopathy.  Right-sided ventriculoperitoneal shunt catheter courses through the  anterior subcutaneous soft tissues of the right chest wall. Trachea  and central airways are clear. No pulmonary parenchymal consolidation  or pleural effusion. No suspicious pulmonary nodule. Increased  attenuation in the anterior superior mediastinum, potentially thymic  rebound.    Abdomen and pelvis:   Ventricular peritoneal shunt catheter enters the perineal cavity in  the right mid  abdomen, with tip in the right hemipelvis. No adjacent  fluid collection to suggest CSF-claudy.    Focal steatosis adjacent to the fossa for ligamentum teres. Otherwise  normal appearance of the liver. Hepatic and portal venous systems are  patent.Gallbladder is normal; no dense stone.  Normal appearance of  the spleen. Small left renal cysts, unchanged. Negative adrenal  glands. Pancreas is normal. Main pancreatic duct is not dilated. No  abdominal aortic aneurysm.  There is no bowel obstruction. Normal  appendix. No abdominal pelvic lymphadenopathy. Urinary bladder is  incompletely distended. Trace fluid in the deep pelvis is again seen.    Bones and soft tissues: No suspicious lesions in the bones. Mottled  sclerotic appearance of the femoral heads could be secondary to AVM.  This is unchanged.      Impression    IMPRESSION:   1. No convincing evidence of metastatic disease in the chest abdomen or pelvis.  2. Trace fluid is again seen in the deep pelvis, likely explained by  the presence of a ventriculoperitoneal shunt catheter.    ARNALDO ADAME MD     Recent Labs   Lab Test  10/10/18   1008  04/12/18   1707  12/04/17   1553  10/16/17   1529  04/17/17   1613   FETO  2.1  1.8  2.2  2.4  2.2   HCGTM  <3  <3  <3  <3  <3   LDH  242*  181  163  238*  136          ASSESSMENT AND PLAN   1. 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 DI- taking desmopressin  3. Seizure episode after cycle 3  4. Prior provoked PE, off lovenox    ONC: S/p resection and 6 cycles of chemotherapy  (1, 3 & 5 -carbo/etoposide and 2, 4, & 6 were ifos/etoposide). MRI brain and tumor markers showed no recurrence/residual disease. He received radiation to the ventricles followed by a boost (3000 cGy to ventricles and 5400 cGy to tumor bed) from 9/17/2014 through 10/28/14.    - Restaging MRI done in May 2018 was negative for  any evidence of recurrent disease  - Restaging CT scan Chest/abd/pelvis done last week. I have reviewed actual images and the scan remains negative for recurrent metastatic disease  - His tumor markers are normal except for a marginally elevated LDH which is non-specific and not clinically significant.   - I will follow him in 6 months with tumor markers (AFP, LDH and HCG) and brain MRI     Depression:   He has been following his pscyhologist.   He is doing much better now    PE: discontinued lovenox after 6 months of therapy (provoked clot)    ENDO:  Diabetes insipidus:  -Continue desmopressin, FU Dr. Yessenia Ramires    NEURO:    -H/o Seizures: on keppra,  -Esotropia with diplopia: stable. FU opthalmology   - His keppra dose has been adjusted    Over 25 min of direct face to face time spent with patient with more than 50% time spent in counseling and coordinating care.      Again, thank you for allowing me to participate in the care of your patient.      Sincerely,    Cheikh Ervin MD

## 2018-12-01 DIAGNOSIS — R51.9 CHRONIC DAILY HEADACHE: ICD-10-CM

## 2018-12-05 DIAGNOSIS — G43.719 INTRACTABLE CHRONIC MIGRAINE WITHOUT AURA AND WITHOUT STATUS MIGRAINOSUS: ICD-10-CM

## 2018-12-05 RX ORDER — NORTRIPTYLINE HCL 10 MG
CAPSULE ORAL
Qty: 60 CAPSULE | Refills: 3 | Status: SHIPPED | OUTPATIENT
Start: 2018-12-05 | End: 2019-03-31

## 2018-12-06 RX ORDER — TOPIRAMATE 25 MG/1
TABLET, FILM COATED ORAL
Qty: 90 TABLET | Refills: 3 | Status: SHIPPED | OUTPATIENT
Start: 2018-12-06 | End: 2019-03-29

## 2018-12-06 NOTE — TELEPHONE ENCOUNTER
[facility] Call Center    Phone Message    May a detailed message be left on voicemail: yes    Reason for Call: Other: Pt returning Karson's call.  He is taking 25mg in the morning and 50mg in the evening.  Please call him back if you need further information     Action Taken: Message routed to:  [facility]

## 2018-12-06 NOTE — TELEPHONE ENCOUNTER
LVM for patient to confirm dose of Topirmate and what he is taking on a daily basis.     Karson REIDA

## 2018-12-13 ENCOUNTER — TELEPHONE (OUTPATIENT)
Dept: NEUROLOGY | Facility: CLINIC | Age: 28
End: 2018-12-13

## 2018-12-14 ENCOUNTER — CARE COORDINATION (OUTPATIENT)
Dept: NEUROLOGY | Facility: CLINIC | Age: 28
End: 2018-12-14

## 2018-12-14 NOTE — TELEPHONE ENCOUNTER
[facility] Call Center    Phone Message    May a detailed message be left on voicemail: yes    Reason for Call: Other: Abelino called in and wanted Sybil Dockery to know he hasn't made any of his appointments because he has been at home, sick with the flu. Thank you.     Action Taken: Message routed to:  [facility] Neurology

## 2019-03-25 ENCOUNTER — TELEPHONE (OUTPATIENT)
Dept: CALL CENTER | Age: 29
End: 2019-03-25

## 2019-03-25 ENCOUNTER — CARE COORDINATION (OUTPATIENT)
Dept: NEUROLOGY | Facility: CLINIC | Age: 29
End: 2019-03-25

## 2019-03-25 ENCOUNTER — TELEPHONE (OUTPATIENT)
Dept: NEUROLOGY | Facility: CLINIC | Age: 29
End: 2019-03-25

## 2019-03-25 NOTE — TELEPHONE ENCOUNTER
----- Message from Lawanda Gatica LPN sent at 3/25/2019  1:45 PM CDT -----  Regarding: Patient called in with 7/10 migraine  1.) Patient called in with bad migraine. Patient of Sybil.    2.) Patient reports: Migraine 7/10 nausea, no vomiting, blurry,vision, light sensitivity, sore throat (slight)    3.) Consumption of coffee,  no food.     4.) Medications: 1 tab topiramate, 1 tab nortriptyline, about to take 1 motrin with food.      Please advise,    -Lawanda

## 2019-03-25 NOTE — PROGRESS NOTES
1.) Patient called in with bad migraine. Patient of Sybil.    2.) Patient reports: Migraine 7/10 nausea, no vomiting, blurry,vision, light sensitivity, sore throat (slight)    3.) Consumption of coffee,  no food.     4.) Medications: 1 tab topiramate, 1 tab nortriptyline, about to take 1 motrin with food.      5.) Sent a message to Sybil informing her of patient symptoms.

## 2019-03-25 NOTE — TELEPHONE ENCOUNTER
[facility] Call Center    Phone Message    May a detailed message be left on voicemail: yes    Reason for Call: Other: Abelino returning Neo's call.  Please reference the earlier encounter regarding symptoms.  Please call him back at your earliest convenience     Action Taken: Message routed to:  [facility] ([facility]

## 2019-03-25 NOTE — TELEPHONE ENCOUNTER
Pt calls red flag triage reporting migraine headache starting  this morning. He had to leave work because of sx. Pain 7/10. Feels nauseated, no vomiting. Vision  a little blurry, has light sensitivity. Slight sore throat. He has not eaten breakfast, had coffee. Took topiramate 25 mg am dose,  Nortriptyline 10 mg am dose. Will take 1 motrin now with food.  Advised rest in darkened room, cool compresses to forhead. Verbal report to Saray. Note to follow.    ZENOBIA Plummer tele protocol  Headache  Pg [phone]

## 2019-03-26 NOTE — TELEPHONE ENCOUNTER
Called and left a VM to call us back with headache updates. If headache is Ok enough to come to our Clinic to discuss treatment or telephone call prescheduled to avoid phone tag.

## 2019-03-27 ENCOUNTER — OFFICE VISIT (OUTPATIENT)
Dept: NEUROLOGY | Facility: CLINIC | Age: 29
End: 2019-03-27
Payer: COMMERCIAL

## 2019-03-27 VITALS
OXYGEN SATURATION: 100 % | SYSTOLIC BLOOD PRESSURE: 124 MMHG | DIASTOLIC BLOOD PRESSURE: 83 MMHG | BODY MASS INDEX: 24.74 KG/M2 | HEART RATE: 91 BPM | WEIGHT: 163.2 LBS | HEIGHT: 68 IN | RESPIRATION RATE: 16 BRPM | TEMPERATURE: 97.3 F

## 2019-03-27 DIAGNOSIS — R51.9 ACUTE INTRACTABLE HEADACHE, UNSPECIFIED HEADACHE TYPE: Primary | ICD-10-CM

## 2019-03-27 RX ORDER — BUTALBITAL, ACETAMINOPHEN AND CAFFEINE 50; 325; 40 MG/1; MG/1; MG/1
1 TABLET ORAL EVERY 6 HOURS PRN
Qty: 15 TABLET | Refills: 0 | Status: SHIPPED | OUTPATIENT
Start: 2019-03-27 | End: 2019-06-12

## 2019-03-27 RX ORDER — NAPROXEN 500 MG/1
500 TABLET ORAL 2 TIMES DAILY PRN
Qty: 20 TABLET | Refills: 0 | Status: SHIPPED | OUTPATIENT
Start: 2019-03-27 | End: 2019-04-10

## 2019-03-27 RX ORDER — PROCHLORPERAZINE MALEATE 5 MG
2.5-5 TABLET ORAL EVERY 8 HOURS PRN
Qty: 20 TABLET | Refills: 1 | Status: SHIPPED | OUTPATIENT
Start: 2019-03-27 | End: 2019-06-23

## 2019-03-27 ASSESSMENT — MIFFLIN-ST. JEOR: SCORE: 1681.52

## 2019-03-27 ASSESSMENT — PAIN SCALES - GENERAL: PAINLEVEL: MILD PAIN (3)

## 2019-03-27 NOTE — PROGRESS NOTES
"Re: Kari Turcios      MRN# [id number]  YOB: 1990  Date of Visit:3/27/2019    OUTPATIENT NEUROLOGY VISIT NOTE    Reason for Visit:  Worsening headache follow up    Interval History:  Kari Turcios is a 29-year-old male presents to the clinic today for headache follow up.   Last Neurology visit for headache management 04/28/2017, see clinic visit note for details. 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   Oncology visit  on May 10th, 2018 and no symptoms to suggest disease recurrence (history of primary CNS germ cell tumor, s/p treatment in 2014, s/p 6 cycles of chemotherapy) per my review of patient's  Oncology clinic visit note.  Last Neurology visit for headaches 08/13/2018 and headaches were better.   Patient reports that he feels that headache is \"something new\" and onset about 2-3 weeks. Reports that headache does not go away and its new for the patient. Reports that headaches usually would go away but not currently. Headaches are steady and pain from 3-8/10 and Pain is frontal and throbbing in the front and pain sharp on the shunt site but throbbig/pulsing. Reports that pain is very frustrated. Patient reports that closing his eyes and sleep help with the headache. Patient reports that headaches are better with laying and worse with getting up.  Patient reports that darkness help. Patient reports that his eyes are sensitive to light but \"fine\" with the noise. Reports nausea. Reports no appetite but drinks fluids and drinks.  frequently but able to tolerate fluids. Patient reports that he goes to work and took half day today but feels \"guilty\" when " he takes day off -he works in mortgages.     Headache treatment -tylenol would usually work but not currently  Patient has been taking nortriptyline 10 mg at bedtime and topiramate 25 mg am and 50 mg pm for headache prevention     Denies recent seizures and on Keppra.     Denies history of head or neck trauma, dizziness, vertigo, loss of consciousness, double vision, blurred vision, hearing difficulty, speech or swallowing difficulty,  urinary or bowel incontinence, coordination problems or gait difficulty, fever or chills.    Plan discussed with the patient:  Will move brain MRI up from 4/22 to sooner for headache evaluation. Schedule neurosurgery follow up for shunt reset after the MRI  Increased topiramate 50 mg twice daily   Acute headache treatment -stay hydrated and some cracker or small meals. Ondansetron for nausea as needed. May try compazine 2.5 -5 mg every 8 hours as needed for nausea and naproxen 50 mg every 12 hours  as needed  help with headache. Take Fioricet acute treatment as needed -limited amount of pills to as needed. Do not take it if not effective and call our Clinic.   Take a few days off work for headache treatment and rest. Excuse letter given.   Follow up after the imaging. Can be schedule at the same time with neurosurgery.   If headache were getting worse-will need to get to ED for acute treatment.     Neurodiagnostic Testing  MR BRAIN W/O & W CONTRAST 5/10/2018 11:46 AM     Comparison: Brain MRI 12/4/2017, 2/23/2017.     Treatment history:  Pineal mass resection 3/26/2014, with chemoradiation completed  10/28/2014.      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 No residual signal  abnormality or contrast enhancement in the resection bed.     Right frontal approach ventriculoperitoneal shunt catheter tip  terminates near the foramen of Harper, in stable position compared  to  prior examinations. Unchanged associated linear right frontal T2  signal hyperintensity and overlying calvarial rashawn hole.  Susceptibility artifact from catheter reservoir in the right  frontoparietal region. No acute intracranial hemorrhage, mass effect,  midline shift, or abnormal extra axial fluid collections.  Developmental venous anomaly in the left frontal lobe, unchanged.  Polypoid mucosal thickening in the left maxillary sinus mastoid air  cells are clear. Orbits are grossly unremarkable.                                                                      Impression:  1. No significant change compared to 12/4/2017. No evidence of tumor  recurrence.  2. Stable size and configuration of the shunted ventricular system.     ZENA GARCIA MD    Past Medical History reviewed and verified with the patient  Past Medical History:   Diagnosis Date     Hypertropia      Myopia      Seizures (H)     2 months ago     Type 2 diabetes mellitus without complications (H)     Borderline       Past Surgical History reviewed and verified with the patient  Family History reviewed and verified with the patient  Social History     Tobacco Use     Smoking status: Never Smoker     Smokeless tobacco: Never Used   Substance Use Topics     Alcohol use: Yes     Alcohol/week: 1.2 - 1.8 oz     Types: 2 - 3 Standard drinks or equivalent per week     Comment: OCASIONALLY    reviewed and verified with the patient     Allergies   Allergen Reactions     Penicillins Hives       Current Outpatient Medications   Medication Sig Dispense Refill     ACETAMINOPHEN PO Take 650 mg by mouth every 4 hours as needed for pain       desmopressin (DDAVP) 0.1 MG tablet Take one tablet at lunch and half tablet at bedtime. 135 tablet 3     desmopressin (DDAVP) 0.2 MG tablet Take 1 tablet (200 mcg) by mouth 2 times daily 180 tablet 3     levETIRAcetam (KEPPRA) 750 MG tablet Take 1,500 mg by mouth every morning Two tablet in am and 2 1/2 tablet in pm 60  "tablet 0     LORazepam (ATIVAN) 0.5 MG tablet Take 1 tablet (0.5 mg) by mouth every 4 hours as needed (Anxiety, Nausea/Vomiting or Sleep) 30 tablet 3     melatonin 1 MG TABS Take 1-2 tablets (1-2 mg) by mouth nightly as needed for sleep 90 tablet 0     nortriptyline (PAMELOR) 10 MG capsule TAKE ONE TAB BY MOUTH EVERY MORNING AND ONE TAB AT BEDTIME 60 capsule 3     topiramate (TOPAMAX) 25 MG tablet Take 25mg PO in the AM and 50 mg PO in the PM 90 tablet 3     Botulinum Toxin Type A (BOTOX) 200 units injection Inject 155 Units into the muscle every 3 months (Patient not taking: Reported on 10/24/2018) 155 Units 4     Glycerin-Hypromellose- (VISINE TEARS OP) Apply 1-2 drops to eye 2 times daily as needed (for dry eyes.)     reviewed and verified with the patient    Review of Systems:   A 10-point ROS including constitutional, eyes, respiratory, cardiovascular, gastroenterology, genitourinary, integumentary, musculoskeletal, neurology and psychiatric were all negative except as mentioned in the interval history.     General Exam:   /83 (BP Location: Left arm, Patient Position: Sitting, Cuff Size: Adult Regular)   Pulse 91   Temp 97.3  F (36.3  C) (Oral)   Resp 16   Ht 1.73 m (5' 8.11\")   Wt 74 kg (163 lb 3.2 oz)   SpO2 100%   BMI 24.73 kg/m    GEN: Awake, NAD; good eye contact, responses appropriately   HEENT: Head atraumatic/Normocephalic. Scalp normal. Pupils equally round, 4 mm, reactive to light and accommodation, sclera and conjunctiva normal. Fundoscopic examination reveals no apparent  papilledema. Neck: Easily moveable without resistance.  The patient is alert and oriented times four. Has good attention and concentration. Speech is fluent without dysarthria.Face is symmetrical. Intact and symmetrical eyebrow and lid raise and eyelid closure, smiles. Hearing Intact to conversation speech. Strength  intact in the upper and lower extremities bilaterally. Sensation is intact to  touch throughout. "  Normal casual gait.    Assessment and Plan:  See Interval History for our discussion and plan    I discussed all my recommendation with Kari Turcios. The patient verbalizes understanding and comfortable with the plan. The patient has our clinic phone number to call with any questions or concerns. All of the patient's questions were answered from the best of my current knowledge.     Time spent with pt answering questions, discussing findings, counseling and coordinating care was more than 50% the appointment time,  26 minutes.         CASPER Black, CNP  [facility]

## 2019-03-27 NOTE — NURSING NOTE
Chief Complaint   Patient presents with     RECHECK     [facility] RETURN - HEADACHE     Angelic Blunt    
No

## 2019-03-27 NOTE — PATIENT INSTRUCTIONS
Plan:  Will move brain MRI up from 4/22 to sooner for headache evaluation. Schedule neurosurgery follow up for shunt reset after the MRI  Increased topiramate 50 mg twice daily   Acute headache treatment -stay hydrated and some cracker or small meals. Ondansetron for nausea as needed. May try compazine for nausea and help with headache. Take Fioricet acute treatment as needed -limited amount of pills to as needed. Do not take it if not effective and call our Clinic.   Take a few days off work for headache treatment and rest. Excuse letter given.   Follow up after the imaging. Can be schedule at the same time with neurosurgery.   If headache were getting worse-will need to get to ED for acute treatment.             Patient Education     Patient Education    Prochlorperazine Edisylate Solution for injection    Prochlorperazine Maleate Oral tablet    Prochlorperazine Rectal suppository  Prochlorperazine Maleate Oral tablet  What is this medicine?  PROCHLORPERAZINE (proe klor PER a zeen) helps to control severe nausea and vomiting. This medicine is also used to treat schizophrenia. It can also help patients who experience anxiety that is not due to psychological illness.  This medicine may be used for other purposes; ask your health care provider or pharmacist if you have questions.  What should I tell my health care provider before I take this medicine?  They need to know if you have any of these conditions:    blood disorders or disease    dementia    liver disease or jaundice    Parkinson's disease    uncontrollable movement disorder    an unusual or allergic reaction to prochlorperazine, other medicines, foods, dyes, or preservatives    pregnant or trying to get pregnant    breast-feeding  How should I use this medicine?  Take this medicine by mouth with a glass of water. Follow the directions on the prescription label. Take your doses at regular intervals. Do not take your medicine more often than directed. Do not  stop taking this medicine suddenly. This can cause nausea, vomiting, and dizziness. Ask your doctor or health care professional for advice.  Talk to your pediatrician regarding the use of this medicine in children. Special care may be needed. While this drug may be prescribed for children as young as 2 years for selected conditions, precautions do apply.  Overdosage: If you think you have taken too much of this medicine contact a poison control center or emergency room at once.  NOTE: This medicine is only for you. Do not share this medicine with others.  What if I miss a dose?  If you miss a dose, take it as soon as you can. If it is almost time for your next dose, take only that dose. Do not take double or extra doses.  What may interact with this medicine?  Do not take this medicine with any of the following medications:    amoxapine    antidepressants like citalopram, escitalopram, fluoxetine, paroxetine, and sertraline    deferoxamine    dofetilide    maprotiline    tricyclic antidepressants like amitriptyline, clomipramine, imipramine, nortiptyline and others  This medicine may also interact with the following medications:    lithium    medicines for pain    phenytoin    propranolol    warfarin  This list may not describe all possible interactions. Give your health care provider a list of all the medicines, herbs, non-prescription drugs, or dietary supplements you use. Also tell them if you smoke, drink alcohol, or use illegal drugs. Some items may interact with your medicine.  What should I watch for while using this medicine?  Visit your doctor or health care professional for regular checks on your progress.  You may get drowsy or dizzy. Do not drive, use machinery, or do anything that needs mental alertness until you know how this medicine affects you. Do not stand or sit up quickly, especially if you are an older patient. This reduces the risk of dizzy or fainting spells. Alcohol may interfere with the  effect of this medicine. Avoid alcoholic drinks.  This medicine can reduce the response of your body to heat or cold. Dress warm in cold weather and stay hydrated in hot weather. If possible, avoid extreme temperatures like saunas, hot tubs, very hot or cold showers, or activities that can cause dehydration such as vigorous exercise.  This medicine can make you more sensitive to the sun. Keep out of the sun. If you cannot avoid being in the sun, wear protective clothing and use sunscreen. Do not use sun lamps or tanning beds/booths.  Your mouth may get dry. Chewing sugarless gum or sucking hard candy, and drinking plenty of water may help. Contact your doctor if the problem does not go away or is severe.  What side effects may I notice from receiving this medicine?  Side effects that you should report to your doctor or health care professional as soon as possible:    blurred vision    breast enlargement in men or women    breast milk in women who are not breast-feeding    chest pain, fast or irregular heartbeat    confusion, restlessness    dark yellow or brown urine    difficulty breathing or swallowing    dizziness or fainting spells    drooling, shaking, movement difficulty (shuffling walk) or rigidity    fever, chills, sore throat    involuntary or uncontrollable movements of the eyes, mouth, head, arms, and legs    seizures    stomach area pain    unusually weak or tired    unusual bleeding or bruising    yellowing of skin or eyes  Side effects that usually do not require medical attention (report to your doctor or health care professional if they continue or are bothersome):    difficulty passing urine    difficulty sleeping    headache    sexual dysfunction    skin rash, or itching  This list may not describe all possible side effects. Call your doctor for medical advice about side effects. You may report side effects to FDA at 1-856-FDA-7680.  Where should I keep my medicine?  Keep out of the reach of  children.  Store at room temperature between 15 and 30 degrees C (59 and 86 degrees F). Protect from light. Throw away any unused medicine after the expiration date.  NOTE:This sheet is a summary. It may not cover all possible information. If you have questions about this medicine, talk to your doctor, pharmacist, or health care provider. Copyright  2016 Gold Standard           Patient Education     Patient Education    Butalbital, Acetaminophen, Caffeine Oral capsule    Butalbital, Acetaminophen, Caffeine Oral solution    Butalbital, Acetaminophen, Caffeine Oral tablet  Butalbital, Acetaminophen, Caffeine Oral tablet  What is this medicine?  ACETAMINOPHEN; BUTALBITAL; CAFFEINE (a set a KASSANDRA ez fen; byoo AQUILES bi aquiles; KAF een) is a pain reliever. It is used to treat tension headaches.  This medicine may be used for other purposes; ask your health care provider or pharmacist if you have questions.  What should I tell my health care provider before I take this medicine?  They need to know if you have any of these conditions:    drug abuse or addiction    heart or circulation problems    if you often drink alcohol    kidney disease or problems going to the bathroom    liver disease    lung disease, asthma, or breathing problems    porphyria    an unusual or allergic reaction to acetaminophen, butalbital or other barbiturates, caffeine, other medicines, foods, dyes, or preservatives    pregnant or trying to get pregnant    breast-feeding  How should I use this medicine?  Take this medicine by mouth with a full glass of water. Follow the directions on the prescription label. If the medicine upsets your stomach, take the medicine with food or milk. Do not take more than you are told to take.   Talk to your pediatrician regarding the use of this medicine in children. Special care may be needed.  Overdosage: If you think you have taken too much of this medicine contact a poison control center or emergency room at once.  NOTE:  This medicine is only for you. Do not share this medicine with others.  What if I miss a dose?  If you miss a dose, take it as soon as you can. If it is almost time for your next dose, take only that dose. Do not take double or extra doses.  What may interact with this medicine?    alcohol or medicines that contain alcohol    antidepressants, especially MAOIs like isocarboxazid, phenelzine, tranylcypromine, and selegiline    antihistamines    benzodiazepines    carbamazepine    isoniazid    medicines for pain like pentazocine, buprenorphine, butorphanol, nalbuphine, tramadol, and propoxyphene    muscle relaxants    naltrexone    phenobarbital, phenytoin, and fosphenytoin    phenothiazines like perphenazine, thioridazine, chlorpromazine, mesoridazine, fluphenazine, prochlorperazine, promazine, and trifluoperazine    voriconazole  This list may not describe all possible interactions. Give your health care provider a list of all the medicines, herbs, non-prescription drugs, or dietary supplements you use. Also tell them if you smoke, drink alcohol, or use illegal drugs. Some items may interact with your medicine.  What should I watch for while using this medicine?  Tell your doctor or health care professional if your pain does not go away, if it gets worse, or if you have new or a different type of pain. You may develop tolerance to the medicine. Tolerance means that you will need a higher dose of the medicine for pain relief. Tolerance is normal and is expected if you take the medicine for a long time.  Do not suddenly stop taking your medicine because you may develop a severe reaction. Your body becomes used to the medicine. This does NOT mean you are addicted. Addiction is a behavior related to getting and using a drug for a non-medical reason. If you have pain, you have a medical reason to take pain medicine. Your doctor will tell you how much medicine to take. If your doctor wants you to stop the medicine, the  dose will be slowly lowered over time to avoid any side effects.  You may get drowsy or dizzy when you first start taking the medicine or change doses. Do not drive, use machinery, or do anything that may be dangerous until you know how the medicine affects you. Stand or sit up slowly.  Do not take other medicines that contain acetaminophen with this medicine. Always read labels carefully. If you have questions, ask your doctor or pharmacist.  If you take too much acetaminophen get medical help right away. Too much acetaminophen can be very dangerous and cause liver damage. Even if you do not have symptoms, it is important to get help right away.  What side effects may I notice from receiving this medicine?  Side effects that you should report to your doctor or health care professional as soon as possible:    allergic reactions like skin rash, itching or hives, swelling of the face, lips, or tongue    breathing problems    confusion    feeling faint or lightheaded, falls    redness, blistering, peeling or loosening of the skin, including inside the mouth    seizure    stomach pain    yellowing of the eyes or skin  Side effects that usually do not require medical attention (report to your doctor or health care professional if they continue or are bothersome):    constipation    nausea, vomiting  This list may not describe all possible side effects. Call your doctor for medical advice about side effects. You may report side effects to FDA at 6-695-FDA-3961.  Where should I keep my medicine?  Keep out of the reach of children. This medicine can be abused. Keep your medicine in a safe place to protect it from theft. Do not share this medicine with anyone. Selling or giving away this medicine is dangerous and against the law.  Store at room temperature between 15 and 30 degrees C (59 and 86 degrees F). Keep container tightly closed. Protect from light. Throw away any unused medicine after the expiration date.  NOTE:This  sheet is a summary. It may not cover all possible information. If you have questions about this medicine, talk to your doctor, pharmacist, or health care provider. Copyright  2016 Gold Standard

## 2019-03-27 NOTE — LETTER
[facility]  [facility]  [address] [id number] [phone]      3/27/2019    Re: Kari Turcios      TO WHOM IT MAY CONCERN:    Kari Turcios  was seen at [facility] on March 27th, 2019.  Please excuse him in the next two days due to illness and medical evaluation.      Cordially,     Sybil Dockery, CASPER CNP

## 2019-03-29 ENCOUNTER — OFFICE VISIT (OUTPATIENT)
Dept: NEUROSURGERY | Facility: CLINIC | Age: 29
End: 2019-03-29
Payer: COMMERCIAL

## 2019-03-29 ENCOUNTER — HOSPITAL ENCOUNTER (OUTPATIENT)
Dept: MRI IMAGING | Facility: CLINIC | Age: 29
Discharge: HOME | End: 2019-03-29
Attending: INTERNAL MEDICINE | Admitting: INTERNAL MEDICINE
Payer: COMMERCIAL

## 2019-03-29 ENCOUNTER — OFFICE VISIT (OUTPATIENT)
Dept: NEUROLOGY | Facility: CLINIC | Age: 29
End: 2019-03-29
Payer: COMMERCIAL

## 2019-03-29 VITALS
BODY MASS INDEX: 25.01 KG/M2 | OXYGEN SATURATION: 100 % | HEART RATE: 100 BPM | DIASTOLIC BLOOD PRESSURE: 70 MMHG | WEIGHT: 165 LBS | HEIGHT: 68 IN | SYSTOLIC BLOOD PRESSURE: 130 MMHG

## 2019-03-29 VITALS
SYSTOLIC BLOOD PRESSURE: 130 MMHG | HEIGHT: 68 IN | WEIGHT: 165 LBS | BODY MASS INDEX: 25.01 KG/M2 | OXYGEN SATURATION: 100 % | DIASTOLIC BLOOD PRESSURE: 70 MMHG | HEART RATE: 100 BPM

## 2019-03-29 DIAGNOSIS — C71.9 GERM CELL TUMOR OF BRAIN (H): ICD-10-CM

## 2019-03-29 DIAGNOSIS — G43.719 INTRACTABLE CHRONIC MIGRAINE WITHOUT AURA AND WITHOUT STATUS MIGRAINOSUS: ICD-10-CM

## 2019-03-29 DIAGNOSIS — Z98.2 S/P VP SHUNT: ICD-10-CM

## 2019-03-29 DIAGNOSIS — E23.2 DIABETES INSIPIDUS SECONDARY TO VASOPRESSIN DEFICIENCY (H): ICD-10-CM

## 2019-03-29 DIAGNOSIS — Z98.890 S/P CRANIOTOMY: ICD-10-CM

## 2019-03-29 DIAGNOSIS — Z86.69 HISTORY OF HYDROCEPHALUS: ICD-10-CM

## 2019-03-29 PROCEDURE — 70553 MRI BRAIN STEM W/O & W/DYE: CPT

## 2019-03-29 PROCEDURE — A9585 GADOBUTROL INJECTION: HCPCS | Performed by: STUDENT IN AN ORGANIZED HEALTH CARE EDUCATION/TRAINING PROGRAM

## 2019-03-29 PROCEDURE — 25500064 ZZH RX 255 OP 636: Performed by: STUDENT IN AN ORGANIZED HEALTH CARE EDUCATION/TRAINING PROGRAM

## 2019-03-29 RX ORDER — GADOBUTROL 604.72 MG/ML
7.5 INJECTION INTRAVENOUS ONCE
Status: COMPLETED | OUTPATIENT
Start: 2019-03-29 | End: 2019-03-29

## 2019-03-29 RX ORDER — TOPIRAMATE 25 MG/1
TABLET, FILM COATED ORAL
Qty: 120 TABLET | Refills: 3 | Status: SHIPPED | OUTPATIENT
Start: 2019-03-29 | End: 2021-06-10

## 2019-03-29 RX ADMIN — GADOBUTROL 7.5 ML: 604.72 INJECTION INTRAVENOUS at 08:27

## 2019-03-29 ASSESSMENT — PAIN SCALES - GENERAL
PAINLEVEL: MILD PAIN (2)
PAINLEVEL: MILD PAIN (2)

## 2019-03-29 ASSESSMENT — MIFFLIN-ST. JEOR
SCORE: 1687.94
SCORE: 1687.94

## 2019-03-29 NOTE — PATIENT INSTRUCTIONS
Plan:  Increased topiramate 2 tabs  twice daily if headache were getting worse  Acute headache treatment -stay hydrated and some cracker or small meals. Ondansetron for nausea as needed. May try compazine 2.5 -5 mg every 8 hours as needed for nausea and naproxen 50 mg every 12 hours  as needed  help with headache. Take Fioricet acute treatment as needed -limited amount of pills to as needed. Limit use to no more than 2 days per week.   Follow up in 8-12 weeks or sooner if needed.      Talk to Dr Billy about stressors in your life and ways of copying with

## 2019-03-29 NOTE — LETTER
3/29/2019       RE: Kari Turcios  [address]  [facility] [address]     Dear Colleague,    Thank you for referring your patient, Kari Turcios, to the [facility] NEUROSURGERY at [facility]. Please see a copy of my visit note below.    NEUROSURGERY PROGRESS NOTE    REASON FOR VISIT: Shunt recheck after MRI.    HISTORY OF PRESENT ILLNESS: This is a 28 year old male with a history of chronic headache and pineal region mass with moderate hydrocephalus, who underwent an endoscopic 3rd ventriculostomy and suboccipital craniotomy with the excision of the tumor on 3/26/14 follow by right sided  shunt (Strata valve) on 4/8/2014 by Dr. Sharp.  His shunt pressure was checked and set to 1.5 on 5/14/2018 after the MRI on the same day. The patient has been followed by Neurology for headache. He underwent a new MRI of brain per Neurology and is here for shunt pressure recheck.     PROCEDURE: Interrogation of his shunt showed a pressure setting of 1.0.  Using the Medtronic Strata tool kit, the pressure was reset to 1.5 where it was before. This was verified twice using the Medtronic pressure check device.      ASSESSMENT AND PLAN:   The patient tolerated the shunt programming today.The patient was instructed to call our office should for concerns or questions. Follow up with Neurosurgery PRN.    Again, thank you for allowing me to participate in the care of your patient.      Sincerely,    CASPER Cano CNP

## 2019-03-29 NOTE — TELEPHONE ENCOUNTER
Sent message to the schedulers for patient to have phone visit or follow up appt to touch base with omid on treatment options.

## 2019-03-29 NOTE — PROGRESS NOTES
Re: Kari Turcios      MRN# [id number]  YOB: 1990  Date of Visit:     OUTPATIENT NEUROLOGY VISIT NOTE    Reason for Visit/Chief Complaint:      Interval History/History of Present Illness  Kari Turcios is a -year-old right(left)-handed presents to the clinic today for             denies history of head or neck trauma, dizziness, vertigo, loss of consciousness, seizure, double vision, blurred vision, hearing difficulty, speech or swallowing difficulty, weakness or numbness in face, arms or legs, urinary or bowel incontinence, coordination problems or gait difficulty, fever or chills.        Neurodiagnostic Testing  CT  MRI  Lab    Past Medical History reviewed and verified with the patient  Past Surgical History reviewed and verified with the patient  Family History reviewed and verified with the patient  Social History     Tobacco Use     Smoking status: Never Smoker     Smokeless tobacco: Never Used   Substance Use Topics     Alcohol use: Yes     Alcohol/week: 1.2 - 1.8 oz     Types: 2 - 3 Standard drinks or equivalent per week     Comment: OCASIONALLY    reviewed and verified with the patient    Current Outpatient Medications   Medication Sig Dispense Refill     ACETAMINOPHEN PO Take 650 mg by mouth every 4 hours as needed for pain       Botulinum Toxin Type A (BOTOX) 200 units injection Inject 155 Units into the muscle every 3 months 155 Units 4     butalbital-acetaminophen-caffeine (FIORICET/ESGIC) -40 MG tablet Take 1 tablet by mouth every 6 hours as needed for headaches or migraine 15 tablet 0     desmopressin (DDAVP) 0.1 MG tablet Take one tablet at lunch and half tablet at bedtime. 135 tablet 3     desmopressin (DDAVP) 0.2 MG tablet Take 1 tablet (200 mcg) by mouth 2 times daily 180 tablet 3     Glycerin-Hypromellose- (VISINE TEARS OP) Apply 1-2 drops to eye 2 times daily as needed (for dry eyes.)       levETIRAcetam (KEPPRA) 750 MG tablet Take 1,500 mg by mouth every morning  "Two tablet in am and 2 1/2 tablet in pm 60 tablet 0     LORazepam (ATIVAN) 0.5 MG tablet Take 1 tablet (0.5 mg) by mouth every 4 hours as needed (Anxiety, Nausea/Vomiting or Sleep) 30 tablet 3     melatonin 1 MG TABS Take 1-2 tablets (1-2 mg) by mouth nightly as needed for sleep 90 tablet 0     naproxen (NAPROSYN) 500 MG tablet Take 1 tablet (500 mg) by mouth 2 times daily as needed for headaches 20 tablet 0     nortriptyline (PAMELOR) 10 MG capsule TAKE ONE TAB BY MOUTH EVERY MORNING AND ONE TAB AT BEDTIME 60 capsule 3     prochlorperazine (COMPAZINE) 5 MG tablet Take 0.5-1 tablets (2.5-5 mg) by mouth every 8 hours as needed for nausea or vomiting 20 tablet 1     topiramate (TOPAMAX) 25 MG tablet Take 25mg PO in the AM and 50 mg PO in the PM 90 tablet 3   reviewed and verified with the patient    Review of Systems:   A 10-point ROS including constitutional, eyes, respiratory, cardiovascular, gastroenterology, genitourinary, integumentary, musculoskeletal, neurology and psychiatric were all negative except as mentioned in the interval history.     A 12-point ROS including constitutional, eyes, ENT, respiratory, cardiovascular, gastroenterology, genitourinary, integumentary, musculoskeletal, neurology, hematology and psychiatric were all reviewed with the patient and completed at the Neuroscience Services Question chelly and as mentioned in the HPI.        General Exam:   /70   Pulse 100   Ht 1.727 m (5' 8\")   Wt 74.8 kg (165 lb)   SpO2 100%   BMI 25.09 kg/m    GEN: Awake, NAD; good eye contact, responses appropriately   HEENT: Head atraumatic/Normocephalic. Scalp normal. Pupils equally round, 4 mm, reactive to light and accommodation, sclera and conjunctiva normal. Fundoscopic examination reveals normal vessels no papilledema.   Neck: Easily moveable without resistance  Heart: S1/S2 appreciated, RRR, no m/r/g, no carotid bruits  Lungs:Lungs are clear to auscultation bilaterally, no wheezes or crackles. " "  Neurological Examination:  The patient is alert and oriented times four. Has good attention and concentration. Speech is fluent without dysarthria. EOM intact. There is no nystagmus. Has conjugated gaze. Face is symmetrical. Intact and symmetrical eyebrow and lid raise and eyelid closure, smiles. Hearing Intact to conversation speech. The palates elevates symmetrical. The tongue protrudes midline with no atrophy or fasciculations. Strength  5/5 in the upper and lower extremities bilaterally. Sensation is intact to  touch throughout.  Reflexes symmetrical at biceps, triceps, brachioradialis, patellar, and Achilles. Negative Babinski with downgoing toes bilaterally. Coordination reveals finger-nose-finger, rapid alternating movements with normal speed and accuracy. Normal casual gait.      GENERAL EXAM:  /70   Pulse 100   Ht 1.727 m (5' 8\")   Wt 74.8 kg (165 lb)   SpO2 100%   BMI 25.09 kg/m    GEN: Awake, NAD; very pleasant  HEENT: Head atraumatic/Normocephalic. Scalp normal. Pupils equally round, 4 mm, reactive to light and accommodation, sclera and conjunctiva normal. Fundoscopic examination reveals normal vessels without hemorrhage, no papilledema.   Tympanic membranes and external auditory canals normal, no otic discharge. Nasal mucosa normal.   Oral pharynx is normal, no erythema or exudate. No oral lesions. Tongue and gums are normal.  Neck: Easily moveable without resistance, no cervical or supraclavicular  lymphadenopathy.   Heart: S1/S2 appreciated, RRR, no m/r/g, no carotid bruits  Lungs:Lungs are clear to auscultation bilaterally, no wheezes or crackles.   ABDOMEN: The abdomen is symmetrical without distention.   MSK:  No cyanosis, clubbing, or edema are noted.  full ROMs on all limbs, no tenderness along the bilateral trapezius;  SKIN: no erythema or bruising, no concerning lesions  VASCULAR: Peripheral pulses in the radial and dorsalis pedis areas are normal bilaterally.  PSYCH: Good affect " (no pseudo-bulbar symptoms)  Neurological Examination:  The patient is alert and oriented times four. Has good attention and concentration. Immediate and short term recall is 3/3. Speech is fluent without dysarthria.   Cranial nerves:  CN I deferred.   CN II: Intact and full visual fields to confrontation bilaterally. Funduscopic exam revealed disc bilaterally.   CN III, IV, VI: EOM intact. There is no nystagmus. Has conjugated gaze. Intact direct and consensual pupillary light reflexes.   CN V: Intact and symmetrical to facial sensation in the V1 through V3 bilaterally.   CN VII: Intact and symmetrical eyebrow and lid raise and eyelid closure, smiles and frown.   CN VIII: Intact to finger rub bilaterally.   CN IX and X: The palates elevates symmetrical. The uvula is midline.   CN XII: The tongue protrudes midline with no atrophy or fasciculations.   Motor exam: The patient has a normal bulk and tone throughout. There is no atrophy, fasciculations, clonus, or abnormal movements appreciated.   Strength Exam:  5/5 strength at shoulder abduction, elbow flexion or extension, wrist flexion or extension, finger abduction, , hip flexion and extension, knee flexion and extension, and dorsiflexion and plantarflexion bilaterally.   Sensation is intact to light touch and pinprick throughout. Has good vibration and proprioception sensation at great toes bilaterally.   Reflexes are 2+ and symmetrical at biceps, triceps, brachioradialis, patellar, and Achilles.   Negative Babinski with downgoing toes bilaterally.   Coordination reveals finger-nose-finger, rapid alternating movements, finger tapping, and toe tapping with normal speed and accuracy.   Station and gait is normal. There is no ataxia. Can walk on the toes, heels, and tandem walk without difficulty. Has good postural reflexes.Has no drift and a negative Romberg. Lhermitte's negative          DATA:        Assessment and Plan:    Kari Turcios, -year-old female/male  w/ a past medical history significant for ...,  And...          Prescription provided. Correct use and course provided. Expected benefits and typical side effects reviewed. Safety of concomitant medications and interactions reviewed. Patient taught signs and symptoms of adverse reactions and allergies. Patient understands teaching and accepts risks of prescribed medication regimen.    [place] Prescription Monitoring Program website was reviewed. No apparent concerns at this time.          I discussed all my recommendation with Kari Turcios. The patient verbalizes understanding and comfortable with the plan. The patient has our clinic phone number to call with any questions or concerns. All of the patient's questions were answered from the best of my current knowledge. Follow up in    weeks or sooner if needed.      Thank you for letting me be a part of the treatment team for Margaritabarbara Tam      Time spent with pt answering questions, discussing findings, counseling and coordinating care was more than 50% the appointment time,   minutes.         CASPER Black, CNP  [facility]

## 2019-03-29 NOTE — PROGRESS NOTES
Re: Kari Turcios      MRN# [id number]  YOB: 1990  Date of Visit:3/29/2019     OUTPATIENT NEUROLOGY VISIT NOTE    Reason for Visit:  Headache follow up    Interval History:  Kari Turcios is a 29-year-old male presents to the clinic today for headache follow up.   Accompanied to today appointment by his cousin.   Last Neurology visit for intractable headache on 3/27/2019, see note details.   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   Patient reports that headache is slightly better today. Patient did not increase topiramate dose and stays the dose is the same 25 mg am and 50 mg at bedtime. Patient did not try Fioricet either to help with acute headache as needed.   Patient reports that he was more worried about if he has any new changes and appers to be more releived after having brain MRI results reviewed today.   Patient had shunt reset today after imaging and may be slight improvement in headache.   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 Patient is contemplating changing job but he is not ready.     Plan discussed with the patient and recommended to increase topiramate for headache prevention:  Increased topiramate 2 tabs  twice daily if headache were getting worse  Acute headache treatment -stay hydrated and some cracker or small meals. Ondansetron for nausea as needed. May try compazine 2.5 -5 mg every 8 hours as needed for nausea and naproxen 50 mg every 12 hours  as needed  help with headache. Take Fioricet acute treatment as needed -limited amount of pills to as needed. Limit use to no more than 2 days  per week.   Follow up in 8-12 weeks or sooner if needed.      Talk to Dr Billy about stressors in your life and ways of copying with    Past Medical History reviewed   Social History     Tobacco Use     Smoking status: Never Smoker     Smokeless tobacco: Never Used   Substance Use Topics     Alcohol use: Yes     Alcohol/week: 1.2 - 1.8 oz     Types: 2 - 3 Standard drinks or equivalent per week     Comment: OCASIONALLY    reviewed and verified with the patient     Allergies   Allergen Reactions     Penicillins Hives       Current Outpatient Medications   Medication Sig Dispense Refill     ACETAMINOPHEN PO Take 650 mg by mouth every 4 hours as needed for pain       Botulinum Toxin Type A (BOTOX) 200 units injection Inject 155 Units into the muscle every 3 months 155 Units 4     butalbital-acetaminophen-caffeine (FIORICET/ESGIC) -40 MG tablet Take 1 tablet by mouth every 6 hours as needed for headaches or migraine 15 tablet 0     desmopressin (DDAVP) 0.1 MG tablet Take one tablet at lunch and half tablet at bedtime. 135 tablet 3     desmopressin (DDAVP) 0.2 MG tablet Take 1 tablet (200 mcg) by mouth 2 times daily 180 tablet 3     Glycerin-Hypromellose- (VISINE TEARS OP) Apply 1-2 drops to eye 2 times daily as needed (for dry eyes.)       levETIRAcetam (KEPPRA) 750 MG tablet Take 1,500 mg by mouth every morning Two tablet in am and 2 1/2 tablet in pm 60 tablet 0     LORazepam (ATIVAN) 0.5 MG tablet Take 1 tablet (0.5 mg) by mouth every 4 hours as needed (Anxiety, Nausea/Vomiting or Sleep) 30 tablet 3     melatonin 1 MG TABS Take 1-2 tablets (1-2 mg) by mouth nightly as needed for sleep 90 tablet 0     naproxen (NAPROSYN) 500 MG tablet Take 1 tablet (500 mg) by mouth 2 times daily as needed for headaches 20 tablet 0     nortriptyline (PAMELOR) 10 MG capsule TAKE ONE TAB BY MOUTH EVERY MORNING AND ONE TAB AT BEDTIME 60 capsule 3     prochlorperazine (COMPAZINE) 5 MG tablet Take 0.5-1 tablets (2.5-5 mg) by  "mouth every 8 hours as needed for nausea or vomiting 20 tablet 1     topiramate (TOPAMAX) 25 MG tablet Take 25mg PO in the AM and 50 mg PO in the PM 90 tablet 3   reviewed and verified with the patient    Review of Systems:   A 10-point ROS including constitutional, eyes, respiratory, cardiovascular, gastroenterology, genitourinary, integumentary, musculoskeletal, neurology and psychiatric were all negative except as mentioned in the interval history.      General Exam:   /70   Pulse 100   Ht 1.727 m (5' 8\")   Wt 74.8 kg (165 lb)   SpO2 100%   BMI 25.09 kg/m    GEN: Awake, NAD; good eye contact, responses appropriately, 2-3/10 on the numeric pain scale. Pupils equally round, 4 mm, reactive to light and accommodation, sclera and conjunctiva normal.  Speech is fluent without dysarthria.Face is symmetrical. Intact and symmetrical eyebrow and lid raise and eyelid closure, smiles. Hearing Intact to conversation speech. The palates elevates symmetrical. The tongue protrudes midline with no atrophy or fasciculations. Strength  5/5 in the upper and lower extremities bilaterally. Sensation is intact to  touch throughout.  Reflexes symmetrical at biceps, triceps, brachioradialis, patellar, and Achilles.  Normal casual gait.    Assessment and Plan:  See Interval History for our discussion and plan    I discussed all my recommendation with Kari Turcios. The patient verbalizes understanding and comfortable with the plan. The patient has our clinic phone number to call with any questions or concerns. All of the patient's questions were answered from the best of my current knowledge.     Time spent with pt answering questions, discussing findings, counseling and coordinating care was more than 50% the appointment time, 18  minutes.         CASPER Black, CNP  [facility]    "

## 2019-03-31 DIAGNOSIS — R51.9 CHRONIC DAILY HEADACHE: ICD-10-CM

## 2019-03-31 DIAGNOSIS — G43.719 INTRACTABLE CHRONIC MIGRAINE WITHOUT AURA AND WITHOUT STATUS MIGRAINOSUS: ICD-10-CM

## 2019-03-31 NOTE — PROGRESS NOTES
NEUROSURGERY PROGRESS NOTE      REASON FOR VISIT: Shunt recheck after MRI.      HISTORY OF PRESENT ILLNESS: This is a 28 year old male with a history of chronic headache and pineal region mass with moderate hydrocephalus, who underwent an endoscopic 3rd ventriculostomy and suboccipital craniotomy with the excision of the tumor on 3/26/14 follow by right sided  shunt (Strata valve) on 4/8/2014 by Dr. Sharp. His shunt pressure was checked and set to 1.5 on 5/14/2018 after the MRI on the same day. The patient has been followed by Neurology for headache. He underwent a new MRI of brain per Neurology and is here for shunt pressure recheck.       PROCEDURE: Interrogation of his shunt showed a pressure setting of 1.0.  Using the Medtronic Strata tool kit, the pressure was reset to 1.5 where it was before. This was verified twice using the Medtronic pressure check device.          ASSESSMENT AND PLAN:   The patient tolerated the shunt programming today.The patient was instructed to call our office should for concerns or questions. Follow up with Neurosurgery PRN.

## 2019-04-01 RX ORDER — DESMOPRESSIN ACETATE 0.2 MG/1
0.2 TABLET ORAL 2 TIMES DAILY
Qty: 180 TABLET | Refills: 3 | Status: SHIPPED | OUTPATIENT
Start: 2019-04-01 | End: 2020-02-27

## 2019-04-02 RX ORDER — TOPIRAMATE 25 MG/1
TABLET, FILM COATED ORAL
Qty: 120 TABLET | Refills: 3 | Status: SHIPPED | OUTPATIENT
Start: 2019-04-02 | End: 2019-04-10

## 2019-04-02 RX ORDER — NORTRIPTYLINE HCL 10 MG
CAPSULE ORAL
Qty: 60 CAPSULE | Refills: 3 | Status: SHIPPED | OUTPATIENT
Start: 2019-04-02 | End: 2019-08-02

## 2019-04-10 ENCOUNTER — OFFICE VISIT (OUTPATIENT)
Dept: NEUROLOGY | Facility: CLINIC | Age: 29
End: 2019-04-10
Payer: COMMERCIAL

## 2019-04-10 VITALS
OXYGEN SATURATION: 100 % | HEART RATE: 92 BPM | HEIGHT: 68 IN | BODY MASS INDEX: 25.01 KG/M2 | DIASTOLIC BLOOD PRESSURE: 86 MMHG | WEIGHT: 165 LBS | SYSTOLIC BLOOD PRESSURE: 135 MMHG

## 2019-04-10 DIAGNOSIS — R56.9 NOCTURNAL SEIZURES (H): ICD-10-CM

## 2019-04-10 DIAGNOSIS — C80.1 MIXED GERM CELL TUMOR (H): ICD-10-CM

## 2019-04-10 DIAGNOSIS — R51.9 ACUTE INTRACTABLE HEADACHE, UNSPECIFIED HEADACHE TYPE: ICD-10-CM

## 2019-04-10 DIAGNOSIS — G40.919 BREAKTHROUGH SEIZURE (H): Primary | ICD-10-CM

## 2019-04-10 DIAGNOSIS — Z98.2 S/P VP SHUNT: ICD-10-CM

## 2019-04-10 RX ORDER — NAPROXEN 500 MG/1
500 TABLET ORAL 2 TIMES DAILY PRN
Qty: 60 TABLET | Refills: 0 | Status: SHIPPED | OUTPATIENT
Start: 2019-04-10

## 2019-04-10 ASSESSMENT — PAIN SCALES - GENERAL: PAINLEVEL: NO PAIN (0)

## 2019-04-10 ASSESSMENT — MIFFLIN-ST. JEOR: SCORE: 1687.94

## 2019-04-10 NOTE — PATIENT INSTRUCTIONS
Plan:  Wean off topiramate by taking 25 mg at bedtime for one week then stop it.   Take nortriptyline 20 mg at bedtime and avoid taking it in the morning  Naproxen as needed for acute headache  Letter per request to  for activity as tolerated  Keppra level and epilepsy Clinic referral  Follow up in 4-6 weeks or sooner if needed

## 2019-04-10 NOTE — LETTER
[facility]  [facility]  [address] [address] [phone]      4/10/2019    Re: Kari Turcios      TO WHOM IT MAY CONCERN:    Abelino may resume his baseline  physical activity per his tolerance and he feels ready. Abelino knows his limits and will seize activity if not tolerated. Recommended to stay hydrated during any exercise program.     Cordially,    CASPER Forte CNP

## 2019-04-10 NOTE — PROGRESS NOTES
Re: Kari Turcios      MRN# [id number]  YOB: 1990  Date of Visit:4/10/2019     OUTPATIENT NEUROLOGY VISIT NOTE    Reason for Visit:  Headache follow up    Interval History:  Kari Turcios is a 29-year-old male presents to the clinic today for headache follow up.   Last Neurology visit for intractable headache 3/29/2019, see clinic visit note for details. 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    Patient reports today that he tries to rest more and feels that his brain needs more rest. Patient reports that headaches today are a little bit better about 3-5/10 on the numeric pain scale . Patient reports that naproxen has been helpful for headache. Patient has been taking nortriptyline 10 mg twice daily and topiramate 25 mg twice daily and not effective and may be slowing him  down. Patient would like to wean off topiramate.   Patient reports that he has computer glasses he uses and eye drops to help with light sensitivity. Headache is frontal. Reports that headaches are more at work during the day. Discussed changing 20 mg at bedtime and avoid taking it in the morning and see if helps with thinking or feeling more active.   Brain MRI 3/29/2019 results reviewed and stable.Patient was seen by neurosurgery NP on 3/29/2019 for shunt resetting after brain MRI, see neurosurgery note for details.   Last seizure a few months ago. Reports that seizures would be at night when sleeping and no one witnessed any seizures recently. Patient reports that he would wake up feeling hang over and dehydrated and this has  not happen for a few month.   Patient has been on Keppra for sometime and has been a long time since he saw anyone for seizures follow up.       Plan:  Wean off topiramate by taking 25 mg at bedtime for one week then stop  it  Take nortriptyline 20 mg at bedtime and avoid taking it in the morning  Naproxen as needed for acute headache  Letter per request to  for activity as tolerated  Keppra level and epilepsy Clinic referral    Past Medical History reviewed   Social History     Tobacco Use     Smoking status: Never Smoker     Smokeless tobacco: Never Used   Substance Use Topics     Alcohol use: Yes     Alcohol/week: 1.2 - 1.8 oz     Types: 2 - 3 Standard drinks or equivalent per week     Comment: OCASIONALLY    reviewed and verified with the patient     Allergies   Allergen Reactions     Penicillins Hives       Current Outpatient Medications   Medication Sig Dispense Refill     ACETAMINOPHEN PO Take 650 mg by mouth every 4 hours as needed for pain       Botulinum Toxin Type A (BOTOX) 200 units injection Inject 155 Units into the muscle every 3 months 155 Units 4     butalbital-acetaminophen-caffeine (FIORICET/ESGIC) -40 MG tablet Take 1 tablet by mouth every 6 hours as needed for headaches or migraine 15 tablet 0     desmopressin (DDAVP) 0.1 MG tablet Take one tablet at lunch and half tablet at bedtime. 135 tablet 3     desmopressin (DDAVP) 0.2 MG tablet Take 1 tablet (200 mcg) by mouth 2 times daily 180 tablet 3     Glycerin-Hypromellose- (VISINE TEARS OP) Apply 1-2 drops to eye 2 times daily as needed (for dry eyes.)       levETIRAcetam (KEPPRA) 750 MG tablet Take 1,500 mg by mouth every morning Two tablet in am and 2 1/2 tablet in pm 60 tablet 0     LORazepam (ATIVAN) 0.5 MG tablet Take 1 tablet (0.5 mg) by mouth every 4 hours as needed (Anxiety, Nausea/Vomiting or Sleep) 30 tablet 3     melatonin 1 MG TABS Take 1-2 tablets (1-2 mg) by mouth nightly as needed for sleep 90 tablet 0     naproxen (NAPROSYN) 500 MG tablet Take 1 tablet (500 mg) by mouth 2 times daily as needed for headaches 20 tablet 0     nortriptyline (PAMELOR) 10 MG capsule TAKE ONE TAB BY MOUTH EVERY MORNING AND ONE TAB AT BEDTIME 60  "capsule 3     prochlorperazine (COMPAZINE) 5 MG tablet Take 0.5-1 tablets (2.5-5 mg) by mouth every 8 hours as needed for nausea or vomiting 20 tablet 1     topiramate (TOPAMAX) 25 MG tablet TAKE 2 TABS BY MOUTH IN THE AM AND 2 TABS IN THE  tablet 3     topiramate (TOPAMAX) 25 MG tablet Take 50mg PO in the AM and 50 mg PO in the  tablet 3   reviewed and verified with the patient    Review of Systems:   A 10-point ROS including constitutional, eyes, respiratory, cardiovascular, gastroenterology, genitourinary, integumentary, musculoskeletal, neurology and psychiatric were all negative except as mentioned in the interval history.      General Exam:   /86 (BP Location: Left arm, Patient Position: Sitting, Cuff Size: Adult Regular)   Pulse 92   Ht 1.727 m (5' 8\")   Wt 74.8 kg (165 lb)   SpO2 100%   BMI 25.09 kg/m    GEN: Awake, NAD; good eye contact, responses appropriately,    HEENT: Head atraumatic/Normocephalic. Scalp normal. Pupils equally round, 4 mm, reactive to light and accommodation, sclera and conjunctiva normal.  Speech is fluent without dysarthria.  Face is symmetrical. Intact and symmetrical eyebrow and lid raise and eyelid closure, smiles. Hearing Intact to conversation speech.  Strength   in the upper and lower extremities bilaterally. Sensation is intact to  touch throughout.   Normal casual gait.    Assessment and Plan:  See Interval History for our discussion and plan      I discussed all my recommendation with Kari Turcios. The patient verbalizes understanding and comfortable with the plan. The patient has our clinic phone number to call with any questions or concerns. All of the patient's questions were answered from the best of my current knowledge.     Thank you for letting me be a part of the treatment team for Kari Turcios      Time spent with pt answering questions, discussing findings, counseling and coordinating care was more than 50% the appointment time, 19  " minutes.         CASPER Black, CNP  [facility] Neurology [facility]

## 2019-04-12 ENCOUNTER — NURSE TRIAGE (OUTPATIENT)
Dept: INTERNAL MEDICINE | Facility: CLINIC | Age: 29
End: 2019-04-12

## 2019-04-12 NOTE — TELEPHONE ENCOUNTER
"[facility]: Nurse Triage Note  SITUATION/BACKGROUND                                                      Kari Turcios is a 29 year old male who calls with nausea and headache.He saw Sybil Dockery on 4/10/2019.  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      Light sensitivity is present. Not using computer now.He is at work. No seizure or seizure type activity.  Headache at 3-4/10.  Took naprosyn bu this hasn't helped this AM..  Topiramate 50 mg,  Desmopressin 0.2 mg and Tegretol 1500 mg all at usual dose this AM.  Fever: temperature: feels warm, chills.  Not feeling great, not awful.   He wonders if this is secondary to recent medication changes and what he should do.        4/10/2019: Plan per Neo Dockery APRN, CNP  \"Wean off topiramate by taking 25 mg at bedtime for one week then stop it.   Take nortriptyline 20 mg at bedtime and avoid taking it in the morning  Naproxen as needed for acute headache  Letter per request to  for activity as tolerated  Keppra level and epilepsy Clinic referral\"      Allergies:   Allergies   Allergen Reactions     Penicillins Hives       ASSESSMENT       Patient awoke with nausea and headache 3-4/10 which is unchanged from last visit. Usual photosensitivity  , slight fever, warm/ chills- he doesn't have thermometer at work.  He had recent change in medications and would like to discuss treatment or if Neo would like to see him again now.  He is not faint , weak, lightheaded or confused.     Seen multiple times in neurology for medication and headache management over past 2 weeks. Will route high priority to neurology    RECOMMENDATION/PLAN                                                      RECOMMENDED DISPOSITION:  Phone Visit  Will comply " with recommendation: Yes    If further questions/concerns or if symptoms do not improve, worsen or new symptoms develop, call your PCP or [phone] to talk with the Resident on call, as soon as possible.    Guideline used: headache  Telephone Triage Protocols for Nurses, Fifth Edition, Georgia Dove RN

## 2019-04-12 NOTE — TELEPHONE ENCOUNTER
Called and spoke to the patient. Will increase topiramate 25 mg am and 50 mg at bedtime for one week, then 50 mg am and 50 at bedtime. If to drowsy-then take 100 mg at bedtime. Patient is in agreement with the plan.

## 2019-04-13 DIAGNOSIS — E23.2 DIABETES INSIPIDUS SECONDARY TO VASOPRESSIN DEFICIENCY (H): ICD-10-CM

## 2019-04-16 RX ORDER — DESMOPRESSIN ACETATE 0.1 MG/1
0.2 TABLET ORAL 2 TIMES DAILY
Qty: 360 TABLET | Refills: 3 | Status: SHIPPED | OUTPATIENT
Start: 2019-04-16 | End: 2020-04-23

## 2019-04-17 NOTE — TELEPHONE ENCOUNTER
RECORDS RECEIVED FROM: [facility] Neurology   DATE RECEIVED: 4/29/9   NOTES (FOR ALL VISITS) STATUS DETAILS   OFFICE NOTE from referring provider Internal 4/10/19  3/27/19  8/13/18  11/22/17  4/28/17  3/14/17   OFFICE NOTE from other specialist N/A    DISCHARGE SUMMARY from hospital N/A    DISCHARGE REPORT from the ER N/A    OPERATIVE REPORT N/A    MEDICATION LIST Internal    IMAGING  (FOR ALL VISITS)     EMG N/A    EEG N/A    ECT N/A    MRI (HEAD, NECK, SPINE) Internal [facility]:  MRI Brain 3/29/19  MRI Brain 5/10/18  MRI Brain 12/4/17  MRI Brain 2/22/17    [facility]:  MRI Brain 10/17/16    [facility]:  MRI Brain 6/13/16   CT (HEAD, NECK, SPINE) N/A

## 2019-04-22 DIAGNOSIS — G40.919 BREAKTHROUGH SEIZURE (H): ICD-10-CM

## 2019-04-22 DIAGNOSIS — K62.5 BRIGHT RED BLOOD PER RECTUM: ICD-10-CM

## 2019-04-22 DIAGNOSIS — C71.9 GERM CELL TUMOR OF BRAIN (H): ICD-10-CM

## 2019-04-22 DIAGNOSIS — R56.9 NOCTURNAL SEIZURES (H): ICD-10-CM

## 2019-04-22 LAB
AFP SERPL-MCNC: 3.2 UG/L (ref 0–8)
ALBUMIN SERPL-MCNC: 4.5 G/DL (ref 3.4–5)
ALP SERPL-CCNC: 86 U/L (ref 40–150)
ALT SERPL W P-5'-P-CCNC: 128 U/L (ref 0–70)
ANION GAP SERPL CALCULATED.3IONS-SCNC: 8 MMOL/L (ref 3–14)
AST SERPL W P-5'-P-CCNC: 268 U/L (ref 0–45)
BASOPHILS # BLD AUTO: 0 10E9/L (ref 0–0.2)
BASOPHILS NFR BLD AUTO: 0.4 %
BILIRUB SERPL-MCNC: 0.4 MG/DL (ref 0.2–1.3)
BUN SERPL-MCNC: 8 MG/DL (ref 7–30)
CALCIUM SERPL-MCNC: 9.1 MG/DL (ref 8.5–10.1)
CHLORIDE SERPL-SCNC: 101 MMOL/L (ref 94–109)
CO2 SERPL-SCNC: 26 MMOL/L (ref 20–32)
CREAT SERPL-MCNC: 0.81 MG/DL (ref 0.66–1.25)
DIFFERENTIAL METHOD BLD: NORMAL
EOSINOPHIL # BLD AUTO: 0.1 10E9/L (ref 0–0.7)
EOSINOPHIL NFR BLD AUTO: 1 %
ERYTHROCYTE [DISTWIDTH] IN BLOOD BY AUTOMATED COUNT: 13.4 % (ref 10–15)
GFR SERPL CREATININE-BSD FRML MDRD: >90 ML/MIN/{1.73_M2}
GLUCOSE SERPL-MCNC: 95 MG/DL (ref 70–99)
HCT VFR BLD AUTO: 40.9 % (ref 40–53)
HGB BLD-MCNC: 13.4 G/DL (ref 13.3–17.7)
IMM GRANULOCYTES # BLD: 0 10E9/L (ref 0–0.4)
IMM GRANULOCYTES NFR BLD: 0.2 %
LDH SERPL L TO P-CCNC: 656 U/L (ref 85–227)
LYMPHOCYTES # BLD AUTO: 1.4 10E9/L (ref 0.8–5.3)
LYMPHOCYTES NFR BLD AUTO: 16.5 %
MCH RBC QN AUTO: 27.5 PG (ref 26.5–33)
MCHC RBC AUTO-ENTMCNC: 32.8 G/DL (ref 31.5–36.5)
MCV RBC AUTO: 84 FL (ref 78–100)
MONOCYTES # BLD AUTO: 0.5 10E9/L (ref 0–1.3)
MONOCYTES NFR BLD AUTO: 6.4 %
NEUTROPHILS # BLD AUTO: 6.2 10E9/L (ref 1.6–8.3)
NEUTROPHILS NFR BLD AUTO: 75.5 %
NRBC # BLD AUTO: 0 10*3/UL
NRBC BLD AUTO-RTO: 0 /100
PLATELET # BLD AUTO: 217 10E9/L (ref 150–450)
POTASSIUM SERPL-SCNC: 3.7 MMOL/L (ref 3.4–5.3)
PROT SERPL-MCNC: 7.7 G/DL (ref 6.8–8.8)
RBC # BLD AUTO: 4.88 10E12/L (ref 4.4–5.9)
SODIUM SERPL-SCNC: 135 MMOL/L (ref 133–144)
WBC # BLD AUTO: 8.2 10E9/L (ref 4–11)

## 2019-04-23 LAB — HCG-TM SERPL-ACNC: <3 IU/L

## 2019-04-24 ENCOUNTER — TELEPHONE (OUTPATIENT)
Dept: NEUROLOGY | Facility: CLINIC | Age: 29
End: 2019-04-24

## 2019-04-24 LAB — LEVETIRACETAM SERPL-MCNC: 35 UG/ML (ref 12–46)

## 2019-04-29 ENCOUNTER — PRE VISIT (OUTPATIENT)
Dept: NEUROLOGY | Facility: CLINIC | Age: 29
End: 2019-04-29

## 2019-04-29 ENCOUNTER — OFFICE VISIT (OUTPATIENT)
Dept: NEUROLOGY | Facility: CLINIC | Age: 29
End: 2019-04-29
Attending: NURSE PRACTITIONER
Payer: COMMERCIAL

## 2019-04-29 VITALS
OXYGEN SATURATION: 98 % | RESPIRATION RATE: 15 BRPM | WEIGHT: 164 LBS | TEMPERATURE: 98 F | HEART RATE: 80 BPM | HEIGHT: 68 IN | DIASTOLIC BLOOD PRESSURE: 74 MMHG | SYSTOLIC BLOOD PRESSURE: 120 MMHG | BODY MASS INDEX: 24.86 KG/M2

## 2019-04-29 DIAGNOSIS — R56.9 SEIZURES (H): Primary | ICD-10-CM

## 2019-04-29 DIAGNOSIS — D49.7 PINEAL TUMOR: ICD-10-CM

## 2019-04-29 RX ORDER — LEVETIRACETAM 750 MG/1
TABLET ORAL
Qty: 60 TABLET | Refills: 11 | Status: SHIPPED | OUTPATIENT
Start: 2019-04-29 | End: 2020-03-19

## 2019-04-29 ASSESSMENT — MIFFLIN-ST. JEOR: SCORE: 1683.4

## 2019-04-29 ASSESSMENT — PAIN SCALES - GENERAL: PAINLEVEL: NO PAIN (0)

## 2019-04-29 NOTE — NURSING NOTE
Chief Complaint   Patient presents with     Seizures     [facility] NEW SEIZURE       Emmett Altamirano, EMT

## 2019-04-29 NOTE — PROGRESS NOTES
Service Date: 2019        Ila Cornell MD   [facility]    [address], [address]   [place] [address]      RE: Kari Turcios    MRN: [id number]    : 1990              Dear Dr. Cornell:        Mr. Turcios is a very pleasant 29-year-old  from [facility] who is seen in the Epilepsy/Neurology Clinic for his seizure problem.  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  He has a shunt in and has never had problem and has not been revised.  He had been seen by Sybil Dockery on  because headache was a little unusual and he has been using nortriptyline and topiramate 25 mg twice a day and also some medication.  The history of seizures seems unrelated to the brain tumor and the hydrocephalus.  He says his seizures had started at the age of 12 when they were nocturnal.  He said he never had them in childhood.  There is no family history.  There is no meningitis, encephalitis or trouble.  He says the seizures tend to be initially nocturnal his parents would hear him thrashing and there would be blood on the pillow and his tumor was not diagnosed until .  He says that the episodes occurred during the day are so rare he does not know if there is a premonitory or an aura with it.  He does say that after the episodes he is usually weak.  He has been treated for a number of medications over the years including carbamazepine, which has been discontinued and now he is on levetiracetam for his seizures.  He says he has been worked up with EEG with Dr. Urias but I could not find them.  He said he was admitted to [facility] in the past and they were working him up there and I will have to review those records which were not immediately  available.  He said he has been compliant with medications.  There is no history of noncompliance.  His shunt has never been obstructed.  His last concentration of levetiracetam here was on 04/22 which was 35 at his current dose, which is 1500 in the morning and 2.5 tablets in the p.m.  This was refilled.      He has had laboratory studies done recently with a tumor marker that was less than 3 for Beta hCG and AFP tumor marker was 3.2.  His last MRI of the brain was done on 03/29/2019 and this shows no findings to suggest recurrent tumor in the pineal region.      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  He has some alcohol issues.  He says that his last seizure was a few months ago and was related to drinking.  He says if he goes out with his friend, he may have 1 drink and that is it now and is well controlled.  He says his seizures do not occur unless he is drinking.  There is no drug use that I am aware of or he did not say to it.      FAMILY HISTORY:  Negative for brain tumor.  Shows the father heart disease pulmonary tumor and no other issues.      ALLERGIES:  Penicillin.      REVIEW OF SYSTEMS:  Unremarkable.      PHYSICAL EXAMINATION:  Shows his blood pressure to be 120/74.  The weight is 164.  His meds include a medication for diabetes insipidus.  He has a history of major depression but that is clear now.  He has had some shoulder problem in the right and he is on DDAVP and other medications already listed.      His exam shows a very pleasant young man.  He has a very nice tattoo.  His father is originally from [facility].  He is not Hmong.  His mental status exam is normal.  Cranial nerves are normal except he has inability to upgaze pretty significant and also convergence.  Fundus does not show atrophy.  The shunt appears well on the right side and compresses easy the shunt tubing.  His coordination is very good.  He is right-handed.  His strength is excellent.   His reflexes are symmetrical.  His sensory, cerebellar and motor exam are normal.      In summary, seizures seemed to precede the diagnosis of pineal tumor which was treated with radiation and chemo, surgery and shunt.  The etiology of the seizures is not clear.  There is no family history.  Seizure is mostly nocturnal.  We would need to look at his EEG studies from the [facility].  In the meantime, he is in a good concentration of levetiracetam with a good level.  His seizures seem to be brought on also by alcohol, which he is now controlling.      He is a very nice young man.  We will follow him in 3 months and we will obtain EEG as I do not have a recent EEG data and look at the old one.         Sincerely,        nAgela Chávez MD       cc:   IBAN SHIRLEY [facility]         ANGELA CHÁVEZ MD             D: 2019   T: 2019   MT: tb      Name:     PANDA WOODARD   MRN:      [id number]        Account:      [id number]   :      1990           Service Date: 2019      Document: [id number]

## 2019-04-29 NOTE — LETTER
2019       RE: Kari Turcios  [address]  [place] [address]     Dear Colleague,    Thank you for referring your patient, Kari Turcios, to the [facility] NEUROLOGY at [facility]. Please see a copy of my visit note below.    Hx of viral encephalirtis and seizures subsequent to that. fiol    Service Date: 2019        Ila Cornell MD   [address], [address]   [place] [address]      RE: Kari Turcios    MRN: [id number]    : 1990              Dear Dr. Cornell:        Mr. Turcios is a very pleasant 29-year-old  from [facility] who is seen in the Epilepsy/Neurology Clinic for his seizure problem.  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  He has a shunt in and has never had problem and has not been revised.  He had been seen by Sybil Dockery on  because headache was a little unusual and he has been using nortriptyline and topiramate 25 mg twice a day and also some medication.  The history of seizures seems unrelated to the brain tumor and the hydrocephalus.  He says his seizures had started at the age of 12 when they were nocturnal.  He said he never had them in childhood.  There is no family history.  There is no meningitis, encephalitis or trouble.  He says the seizures tend to be initially nocturnal his parents would hear him thrashing and there would be blood on the pillow and his tumor was not diagnosed until .  He says that the episodes occurred during the day are so rare he does not know if there is a premonitory or an aura with it.  He does say that after the episodes he is usually weak.  He has been treated for a number of medications over the years including  carbamazepine, which has been discontinued and now he is on levetiracetam for his seizures.  He says he has been worked up with EEG with Dr. Urisa but I could not find them.  He said he was admitted to [facility] in the past and they were working him up there and I will have to review those records which were not immediately available.  He said he has been compliant with medications.  There is no history of noncompliance.  His shunt has never been obstructed.  His last concentration of levetiracetam here was on 04/22 which was 35 at his current dose, which is 1500 in the morning and 2.5 tablets in the p.m.  This was refilled.      He has had laboratory studies done recently with a tumor marker that was less than 3 for Beta hCG and AFP tumor marker was 3.2.  His last MRI of the brain was done on 03/29/2019 and this shows no findings to suggest recurrent tumor in the pineal region.      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  He has some alcohol issues.  He says that his last seizure was a few months ago and was related to drinking.  He says if he goes out with his friend, he may have 1 drink and that is it now and is well controlled.  He says his seizures do not occur unless he is drinking.  There is no drug use that I am aware of or he did not say to it.      FAMILY HISTORY:  Negative for brain tumor.  Shows the father heart disease pulmonary tumor and no other issues.      ALLERGIES:  Penicillin.      REVIEW OF SYSTEMS:  Unremarkable.      PHYSICAL EXAMINATION:  Shows his blood pressure to be 120/74.  The weight is 164.  His meds include a medication for diabetes insipidus.  He has a history of major depression but that is clear now.  He has had some shoulder problem in the right and he is on DDAVP and other medications already listed.      His exam shows a very pleasant young man.  He has a very nice tattoo.  His father is originally from [facility].   He is not Hmong.  His mental status exam is normal.  Cranial nerves are normal except he has inability to upgaze pretty significant and also convergence.  Fundus does not show atrophy.  The shunt appears well on the right side and compresses easy the shunt tubing.  His coordination is very good.  He is right-handed.  His strength is excellent.  His reflexes are symmetrical.  His sensory, cerebellar and motor exam are normal.      In summary, seizures seemed to precede the diagnosis of pineal tumor which was treated with radiation and chemo, surgery and shunt.  The etiology of the seizures is not clear.  There is no family history.  Seizure is mostly nocturnal.  We would need to look at his EEG studies from the [facility].  In the meantime, he is in a good concentration of levetiracetam with a good level.  His seizures seem to be brought on also by alcohol, which he is now controlling.      He is a very nice young man.  We will follow him in 3 months and we will obtain EEG as I do not have a recent EEG data and look at the old one.         Sincerely,        Maninder Chávez MD       cc:   IBAN SHIRLEY [facility]         MANINDER CHÁVEZ MD        D: 2019   T: 2019   MT: em    Name:     PANDA WOODARD   MRN:      [id number]        Account:      [id number]   :      1990           Service Date: 2019    Document: [id number]

## 2019-05-02 NOTE — RESULT ENCOUNTER NOTE
Hank Roca,   You Keppra level is normal. Please follow up with Dr Shepard for EEG.   Take care, Sybil

## 2019-05-07 ENCOUNTER — TELEPHONE (OUTPATIENT)
Dept: NEUROLOGY | Facility: CLINIC | Age: 29
End: 2019-05-07

## 2019-05-07 ENCOUNTER — CARE COORDINATION (OUTPATIENT)
Dept: NEUROLOGY | Facility: CLINIC | Age: 29
End: 2019-05-07

## 2019-05-07 NOTE — PROGRESS NOTES
Sybil Dockery APRN CNP Seeb, Tracey, RN             Can you please call him about his oncologist's response (his oncologist was the provider who requested the liver tests). His liver enzymes were up. Please ask him about tylenol intake and if there are any alcohol intake.   He needs to repeat his liver tests. Does he have a primary care here he can follow up with?   Let me know.     Thank you, Sybil    Previous Messages      ----- Message -----   From: Cheikh Ervin MD   Sent: 5/3/2019   4:24 PM   To: CASPER Jones CNP   Subject: RE: question                                     This LFT change for transaminases is new. Could you check on his alcohol and then tylenol intake? Unlikely to be related to his malignancy. His tumor markers have been normal. We will have to repeat it to see the trend up or down. It could be a medication or something else he took.     If it keeps rising, we will have to get hepatitis serologies, liver US/CT and GI referral.     Cheikh   ----- Message -----   From: Sybil Dockery APRN CNP   Sent: 5/2/2019   1:00 PM   To: Cheikh Ervin MD   Subject: question                                         Hi Dr Ervin,   I have been seeing Abelino for headaches. I was looking thru his recent labs that you requested and his LDH and ALT/AST are very high.   I do not know if you had chance to see the results and if there are any explanation of his ALT/AST to be increased.   Thank you, Sybil         5/7/19:  Called and left a voice message asking for a call back.  I need to ask the above questions per Sybil.

## 2019-05-07 NOTE — TELEPHONE ENCOUNTER
----- Message from Cheikh Ervin MD sent at 5/3/2019  4:24 PM CDT -----  Regarding: RE: question  This LFT change for transaminases is new. Could you check on his alcohol and then tylenol intake? Unlikely to be related to his malignancy. His tumor markers have been normal. We will have to repeat it to see the trend up or down. It could be a medication or something else he took.     If it keeps rising, we will have to get hepatitis serologies, liver US/CT and GI referral.     Cheikh  ----- Message -----  From: Sybil Dockery, CASPER CNP  Sent: 5/2/2019   1:00 PM  To: Cheikh Ervin MD  Subject: question                                         Hi Dr Ervin,   I have been seeing Abelino for headaches. I was looking thru his recent labs that you requested and his LDH and ALT/AST are very high.   I do not know if you had chance to see the results and if there are any explanation of his ALT/AST to be increased.   Thank you, Sybil

## 2019-05-07 NOTE — TELEPHONE ENCOUNTER
[facility] Call Center    Phone Message    May a detailed message be left on voicemail: yes    Reason for Call: Other: Pt returned a call from [facility] regarding abnormal results. Please give him a call back.     Action Taken: Message routed to:  [facility] Neurology

## 2019-05-07 NOTE — TELEPHONE ENCOUNTER
Called and left a VM to call our Neurology Clinic  or his oncologist Dr Ervin back regarding abnormal results.

## 2019-05-08 ENCOUNTER — MYC MEDICAL ADVICE (OUTPATIENT)
Dept: NEUROLOGY | Facility: CLINIC | Age: 29
End: 2019-05-08

## 2019-05-08 NOTE — TELEPHONE ENCOUNTER
[facility] Call Center    Phone Message    May a detailed message be left on voicemail: yes    Reason for Call: Other: Pt returning VM left by Amparo. He states that he does take Tylenol but usually only takes one during lunch time if he needs it and then on an as needed basis. Pt also states that he used to drink but does not drink alcohol much anymore unless it is for a friends birthday or some special occasion. He also said that he does see his PCP Dr. Cornell with [facility]. Please give pt a call back to discuss.      Action Taken: Message routed to:  [facility] Neurology  '

## 2019-05-08 NOTE — TELEPHONE ENCOUNTER
Left pt a voicemail asking him to call back to discuss his abnormal liver function results and his oncologist's response.     He will need to repeat his liver tests. What is his Tylenol and alcohol intake?    Does he have a primary care provider that he can follow up with?

## 2019-05-09 NOTE — TELEPHONE ENCOUNTER
Health Call Center    Phone Message    May a detailed message be left on voicemail: yes    Reason for Call: Other: Pt returned the call he got regarding his labs. He is wanting to know if there's a reason why he's needing the labs redone. Please give him another call back.     Action Taken: Message routed to:  [facility] ([facility] Neurology

## 2019-05-09 NOTE — TELEPHONE ENCOUNTER
Left a voicemail instructing pt to contact either Dr. Ervin or his PCP to get recheck labs ordered.

## 2019-05-10 NOTE — TELEPHONE ENCOUNTER
Health Call Center    Phone Message    May a detailed message be left on voicemail: yes    Reason for Call: Other: Pt returned call from Amparo. I relayed the message she had left about rechecking his liver function, and then connected him over to Oncology so he can get those labs     Action Taken: Message routed to:  [facility] Neurology

## 2019-05-10 NOTE — TELEPHONE ENCOUNTER
Left a voicmemail letting him know the reasoning behind rechecking liver function and to remind him to contact his PCP or oncologist to get these reordered.

## 2019-05-13 ENCOUNTER — PATIENT OUTREACH (OUTPATIENT)
Dept: ONCOLOGY | Facility: CLINIC | Age: 29
End: 2019-05-13

## 2019-05-13 NOTE — PROGRESS NOTES
had a voicemail on Friday 5/10/19 from someone (a [employer] employee) stating the patient had a question about labs and left his MRN number on writer's voicemail.  They didn't state what his question was in regards to.     Writer called patient to see if he needs lab orders faxed, a lab appointment or what exactly he needed, however patient did not answer his phone and writer couldn't address his question.  Left a message asking him to return the call if he still had questions about his labs.     Carmen Garcia, RN BSN   [facility]  Nurse Coordinator

## 2019-05-23 ENCOUNTER — ALLIED HEALTH/NURSE VISIT (OUTPATIENT)
Dept: NEUROLOGY | Facility: CLINIC | Age: 29
End: 2019-05-23
Payer: COMMERCIAL

## 2019-05-23 DIAGNOSIS — R56.9 SEIZURES (H): Primary | ICD-10-CM

## 2019-05-24 NOTE — PROCEDURES
Procedure Date: 2019      EEG #:        DATE OF STUDY:  2019      An outpatient EEG that is 24 minutes in duration.      PATIENT INFORMATION:  A 29-year-old male presents with intractable headaches.  EEG is being done to evaluate for seizures.      MEDICATIONS:  Keppra, Topamax, DDAVP, Compazine.     TECHNICAL SUMMARY: This EEG procedure was performed with 23 scalp electrodes in 10-20 system placements, and additional scalp, precordial and other surface electrodes used for electrical referencing and artifact detection.      BACKGROUND:  During wakefulness, the background activity consists of synchronous and symmetric, well modulated, 9-10 Hz posterior dominant rhythm. The posterior dominant rhythm attenuated with eye opening. During drowsiness, the background activity waxed and waned and there were periods of slowing and attenuation of the posterior alpha rhythm.   The patient does become drowsy, but does not fall asleep.      ACTIVATION PROCEDURES:  Photic stimulation was completed.  Hyperventilation was omitted due to history.  No abnormalities were seen.      EPILEPTIFORM DISCHARGES:  None.      ICTAL:  None.      IMPRESSION:  Awake and drowsy routine EEG is normal.  No electrographic seizures or epileptiform discharges seen.         DEWAYNE BHATIA MD             D: 2019   T: 2019   MT: tan      Name:     PANDA WOODARD   MRN:      [id number]        Account:        [id number]   :      1990           Procedure Date: 2019      Document: [id number]

## 2019-06-12 ENCOUNTER — OFFICE VISIT (OUTPATIENT)
Dept: NEUROLOGY | Facility: CLINIC | Age: 29
End: 2019-06-12
Payer: COMMERCIAL

## 2019-06-12 VITALS
SYSTOLIC BLOOD PRESSURE: 133 MMHG | DIASTOLIC BLOOD PRESSURE: 79 MMHG | OXYGEN SATURATION: 100 % | HEART RATE: 70 BPM | WEIGHT: 161.5 LBS | BODY MASS INDEX: 24.56 KG/M2

## 2019-06-12 DIAGNOSIS — R89.9 ABNORMAL LABORATORY TEST RESULT: ICD-10-CM

## 2019-06-12 DIAGNOSIS — R89.9 ABNORMAL LABORATORY TEST RESULT: Primary | ICD-10-CM

## 2019-06-12 DIAGNOSIS — Z79.899 ENCOUNTER FOR LONG-TERM (CURRENT) USE OF MEDICATIONS: ICD-10-CM

## 2019-06-12 LAB
ALBUMIN SERPL-MCNC: 4.6 G/DL (ref 3.4–5)
ALP SERPL-CCNC: 109 U/L (ref 40–150)
ALT SERPL W P-5'-P-CCNC: 27 U/L (ref 0–70)
ANION GAP SERPL CALCULATED.3IONS-SCNC: 6 MMOL/L (ref 3–14)
AST SERPL W P-5'-P-CCNC: 19 U/L (ref 0–45)
BILIRUB SERPL-MCNC: 0.5 MG/DL (ref 0.2–1.3)
BUN SERPL-MCNC: 7 MG/DL (ref 7–30)
CALCIUM SERPL-MCNC: 9.4 MG/DL (ref 8.5–10.1)
CHLORIDE SERPL-SCNC: 105 MMOL/L (ref 94–109)
CO2 SERPL-SCNC: 26 MMOL/L (ref 20–32)
CREAT SERPL-MCNC: 0.91 MG/DL (ref 0.66–1.25)
GFR SERPL CREATININE-BSD FRML MDRD: >90 ML/MIN/{1.73_M2}
GLUCOSE SERPL-MCNC: 92 MG/DL (ref 70–99)
LDH SERPL L TO P-CCNC: 176 U/L (ref 85–227)
POTASSIUM SERPL-SCNC: 3.9 MMOL/L (ref 3.4–5.3)
PROT SERPL-MCNC: 8.8 G/DL (ref 6.8–8.8)
SODIUM SERPL-SCNC: 136 MMOL/L (ref 133–144)

## 2019-06-12 ASSESSMENT — PAIN SCALES - GENERAL: PAINLEVEL: NO PAIN (0)

## 2019-06-12 NOTE — NURSING NOTE
Chief Complaint   Patient presents with     RECHECK     [facility] RETURN - FOLLOW UP       Armand Soto, EMT

## 2019-06-12 NOTE — PATIENT INSTRUCTIONS
Plan:  Repeat labs-CMP with liver enzymes and lactate dehydrogenase  Continue headache treatment  Continue to stay away from tylenol, alcohol, mariguana or any other OTC meds or substances.   EKG for nortriptyline possible side effect monitoring   May try Botox for headache treatment.   Will call you with the lab results

## 2019-06-12 NOTE — PROGRESS NOTES
"Re: Kari Turcios      MRN# [id number]  YOB: 1990  Date of Visit:6/12/2019     OUTPATIENT NEUROLOGY VISIT NOTE    Reason for Visit:  Headache follow up    Interval History:  Kari Turcios is a 29-year-old male presents to the clinic today for headache follow up  Last Neurology visit for intractable headache 4/10/2019, see clinic visit note for details. 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  Brain MRI with and without contrast on 3/29/2019 due to worsening of headache and no new changes were found.  Patient reports that he stopped acetaminophen about a month ago and was taking 500 mg at lunch. Patient reports that he stopped alcohol about a month ago. Patient reports that he drinks occasionally with friends but never to be \"drunk.\"  Patient reports that he stopped marijuana frequent/daily one week ago.   Patient reports that he has been feeling nauseous persistently but no abdominal pain but \"uncomfortable\" in the stomach area and has been feeling like this for weeks but getting worse.    Denies any bleeding or bruising.   Patient reports that headaches are the same and localized to the frontal area. Patient has been taking nortriptyline 10 mg BID and topiramate 25 mg BID for headache prevention and naproxen 500 mg as needed for acute headache and takes about once per week. No chest pain or palpitation or any other CV symptoms reported.   Patient reports that he takes melatonin as needed but \"does not need help sleeping\" because he is \"fine with sleeping.\"  Patient takes lorezepam 0.5 mg am and at bedtime for anxiety and prescribed by Dr Ervin  Seizure care-patient has been seeing Dr Shepard and epileptologist on 4/29/2019, see Dr Shepard's note for details. Patient has been taking levetiracetam since 11-12 years old and " "recent levels were checked and normal.   Tumor markers recently were normal and less than 3 for beta HCG and 3.2 for AFP tumor  Brain MRI completed on 3/29/2019 and no suggested recurrent tumor in the pineal region   shunt was rechecked recently.   Elevated AST and ALT and LDH -were ordered by Dr Ervin patient's oncologist and was contacted  Per Dr Ervin, \"Message from Cheikh Ervin MD sent at 5/3/2019  4:24 PM CDT -----  Regarding: RE: question  This LFT change for transaminases is new. Could you check on his alcohol and then tylenol intake? Unlikely to be related to his malignancy. His tumor markers have been normal. We will have to repeat it to see the trend up or down. It could be a medication or something else he took\".     Plan discussed with the patien:  Repeat labs-CMP with liver enzymes and lactate dehydrogenase  Continue headache treatment  Continue to stay away from tylenol, alcohol, mariguana or any other OTC meds or substances.   EKG for nortriptyline possible side effect monitoring   May try Botox for headache treatment.   Will call you with the lab results      Past Medical History reviewed   Past Medical History:   Diagnosis Date     Hypertropia      Myopia      Seizures (H)     2 months ago     Type 2 diabetes mellitus without complications (H)     Borderline       Social History     Tobacco Use     Smoking status: Never Smoker     Smokeless tobacco: Never Used   Substance Use Topics     Alcohol use: Not Currently     Alcohol/week: 1.2 - 1.8 oz     Types: 2 - 3 Standard drinks or equivalent per week     Comment: OCASIONALLY    reviewed and verified with the patient        Allergies   Allergen Reactions     Penicillins Hives       Current Outpatient Medications   Medication Sig Dispense Refill     ACETAMINOPHEN PO Take 650 mg by mouth every 4 hours as needed for pain       Botulinum Toxin Type A (BOTOX) 200 units injection Inject 155 Units into the muscle every 3 months 155 Units 4     " butalbital-acetaminophen-caffeine (FIORICET/ESGIC) -40 MG tablet Take 1 tablet by mouth every 6 hours as needed for headaches or migraine 15 tablet 0     desmopressin (DDAVP) 0.1 MG tablet Take 2 tablets (200 mcg) by mouth 2 times daily TAKE ONE TABLET BY MOUTH AT LUNCH (Patient taking differently: TAKE ONE TABLET BY MOUTH AT LUNCH) 360 tablet 3     desmopressin (DDAVP) 0.1 MG tablet Take one tablet at lunch and half tablet at bedtime. 135 tablet 3     desmopressin (DDAVP) 0.2 MG tablet Take 1 tablet (200 mcg) by mouth 2 times daily 180 tablet 3     Glycerin-Hypromellose- (VISINE TEARS OP) Apply 1-2 drops to eye 2 times daily as needed (for dry eyes.)       levETIRAcetam (KEPPRA) 750 MG tablet Two tablet in am and 2 1/2 tablet in pm 60 tablet 11     LORazepam (ATIVAN) 0.5 MG tablet Take 1 tablet (0.5 mg) by mouth every 4 hours as needed (Anxiety, Nausea/Vomiting or Sleep) 30 tablet 3     melatonin 1 MG TABS Take 1-2 tablets (1-2 mg) by mouth nightly as needed for sleep 90 tablet 0     naproxen (NAPROSYN) 500 MG tablet Take 1 tablet (500 mg) by mouth 2 times daily as needed for headaches 60 tablet 0     nortriptyline (PAMELOR) 10 MG capsule TAKE ONE TAB BY MOUTH EVERY MORNING AND ONE TAB AT BEDTIME 60 capsule 3     prochlorperazine (COMPAZINE) 5 MG tablet Take 0.5-1 tablets (2.5-5 mg) by mouth every 8 hours as needed for nausea or vomiting 20 tablet 1     topiramate (TOPAMAX) 25 MG tablet Take 50mg PO in the AM and 50 mg PO in the  tablet 3   reviewed and verified with the patient    Review of Systems:   A 10-point ROS including constitutional, eyes, respiratory, cardiovascular, gastroenterology, genitourinary, integumentary, musculoskeletal, neurology and psychiatric were all negative except as mentioned in the interval history.     General Exam:   /79 (BP Location: Left arm, Patient Position: Sitting, Cuff Size: Adult Regular)   Pulse 70   Wt 73.3 kg (161 lb 8 oz)   SpO2 100%   BMI  24.56 kg/m    GEN: Awake, NAD; good eye contact, responses appropriately, non toxic appearing   HEENT: Head atraumatic/Normocephalic. Scalp normal. Pupils equally round, 4 mm, reactive to light and accommodation, sclera and conjunctiva normal. Speech is fluent without dysarthria. EOM intact.  Face is symmetrical. Intact and symmetrical eyebrow and lid raise and eyelid closure, smiles.  Strength intact  in the upper and lower extremities bilaterally. Sensation is intact to  touch throughout.  Reflexes symmetrical at biceps, triceps, brachioradialis, patellar, and Achilles. Negative Babinski with downgoing toes bilaterally. Normal casual gait.    Assessment and Plan:  See Interval History for our discussion and plan..      I discussed all my recommendation with Kari Turcios. The patient verbalizes understanding and comfortable with the plan. The patient has our clinic phone number to call with any questions or concerns. All of the patient's questions were answered from the best of my current knowledge.     Time spent with pt answering questions, discussing findings, counseling and coordinating care was more than 50% the appointment time, 23  minutes.         CASPER Black, CNP  [facility]

## 2019-06-13 ENCOUNTER — NURSE TRIAGE (OUTPATIENT)
Dept: CALL CENTER | Age: 29
End: 2019-06-13

## 2019-06-13 NOTE — RESULT ENCOUNTER NOTE
Hank Roca,   Your recent lab results -liver function and comprehensive metabolic panel are good. Please continue to stay from acetaminophen use.   Let me know if you have any questions.   Take care, Sybil

## 2019-06-13 NOTE — TELEPHONE ENCOUNTER
[facility]: Nurse Triage Note  SITUATION/BACKGROUND                                                      Kari Turcios is a 29 year old male who calls with headache.    Description:  Severe frontal headache  Onset/duration:  Started 15/20 minutes ago at work  Precip. 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  Associated symptoms:  Sweaty palms and nauseous   Improves/worsens symptoms:  Does not have medication at work  Pain scale (0-10)   Did not give me a #   MEDICATIONS:   Taking medication(s) as prescribed? Yes  Taking over the counter medication(s?) No  Any barriers to taking medication(s) as prescribed?  Yes is at work and does not have Naproxen  Medication(s) improving/managing symptoms?  No    Allergies:   Allergies   Allergen Reactions     Penicillins Hives       ASSESSMENT      29 year old male calls today with HA.  He states the headache started about 15-20 minutes ago at work.  He is nauseous and has sweaty hands/palms.  He denies weakness,numbness confusion, fever or still neck.  He does not have his Naproxen at work.      Advised him to go home from work -someone to drive if he is unable.    Once home take his naproxen,rest in a dark room  Apply ice or cool compress to forehead.  He will go home but he would like to discuss with Sybil Dockery  Will forward message to the neurology clinic         RECOMMENDATION/PLAN                                                      RECOMMENDED DISPOSITION:  Home care advice -   Will comply with recommendation: Yes    If further questions/concerns or if symptoms do not improve, worsen or new symptoms develop, call your PCP or [phone] to talk with the Resident on call, as soon as possible.    Guideline used: pg303  Telephone Triage Protocols for  Nurses, Fifth Edition, Georgia Fuchs RN

## 2019-06-23 DIAGNOSIS — R51.9 ACUTE INTRACTABLE HEADACHE, UNSPECIFIED HEADACHE TYPE: ICD-10-CM

## 2019-06-27 RX ORDER — PROCHLORPERAZINE MALEATE 5 MG
2.5-5 TABLET ORAL EVERY 8 HOURS PRN
Qty: 20 TABLET | Refills: 1 | Status: SHIPPED | OUTPATIENT
Start: 2019-06-27 | End: 2022-08-04

## 2019-07-01 NOTE — PROGRESS NOTES
Reviewed and normal ALT and AST.    Ref. Range 6/12/2019 16:13   Sodium Latest Ref Range: 133 - 144 mmol/L 136   Potassium Latest Ref Range: 3.4 - 5.3 mmol/L 3.9   Chloride Latest Ref Range: 94 - 109 mmol/L 105   Carbon Dioxide Latest Ref Range: 20 - 32 mmol/L 26   Urea Nitrogen Latest Ref Range: 7 - 30 mg/dL 7   Creatinine Latest Ref Range: 0.66 - 1.25 mg/dL 0.91   GFR Estimate Latest Ref Range: >60 mL/min/1.73_m2 >90   GFR Estimate If Black Latest Ref Range: >60 mL/min/1.73_m2 >90   Calcium Latest Ref Range: 8.5 - 10.1 mg/dL 9.4   Anion Gap Latest Ref Range: 3 - 14 mmol/L 6   Albumin Latest Ref Range: 3.4 - 5.0 g/dL 4.6   Protein Total Latest Ref Range: 6.8 - 8.8 g/dL 8.8   Bilirubin Total Latest Ref Range: 0.2 - 1.3 mg/dL 0.5   Alkaline Phosphatase Latest Ref Range: 40 - 150 U/L 109   ALT Latest Ref Range: 0 - 70 U/L 27   AST Latest Ref Range: 0 - 45 U/L 19     Lactate Dehydrogenase 176 and normal

## 2019-08-02 DIAGNOSIS — R51.9 CHRONIC DAILY HEADACHE: ICD-10-CM

## 2019-08-02 NOTE — TELEPHONE ENCOUNTER
Last Written Prescription Date:  04/02/19  Last Fill Quantity: 60,  # refills: 3   Last office visit:06/12/19 previous visit found with prescribing provider:     Future Office Visit:

## 2019-08-05 RX ORDER — NORTRIPTYLINE HCL 10 MG
CAPSULE ORAL
Qty: 180 CAPSULE | Refills: 1 | Status: SHIPPED | OUTPATIENT
Start: 2019-08-05 | End: 2020-02-02

## 2019-11-04 ENCOUNTER — HEALTH MAINTENANCE LETTER (OUTPATIENT)
Age: 29
End: 2019-11-04

## 2020-01-28 DIAGNOSIS — R51.9 CHRONIC DAILY HEADACHE: ICD-10-CM

## 2020-01-28 NOTE — TELEPHONE ENCOUNTER
Rx Authorization:    Requested Medication/ Dose: Nortriptyline 10mg caps    Date last refill ordered: 8/5/2019    Quantity ordered: 180    # refills: 1    Date of last clinic visit with ordering provider: 6/12/2019    Date of next clinic visit with ordering provider: none    All pertinent protocol data (lab date/result):     Include pertinent information from patients message:

## 2020-02-02 RX ORDER — NORTRIPTYLINE HCL 10 MG
CAPSULE ORAL
Qty: 180 CAPSULE | Refills: 0 | Status: SHIPPED | OUTPATIENT
Start: 2020-02-02 | End: 2020-05-04

## 2020-02-03 NOTE — TELEPHONE ENCOUNTER
Called and left a voice message asking for a call back.  We need to know how his headaches are and if he is tolerating the Nortriptyline.  He has not seen Sybil since June 2019 and will need a follow up appointment

## 2020-02-03 NOTE — TELEPHONE ENCOUNTER
Can you please ask patient to update us about his headaches. Does nortriptyline work? I did not hear from him since June of 2019. Thank you

## 2020-02-24 DIAGNOSIS — E23.2 DIABETES INSIPIDUS SECONDARY TO VASOPRESSIN DEFICIENCY (H): ICD-10-CM

## 2020-02-27 RX ORDER — DESMOPRESSIN ACETATE 0.2 MG/1
0.2 TABLET ORAL 2 TIMES DAILY
Qty: 180 TABLET | Refills: 3 | Status: SHIPPED | OUTPATIENT
Start: 2020-02-27 | End: 2021-01-05

## 2020-02-27 NOTE — TELEPHONE ENCOUNTER
VM box is full. Will attempt second call. If pt calls back, can be scheduled ROBERT with Dr. Ramires.

## 2020-02-27 NOTE — TELEPHONE ENCOUNTER
More than 1 year since seen in clinic. Nursing unable to sign.   Laquita Coulter, RN on 2/27/2020 at 9:54 AM       PLEASE HELP SCHEDULE THE FOLLOWING APPT(S): Return Appointment    TIME FRAME NEEDED BY: First available.       SCHEDULING COMMENTS (optional): with frannie Seay.

## 2020-02-27 NOTE — TELEPHONE ENCOUNTER
desmopressin (DDAVP) 0.2 MG tablet      Last Written Prescription Date:  4/1/19  Last Fill Quantity: 180,   # refills: 3  Last Office Visit : 10/1/18  Future Office visit:  none    Routing refill request to provider for review/approval because:    Overdue appointment.     Clarification of current dosage and frequency needed.   Scheduling has been notified to contact the pt for appointment.

## 2020-03-15 DIAGNOSIS — D49.7 PINEAL TUMOR: ICD-10-CM

## 2020-03-19 RX ORDER — LEVETIRACETAM 750 MG/1
TABLET ORAL
Qty: 135 TABLET | Refills: 0 | Status: SHIPPED | OUTPATIENT
Start: 2020-03-19 | End: 2020-04-15

## 2020-03-19 NOTE — TELEPHONE ENCOUNTER
Last visit with  4/29/2019. He was to follow up in three months but did not.    He has no follow up scheduled with . Per records, patient has an  appointment scheduled in [facility].  Message sent to scheduling  to contact patient to make an appointment  Will route to  for recommendations

## 2020-04-12 DIAGNOSIS — D49.7 PINEAL TUMOR: ICD-10-CM

## 2020-04-15 RX ORDER — LEVETIRACETAM 750 MG/1
TABLET ORAL
Qty: 135 TABLET | Refills: 3 | Status: SHIPPED | OUTPATIENT
Start: 2020-04-15 | End: 2021-05-25

## 2020-04-15 NOTE — TELEPHONE ENCOUNTER
"levETIRAcetam (KEPPRA) 750 MG tablet   Last Written Prescription Date: 3/19/20  Last Fill Quantity: 135,   # refills: 0  Last Office Visit : 4/29/19  Future Office visit:  none       Routing refill request to provider for review/approval because:  Per last office visit, \"Plan: Return in about 3 months (around 7/29/2019)\".      Scheduling has been notified to contact the pt for appointment.          "

## 2020-04-22 ENCOUNTER — TELEPHONE (OUTPATIENT)
Dept: NEUROLOGY | Facility: CLINIC | Age: 30
End: 2020-04-22

## 2020-04-22 NOTE — TELEPHONE ENCOUNTER
Patient has been contacted several times to make an appt with Dr. Shepard with no answer/reply. Patient has been placed on a recall list to be contacted at a later date.

## 2020-04-22 NOTE — PROGRESS NOTES
"Kari Turcios is a 30 year old male who is being evaluated via a billable video visit.      The patient has been notified of following:     \"This video visit will be conducted via a call between you and your physician/provider. We have found that certain health care needs can be provided without the need for an in-person physical exam.  This service lets us provide the care you need with a video conversation.  If a prescription is necessary we can send it directly to your pharmacy.  If lab work is needed we can place an order for that and you can then stop by our lab to have the test done at a later time.    Video visits are billed at different rates depending on your insurance coverage.  Please reach out to your insurance provider with any questions.    If during the course of the call the physician/provider feels a video visit is not appropriate, you will not be charged for this service.\"    Patient has given verbal consent for Video visit? Yes    How would you like to obtain your AVS? Anitha    Patient would like the video invitation sent by: Text to cell phone: [phone]    Will anyone else be joining your video visit? No      Video-Visit Details    Type of service:  Video Visit  Start: 04/23/2020 09:31 am   Stop: 04/23/2020 09:45 am  Originating Location (pt. Location): Home    Distant Location (provider location):  [facility] ENDOCRINOLOGY     Mode of Communication:  Video Conference via Rodati  Due to the COVID 19 pandemic this visit was converted to a video visit in order to help prevent spread of infection in this patient and the general population.   Abelino Turcios is a 30 year old male who was admitted on 3/19/14 after presenting to the emergency room with headache, nausea, 2 weeks of left side vision blurring.  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 He underwent an endoscopic 3rd ventriculostomy and EVD placement and surgery stealth assisted suboccipital " craniotomy with the excision of the tumor on 3/26/14. 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 Focal mature teratoma (~5%) with mature squamous epithelial and mesenchymal elements (focal cartilage) were seen. Lesser components of yolk sac (~5%), (~2%) embryonal and (~3%) germinoma were present. 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   The patient developed polyuria and increased thirst a couple of months prior to his hospitalization. A diagnosis of DI was confirmed during hospitalization and the patient was discharged on desmopressin. The investigation of pituitary function postop revealed normal anterior pituitary function.  He completed the radiation therapy in October of 2014.  Prior to radiation therapy, he had sperm retrieved, for cryopreservation.  Current dose of desmopressin is 200  g PO in the morning (6 AM), 200  g around bedtime (8-9 PM).  Since his last visit here, he had 2 episodes of seizures, both associated with loss of consciousness.  The first one occurred in December and the most recent one on 3/21/2020.  The patient does not remember experiencing any symptoms prior to these seizure events.  With the first seizure, the sodium level was 133.  He is not sure at which hospital he was evaluated with the second seizure and he is not sure whether or not the sodium level was checked at that time.  The dose of the Tegretol was increased.    Currently, he denies any specific complaints.  On questioning, he denies headaches, visual issues.  He does work from home and he finds that using computer glasses is very helpful for his vision.  At night, he has been using the restroom 0-1 times.  Denies increased urination during daytime.  Most recent brain MRI from 3/29/2019 was negative for tumor recurrence.  Beta hCG has remained undetectable on follow-up labs.  The most recent lab work from  April 2019 revealed an undetectable beta hCG and alpha-fetoprotein of 3.2, slightly higher compared with the prior numbers.      Past Medical History:   Diagnosis Date     Hypertropia      Myopia      Seizures (H)     2 months ago     Type 2 diabetes mellitus without complications (H)     Borderline   Encephalitis 2012  History of seizure disorders post encephalitis     Past Surgical History:   Procedure Laterality Date     ARTHROTOMY SHOULDER, BANKHART REPAIR, COMBINED  10/29/2012    Procedure: COMBINED ARTHROTOMY SHOULDER, BANKHART REPAIR;  RIGHT OPEN SHOULDER BANKART REPAIR;  Surgeon: Lemuel Ramírez MD;  Location: [facility]     OPTICAL TRACKING SYSTEM BIOPSY BRAIN  3/20/2014    Procedure: OPTICAL TRACKING SYSTEM BIOPSY BRAIN;  Right Frontal Approach For endoscopic Intraventricular Mass Biopsy, 3rd Ventriculostomy, Placement of right ventriculostomy catheter.;  Surgeon: Williams Clement MD;  Location: [facility] OR     OPTICAL TRACKING SYSTEM CRANIOTOMY, EXCISE TUMOR, COMBINED  3/26/2014    Procedure: COMBINED OPTICAL TRACKING SYSTEM CRANIOTOMY, EXCISE TUMOR;  Stealth Assisted Sub-Occiptial Craniotomy, Excise Tumor ;  Surgeon: Ann Sharp MD;  Location: [facility] OR     OPTICAL TRACKING SYSTEM IMPLANT SHUNT VENTRICULOPERITONEAL  4/8/2014    Procedure: OPTICAL TRACKING SYSTEM IMPLANT SHUNT VENTRICULOPERITONEAL;  Right Stealth Guided Ventricularperitoneal Shunt Placement ;  Surgeon: Ann Sharp MD;  Location: [facility] OR     RECESSION RESECTION (REPAIR STRABISMUS) BILATERAL Bilateral 8/4/2016    Procedure: RECESSION RESECTION (REPAIR STRABISMUS) BILATERAL;  Surgeon: Serafin Menendez MD;  Location: [facility] OR     VENTRICULOSTOMY  3/20/2014    Procedure: VENTRICULOSTOMY;;  Surgeon: Williams Clement MD;  Location: [facility] OR       Current Medications    Current Outpatient Medications:      ACETAMINOPHEN PO, Take 650 mg by mouth every 4 hours as needed for pain, Disp: , Rfl:      Botulinum Toxin  Type A (BOTOX) 200 units injection, Inject 155 Units into the muscle every 3 months, Disp: 155 Units, Rfl: 4     desmopressin (DDAVP) 0.1 MG tablet, Take 2 tablets (200 mcg) by mouth 2 times daily TAKE ONE TABLET BY MOUTH AT LUNCH (Patient taking differently: TAKE ONE TABLET BY MOUTH AT LUNCH), Disp: 360 tablet, Rfl: 3     desmopressin (DDAVP) 0.1 MG tablet, Take one tablet at lunch and half tablet at bedtime., Disp: 135 tablet, Rfl: 3     desmopressin (DDAVP) 0.2 MG tablet, TAKE 1 TABLET (200 MCG) BY MOUTH 2 TIMES DAILY, Disp: 180 tablet, Rfl: 3     Glycerin-Hypromellose- (VISINE TEARS OP), Apply 1-2 drops to eye 2 times daily as needed (for dry eyes.), Disp: , Rfl:      levETIRAcetam (KEPPRA) 750 MG tablet, TAKE 2 TABLET IN AM AND 2 1/2 TABLET IN PM (Patient taking differently: TAKE 2 TABLET IN AM AND 3 TABLET IN PM), Disp: 135 tablet, Rfl: 3     LORazepam (ATIVAN) 0.5 MG tablet, Take 1 tablet (0.5 mg) by mouth every 4 hours as needed (Anxiety, Nausea/Vomiting or Sleep), Disp: 30 tablet, Rfl: 3     melatonin 1 MG TABS, Take 1-2 tablets (1-2 mg) by mouth nightly as needed for sleep, Disp: 90 tablet, Rfl: 0     naproxen (NAPROSYN) 500 MG tablet, Take 1 tablet (500 mg) by mouth 2 times daily as needed for headaches, Disp: 60 tablet, Rfl: 0     nortriptyline (PAMELOR) 10 MG capsule, TAKE ONE TAB BY MOUTH EVERY MORNING AND ONE TAB AT BEDTIME, Disp: 180 capsule, Rfl: 0     prochlorperazine (COMPAZINE) 5 MG tablet, TAKE 0.5-1 TABLETS (2.5-5 MG) BY MOUTH EVERY 8 HOURS AS NEEDED FOR NAUSEA OR VOMITING, Disp: 20 tablet, Rfl: 1     topiramate (TOPAMAX) 25 MG tablet, Take 50mg PO in the AM and 50 mg PO in the PM, Disp: 120 tablet, Rfl: 3  No current facility-administered medications for this visit.     Facility-Administered Medications Ordered in Other Visits:      gadobutrol (GADAVIST) injection 7.5 mL, 7.5 mL, Intravenous, Once, Ann Sharp MD    Family history  Denies family history of diabetes,  thyroid disorders or cancer.    Social History  He denies smoking, drinking alcohol or using illicit drugs.  Quit [employer] in 8/2016.  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    Review of Systems   Systemic:              No fatigue; weight stable  Eye:                      Stable visual impairement - hard to look up   Jay-Laryngeal:     no dysphagia, no hoarseness, no cough   Breast:                  No breast symptoms  Cardiovascular:    No cardiovascular symptoms, no CP or palpitations   Pulmonary:           No SOB    Gastrointestinal:   No N/V, no diarrhea or constipation   Genitourinary:       As above  Endocrine:            No heat or cold intolerance. Denies night sweats or hot flashes.  No erectile dysfunction.  No changes of the facial hair.  Neurological:        no tremor, no numbness or tingling sensation  Musculoskeletal:  No musculoskeletal symptoms, no muscle or joint pain   Skin:                     No skin symptoms, no dry skin, no hair falling out   Psychological:      Denies depression or anxiety               Vital Signs     Previous Weights:    Wt Readings from Last 10 Encounters:   06/12/19 73.3 kg (161 lb 8 oz)   04/29/19 74.4 kg (164 lb)   04/10/19 74.8 kg (165 lb)   03/29/19 74.8 kg (165 lb)   03/29/19 74.8 kg (165 lb)   03/27/19 74 kg (163 lb 3.2 oz)   10/24/18 80.6 kg (177 lb 12.8 oz)   10/01/18 82.1 kg (181 lb)   09/27/18 80.6 kg (177 lb 9.6 oz)   08/13/18 82.6 kg (182 lb 1.6 oz)          Labs:   I reviewed prior lab results documented in EPIC.     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 He is now s/p chemo and radiation therapy and there was no evidence of tumor recurrence on the most recent MRI from March 2019.  The pituitary gland function was reevaluated in October 2018.  AFP has remained fairly stable and and Beta HCG has been undetectable since 2015.     The patient does not endorse polyuria or  polydipsia or signs or symptoms suggestive of hyponatremia.    Recommendations:   - check Na level 2-3 hrs after taking the morning dose of desmopressin   - f/up with oncology regarding the next brain MRI.     Orders Placed This Encounter   Procedures     Basic metabolic panel

## 2020-04-23 ENCOUNTER — VIRTUAL VISIT (OUTPATIENT)
Dept: ENDOCRINOLOGY | Facility: CLINIC | Age: 30
End: 2020-04-23
Payer: COMMERCIAL

## 2020-04-23 DIAGNOSIS — E23.2 DIABETES INSIPIDUS SECONDARY TO VASOPRESSIN DEFICIENCY (H): Primary | ICD-10-CM

## 2020-04-30 DIAGNOSIS — R51.9 CHRONIC DAILY HEADACHE: ICD-10-CM

## 2020-05-01 NOTE — TELEPHONE ENCOUNTER
Rx Authorization:    Requested Medication/ Dose: Nortriptyline 10mg    Date last refill ordered: 2/2/20    Quantity ordered: 180    # refills: 0    Date of last clinic visit with ordering provider: 6/12/2019    Date of next clinic visit with ordering provider: none    All pertinent protocol data (lab date/result):     Include pertinent information from patients message:

## 2020-05-04 RX ORDER — NORTRIPTYLINE HCL 10 MG
CAPSULE ORAL
Qty: 180 CAPSULE | Refills: 0 | Status: SHIPPED | OUTPATIENT
Start: 2020-05-04 | End: 2020-07-28

## 2020-07-27 DIAGNOSIS — R51.9 CHRONIC DAILY HEADACHE: ICD-10-CM

## 2020-07-28 RX ORDER — NORTRIPTYLINE HCL 10 MG
CAPSULE ORAL
Qty: 60 CAPSULE | Refills: 0 | Status: SHIPPED | OUTPATIENT
Start: 2020-07-28 | End: 2020-08-21

## 2020-07-28 NOTE — TELEPHONE ENCOUNTER
Rx Authorization:    Requested Medication/ Dose nortriptyline (PAMELOR) 10 MG capsule    Date last refill ordered: 5/4/20    Quantity ordered: 18    # refills: 0    Date of last clinic visit with ordering provider: 6/12/19    Date of next clinic visit with ordering provider:     All pertinent protocol data (lab date/result):     Include pertinent information from patients message:

## 2020-08-21 DIAGNOSIS — R51.9 CHRONIC DAILY HEADACHE: ICD-10-CM

## 2020-08-21 RX ORDER — NORTRIPTYLINE HCL 10 MG
CAPSULE ORAL
Qty: 60 CAPSULE | Refills: 0 | Status: SHIPPED | OUTPATIENT
Start: 2020-08-21 | End: 2021-06-10

## 2020-08-21 NOTE — TELEPHONE ENCOUNTER
Rx Authorization:    Requested Medication/ Dose: Nortriptyline HCL 10MG Cap    Date last refill ordered: 7/28/20    Quantity ordered: 60 cap    # refills:     Date of last clinic visit with ordering provider: 6/12/19    Date of next clinic visit with ordering provider: f/u 3 months    All pertinent protocol data (lab date/result):     Include pertinent information from patients message:

## 2020-11-16 ENCOUNTER — HEALTH MAINTENANCE LETTER (OUTPATIENT)
Age: 30
End: 2020-11-16

## 2020-12-31 DIAGNOSIS — E23.2 DIABETES INSIPIDUS SECONDARY TO VASOPRESSIN DEFICIENCY (H): ICD-10-CM

## 2021-01-05 RX ORDER — DESMOPRESSIN ACETATE 0.2 MG/1
0.2 TABLET ORAL 2 TIMES DAILY
Qty: 180 TABLET | Refills: 0 | Status: SHIPPED | OUTPATIENT
Start: 2021-01-05 | End: 2021-03-27

## 2021-03-23 DIAGNOSIS — E23.2 DIABETES INSIPIDUS SECONDARY TO VASOPRESSIN DEFICIENCY (H): ICD-10-CM

## 2021-03-27 RX ORDER — DESMOPRESSIN ACETATE 0.2 MG/1
0.2 TABLET ORAL 2 TIMES DAILY
Qty: 180 TABLET | Refills: 0 | Status: SHIPPED | OUTPATIENT
Start: 2021-03-27 | End: 2021-06-18

## 2021-04-01 ENCOUNTER — TELEPHONE (OUTPATIENT)
Dept: NEUROSURGERY | Facility: CLINIC | Age: 31
End: 2021-04-01

## 2021-04-01 NOTE — TELEPHONE ENCOUNTER
[facility] Call Center    Phone Message    May a detailed message be left on voicemail: yes     Reason for Call: Other: Patient calling looking to speak with care team about receiving the covid vaccine - patient states that he has a history of a brain tumor and is wondering if he is okay to get the covid vaccine. States that he spoke with his PCP and was told to call Dr. Sharp to discuss further.     Please advise and call patient back at your earliest convenience     Action Taken: Other: [facility] [place] [facility]     Travel Screening: Not Applicable

## 2021-04-01 NOTE — TELEPHONE ENCOUNTER
Writer spoke with patient today about his questions concerning the Covid vaccine.  Patient is asking if he is ok to get the vaccine with a history of a brain tumor.  Writer advised patient that he is fine to get the vaccine.  Patient verbalized understanding and is in agreement with this plan.

## 2021-05-25 ENCOUNTER — APPOINTMENT (OUTPATIENT)
Dept: CT IMAGING | Facility: CLINIC | Age: 31
End: 2021-05-25
Attending: EMERGENCY MEDICINE
Payer: COMMERCIAL

## 2021-05-25 ENCOUNTER — HOSPITAL ENCOUNTER (EMERGENCY)
Facility: CLINIC | Age: 31
Discharge: HOME | End: 2021-05-25
Attending: EMERGENCY MEDICINE | Admitting: EMERGENCY MEDICINE
Payer: COMMERCIAL

## 2021-05-25 ENCOUNTER — APPOINTMENT (OUTPATIENT)
Dept: GENERAL RADIOLOGY | Facility: CLINIC | Age: 31
End: 2021-05-25
Attending: EMERGENCY MEDICINE
Payer: COMMERCIAL

## 2021-05-25 VITALS
OXYGEN SATURATION: 92 % | TEMPERATURE: 97.5 F | HEART RATE: 58 BPM | RESPIRATION RATE: 18 BRPM | DIASTOLIC BLOOD PRESSURE: 64 MMHG | SYSTOLIC BLOOD PRESSURE: 105 MMHG

## 2021-05-25 DIAGNOSIS — D49.7 PINEAL TUMOR: ICD-10-CM

## 2021-05-25 DIAGNOSIS — Z92.89 HISTORY OF CREATION OF VENTRICULOPERITONEAL SHUNT: ICD-10-CM

## 2021-05-25 DIAGNOSIS — G40.919 BREAKTHROUGH SEIZURE (H): ICD-10-CM

## 2021-05-25 DIAGNOSIS — G40.909 NONINTRACTABLE EPILEPSY WITHOUT STATUS EPILEPTICUS, UNSPECIFIED EPILEPSY TYPE (H): ICD-10-CM

## 2021-05-25 LAB
ANION GAP SERPL CALCULATED.3IONS-SCNC: 2 MMOL/L (ref 3–14)
BASOPHILS # BLD AUTO: 0 10E9/L (ref 0–0.2)
BASOPHILS NFR BLD AUTO: 0.7 %
BUN SERPL-MCNC: 11 MG/DL (ref 7–30)
CALCIUM SERPL-MCNC: 9 MG/DL (ref 8.5–10.1)
CHLORIDE SERPL-SCNC: 104 MMOL/L (ref 94–109)
CO2 SERPL-SCNC: 29 MMOL/L (ref 20–32)
CREAT SERPL-MCNC: 0.98 MG/DL (ref 0.66–1.25)
D DIMER PPP FEU-MCNC: <0.3 UG/ML FEU (ref 0–0.5)
DIFFERENTIAL METHOD BLD: NORMAL
EOSINOPHIL # BLD AUTO: 0.1 10E9/L (ref 0–0.7)
EOSINOPHIL NFR BLD AUTO: 1.4 %
ERYTHROCYTE [DISTWIDTH] IN BLOOD BY AUTOMATED COUNT: 12.5 % (ref 10–15)
GFR SERPL CREATININE-BSD FRML MDRD: >90 ML/MIN/{1.73_M2}
GLUCOSE SERPL-MCNC: 79 MG/DL (ref 70–99)
HCT VFR BLD AUTO: 44.3 % (ref 40–53)
HGB BLD-MCNC: 14.8 G/DL (ref 13.3–17.7)
IMM GRANULOCYTES # BLD: 0 10E9/L (ref 0–0.4)
IMM GRANULOCYTES NFR BLD: 0.2 %
LYMPHOCYTES # BLD AUTO: 1.3 10E9/L (ref 0.8–5.3)
LYMPHOCYTES NFR BLD AUTO: 30.9 %
MCH RBC QN AUTO: 28.2 PG (ref 26.5–33)
MCHC RBC AUTO-ENTMCNC: 33.4 G/DL (ref 31.5–36.5)
MCV RBC AUTO: 84 FL (ref 78–100)
MONOCYTES # BLD AUTO: 0.4 10E9/L (ref 0–1.3)
MONOCYTES NFR BLD AUTO: 9.7 %
NEUTROPHILS # BLD AUTO: 2.5 10E9/L (ref 1.6–8.3)
NEUTROPHILS NFR BLD AUTO: 57.1 %
NRBC # BLD AUTO: 0 10*3/UL
NRBC BLD AUTO-RTO: 0 /100
PLATELET # BLD AUTO: 226 10E9/L (ref 150–450)
POTASSIUM SERPL-SCNC: 3.6 MMOL/L (ref 3.4–5.3)
RBC # BLD AUTO: 5.25 10E12/L (ref 4.4–5.9)
SODIUM SERPL-SCNC: 135 MMOL/L (ref 133–144)
WBC # BLD AUTO: 4.3 10E9/L (ref 4–11)

## 2021-05-25 PROCEDURE — 99285 EMERGENCY DEPT VISIT HI MDM: CPT | Mod: 25

## 2021-05-25 PROCEDURE — 96361 HYDRATE IV INFUSION ADD-ON: CPT

## 2021-05-25 PROCEDURE — 85379 FIBRIN DEGRADATION QUANT: CPT | Performed by: EMERGENCY MEDICINE

## 2021-05-25 PROCEDURE — 96374 THER/PROPH/DIAG INJ IV PUSH: CPT

## 2021-05-25 PROCEDURE — 96375 TX/PRO/DX INJ NEW DRUG ADDON: CPT

## 2021-05-25 PROCEDURE — 250N000011 HC RX IP 250 OP 636: Performed by: EMERGENCY MEDICINE

## 2021-05-25 PROCEDURE — 85025 COMPLETE CBC W/AUTO DIFF WBC: CPT | Performed by: EMERGENCY MEDICINE

## 2021-05-25 PROCEDURE — 258N000003 HC RX IP 258 OP 636: Performed by: EMERGENCY MEDICINE

## 2021-05-25 PROCEDURE — 80177 DRUG SCRN QUAN LEVETIRACETAM: CPT | Performed by: EMERGENCY MEDICINE

## 2021-05-25 PROCEDURE — 250N000013 HC RX MED GY IP 250 OP 250 PS 637: Performed by: EMERGENCY MEDICINE

## 2021-05-25 PROCEDURE — 70450 CT HEAD/BRAIN W/O DYE: CPT

## 2021-05-25 PROCEDURE — 80048 BASIC METABOLIC PNL TOTAL CA: CPT | Performed by: EMERGENCY MEDICINE

## 2021-05-25 PROCEDURE — 71045 X-RAY EXAM CHEST 1 VIEW: CPT

## 2021-05-25 RX ORDER — DIPHENHYDRAMINE HYDROCHLORIDE 50 MG/ML
25 INJECTION INTRAMUSCULAR; INTRAVENOUS ONCE
Status: COMPLETED | OUTPATIENT
Start: 2021-05-25 | End: 2021-05-25

## 2021-05-25 RX ORDER — ACETAMINOPHEN 325 MG/1
975 TABLET ORAL ONCE
Status: COMPLETED | OUTPATIENT
Start: 2021-05-25 | End: 2021-05-25

## 2021-05-25 RX ORDER — LEVETIRACETAM 750 MG/1
750 TABLET ORAL 2 TIMES DAILY
Qty: 135 TABLET | Refills: 3 | Status: SHIPPED | OUTPATIENT
Start: 2021-05-25

## 2021-05-25 RX ADMIN — DIPHENHYDRAMINE HYDROCHLORIDE 25 MG: 50 INJECTION, SOLUTION INTRAMUSCULAR; INTRAVENOUS at 09:06

## 2021-05-25 RX ADMIN — PROCHLORPERAZINE EDISYLATE 10 MG: 5 INJECTION INTRAMUSCULAR; INTRAVENOUS at 09:06

## 2021-05-25 RX ADMIN — ACETAMINOPHEN 975 MG: 325 TABLET, FILM COATED ORAL at 09:33

## 2021-05-25 RX ADMIN — SODIUM CHLORIDE 1000 ML: 9 INJECTION, SOLUTION INTRAVENOUS at 09:10

## 2021-05-25 ASSESSMENT — ENCOUNTER SYMPTOMS
DYSURIA: 0
CHILLS: 1
HEADACHES: 1
NUMBNESS: 0
DIFFICULTY URINATING: 0
HEMATURIA: 0
BLOOD IN STOOL: 0
SEIZURES: 1
DIARRHEA: 0
DIAPHORESIS: 1
CONSTIPATION: 0
MYALGIAS: 0

## 2021-05-25 NOTE — ED PROVIDER NOTES
History   Chief Complaint:  Headache       HPI   Kari Turcios is a 31 year old male with history of pineal germ cell tumor, diabetes, and epilepsy with a  shunt in place from 2014 who presents with a headache that started this morning. The patient says that he had a seizure 2 nights ago while he was sleeping. He says that he woke up with full body soreness and was diaphoretic. The patient says that he does not recall the last time he had a seizure before that, but notes that most of them happen while he is sleeping. He denies any incontinence. The patient says that he has been feeling warm and having chills, but has not measured any fevers. The patient says that he has not missed any Keppra doses. He denies any numbness/tingling, arm pain, urinary or bowel issues. He endorses the use of Tylenol this morning.         Review of Systems   Constitutional: Positive for chills and diaphoresis. Fever: feverish.   Gastrointestinal: Negative for blood in stool, constipation and diarrhea.   Genitourinary: Negative for difficulty urinating, dysuria and hematuria.   Musculoskeletal: Negative for myalgias.   Neurological: Positive for seizures and headaches. Negative for numbness.   All other systems reviewed and are negative.      Allergies:  Penicillins    Medications:  Topamax  Compazine  Pamelor  Naprosyn  Melatonin  Ativan  Keppra  Ddavp  Botox     Past Medical History:    Diabetes  Epilepsy    Myopia  Hypertropia   Depression  Anxiety  Mixed germ cell tumor  Anemia   Pineal germ cell tumor   PE       Past Surgical History:    Ventriculoscopy  Rescission resection repair strabismus bilateral  Optical tracking system implant shunt ventriculoperitoneal   Optical tracking system craniotomy, excise tumor, combined  Optical tracking system biopsy brain  Arthrotomy shoulder, Bankhart repair, combined    Pineal biopsy,  shunt     Family History:    Vaginal cancer  Breast cancer  Lung cancer     Social History:  Patient  presents to the ED alone.   Denies tobacco use  Endorses marijuana and ETOH use.     Physical Exam     Patient Vitals for the past 24 hrs:   BP Temp Temp src Pulse Resp SpO2   05/25/21 1100 105/64 -- -- 58 -- --   05/25/21 1000 -- -- -- -- -- 92 %   05/25/21 0945 111/65 -- -- (!) 48 -- 100 %   05/25/21 0930 118/67 -- -- 53 -- 100 %   05/25/21 0915 121/81 -- -- 58 -- 99 %   05/25/21 0900 117/81 -- -- 62 -- 100 %   05/25/21 0800 122/77 -- -- 68 -- 99 %   05/25/21 0731 (!) 138/99 97.5  F (36.4  C) Temporal 77 18 99 %       Physical Exam  Constitutional: Alert, attentive, GCS 15, sitting in room with sunglasses on  HENT:    Nose: Nose normal.    Mouth/Throat: Oropharynx is clear, mucous membranes are moist  Eyes: EOM are normal, anicteric, conjugate gaze  CV: regular rate and rhythm; no murmurs  Chest: Effort normal and breath sounds clear without wheezing or rales, symmetric bilaterally   GI:  non tender. No distension. No guarding or rebound.    MSK: No LE edema, no tenderness to palpation of BLE.  Neurological:   GCS 15; A/Ox3; Cranial nerves 2-12 intact;   5/5 strength throughout the upper and lower extremities;   sensation intact to light touch throughout the upper and lower extremities;   2+ DTRs to the bilateral upper and lower extremities (biceps, BRs, patellar, achilles);   normal fine motor coordination intact bilaterally;   normal gait   No meningismus   Skin: Skin is warm and dry.      Emergency Department Course       Imaging:  XR Shunt Malfunction Survey  Right frontal approach  shunt. Shunt is intact without   kinking or disruption, the tip is directed in the left side of the   abdomen. No significant acute ancillary findings identified.   Midline/paramedian posterior fossa craniotomy.   PRIYANKA MEJIAS MD  Reading per radiology    Head CT w/o contrast  1. No acute process is identified. No evidence for intracranial mass.  Chronic ischemic change and/or shunt gliosis about the right frontal    shunt.  2. Right frontal  shunt stable in position from previous MR. Stable  ventricular morphology from previous MR 3/29/2019 with decompression  of the lateral and third ventricles and normal caliber of the fourth  ventricle.  3. No evidence for hemorrhage or acute infarction.  4. Additional details as noted above.  PRIYANKA MEJIAS MD  Reading per radiology     Laboratory:    Keppra: pending  CBC: WBC: 4.3, HGB: 14.8, PLT: 226  BMP: Anion Gap: 2 (L), o/w WNL (Creatinine: 0.98)  D-dimer: <0.3     Procedures    Emergency Department Course:    Reviewed:  I reviewed nursing notes, vitals, past medical history and care everywhere       Assessments:  0847 I performed an exam of the patient as documented above.   1155 Patient rechecked and updated.      Consults:   0915 I spoke with Dr. Montoya  from [facility] regarding patient's presentation, findings, and plan of care.     Interventions:  0906 Compazine 10 mg IV   Benadryl 25 g IV  0910 NS 1 L IV     Disposition:  The patient was discharged to home.       Impression & Plan   Medical Decision Making:  Kari Turcios is a 31 year old male past medical history significant for epilepsy predating his pineal tumor resection due to encephalitis presenting for evaluation of headache with concerns he had a seizure 2 days prior.  He denies any infectious symptoms, reports no vomiting but has been a little bit sweaty.  He is concerned he had a seizure after he woke up 2 days ago bed drenched in sweat, sore all over but he did not have any tongue biting.  He has been compliant with his Keppra.  CT imaging here shows stable size of his ventricles, no evidence of kinking of his  shunt and shunt series.  Symptomatically he was improved with IV fluids and migraine medicine.  I did discuss the presentation with his on-call epileptologist who recommended increasing his Keppra from 2 pills in the morning to 2-1/2 in the morning and continue his typical evening dose.  I  recommended follow-up with his neurology, and consideration of follow-up with neurosurgery for a recheck.  Return precautions were reviewed not limited to vomiting uncontrollably fever or recurrent seizure.        Diagnosis:    ICD-10-CM    1. Breakthrough seizure (H)  G40.919    2. Nonintractable epilepsy without status epilepticus, unspecified epilepsy type (H)  G40.909    3. History of creation of ventriculoperitoneal shunt  Z92.89    4. Pineal tumor  D49.7 levETIRAcetam (KEPPRA) 750 MG tablet       Discharge Medications:  - Switch keppra from 2 tabs in am to 2.5 tabs in am, keeping 2.5 tabs in even (750mg)    Chai Donnelly MD  [employer]  12:38 PM 05/25/21     Scribe Disclosure:  I, Delonte Harris, am serving as a scribe at 7:33 AM on 5/25/2021 to document services personally performed by Chai Donnelly MD based on my observations and the provider's statements to me.          Chai Donnelly MD  05/25/21 [id number]

## 2021-05-25 NOTE — ED TRIAGE NOTES
"Pt arrives with c/o migraine that started this morning. Pt also endorses feeling \"very sweaty\". Pt has a  shunt from 2014 for hx of brain tumor, pt takes meds for seizures, thinks he may have had one two nights ago while sleeping. ABCs intact.  "

## 2021-05-25 NOTE — DISCHARGE INSTRUCTIONS
You should make an appointment to follow-up with your neurologist, he is expecting you and will ask his clinic to make a visit for you.  You should increase your Keppra as prescribed here.  Continue to take Tylenol as needed for headache.  You should return should you have another seizure or develop fever.

## 2021-05-26 LAB — LEVETIRACETAM SERPL-MCNC: 53 UG/ML (ref 12–46)

## 2021-06-08 ENCOUNTER — MEDICAL CORRESPONDENCE (OUTPATIENT)
Dept: HEALTH INFORMATION MANAGEMENT | Facility: CLINIC | Age: 31
End: 2021-06-08

## 2021-06-09 DIAGNOSIS — G40.219 LOCALIZATION-RELATED (FOCAL) (PARTIAL) SYMPTOMATIC EPILEPSY AND EPILEPTIC SYNDROMES WITH COMPLEX PARTIAL SEIZURES, INTRACTABLE, WITHOUT STATUS EPILEPTICUS (H): Primary | ICD-10-CM

## 2021-06-10 ENCOUNTER — VIRTUAL VISIT (OUTPATIENT)
Dept: NEUROLOGY | Facility: CLINIC | Age: 31
End: 2021-06-10
Payer: COMMERCIAL

## 2021-06-10 ENCOUNTER — TELEPHONE (OUTPATIENT)
Dept: NEUROLOGY | Facility: CLINIC | Age: 31
End: 2021-06-10

## 2021-06-10 DIAGNOSIS — G40.909 NONINTRACTABLE EPILEPSY WITHOUT STATUS EPILEPTICUS, UNSPECIFIED EPILEPSY TYPE (H): ICD-10-CM

## 2021-06-10 DIAGNOSIS — G43.719 INTRACTABLE CHRONIC MIGRAINE WITHOUT AURA AND WITHOUT STATUS MIGRAINOSUS: Primary | ICD-10-CM

## 2021-06-10 PROCEDURE — 99214 OFFICE O/P EST MOD 30 MIN: CPT | Mod: GT | Performed by: NURSE PRACTITIONER

## 2021-06-10 NOTE — TELEPHONE ENCOUNTER
Prior Authorization Retail Medication Request    Medication/Dose: galcanezumab-gnlm (EMGALITY) 120 MG/ML injection; Inject 240 mg subcutaneous first month and then inject 120 mg subcutaneous every 28 days  ICD code (if different than what is on RX):    G43.719  Previously Tried and Failed:  nortriptyline, topiramate, currently on keppra   Rationale:  Chronic migraine    Insurance Name:  [employer]  Insurance ID:  [id number]       Pharmacy Information (if different than what is on RX)  Name:    Phone:

## 2021-06-10 NOTE — LETTER
6/10/2021       RE: Kari Turcios  [address]  [facility] [address]     Dear Colleague,    Thank you for referring your patient, Kari Turcios, to the [facility] NEUROLOGY CLINIC [place] at [facility]. Please see a copy of my visit note below.    Abelino is a 31 year old who is being evaluated via a billable video visit.      How would you like to obtain your AVS? MyChart  If the video visit is dropped, the invitation should be resent by: Send to e-mail at: [email or web address]  Will anyone else be joining your video visit? No  {If patient encounters technical issues they should call [phone] :[phone]}    Video Start Time: 10:37 AM  Video-Visit Details    Type of service:  Video Visit    Video End Time:11:05 AM    Originating Location (pt. Location): Home    Distant Location (provider location):  [facility] NEUROLOGY CLINIC [place]     Platform used for Video Visit: [facility]    Chief Complaint   Patient presents with     RECHECK     VIDEO VISIT RETURN      Daryl Cho    ASSESSMENT AND PLAN:  Chronic daily headaches and chronic migraine headaches. Recommended focus on migraine headache prevention with newly FDA approved CGRPs -discussed with the patient. Side effects known and unknown reviewed and patient is in agreement with the trial.     Plan:  Recommended a trial of migraine preventive treatment with Emgality. Side effects-allergic reaction or pain in the injection side or redness in the injection side. Unknown side effects with a long term use and was not studied in patient's similar history but no apparent contraindications.   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    Last Headache Clinic visit in 2019  Went to the ED for breakthrough seizures -has been seen at [facility] and meds changes -increased levetiracetam. Has a follow up ramón next week. Patient works from home.   Headaches are still there -daily 30 days out of 30 days -headaches. Headache is frontal and pulsing with light sensitivity, nausea, does not vomit -needs to close his eyes for a little bit.   Off nortriptyline and topiramate -was not very helpful.   Tylenol occasional and a couple times per week and helps  Botox -tried and it wasn't effective   Would avoid triptans due brain MRI findings concerning for ischemic changes vs gliosis.     Review of Systems   Constitutional, HEENT, cardiovascular, pulmonary, gi and gu, neuro systems reviewed except as otherwise noted.      Objective       Vitals:  No vitals were obtained today due to virtual visit.    Physical Exam   Headache mild and worsening with screen/video visit, wears dark shades  GENERAL: Healthy, alert and no distress  EYES: Eyes grossly normal to inspection.  No discharge or erythema, or obvious scleral/conjunctival abnormalities.  RESP: No audible wheeze, cough, or visible cyanosis.  No visible retractions or increased work of breathing.    NEURO: Cranial nerves grossly intact.  Mentation and speech appropriate for age.  PSYCH: Mentation appears normal, affect normal/bright, judgement and insight intact, normal speech and appearance well-groomed.    Diagnostic Test Results:  Results for JAIRO WOODARD (MRN [id number]) as of 6/10/2021 10:43   Ref. Range 6/12/2019 16:13 5/25/2021 07:53   Sodium Latest Ref Range: 133 - 144 mmol/L 136 135   Potassium Latest Ref Range: 3.4 - 5.3 mmol/L 3.9 3.6   Chloride Latest Ref Range: 94 - 109 mmol/L 105 104   Carbon Dioxide Latest Ref Range: 20 - 32 mmol/L 26 29    Urea Nitrogen Latest Ref Range: 7 - 30 mg/dL 7 11   Creatinine Latest Ref Range: 0.66 - 1.25 mg/dL 0.91 0.98   GFR Estimate Latest Ref Range: >60 mL/min/1.73_m2 >90 >90   GFR Estimate If Black Latest Ref Range: >60 mL/min/1.73_m2 >90 >90   Calcium Latest Ref Range: 8.5 - 10.1 mg/dL 9.4 9.0   Anion Gap Latest Ref Range: 3 - 14 mmol/L 6 2 (L)   Albumin Latest Ref Range: 3.4 - 5.0 g/dL 4.6    Protein Total Latest Ref Range: 6.8 - 8.8 g/dL 8.8    Bilirubin Total Latest Ref Range: 0.2 - 1.3 mg/dL 0.5    Alkaline Phosphatase Latest Ref Range: 40 - 150 U/L 109    ALT Latest Ref Range: 0 - 70 U/L 27    AST Latest Ref Range: 0 - 45 U/L 19    Lactate Dehydrogenase Latest Ref Range: 85 - 227 U/L 176      MR BRAIN W/O & W CONTRAST 3/29/2019 8:27 AM     Provided History: Surveillance 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  ICD-10: Germ cell tumor of brain (H)     Comparison: 5/10/2018, 12/4/2017, and 3/19/2014.     Technique: Multiplanar T1-weighted, axial FLAIR, and susceptibility  images were obtained without intravenous contrast. Following  intravenous gadolinium-based contrast administration, axial  T2-weighted, diffusion, and T1-weighted images (in multiple planes)  were obtained.     Contrast: 7.5cc Gadavist Injected      Findings: Mild volume loss of the tectal plate again seen, without  recurrent mass or definite abnormal enhancement in the pineal region.  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 Tangled  vasculature in the interpeduncular fossa is also similar. No  hydrocephalus or acute intracranial hemorrhage. Right frontal approach  ventriculostomy catheter is seen, with decompressed ventricles that  are slightly smaller than the prior study.     Mild ethmoid mucosal thickening. Paranasal sinuses otherwise  relatively clear. Mastoid air cells clear as well.                                                                      Impression:  No findings to suggest recurrent germinoma in the pineal  region.      MEME CHATTERJEE MD    CT SCAN OF THE HEAD WITHOUT CONTRAST May 25, 2021 8:27 AM      HISTORY: Headache, history of  shunt.     TECHNIQUE: Axial images of the head and coronal reformations without  IV contrast material. Radiation dose for this scan was reduced using  automated exposure control, adjustment of the mA and/or kV according  to patient size, or iterative reconstruction technique.     COMPARISON: Brain MR 3/29/2019, head CT 4/8/2014.     FINDINGS:      Intracranial contents: Postoperative changes of right frontal rashawn  hole with right frontal approach  shunt tip entering the rashawn hole  and position near the region of the anterior superior third ventricle.  Low-density changes along the course of the right frontal shunt have  the appearance of post shunt placement gliosis and/or chronic  ischemia. No extra-axial blood products or fluid collections.  Ventricular caliber is diminished at the lateral and third ventricular  level and normal caliber at the fourth ventricular level, representing  stable ventricular morphology from 3/29/2019. There is no hyperdense  hemorrhage, mass or midline shift. Corpus callosum is satisfactory.  Satisfactory position of the cerebellar tonsils. Sella shows no acute  abnormality. No evidence for acute infarction is noted.     Visualized orbits/sinuses/mastoids: Mild membrane thickening in the  ethmoid air cells.     Osseous structures/soft tissues: No evidence for acute fracture of the  calvarium or skull base. No significant swelling of the facial or  scalp tissues is evident.                                                                       IMPRESSION:   1. No acute process is identified. No evidence for intracranial mass.  Chronic ischemic change and/or shunt gliosis about the right frontal   shunt.  2. Right frontal  shunt stable in position from previous MR. Stable  ventricular morphology from  previous MR 3/29/2019 with decompression  of the lateral and third ventricles and normal caliber of the fourth  ventricle.  3. No evidence for hemorrhage or acute infarction.  4. Additional details as noted above.        PRIYANKA MEJIAS MD    Results for JAIRO WOODARD (MRN [id number]) as of 6/10/2021 10:43   Ref. Range 5/25/2021 07:53   WBC Latest Ref Range: 4.0 - 11.0 10e9/L 4.3   Hemoglobin Latest Ref Range: 13.3 - 17.7 g/dL 14.8   Hematocrit Latest Ref Range: 40.0 - 53.0 % 44.3   Platelet Count Latest Ref Range: 150 - 450 10e9/L 226   RBC Count Latest Ref Range: 4.4 - 5.9 10e12/L 5.25   MCV Latest Ref Range: 78 - 100 fl 84   MCH Latest Ref Range: 26.5 - 33.0 pg 28.2   MCHC Latest Ref Range: 31.5 - 36.5 g/dL 33.4   RDW Latest Ref Range: 10.0 - 15.0 % 12.5   Diff Method Unknown Automated Method   % Neutrophils Latest Units: % 57.1   % Lymphocytes Latest Units: % 30.9   % Monocytes Latest Units: % 9.7   % Eosinophils Latest Units: % 1.4   % Basophils Latest Units: % 0.7   % Immature Granulocytes Latest Units: % 0.2   Nucleated RBCs Latest Ref Range: 0 /100 0   Absolute Neutrophil Latest Ref Range: 1.6 - 8.3 10e9/L 2.5   Absolute Lymphocytes Latest Ref Range: 0.8 - 5.3 10e9/L 1.3   Absolute Monocytes Latest Ref Range: 0.0 - 1.3 10e9/L 0.4   Absolute Eosinophils Latest Ref Range: 0.0 - 0.7 10e9/L 0.1   Absolute Basophils Latest Ref Range: 0.0 - 0.2 10e9/L 0.0   Abs Immature Granulocytes Latest Ref Range: 0 - 0.4 10e9/L 0.0   Absolute Nucleated RBC Unknown 0.0       I discussed all my recommendations with Kari Woodard who verbalizes understanding and comfortable with the plan.  All of patient's questions were answered from the best of my knowledge.  Patient is in agreement with the plan.     32 minutes spent on the date of the encounter doing chart review, history and exam, documentation and further activities as noted above    CASPER Black, CNP [facility]  Headache certified  [facility] Neurology  Clinic        Again, thank you for allowing me to participate in the care of your patient.      Sincerely,    CASPER Jones CNP

## 2021-06-10 NOTE — PROGRESS NOTES
Abelino is a 31 year old who is being evaluated via a billable video visit.      How would you like to obtain your AVS? MyChart  If the video visit is dropped, the invitation should be resent by: Send to e-mail at: [email or web address]  Will anyone else be joining your video visit? No      Video Start Time: 10:37 AM  Video-Visit Details    Type of service:  Video Visit    Video End Time:11:05 AM    Originating Location (pt. Location): Home    Distant Location (provider location):  [facility] NEUROLOGY [facility]     Platform used for Video Visit: [facility]    Chief Complaint   Patient presents with     RECHECK     VIDEO VISIT RETURN      Daryl Cho    ASSESSMENT AND PLAN:  Chronic daily headaches and chronic migraine headaches. Recommended focus on migraine headache prevention with newly FDA approved CGRPs -discussed with the patient. Side effects known and unknown reviewed and patient is in agreement with the trial.     Plan:  Recommended a trial of migraine preventive treatment with Emgality. Side effects-allergic reaction or pain in the injection side or redness in the injection side. Unknown side effects with a long term use and was not studied in patient's similar history but no apparent contraindications.   Follow up in 3 months after starting Emgality    History of seizures and on Keppra-scheduled to be seen at 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    Last Headache Clinic visit in 2019  Went to the ED for breakthrough seizures -has been seen at [facility] and meds changes -increased levetiracetam. Has a follow up ramón next week. Patient works from home.   Headaches are still there -daily  30 days out of 30 days -headaches. Headache is frontal and pulsing with light sensitivity, nausea, does not vomit -needs to close his eyes for a little bit.   Off nortriptyline and topiramate -was not very helpful.   Tylenol occasional and a couple times per week and helps  Botox -tried and it wasn't effective   Would avoid triptans due brain MRI findings concerning for ischemic changes vs gliosis.     Review of Systems   Constitutional, HEENT, cardiovascular, pulmonary, gi and gu, neuro systems reviewed except as otherwise noted.      Objective       Vitals:  No vitals were obtained today due to virtual visit.    Physical Exam   Headache mild and worsening with screen/video visit, wears dark shades  GENERAL: Healthy, alert and no distress  EYES: Eyes grossly normal to inspection.  No discharge or erythema, or obvious scleral/conjunctival abnormalities.  RESP: No audible wheeze, cough, or visible cyanosis.  No visible retractions or increased work of breathing.    NEURO: Cranial nerves grossly intact.  Mentation and speech appropriate for age.  PSYCH: Mentation appears normal, affect normal/bright, judgement and insight intact, normal speech and appearance well-groomed.    Diagnostic Test Results:  Results for JAIRO WOODARD ZENOBIA (MRN [id number]) as of 6/10/2021 10:43   Ref. Range 6/12/2019 16:13 5/25/2021 07:53   Sodium Latest Ref Range: 133 - 144 mmol/L 136 135   Potassium Latest Ref Range: 3.4 - 5.3 mmol/L 3.9 3.6   Chloride Latest Ref Range: 94 - 109 mmol/L 105 104   Carbon Dioxide Latest Ref Range: 20 - 32 mmol/L 26 29   Urea Nitrogen Latest Ref Range: 7 - 30 mg/dL 7 11   Creatinine Latest Ref Range: 0.66 - 1.25 mg/dL 0.91 0.98   GFR Estimate Latest Ref Range: >60 mL/min/1.73_m2 >90 >90   GFR Estimate If Black Latest Ref Range: >60 mL/min/1.73_m2 >90 >90   Calcium Latest Ref Range: 8.5 - 10.1 mg/dL 9.4 9.0   Anion Gap Latest Ref Range: 3 - 14 mmol/L 6 2 (L)   Albumin Latest Ref Range: 3.4 - 5.0 g/dL 4.6    Protein  Total Latest Ref Range: 6.8 - 8.8 g/dL 8.8    Bilirubin Total Latest Ref Range: 0.2 - 1.3 mg/dL 0.5    Alkaline Phosphatase Latest Ref Range: 40 - 150 U/L 109    ALT Latest Ref Range: 0 - 70 U/L 27    AST Latest Ref Range: 0 - 45 U/L 19    Lactate Dehydrogenase Latest Ref Range: 85 - 227 U/L 176      MR BRAIN W/O & W CONTRAST 3/29/2019 8:27 AM     Provided History: Surveillance 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  ICD-10: Germ cell tumor of brain (H)     Comparison: 5/10/2018, 12/4/2017, and 3/19/2014.     Technique: Multiplanar T1-weighted, axial FLAIR, and susceptibility  images were obtained without intravenous contrast. Following  intravenous gadolinium-based contrast administration, axial  T2-weighted, diffusion, and T1-weighted images (in multiple planes)  were obtained.     Contrast: 7.5cc Gadavist Injected      Findings: Mild volume loss of the tectal plate again seen, without  recurrent mass or definite abnormal enhancement in the pineal region.  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 Tangled  vasculature in the interpeduncular fossa is also similar. No  hydrocephalus or acute intracranial hemorrhage. Right frontal approach  ventriculostomy catheter is seen, with decompressed ventricles that  are slightly smaller than the prior study.     Mild ethmoid mucosal thickening. Paranasal sinuses otherwise  relatively clear. Mastoid air cells clear as well.                                                                      Impression: No findings to suggest recurrent germinoma in the pineal  region.      MEME CHATTERJEE MD    CT SCAN OF THE HEAD WITHOUT CONTRAST May 25, 2021 8:27 AM      HISTORY: Headache, history of  shunt.     TECHNIQUE: Axial images of the head and coronal reformations without  IV contrast material. Radiation dose for this scan was reduced using  automated exposure control, adjustment of the mA and/or kV  according  to patient size, or iterative reconstruction technique.     COMPARISON: Brain MR 3/29/2019, head CT 4/8/2014.     FINDINGS:      Intracranial contents: Postoperative changes of right frontal rashawn  hole with right frontal approach  shunt tip entering the rashawn hole  and position near the region of the anterior superior third ventricle.  Low-density changes along the course of the right frontal shunt have  the appearance of post shunt placement gliosis and/or chronic  ischemia. No extra-axial blood products or fluid collections.  Ventricular caliber is diminished at the lateral and third ventricular  level and normal caliber at the fourth ventricular level, representing  stable ventricular morphology from 3/29/2019. There is no hyperdense  hemorrhage, mass or midline shift. Corpus callosum is satisfactory.  Satisfactory position of the cerebellar tonsils. Sella shows no acute  abnormality. No evidence for acute infarction is noted.     Visualized orbits/sinuses/mastoids: Mild membrane thickening in the  ethmoid air cells.     Osseous structures/soft tissues: No evidence for acute fracture of the  calvarium or skull base. No significant swelling of the facial or  scalp tissues is evident.                                                                       IMPRESSION:   1. No acute process is identified. No evidence for intracranial mass.  Chronic ischemic change and/or shunt gliosis about the right frontal   shunt.  2. Right frontal  shunt stable in position from previous MR. Stable  ventricular morphology from previous MR 3/29/2019 with decompression  of the lateral and third ventricles and normal caliber of the fourth  ventricle.  3. No evidence for hemorrhage or acute infarction.  4. Additional details as noted above.        PRIYANKA MEJIAS MD    Results for JAIRO WOODARD (MRN [id number]) as of 6/10/2021 10:43   Ref. Range 5/25/2021 07:53   WBC Latest Ref Range: 4.0 - 11.0 10e9/L 4.3   Hemoglobin  Latest Ref Range: 13.3 - 17.7 g/dL 14.8   Hematocrit Latest Ref Range: 40.0 - 53.0 % 44.3   Platelet Count Latest Ref Range: 150 - 450 10e9/L 226   RBC Count Latest Ref Range: 4.4 - 5.9 10e12/L 5.25   MCV Latest Ref Range: 78 - 100 fl 84   MCH Latest Ref Range: 26.5 - 33.0 pg 28.2   MCHC Latest Ref Range: 31.5 - 36.5 g/dL 33.4   RDW Latest Ref Range: 10.0 - 15.0 % 12.5   Diff Method Unknown Automated Method   % Neutrophils Latest Units: % 57.1   % Lymphocytes Latest Units: % 30.9   % Monocytes Latest Units: % 9.7   % Eosinophils Latest Units: % 1.4   % Basophils Latest Units: % 0.7   % Immature Granulocytes Latest Units: % 0.2   Nucleated RBCs Latest Ref Range: 0 /100 0   Absolute Neutrophil Latest Ref Range: 1.6 - 8.3 10e9/L 2.5   Absolute Lymphocytes Latest Ref Range: 0.8 - 5.3 10e9/L 1.3   Absolute Monocytes Latest Ref Range: 0.0 - 1.3 10e9/L 0.4   Absolute Eosinophils Latest Ref Range: 0.0 - 0.7 10e9/L 0.1   Absolute Basophils Latest Ref Range: 0.0 - 0.2 10e9/L 0.0   Abs Immature Granulocytes Latest Ref Range: 0 - 0.4 10e9/L 0.0   Absolute Nucleated RBC Unknown 0.0       I discussed all my recommendations with Kari Turcios who verbalizes understanding and comfortable with the plan.  All of patient's questions were answered from the best of my knowledge.  Patient is in agreement with the plan.     32 minutes spent on the date of the encounter doing chart review, history and exam, documentation and further activities as noted above    CASPER Black, CNP [facility]  Headache certified  [facility]

## 2021-06-11 ENCOUNTER — HOSPITAL ENCOUNTER (EMERGENCY)
Facility: CLINIC | Age: 31
Discharge: HOME | End: 2021-06-11
Attending: PHYSICIAN ASSISTANT | Admitting: PHYSICIAN ASSISTANT
Payer: COMMERCIAL

## 2021-06-11 ENCOUNTER — NURSE TRIAGE (OUTPATIENT)
Dept: NURSING | Facility: CLINIC | Age: 31
End: 2021-06-11

## 2021-06-11 ENCOUNTER — TELEPHONE (OUTPATIENT)
Dept: NURSING | Facility: CLINIC | Age: 31
End: 2021-06-11

## 2021-06-11 VITALS
DIASTOLIC BLOOD PRESSURE: 79 MMHG | SYSTOLIC BLOOD PRESSURE: 115 MMHG | TEMPERATURE: 99 F | HEIGHT: 68 IN | OXYGEN SATURATION: 99 % | BODY MASS INDEX: 24.25 KG/M2 | HEART RATE: 59 BPM | WEIGHT: 160 LBS | RESPIRATION RATE: 16 BRPM

## 2021-06-11 DIAGNOSIS — Z98.2 S/P VP SHUNT: ICD-10-CM

## 2021-06-11 DIAGNOSIS — R51.9 CHRONIC NONINTRACTABLE HEADACHE, UNSPECIFIED HEADACHE TYPE: ICD-10-CM

## 2021-06-11 DIAGNOSIS — G89.29 CHRONIC NONINTRACTABLE HEADACHE, UNSPECIFIED HEADACHE TYPE: ICD-10-CM

## 2021-06-11 DIAGNOSIS — Z86.69 HISTORY OF EPILEPSY: ICD-10-CM

## 2021-06-11 LAB
ANION GAP SERPL CALCULATED.3IONS-SCNC: 4 MMOL/L (ref 3–14)
BASOPHILS # BLD AUTO: 0 10E9/L (ref 0–0.2)
BASOPHILS NFR BLD AUTO: 0.9 %
BUN SERPL-MCNC: 9 MG/DL (ref 7–30)
CALCIUM SERPL-MCNC: 9.2 MG/DL (ref 8.5–10.1)
CHLORIDE SERPL-SCNC: 103 MMOL/L (ref 94–109)
CO2 SERPL-SCNC: 27 MMOL/L (ref 20–32)
CREAT SERPL-MCNC: 0.94 MG/DL (ref 0.66–1.25)
DIFFERENTIAL METHOD BLD: NORMAL
EOSINOPHIL # BLD AUTO: 0 10E9/L (ref 0–0.7)
EOSINOPHIL NFR BLD AUTO: 0.9 %
ERYTHROCYTE [DISTWIDTH] IN BLOOD BY AUTOMATED COUNT: 12.8 % (ref 10–15)
GFR SERPL CREATININE-BSD FRML MDRD: >90 ML/MIN/{1.73_M2}
GLUCOSE SERPL-MCNC: 95 MG/DL (ref 70–99)
HCT VFR BLD AUTO: 43.5 % (ref 40–53)
HGB BLD-MCNC: 14.5 G/DL (ref 13.3–17.7)
IMM GRANULOCYTES # BLD: 0 10E9/L (ref 0–0.4)
IMM GRANULOCYTES NFR BLD: 0.2 %
LYMPHOCYTES # BLD AUTO: 1.2 10E9/L (ref 0.8–5.3)
LYMPHOCYTES NFR BLD AUTO: 28.5 %
MCH RBC QN AUTO: 27.8 PG (ref 26.5–33)
MCHC RBC AUTO-ENTMCNC: 33.3 G/DL (ref 31.5–36.5)
MCV RBC AUTO: 83 FL (ref 78–100)
MONOCYTES # BLD AUTO: 0.3 10E9/L (ref 0–1.3)
MONOCYTES NFR BLD AUTO: 6.6 %
NEUTROPHILS # BLD AUTO: 2.7 10E9/L (ref 1.6–8.3)
NEUTROPHILS NFR BLD AUTO: 62.9 %
NRBC # BLD AUTO: 0 10*3/UL
NRBC BLD AUTO-RTO: 0 /100
PLATELET # BLD AUTO: 216 10E9/L (ref 150–450)
POTASSIUM SERPL-SCNC: 4 MMOL/L (ref 3.4–5.3)
RBC # BLD AUTO: 5.22 10E12/L (ref 4.4–5.9)
SODIUM SERPL-SCNC: 134 MMOL/L (ref 133–144)
WBC # BLD AUTO: 4.3 10E9/L (ref 4–11)

## 2021-06-11 PROCEDURE — 250N000011 HC RX IP 250 OP 636: Performed by: PHYSICIAN ASSISTANT

## 2021-06-11 PROCEDURE — 85025 COMPLETE CBC W/AUTO DIFF WBC: CPT | Performed by: EMERGENCY MEDICINE

## 2021-06-11 PROCEDURE — 96361 HYDRATE IV INFUSION ADD-ON: CPT

## 2021-06-11 PROCEDURE — 258N000003 HC RX IP 258 OP 636: Performed by: PHYSICIAN ASSISTANT

## 2021-06-11 PROCEDURE — 80048 BASIC METABOLIC PNL TOTAL CA: CPT | Performed by: EMERGENCY MEDICINE

## 2021-06-11 PROCEDURE — 80177 DRUG SCRN QUAN LEVETIRACETAM: CPT | Performed by: EMERGENCY MEDICINE

## 2021-06-11 PROCEDURE — 96374 THER/PROPH/DIAG INJ IV PUSH: CPT

## 2021-06-11 PROCEDURE — 99284 EMERGENCY DEPT VISIT MOD MDM: CPT | Mod: 25

## 2021-06-11 PROCEDURE — 96375 TX/PRO/DX INJ NEW DRUG ADDON: CPT

## 2021-06-11 RX ORDER — DIPHENHYDRAMINE HYDROCHLORIDE 50 MG/ML
25 INJECTION INTRAMUSCULAR; INTRAVENOUS ONCE
Status: COMPLETED | OUTPATIENT
Start: 2021-06-11 | End: 2021-06-11

## 2021-06-11 RX ADMIN — SODIUM CHLORIDE 1000 ML: 9 INJECTION, SOLUTION INTRAVENOUS at 14:18

## 2021-06-11 RX ADMIN — DIPHENHYDRAMINE HYDROCHLORIDE 25 MG: 50 INJECTION INTRAMUSCULAR; INTRAVENOUS at 14:18

## 2021-06-11 RX ADMIN — PROCHLORPERAZINE EDISYLATE 10 MG: 5 INJECTION INTRAMUSCULAR; INTRAVENOUS at 14:18

## 2021-06-11 ASSESSMENT — ENCOUNTER SYMPTOMS
COUGH: 0
FEVER: 0
NECK PAIN: 0
HEADACHES: 1
VOMITING: 0
CHILLS: 1
PHOTOPHOBIA: 1

## 2021-06-11 ASSESSMENT — MIFFLIN-ST. JEOR: SCORE: 1655.26

## 2021-06-11 NOTE — ED TRIAGE NOTES
Pt presents for complaint of a migraine, right leg numbness and chills. Pt states he had the same symptoms on Wednesday that lasted several hours before resolving. Pt states at approx 1100 today his symptoms reoccurred but his migraine is worse. Hx of complex migraines in the past. Pt states he does take keppra for seizures. ABC intact, A&Ox4.

## 2021-06-11 NOTE — TELEPHONE ENCOUNTER
PRIOR AUTHORIZATION DENIED    Medication: galcanezumab-gnlm (EMGALITY) 120 MG/ML injection; -DENIED    Denial Date:  6/10/2021    Denial Rational: Coverage is provided for patients with trry/fail of at least 1 triptans      Appeal Information: CALLING FOR LETTER

## 2021-06-11 NOTE — ED NOTES
Pt provided with discharge paperwork and educated on recommended follow-up with PCP and seizure doc. Education provided on how to take reglan and benadryl  for headache. Pt voiced understanding and denied any questions at discharge.

## 2021-06-11 NOTE — TELEPHONE ENCOUNTER
".  [facility]: Nurse Triage Note  SITUATION/BACKGROUND                                                      Kari Turcios is a 31 year old male who calls to report having a migraine head for the past 2 hours.   Rates pain at 8-9/10 on the right side of head, \"where shunt was placed\".   Has sensivity to light and has eyes closed at this time and away from computer. Denies and double or blurred vision.   Took a tylenol before call and another 500 mg during call. Temperature 96.3... He reports being sweaty all over. Patient stated that he does not take any medication for migraine at this time and awaiting authorization for new med galcanezumab-gnlm (EMGALITY) 120 MG/ML injection   Home care instructions given per headache protocol.   Routed to Neurology as High Priority to review and follow up call to patient at [phone] for further instruction.         RECOMMENDATION/PLAN                                                      RECOMMENDED DISPOSITION: home care instructions given per headache protocol. Seek ED care sudden onset of weakness; numbness; or tingling on one side of body; fever; confusion; difficulty speaking; change in ability to walk.   Will comply with recommendation: Yes    If further questions/concerns or if symptoms do not improve, worsen or new symptoms develop, call your PCP or [phone] to talk with the Resident on call, as soon as possible.    Guideline used: headache  Telephone Triage Protocols for Nurses, Fifth Edition, Georgia Mccullough, RN, RN  "

## 2021-06-11 NOTE — TELEPHONE ENCOUNTER
Post ED follow up with Headache Clinic and Epilepsy Clinic-was recently seen in the ED for break through seizures and recent antiseizure meds changes -dose.

## 2021-06-11 NOTE — ED PROVIDER NOTES
History     Chief Complaint:  Headache and Chills      The history is provided by the patient.      Kari Turcios is a 31 year old male with a history of migraine who presents with headache and chills. He similar symptoms two days ago that resolved, but now they returned today around 11 am. He rates the pain as a 6 or 7/10.The patient reports sensitivity to light, nausea, and numbness in his left leg. His hands and feet are also sweaty. He has take tylenol for the headache. He says that light tends to start his migraines. He is vaccinated for COVID-19. He denies fever, cough, neck pain, vomiting, or trauma to the head. The patient was recently seen on May 25 for a headache. He had imaging done of his brain. He follows with neurology for both  shunt, migraines, and epilepsy.     XR Shunt Malfunction Survey  Right frontal approach  shunt. Shunt is intact without  kinking or disruption, the tip is directed in the left side of the   abdomen. No significant acute ancillary findings identified. Midline/paramedian posterior fossa craniotomy.     Head CT w/o contrast  No acute process is identified. No evidence for intracranial mass.Chronic ischemic change and/or shunt gliosis about the right frontal  shunt.Right frontal  shunt stable in position from previous MR. Stable ventricular morphology from previous MR 3/29/2019 with decompression of the lateral and third ventricles and normal caliber of the fourth ventricle. No evidence for hemorrhage or acute infarction. Additional details as noted above.     Review of Systems   Constitutional: Positive for chills. Negative for fever.   Eyes: Positive for photophobia.   Respiratory: Negative for cough.    Gastrointestinal: Negative for vomiting.   Musculoskeletal: Negative for neck pain.   Neurological: Positive for headaches.     Allergies:  Penicillins    Medications:    Ddavp   Emgality    Keppra   Naproxen   Compazine   Deltasone     Past Medical History:   "  Hypertropia   Myopia   Seizures (H)   Type 2 diabetes mellitus without complications (H)  Pineal germ cell tumor  Major depressive disorder, single episode, moderate  Anxiety state  Anemia in neoplastic disease   Pulmonary Embolism   Vitamin D deficiency  Epilepsy  Postinfectious encephalitis, myelitis, and encephalomyelitis    Past Surgical History:    Shoulder Arthrotomy   Brain Biopsy   Craniotomy   Ventriculostomy   Bilateral Recession Resection  Tracking Shunt Implant     Family History:    Mother - Breast Cancer     Social History:  Accompanied to the ED with father     Physical Exam     Patient Vitals for the past 24 hrs:   BP Temp Temp src Pulse Resp SpO2 Height Weight   06/11/21 1144 124/82 99  F (37.2  C) Oral 61 16 100 % 1.727 m (5' 8\") 72.6 kg (160 lb)       Physical Exam  General: Alert and interactive. Lying flat in bed with sunglasses on.   Eyes: The pupils are equal and round. EOMs intact. No scleral icterus.  ENT: No abnormalities to the external nose or ears. Mucous membranes moist. Posterior oropharynx is non-erythematous.      Neck: Trachea is in the midline. No nuchal rigidity.     CV: Regular rate and rhythm. S1 and S2 normal without murmur, click, gallop or rub.   Resp: Breath sounds are clear bilaterally, without rhonchi, wheezes, rales. Non-labored, no retractions or accessory muscle use.     GI: Abdomen is soft without distension. No tenderness to palpation. No peritoneal signs.    MS: Moving all extremities well. Good muscle tone.   Skin: Warm and dry. No rash or lesions noted.  Neuro: Alert and oriented x 3. No focal neurologic deficits. Good strength and sensation in upper and lower extremities.    Psych: Awake. Alert.  Normal affect. Appropriate interactions.  Lymph: No anterior or posterior cervical lymphadenopathy noted.    Emergency Department Course   Laboratory:  BMP:  WNL (Creatinine 0.94)     CBC: WBC 4.3, HGB 14.5,        Emergency Department Course:    Reviewed:  I " reviewed nursing notes, vitals, past history and care everywhere    Assessments:  1334 I obtained history and examined the patient as noted above.   1546 I rechecked the patient.     Consults:   1450 I consulted with Sybil Dockery of Neurology     Interventions:  1418 Sodium Chloride - 1000 mL - IV  1418 Compazine - 10 mg - IV  1418 Benadryl - 25 mg - IV    Disposition:  The patient was discharged to home.    Impression & Plan      Medical Decision Making:  Kari Turcios is a 31 year old male with complicated PMH that includes pineal germ cell tumor, diabetes, epilepsy with  shunt in place who presents for evaluation of headaches. Seen at neurologist yesterday, who plans to start him on Emgality once insurance approves it. He continues to have headaches that feel like his typical migraines. Just had  shunt series and CT head two weeks ago that were normal, and neurology did not feel these were indicated again. He has a normal neurologic examination here. His headaches improved with Reglan and Benadryl. He will follow up with his neurologist this week. He should continue his dose of Keppra. Labs are reassuring. Questions were answered.     Covid-19  Kari Turcios was evaluated during a global COVID-19 pandemic, which necessitated consideration that the patient might be at risk for infection with the SARS-CoV-2 virus that causes COVID-19.   Applicable protocols for evaluation were followed during the patient's care.   COVID-19 was considered as part of the patient's evaluation. The plan for testing is:  a test was obtained during this visit.    Diagnosis:    ICD-10-CM    1. Chronic nonintractable headache, unspecified headache type  R51.9     G89.29    2. S/P  shunt  Z98.2    3. History of epilepsy  Z86.69        Scribe Disclosure:  I, Gustavo Sorto, am serving as a scribe at 1:47 PM on 6/11/2021 to document services personally performed by Harika Dailey, based on my observations and the  provider's statements to me.      Harika Dailey PA-C  06/11/21 0536

## 2021-06-11 NOTE — DISCHARGE INSTRUCTIONS
Please follow up with Sybil Dockery and your epilepsy doctors.   Continue your Keppra.   Take Reglan with Benadry for headaches.   You should  be able to begin Emgality soon once it is pre-approved.     Discharge Instructions  Migraine    You were seen today for a headache that your provider thinks is likely a migraine. At this time your provider does not find that your headache is a sign of anything dangerous or life-threatening.  However, sometimes the signs of serious illness do not show up right away.      Generally, every Emergency Department visit should have a follow-up clinic visit with either a primary or a specialty clinic/provider. Please follow-up as instructed by your emergency provider today.    Return to the Emergency Department if:  You get a fever of 100.4 F or higher.  You get a stiff neck with your headache.  You get a new headache that is different or worse than headaches you have had before.  You are vomiting (throwing up) and cannot keep food or water down.  You have blurry or double vision or other problems with your eyes.  You have a new weakness on one side of your body.  You have difficulty with balance which is new.  You or your family thinks you are confused.  You have a seizure.    Treatment:  Often, treatment for your migraine will take some time to make you headache stop.  Going home to sleep can be very effective.  Use your medications as directed; overuse of medications can actually cause headaches.  Once your headache has gone away, avoid triggers such as certain foods, skipping meals, bright lights, changes in sleep, exercise and stress.  Migraine headaches can have symptoms before the pain starts, like vision changes, funny smells/tastes, dizziness or other symptoms.  Treating a headache as soon as the first symptoms come on is very important and gives the best chance of stopping the headache.  If headaches are severe or frequent, you may need to start daily medication to  prevent the headaches.  Carbon monoxide can cause headaches, so not burning things in your home is important.  Also get a carbon monoxide detector.  Some medications for migraines may raise your blood pressure, so use with caution if you have high blood pressure or heart problems.  If you were given a prescription for medicine here today, be sure to read all of the information (including the package insert) that comes with your prescription.  This will include important information about the medicine, its side effects, and any warnings that you need to know about.  The pharmacist who fills the prescription can provide more information and answer questions you may have about the medicine.  If you have questions or concerns that the pharmacist cannot address, please call or return to the Emergency Department.   Remember that you can always come back to the Emergency Department if you are not able to see your regular provider in the amount of time listed above, if you get any new symptoms, or if there is anything that worries you.

## 2021-06-11 NOTE — TELEPHONE ENCOUNTER
" Centralized Medication Refill Team note:  Reviewed previous note 6/11/21 re migraine  Patient calls back with new symptom:    \"Right leg is going numb and I  feel cold and I am sweaty all over.  This has never happened before.\"   Not confused, will call 911 now    Reason for Disposition    New neurologic deficit that is present NOW, sudden onset of ANY of the following: * Weakness of the face, arm, or leg on one side of the body* Numbness of the face, arm, or leg on one side of the body* Loss of speech or garbled speech    Additional Information    Negative: Difficult to awaken or acting confused (e.g., disoriented, slurred speech)    Answer Assessment - Initial Assessment Questions  1. SYMPTOM: \"What is the main symptom you are concerned about?\" (e.g., weakness, numbness)     RIGHT LEG  Sudden numbness and sweaty/ diaphoretic all over body  2. ONSET: \"When did this start?\" (minutes, hours, days; while sleeping)      10 minuyes  3. LAST NORMAL: \"When was the last time you were normal (no symptoms)?\"      Prior to migraine  4. PATTERN \"Does this come and go, or has it been constant since it started?\"  \"Is it present now?\"     Present now- neve happened before  5. CARDIAC SYMPTOMS: \"Have you had any of the following symptoms: chest pain, difficulty breathing, palpitations?\"   No  6. NEUROLOGIC SYMPTOMS: \"Have you had any of the following symptoms: headache, dizziness, vision loss, double vision, changes in speech, unsteady on your feet?\"  See previous assessment 6/11/21  Positive for light sensitivity and some blurring, no field  cut    Protocols used: NEUROLOGIC DEFICIT-A-[place]      "

## 2021-06-13 LAB — LEVETIRACETAM SERPL-MCNC: 51 UG/ML (ref 12–46)

## 2021-06-14 ENCOUNTER — TELEPHONE (OUTPATIENT)
Dept: NEUROLOGY | Facility: CLINIC | Age: 31
End: 2021-06-14

## 2021-06-14 NOTE — TELEPHONE ENCOUNTER
I called pt to follow up on his ED visit last week. I let him know Sybil would like to set up a follow up visit with him to review his headache treatment plan post ED visit. I offered a video visit with Sybil tomorrow 6/15 at . I asked he call back if this day/time work.     Indira MARTIN

## 2021-06-14 NOTE — TELEPHONE ENCOUNTER
This is a good example of a denial that does not make any sense-Emgality is for prevention and triptans-acute treatment. Patient was in the ED for acute headache -can we try to appeal it -this is for pain that cannot be adequately controlled and wait.

## 2021-06-15 ENCOUNTER — TRANSFERRED RECORDS (OUTPATIENT)
Dept: HEALTH INFORMATION MANAGEMENT | Facility: CLINIC | Age: 31
End: 2021-06-15

## 2021-06-17 DIAGNOSIS — E23.2 DIABETES INSIPIDUS SECONDARY TO VASOPRESSIN DEFICIENCY (H): ICD-10-CM

## 2021-06-18 RX ORDER — DESMOPRESSIN ACETATE 0.2 MG/1
TABLET ORAL
Qty: 60 TABLET | Refills: 3 | Status: SHIPPED | OUTPATIENT
Start: 2021-06-18 | End: 2021-10-13

## 2021-06-18 NOTE — TELEPHONE ENCOUNTER
DESMOPRESSIN ACETATE 0.2 MG TB     Last Written Prescription Date:  3/27/2021  Last Fill Quantity: 180,   # refills: 0  Last Office Visit : 4/23/2020  Future Office visit:  None    Routing refill request to provider for review/approval because:  Second Request      Over due office visit  30 day pended     Provider notified       Yessenia Calderon RN  Central Triage Red Flags/Med Refills

## 2021-06-21 ENCOUNTER — OFFICE VISIT (OUTPATIENT)
Dept: NEUROLOGY | Facility: CLINIC | Age: 31
End: 2021-06-21
Payer: COMMERCIAL

## 2021-06-21 ENCOUNTER — TELEPHONE (OUTPATIENT)
Dept: NEUROLOGY | Facility: CLINIC | Age: 31
End: 2021-06-21

## 2021-06-21 VITALS — OXYGEN SATURATION: 100 % | HEART RATE: 88 BPM | DIASTOLIC BLOOD PRESSURE: 86 MMHG | SYSTOLIC BLOOD PRESSURE: 130 MMHG

## 2021-06-21 DIAGNOSIS — G40.909 NONINTRACTABLE EPILEPSY WITHOUT STATUS EPILEPTICUS, UNSPECIFIED EPILEPSY TYPE (H): ICD-10-CM

## 2021-06-21 DIAGNOSIS — R51.9 CHRONIC DAILY HEADACHE: ICD-10-CM

## 2021-06-21 DIAGNOSIS — G43.719 INTRACTABLE CHRONIC MIGRAINE WITHOUT AURA AND WITHOUT STATUS MIGRAINOSUS: Primary | ICD-10-CM

## 2021-06-21 DIAGNOSIS — Z09 HOSPITAL DISCHARGE FOLLOW-UP: ICD-10-CM

## 2021-06-21 DIAGNOSIS — Z98.2 S/P VP SHUNT: ICD-10-CM

## 2021-06-21 PROCEDURE — 99215 OFFICE O/P EST HI 40 MIN: CPT | Performed by: NURSE PRACTITIONER

## 2021-06-21 ASSESSMENT — PAIN SCALES - GENERAL: PAINLEVEL: MILD PAIN (3)

## 2021-06-21 NOTE — TELEPHONE ENCOUNTER
Central Prior Authorization Team   [phone]    PA Initiation    Medication: NURTEC 75MG TBDP  Insurance Company: [employer] - Phone [phone] Fax [phone]  Pharmacy Filling the Rx: [facility] [place], [place] - [address] [place] [address] 1-704  Filling Pharmacy Phone: [phone]  Filling Pharmacy Fax: [phone]  Start Date: 6/21/2021

## 2021-06-21 NOTE — PATIENT INSTRUCTIONS
Plan:  Acute migraine headache treatment-Nurtec trial 75 mg sublingual every 24 hours as needed or every other day.   Headache prevention-a trial of Emgality monthly for acute   Follow up in 3 months or sooner if needed     FMLA HR at [phone] claim #[id number] -FMLA as needed.   Patient to signed FAROOQ.

## 2021-06-21 NOTE — PROGRESS NOTES
Anne Marie Roca is a 31 year old who presents for the following health issues -ED visit follow up and headaches.  Recall history of pineal tumor, seizures,  shunt, diabetes.  Today reports he has been feeling Ok since visit to the ED on 6/11/2021 just sensitivity to light daily.   Saw epilepsy at [facility] last Friday and keppra was increased and will be following up with [facility] for seizures management.   Patient reports right hand numbness with migraine headaches but resolves. No speech problems. Vision is blurry.   Patient was on topiramate, amitriptyline in the past. Not on any medication for chronic migraine headache prevention at this time.  ED visit note reviewed.  Patient was seen in ED on 6/11/2021 for headache and chills.  Patient reported sensitivity to light, nausea and numbness in the left leg.  History of shunt and x-ray shunt malfunction [employer] showed that shunt is intact without kinking or disruption the tip is directed in the left side of the abdomen.  No significant acute findings identified.  Midline/paramedian posterior fossa craniotomy.  Head CT no acute process was identified, no intracranial mass, but chronic ischemic changes and torsional glucoses above the right frontal  shunt.  Stable ventricular morphology from previous MR in 2019 with decompression of the lateral and third ventricles and normal caliber of the fourth ventricle.  No hemorrhage or acute infarction was identified imaging report.  Normal neurological exam recorded in the ED.  Patient received sodium chloride, Compazine and Benadryl and was discharged home.  Keppra level was checked and stable.  Would avoid triptans-head CT changes of possible ischemic changes vs glioses and possible complex migraine so would avoid triptans given patient history of surgery.     Patient is vaccinated for COVID-19.    Past medical history  Seizures, type 2 diabetes, pineal germ cell tumor, depression, anxiety, anemia, PE, ventriculostomy and   shunt    Plan reviewed with the patient:  Acute migraine headache treatment-recommended a trial of Nurtec  75 mg sublingual every 24 hours as needed or every other day.   Headache prevention-a trial of Emgality monthly for chronic migraine prevention  Follow up in 3 months or sooner if needed after starting Emgality    Patient received Emgality from pharmacy with a saving card and self injected after demonstration a loading dose without any concerns.     [place] at [phone] claim #[id number] -[place]   Patient to signed FAROOQ.       Review of Systems   Constitutional, HEENT, cardiovascular, pulmonary, gi and gu systems reviewed, except as otherwise noted.      Objective    /86   Pulse 88   SpO2 100%   There is no height or weight on file to calculate BMI.  Physical Exam   Headache today 7-8/10 today, worse dark shades  GENERAL: healthy, alert and moderate  Distress due to headache  EYES: Eyes grossly normal to inspection, PERRL and conjunctivae and sclerae normal  NECK: no adenopathy, no asymmetry, masses, or scars and thyroid normal to palpation  RESP: lungs clear to auscultation - no rales, rhonchi or wheezes  CV: regular rate and rhythm, normal S1 S2, no S3 or S4, no murmur, click or rub, no peripheral edema and peripheral pulses strong  NEURO: Normal strength and tone, sensory exam grossly normal, mentation intact, speech normal, cranial nerves 2-12 intact, DTR's normal and symmetric and normal casual gait    I discussed all my recommendations with Kari Turcios who verbalizes understanding and comfortable with the plan.  All of patient's questions were answered from the best of my knowledge.  Patient is in agreement with the plan.     48 minutes spent on the date of the encounter doing chart review, history and exam, documentation and further activities as noted above    Sybil Dockery APRN, CNP [facility]  Headache certified  [facility]

## 2021-06-21 NOTE — TELEPHONE ENCOUNTER
[facility] Prior Authorization Team Request    Medication: NURTEC 75MG TBDP  Dosing:   NDC (required for Medicaid members):     Insurance   BIN: [id number]  PCN:   Grp: [id number]  ID: [id number]    CoverMyMeds Key (if applicable):     Additional documentation:     Filling Pharmacy: [facility]  Phone Number: [phone]  Contact: P [facility] [place] (P [id number]) please send all responses to this pool.  Pharmacy NPI (required for Medicaid members):

## 2021-06-21 NOTE — TELEPHONE ENCOUNTER
Prior Authorization Approval        Authorization Effective Date: 5/22/2021  Authorization Expiration Date: 6/21/2022  Medication: NURTEC 75MG TBDP-PA APPROVED   Approved Dose/Quantity:   Reference #:     Insurance Company: [employer] - Phone [phone] Fax [phone]  Expected CoPay:       CoPay Card Available:      Foundation Assistance Needed:    Which Pharmacy is filling the prescription (Not needed for infusion/clinic administered): [facility] [place], [place] - [address] [place] [address] [phone]  Pharmacy Notified: Yes  Patient Notified: Yes

## 2021-07-02 ENCOUNTER — DOCUMENTATION ONLY (OUTPATIENT)
Dept: NEUROLOGY | Facility: CLINIC | Age: 31
End: 2021-07-02

## 2021-07-07 ENCOUNTER — TELEPHONE (OUTPATIENT)
Dept: NEUROLOGY | Facility: CLINIC | Age: 31
End: 2021-07-07

## 2021-07-07 NOTE — TELEPHONE ENCOUNTER
----- Message from Ruben Gonzalez sent at 7/7/2021 11:53 AM CDT -----  Regarding: RE: need urgent follow up with Sybil and Dr. Shepard  Called pt twice LVM with call back number.  ----- Message -----  From: Indira Russo RN  Sent: 6/15/2021   2:16 PM CDT  To: [id number]  Subject: need urgent follow up with Sybil and  F#    Please help pt schedule a visit with Sybil to follow up after recent ED admission to review headache treatment. Can be video or in person visit. Pt also needs to follow up with Dr. Shepard for seizure.     Indira MARTIN

## 2021-07-09 ENCOUNTER — MYC MEDICAL ADVICE (OUTPATIENT)
Dept: NEUROLOGY | Facility: CLINIC | Age: 31
End: 2021-07-09

## 2021-07-12 ENCOUNTER — DOCUMENTATION ONLY (OUTPATIENT)
Dept: NEUROLOGY | Facility: CLINIC | Age: 31
End: 2021-07-12

## 2021-07-12 NOTE — PROGRESS NOTES
Faxed and sent to scan both [facility] paperwork to [facility] at fax# [phone]. Both intermittent and continuous paperwork.

## 2021-08-03 ENCOUNTER — TRANSFERRED RECORDS (OUTPATIENT)
Dept: HEALTH INFORMATION MANAGEMENT | Facility: CLINIC | Age: 31
End: 2021-08-03

## 2021-09-13 DIAGNOSIS — E23.2 DIABETES INSIPIDUS SECONDARY TO VASOPRESSIN DEFICIENCY (H): ICD-10-CM

## 2021-09-15 RX ORDER — DESMOPRESSIN ACETATE 0.2 MG/1
TABLET ORAL
Qty: 60 TABLET | OUTPATIENT
Start: 2021-09-15

## 2021-09-15 NOTE — TELEPHONE ENCOUNTER
DESMOPRESSIN ACETATE 0.2 MG      Last Written Prescription Date:  6/18/21  Last Fill Quantity: 60,   # refills: 3  Last Office Visit : 4/23/20  Future Office visit:  none    Routing refill request to provider for review/approval because:  Drug not on the [facility], P or [facility] refill protocol or controlled substance  Thank-you, Diane FREGOSO RN Medication Refill Team

## 2021-09-15 NOTE — TELEPHONE ENCOUNTER
[facility] Call Center    Phone Message    May a detailed message be left on voicemail: yes     Reason for Call: Other: Pt called to follow up on this refill.  Pt would like it filled ASAP.  Call if there are any issues.       To be sent to [facility].    Action Taken: Message routed to:  [facility] endo    Travel Screening: Not Applicable

## 2021-09-18 ENCOUNTER — HEALTH MAINTENANCE LETTER (OUTPATIENT)
Age: 31
End: 2021-09-18

## 2021-09-24 ENCOUNTER — TELEPHONE (OUTPATIENT)
Dept: NEUROLOGY | Facility: CLINIC | Age: 31
End: 2021-09-24

## 2021-09-24 ENCOUNTER — NURSE TRIAGE (OUTPATIENT)
Dept: NURSING | Facility: CLINIC | Age: 31
End: 2021-09-24

## 2021-09-24 NOTE — TELEPHONE ENCOUNTER
[facility] Call Center    Phone Message    May a detailed message be left on voicemail: yes     Reason for Call: Other: Pt calling in to inform around the core of his shunt is bleeding and he is having severe migraines - no template available for inperson writer did send to triage      Action Taken: Message routed to:  [facility] neurology    Travel Screening: Not Applicable

## 2021-09-24 NOTE — TELEPHONE ENCOUNTER
"Pt reporting, he has had a headache for the last 2-3 days.    But today when the Pt woke up.   It feels like the worst headache ever and it will not go away.   Now Pt is reporting blurred vision, feeling really dizzy and not himself.   No numbness or tingling on either side of the body.   But the headache is horrible.      Where the shunt is located in the back of his head.    Pt is having severe pain and very itchy back in the area of the shunt.      Pt mentioned some bleeding in the back of the area of where the shunt is located.          Due to the severe headache and blurred vision.    I suggested ER visit for Pt care due to the headache is getting worse and blurred vision.    Pt agreed with plan of care and going to the ER due to the symptoms he is having.     Yessenia Calderon RN  Central Triage Red Flags/Med Refills      Reason for Disposition    [1] SEVERE headache (e.g., excruciating) AND [2] \"worst headache\" of life    Additional Information    Negative: Difficult to awaken or acting confused (e.g., disoriented, slurred speech)    Negative: [1] Weakness of the face, arm or leg on one side of the body AND [2] new onset    Negative: [1] Numbness of the face, arm or leg on one side of the body AND [2] new onset    Negative: [1] Loss of speech or garbled speech AND [2] new onset    Negative: Passed out (i.e., lost consciousness, collapsed and was not responding)    Negative: Sounds like a life-threatening emergency to the triager    Negative: Followed a head injury    Negative: Pregnant    Negative: Postpartum (from 0 to 6 weeks after delivery)    Negative: Traumatic Brain Injury (TBI) is suspected    Negative: Unable to walk, or can only walk with assistance (e.g., requires support)    Negative: Stiff neck (can't touch chin to chest)    Negative: Severe pain in one eye    Negative: [1] Other family members (or roommates) with headaches AND [2] possibility of carbon monoxide exposure    Protocols used: " HEADACHE-A-AH

## 2021-09-27 ENCOUNTER — CARE COORDINATION (OUTPATIENT)
Dept: NEUROLOGY | Facility: CLINIC | Age: 31
End: 2021-09-27

## 2021-09-27 NOTE — PROGRESS NOTES
I called and left a voice message asking for a call back.  I want to know how he is doing since his ER visit and let him know that Sybil would like to see him in follow up to discuss his treatment plan.

## 2021-10-10 DIAGNOSIS — E23.2 DIABETES INSIPIDUS SECONDARY TO VASOPRESSIN DEFICIENCY (H): ICD-10-CM

## 2021-10-13 RX ORDER — DESMOPRESSIN ACETATE 0.2 MG/1
0.2 TABLET ORAL 2 TIMES DAILY
Qty: 180 TABLET | Refills: 3 | Status: SHIPPED | OUTPATIENT
Start: 2021-10-13 | End: 2022-09-23

## 2021-10-13 NOTE — TELEPHONE ENCOUNTER
DESMOPRESSIN ACETATE 0.2 MG TB  Last Written Prescription Date:  6/18/2021  Last Fill Quantity: 60,   # refills: 3  Last Office Visit : 4/23/2020  Future Office visit: None    Routing refill request to provider for review/approval because:  Drug not on the [facility], P or [facility] refill protocol or controlled substance      Yessenia Calderon RN  Central Triage Red Flags/Med Refills

## 2022-01-08 ENCOUNTER — HEALTH MAINTENANCE LETTER (OUTPATIENT)
Age: 32
End: 2022-01-08

## 2022-05-06 ENCOUNTER — TELEPHONE (OUTPATIENT)
Dept: NEUROLOGY | Facility: CLINIC | Age: 32
End: 2022-05-06
Payer: COMMERCIAL

## 2022-05-06 NOTE — TELEPHONE ENCOUNTER
[employer] Prior Authorization Team Request    Medication: NURTEC 75 MG TBDP  Dosing:   NDC (required for Medicaid members):     Insurance   BIN: [id number]  PCN: [id number]  Grp: [id number]  ID: [id number]     CoverMyMeds Key (if applicable):     Additional documentation:     Filling Pharmacy: [facility]  Phone Number: [phone]  Contact: P PHARM [place] (P [id number]) please send all responses to this pool.  Pharmacy NPI (required for Medicaid members):

## 2022-05-11 NOTE — TELEPHONE ENCOUNTER
Central Prior Authorization Team   Phone: [phone]      PA Initiation    Medication: NURTEC 75 MG TBDP  Insurance Company: [place] - Phone [phone] Fax [phone]  Pharmacy Filling the Rx: [facility] [place], [place] - [address] [place] [address] [phone]  Filling Pharmacy Phone: [phone]  Filling Pharmacy Fax:    Start Date: 5/11/2022

## 2022-05-11 NOTE — TELEPHONE ENCOUNTER
Prior Authorization Approval    Authorization Effective Date: 1/1/2022  Authorization Expiration Date: 5/11/2023  Medication: NURTEC 75 MG TBDP-APPROVED  Approved Dose/Quantity:   Reference #:     Insurance Company: [employer] - Phone [phone] Fax [phone]  Expected CoPay:       CoPay Card Available:      Foundation Assistance Needed:    Which Pharmacy is filling the prescription (Not needed for infusion/clinic administered): [facility] [address] 1-909  Pharmacy Notified: Yes  Patient Notified: No

## 2022-07-08 ENCOUNTER — HOSPITAL ENCOUNTER (EMERGENCY)
Facility: CLINIC | Age: 32
Discharge: HOME | End: 2022-07-08
Attending: PHYSICIAN ASSISTANT | Admitting: PHYSICIAN ASSISTANT
Payer: MEDICAID

## 2022-07-08 ENCOUNTER — TELEPHONE (OUTPATIENT)
Dept: NEUROLOGY | Facility: CLINIC | Age: 32
End: 2022-07-08

## 2022-07-08 ENCOUNTER — APPOINTMENT (OUTPATIENT)
Dept: GENERAL RADIOLOGY | Facility: CLINIC | Age: 32
End: 2022-07-08
Attending: EMERGENCY MEDICINE
Payer: MEDICAID

## 2022-07-08 ENCOUNTER — APPOINTMENT (OUTPATIENT)
Dept: CT IMAGING | Facility: CLINIC | Age: 32
End: 2022-07-08
Attending: EMERGENCY MEDICINE
Payer: MEDICAID

## 2022-07-08 ENCOUNTER — NURSE TRIAGE (OUTPATIENT)
Dept: NURSING | Facility: CLINIC | Age: 32
End: 2022-07-08

## 2022-07-08 VITALS
BODY MASS INDEX: 25.71 KG/M2 | SYSTOLIC BLOOD PRESSURE: 118 MMHG | HEIGHT: 66 IN | TEMPERATURE: 98 F | DIASTOLIC BLOOD PRESSURE: 76 MMHG | WEIGHT: 160 LBS | OXYGEN SATURATION: 100 % | RESPIRATION RATE: 18 BRPM | HEART RATE: 60 BPM

## 2022-07-08 DIAGNOSIS — Z98.2 S/P VP SHUNT: ICD-10-CM

## 2022-07-08 DIAGNOSIS — R51.9 ACUTE NONINTRACTABLE HEADACHE, UNSPECIFIED HEADACHE TYPE: ICD-10-CM

## 2022-07-08 LAB
ANION GAP SERPL CALCULATED.3IONS-SCNC: 2 MMOL/L (ref 3–14)
BASOPHILS # BLD AUTO: 0 10E3/UL (ref 0–0.2)
BASOPHILS NFR BLD AUTO: 1 %
BUN SERPL-MCNC: 17 MG/DL (ref 7–30)
CALCIUM SERPL-MCNC: 9.3 MG/DL (ref 8.5–10.1)
CHLORIDE BLD-SCNC: 103 MMOL/L (ref 94–109)
CO2 SERPL-SCNC: 28 MMOL/L (ref 20–32)
CREAT SERPL-MCNC: 0.95 MG/DL (ref 0.66–1.25)
EOSINOPHIL # BLD AUTO: 0 10E3/UL (ref 0–0.7)
EOSINOPHIL NFR BLD AUTO: 0 %
ERYTHROCYTE [DISTWIDTH] IN BLOOD BY AUTOMATED COUNT: 12.4 % (ref 10–15)
GFR SERPL CREATININE-BSD FRML MDRD: >90 ML/MIN/1.73M2
GLUCOSE BLD-MCNC: 92 MG/DL (ref 70–99)
HCT VFR BLD AUTO: 42.9 % (ref 40–53)
HGB BLD-MCNC: 14.1 G/DL (ref 13.3–17.7)
IMM GRANULOCYTES # BLD: 0 10E3/UL
IMM GRANULOCYTES NFR BLD: 0 %
LYMPHOCYTES # BLD AUTO: 1.5 10E3/UL (ref 0.8–5.3)
LYMPHOCYTES NFR BLD AUTO: 24 %
MCH RBC QN AUTO: 27.6 PG (ref 26.5–33)
MCHC RBC AUTO-ENTMCNC: 32.9 G/DL (ref 31.5–36.5)
MCV RBC AUTO: 84 FL (ref 78–100)
MONOCYTES # BLD AUTO: 0.4 10E3/UL (ref 0–1.3)
MONOCYTES NFR BLD AUTO: 6 %
NEUTROPHILS # BLD AUTO: 4.3 10E3/UL (ref 1.6–8.3)
NEUTROPHILS NFR BLD AUTO: 69 %
NRBC # BLD AUTO: 0 10E3/UL
NRBC BLD AUTO-RTO: 0 /100
PLATELET # BLD AUTO: 245 10E3/UL (ref 150–450)
POTASSIUM BLD-SCNC: 4.3 MMOL/L (ref 3.4–5.3)
RBC # BLD AUTO: 5.11 10E6/UL (ref 4.4–5.9)
SODIUM SERPL-SCNC: 133 MMOL/L (ref 133–144)
WBC # BLD AUTO: 6.2 10E3/UL (ref 4–11)

## 2022-07-08 PROCEDURE — 96361 HYDRATE IV INFUSION ADD-ON: CPT

## 2022-07-08 PROCEDURE — 250N000011 HC RX IP 250 OP 636: Performed by: EMERGENCY MEDICINE

## 2022-07-08 PROCEDURE — 85025 COMPLETE CBC W/AUTO DIFF WBC: CPT | Performed by: EMERGENCY MEDICINE

## 2022-07-08 PROCEDURE — 36415 COLL VENOUS BLD VENIPUNCTURE: CPT | Performed by: EMERGENCY MEDICINE

## 2022-07-08 PROCEDURE — 99285 EMERGENCY DEPT VISIT HI MDM: CPT | Mod: 25

## 2022-07-08 PROCEDURE — 250N000013 HC RX MED GY IP 250 OP 250 PS 637: Performed by: EMERGENCY MEDICINE

## 2022-07-08 PROCEDURE — 96375 TX/PRO/DX INJ NEW DRUG ADDON: CPT

## 2022-07-08 PROCEDURE — 80048 BASIC METABOLIC PNL TOTAL CA: CPT | Performed by: EMERGENCY MEDICINE

## 2022-07-08 PROCEDURE — 70450 CT HEAD/BRAIN W/O DYE: CPT

## 2022-07-08 PROCEDURE — 258N000003 HC RX IP 258 OP 636: Performed by: EMERGENCY MEDICINE

## 2022-07-08 PROCEDURE — 74018 RADEX ABDOMEN 1 VIEW: CPT

## 2022-07-08 PROCEDURE — 96374 THER/PROPH/DIAG INJ IV PUSH: CPT

## 2022-07-08 RX ORDER — ACETAMINOPHEN 500 MG
1000 TABLET ORAL ONCE
Status: COMPLETED | OUTPATIENT
Start: 2022-07-08 | End: 2022-07-08

## 2022-07-08 RX ORDER — HYDROMORPHONE HYDROCHLORIDE 1 MG/ML
0.5 INJECTION, SOLUTION INTRAMUSCULAR; INTRAVENOUS; SUBCUTANEOUS ONCE
Status: DISCONTINUED | OUTPATIENT
Start: 2022-07-08 | End: 2022-07-08 | Stop reason: HOSPADM

## 2022-07-08 RX ORDER — DEXAMETHASONE SODIUM PHOSPHATE 4 MG/ML
10 INJECTION, SOLUTION INTRA-ARTICULAR; INTRALESIONAL; INTRAMUSCULAR; INTRAVENOUS; SOFT TISSUE ONCE
Status: COMPLETED | OUTPATIENT
Start: 2022-07-08 | End: 2022-07-08

## 2022-07-08 RX ORDER — METOCLOPRAMIDE HYDROCHLORIDE 5 MG/ML
10 INJECTION INTRAMUSCULAR; INTRAVENOUS ONCE
Status: COMPLETED | OUTPATIENT
Start: 2022-07-08 | End: 2022-07-08

## 2022-07-08 RX ADMIN — DEXAMETHASONE SODIUM PHOSPHATE 10 MG: 4 INJECTION, SOLUTION INTRAMUSCULAR; INTRAVENOUS at 14:59

## 2022-07-08 RX ADMIN — METOCLOPRAMIDE 10 MG: 5 INJECTION, SOLUTION INTRAMUSCULAR; INTRAVENOUS at 15:00

## 2022-07-08 RX ADMIN — ACETAMINOPHEN 1000 MG: 500 TABLET, FILM COATED ORAL at 14:59

## 2022-07-08 RX ADMIN — SODIUM CHLORIDE 1000 ML: 9 INJECTION, SOLUTION INTRAVENOUS at 15:00

## 2022-07-08 ASSESSMENT — ENCOUNTER SYMPTOMS
HEADACHES: 1
NAUSEA: 1
VOMITING: 0
PHOTOPHOBIA: 1

## 2022-07-08 NOTE — DISCHARGE INSTRUCTIONS
*You may resume diet and activities as tolerated.  Drink plenty of fluids and get plenty of rest.    *Follow-up with your neurology clinic  for a recheck in 2-3 days.  *Return if you develop fever, neck stiffness, worsening headache, numbness, weakness, problems with speech/vision/coordination, faint or feel like you will faint or become worse in any way.           Discharge Instructions  Headache    You were seen today for a headache. Headaches may be caused by many different things such as muscle tension, sinus inflammation, anxiety and stress, having too little sleep, too much alcohol, some medical conditions or injury. You may have a migraine, which is caused by changes in the blood vessels in your head.  At this time your provider does not find that your headache is a sign of anything dangerous or life-threatening.  However, sometimes the signs of serious illness do not show up right away.      Generally, every Emergency Department visit should have a follow-up clinic visit with either a primary or a specialty clinic/provider. Please follow-up as instructed by your emergency provider today.    Return to the Emergency Department if:  You get a new fever of 100.4 F or higher.  Your headache gets much worse.  You get a stiff neck with your headache.  You get a new headache that is significantly different or worse than headaches you have had before.  You are vomiting (throwing up) and cannot keep food or water down.  You have blurry or double vision or other problems with your eyes.  You have a new weakness on one side of your body.  You have difficulty with balance which is new.  You or your family thinks you are confused.  You have a seizure.    What can I do to help myself?  Pain medications - You may take a pain medication such as Tylenol  (acetaminophen), Advil , Motrin  (ibuprofen) or Aleve  (naproxen).  Take a pain reliever as soon as you notice symptoms.  Starting medications as soon as you start to have  symptoms may lessen the amount of pain you have.  Relaxing in a quiet, dark room may help.  Get enough sleep and eat meals regularly.  You may need to watch for certain foods or other things which may trigger your headaches.  Keeping a journal of your headaches and possible triggers may help you and your primary provider to identify things which you should avoid which may be causing your headaches.  If you were given a prescription for medicine here today, be sure to read all of the information (including the package insert) that comes with your prescription.  This will include important information about the medicine, its side effects, and any warnings that you need to know about.  The pharmacist who fills the prescription can provide more information and answer questions you may have about the medicine.  If you have questions or concerns that the pharmacist cannot address, please call or return to the Emergency Department.   Remember that you can always come back to the Emergency Department if you are not able to see your regular provider in the amount of time listed above, if you get any new symptoms, or if there is anything that worries you.

## 2022-07-08 NOTE — TELEPHONE ENCOUNTER
[facility] Call Center    Phone Message    May a detailed message be left on voicemail: yes     Reason for Call: Symptoms or Concerns     If patient has red-flag symptoms, warm transfer to triage line    Current symptom or concern:Migrane in the back of head where shunt is.  Very tired and sleeping a lot.  Piercing pain.    Symptoms have been present for:  1 week    Has patient previously been seen for this? Yes    By Gamaliel Dockery:     Date: ASAP    Are there any new or worsening symptoms? Yes:       Action Taken: Message routed to:  [facility]    Travel Screening: Not Applicable

## 2022-07-08 NOTE — TELEPHONE ENCOUNTER
Returned call to patient to Triage severe pain over shunt site.    LVM indicating concern that shunt may have quit working and need to be seen in ER.      Left call back name and number and asked patient to call.    Christian Bobo RN

## 2022-07-08 NOTE — TELEPHONE ENCOUNTER
Nurse Triage SBAR    Is this a 2nd Level Triage? Yes    Situation:  Severe headache in the back of his shunt    Background/Assessment:     Pt reporting, having a severe migraine headache in the back of his shunt for the last couple of days.   Pt is very dizzy, nauseated and just not feeling himself.    Light sensitive.    No blurred or double vision noted.  Very tired and sleeping a lot.   This has been going on for a couple of days and it seems to be getting worse.      Pt would like a call back from the clinic    Please call the Pt back @ [phone] for further assistance.    Yessenia Calderon RN  Central Triage Red Flags/Med Refills    Protocol Recommended Disposition:   See Today In Office      Reason for Disposition    Patient wants to be seen    Additional Information    Negative: Difficult to awaken or acting confused (e.g., disoriented, slurred speech)    Negative: Weakness of the face, arm or leg on one side of the body and new onset    Negative: Numbness of the face, arm or leg on one side of the body and new onset    Negative: Loss of speech or garbled speech and new onset    Negative: Passed out (i.e., fainted, collapsed and was not responding)    Negative: Sounds like a life-threatening emergency to the triager    Negative: Followed a head injury within last 3 days    Negative: Traumatic Brain Injury (TBI) is suspected    Negative: Sinus pain of forehead and yellow or green nasal discharge    Negative: Pregnant    Negative: Unable to walk without falling    Negative: Stiff neck (can't touch chin to chest)    Negative: Possibility of carbon monoxide exposure    Negative: SEVERE headache, states 'worst headache' of life    Negative: SEVERE headache, sudden-onset (i.e., reaching maximum intensity within seconds to 1 hour)    Negative: Severe pain in one eye    Negative: Loss of vision or double vision (Exception: same as prior migraines)    Negative: Patient sounds very sick or weak to the triager     Negative: Fever > 103 F (39.4 C)    Negative: Fever > 100.0 F (37.8 C) and has diabetes mellitus or a weak immune system (e.g., HIV positive, cancer chemotherapy, organ transplant, splenectomy, chronic steroids)    Negative: SEVERE headache (e.g., excruciating) and has had severe headaches before    Negative: SEVERE headache and not relieved by pain meds    Negative: SEVERE headache and vomiting    Negative: SEVERE headache and fever    Negative: New headache and weak immune system (e.g., HIV positive, cancer chemo, splenectomy, organ transplant, chronic steroids)    Negative: Fever present > 3 days (72 hours)    Protocols used: HEADACHE-A-[place]

## 2022-07-08 NOTE — ED NOTES
"The pt is requesting the writer to \"adjust his shunt\" , the writer stated that this is not something he is able to do.  "

## 2022-07-08 NOTE — TELEPHONE ENCOUNTER
Please triage patient. He has  shunt and wasn't seen in our Clinic for a while may need to go to ED and follow up in our Clinic after. Thank you

## 2022-07-08 NOTE — TELEPHONE ENCOUNTER
Warm transfer from call center, reviewed with Abelino previous directions from neurology team of need to be evaluated in emergency room regarding shunt.  Agrees to go to ED

## 2022-07-08 NOTE — ED PROVIDER NOTES
Rapid Assessment Note    History:   Patient presents with a headache on the right side, he has a history of a  shunt placement for hydrocephalus.  He states that this headache is consistent with previous migraine headaches.  He has associated photophobia and nausea.    Exam:   General:  Alert, interactive  Cardiovascular:  Well perfused  Lungs:  No respiratory distress, no accessory muscle use  Neuro:  Moving all 4 extremities  Skin:  Warm, dry  Psych:  Normal affect    Plan of Care:   I evaluated the patient and developed an initial plan of care. I discussed this plan and explained that I, or one of my partners, would be returning to complete the evaluation.         07/08/2022  [facility]    Portions of this medical record were completed by a scribe. UPON MY REVIEW AND AUTHENTICATION BY ELECTRONIC SIGNATURE, this confirms (a) I performed the applicable clinical services, and (b) the record is accurate.      TriggerFloyd MD  07/08/22 8445

## 2022-07-08 NOTE — ED TRIAGE NOTES
Patient has a shunt placed for a previous brain tumor. Patient has an increased pain in the right side of his head.      Triage Assessment     Row Name 07/08/22 1449       Triage Assessment (Adult)    Airway WDL WDL       Respiratory WDL    Respiratory WDL WDL       Skin Circulation/Temperature WDL    Skin Circulation/Temperature WDL WDL       Cardiac WDL    Cardiac WDL WDL       Peripheral/Neurovascular WDL    Peripheral Neurovascular WDL WDL       Cognitive/Neuro/Behavioral WDL    Cognitive/Neuro/Behavioral WDL WDL

## 2022-07-08 NOTE — ED PROVIDER NOTES
"  History   Chief Complaint:  Sudden Severe Headache       The history is provided by the patient.      Kari Turcios is a 32 year old male with history of migraines, pineal germ cell tumor, and epilepsy with  shunt placement who presents with a right sided headache since onset 2 days ago. He notes the pain is located where the ventriculoperitoneal shunt was placed for a tumor.  He states that the headache is consistent with previous migraine headaches.  Additionally, he experiences photophobia and nausea but denies emesis, visual disturbances, or any recent trauma to the head.  He denies taking any pain medications to relieve symptoms. Denies fevers.     Review of Systems   Eyes: Positive for photophobia. Negative for visual disturbance.   Gastrointestinal: Positive for nausea. Negative for vomiting.   Neurological: Positive for headaches.   All other systems reviewed and are negative.        Allergies:  Penicillins    Medications:  Desmopressin  Levetiracetam   Naproxen   Prochlorperazine   Rimegepant Sulfate     Past Medical History:     Pineal germ cell tumor   PE  Anemia  MDD  Anxiety   Diabetes insipidus   Epilepsy  Vitamin D deficiency   Pineal germ cell tumor    Past Surgical History:    Arthrotomy shoulder  Optical tracking system craniotomy x2  Excise tumor  Implant  shunt  Recession resection bilateral  Ventriculostomy      Family History:    Vaginal cancer  Breast cancer  Lung cancer   Diabetes     Social History:  The patient presents alone  The patient presents in a private vehicle.  PCP:Ila Cornell MD    Physical Exam     Patient Vitals for the past 24 hrs:   BP Temp Temp src Pulse Resp SpO2 Height Weight   07/08/22 1735 118/76 -- -- 60 -- -- -- --   07/08/22 1651 -- -- -- -- -- 100 % -- --   07/08/22 1619 99/50 -- -- (!) 47 -- 100 % -- --   07/08/22 1450 125/79 98  F (36.7  C) Tympanic 83 18 99 % 1.676 m (5' 6\") 72.6 kg (160 lb)       Physical Exam  General: GCS 15  Head:  Scalp is " atraumatic.  Eyes:  EOM intact. The pupils are equal, round, and reactive to light. No scleral icterus.  ENT:                                      Ears:  The external ears are normal. TM's non-erythematous. External canals normal.    Nose:  The external nose is normal.  Throat:  The oropharynx is normal. Mucus membranes are moist.                 Neck:  Normal range of motion. There is no rigidity.   CV:  \Regular rate and rhythm. No murmur. 2+ radial and DP pulses  Resp:  Breath sounds are clear bilaterally. Non-labored, no retractions or accessory muscle use.  GI:  Abdomen is soft, no distension, no tenderness.   MS:  Normal range of motion.   Skin:  Warm and dry.   Neuro:  Cranial nerves 2-12 intact; 5/5 strength throughout the upper and lower extremities; sensation intact to light touch throughout the upper and lower extremities; normal finger to nose; normal gait, No meningismus   Psych:  Awake. Alert & orientedx3.  Appropriate interactions.     Emergency Department Course     Imaging:  CT Head w/o Contrast   Final Result   IMPRESSION:     1.  No evidence of acute intracranial hemorrhage or mass effect.   2.  Large developmental venous anomaly within the right occipital lobe, unchanged.   3.  Right frontal approach ventriculoperitoneal shunt catheter, tip within the right lateral ventricle. Stable slitlike ventricles compared to head CT 05/25/2021.      XR Shunt Malfunction Surgery Survey   Final Result   IMPRESSION: Right frontal approach ventriculoperitoneal shunt catheter   is again seen. No evidence of discontinuity or kinking of the shunt   catheter. The catheter tip now terminates over the low pelvis which is   a change since 5/25/2021 when it terminated over the left abdomen.      JORDI COVINGTON MD            SYSTEM ID:  [id number]        Report per radiology    Laboratory:  Labs Ordered and Resulted from Time of ED Arrival to Time of ED Departure   BASIC METABOLIC PANEL - Abnormal       Result Value     Sodium 133      Potassium 4.3      Chloride 103      Carbon Dioxide (CO2) 28      Anion Gap 2 (*)     Urea Nitrogen 17      Creatinine 0.95      Calcium 9.3      Glucose 92      GFR Estimate >90     CBC WITH PLATELETS AND DIFFERENTIAL    WBC Count 6.2      RBC Count 5.11      Hemoglobin 14.1      Hematocrit 42.9      MCV 84      MCH 27.6      MCHC 32.9      RDW 12.4      Platelet Count 245      % Neutrophils 69      % Lymphocytes 24      % Monocytes 6      % Eosinophils 0      % Basophils 1      % Immature Granulocytes 0      NRBCs per 100 WBC 0      Absolute Neutrophils 4.3      Absolute Lymphocytes 1.5      Absolute Monocytes 0.4      Absolute Eosinophils 0.0      Absolute Basophils 0.0      Absolute Immature Granulocytes 0.0      Absolute NRBCs 0.0        Emergency Department Course:    Reviewed:  I reviewed nursing notes, vitals, past medical history and Care Everywhere    Assessments:  1540 I obtained history and examined the patient as noted above.   1638 The patient reported initially improved symptoms but now experienced recurring and worsened pain.    1731 I rechecked the patient. He reports feeling improved and is sitting in chair requesting discharge.    Interventions:  1459 Acetaminophen 1 G PO  1459 Dexamethasone 10 mg IV  1500 Metoclopramide 10 mg IV  1500 NS 1 L IV    Disposition:  The patient was discharged to home.     Impression & Plan     Medical Decision Making:  Kari Turcios is a 32 year old male with medical history including penile germ cell tumor s/p  shunt, epilepsy, and migraines presents to the emergency department with right-sided headache over the last 2 days.  Patient reports history of migraines and this headache seems similar.  He denies thunderclap onset.   shunt survey x-ray without evidence of kinking or discontinuity.  Head CT without evidence of intracranial hemorrhage or mass-effect.  There is no fever or signs of meningitis.  Blood work without leukocytosis or other  acute findings.  Patient was given the above interventions and felt improved and appropriate for discharge.  I suspect migraine headache and recommended following up closely with neurology clinic next week.  Return for fevers, worsening headache, neck stiffness, neurologic symptoms, or any other concerning symptoms develop.  Patient agrees with plan all questions and concerns addressed prior to discharge.     Diagnosis:    ICD-10-CM    1. Acute nonintractable headache, unspecified headache type  R51.9    2. S/P  shunt  Z98.2        Discharge Medications:  New Prescriptions    No medications on file       Scribe Disclosure:  I, Stacie Puente, am serving as a scribe at 3:39 PM on 7/8/2022 to document services personally performed by Liliane Del Toro PA-C based on my observations and the provider's statements to me.            Liliane Del Toro PA-C  07/08/22 2901

## 2022-07-12 ENCOUNTER — HOSPITAL ENCOUNTER (EMERGENCY)
Facility: CLINIC | Age: 32
End: 2022-07-12
Payer: MEDICAID

## 2022-07-12 ENCOUNTER — NURSE TRIAGE (OUTPATIENT)
Dept: NURSING | Facility: CLINIC | Age: 32
End: 2022-07-12

## 2022-07-12 DIAGNOSIS — G43.719 INTRACTABLE CHRONIC MIGRAINE WITHOUT AURA AND WITHOUT STATUS MIGRAINOSUS: ICD-10-CM

## 2022-07-12 NOTE — TELEPHONE ENCOUNTER
Rx Authorization:    Requested Medication/ Dose Rimegepant Sulfate 75 MG TBDP    Date last refill ordered: 6/21/21    Quantity ordered: 8    # refills: 5    Date of last clinic visit with ordering provider: 6/21/21    Date of next clinic visit with ordering provider: 7/15/22    All pertinent protocol data (lab date/result):     Include pertinent information from patients message:

## 2022-07-12 NOTE — TELEPHONE ENCOUNTER
Nurse Triage SBAR    Is this a 2nd Level Triage? YES, LICENSED PRACTITIONER REVIEW IS REQUIRED    Situation:    Patient states was seen in the ER 7/8/22.   -States having nausea, headaches and some dizziness at that times, symptoms improved for 3 days, have now returned.  -ER discharge notes state follow up with Neurology in 2-3 days   Today, on awakening his symptoms have returned> right sided headaches, occasional dizziness, and NAUSEA.  -Rest and hydration is usually helpful.  The nausea and headache are different.  -Nausea is new symptom - hasn't had in a long time.  -He is drinking bottles of water, coffee - couldn't finish.  -Eating is poor.      Migraine meds: No Emgality- this was not refilled, it sometimes helps.  Seizure meds( Keppra) and desmopressin for bladder - no skipped doses        Background: History of migraines, pineal germ cell tumor, and epilepsy with  shunt placement      - shunt placement for hydrocephalus> shunt survey 7/8/22 in ED  No anti nausea meds given on ED discharge.   Had dexamethasone, metaclopramide and fluids in ED. Which helped but now nausea especially is recurring  Assessment: Migraine headache with new symptom of nausea, patient is hydrated, appetite poor.   ED 7/8/22> recommended return to Neurology in 2-3 days. Patient has  shunt and epilepsy, taking seizure meds appropriately/ no skipped doses. Not taking Emgality( no refills?) for migraine> stated it was sometimes helpful.    Last Neo Dockery visit 6/21/21     Protocol Recommended Disposition:   No disposition on file.    Recommendation:  ED 7/8/22> recommended return to Neurology in 2-3 days. Patient has  shunt and epilepsy, taking seizure meds appropriately/ no skipped doses. Not taking Emgality( no refills?) for migraine. No medications given on discharge from ED  For nausea, which is his main complaint today- returned after managed for 3 days since ED. Drinking water, voiding clear urine 3-5 x times day,  no fever.   If worsens acutely before call back, go to ED.    Please call the Pt back @ [phone]  Routed to provider team high priority    Does the patient meet one of the following criteria for ADS visit consideration? No    Reason for Disposition    Unexplained headache that is present > 24 hours    Patient wants to be seen    Additional Information    Negative: Difficult to awaken or acting confused (e.g., disoriented, slurred speech)    Negative: Weakness of the face, arm or leg on one side of the body and new onset    Negative: Numbness of the face, arm or leg on one side of the body and new onset    Negative: Loss of speech or garbled speech and new onset    Negative: Passed out (i.e., fainted, collapsed and was not responding)    Negative: Sounds like a life-threatening emergency to the triager    Negative: Followed a head injury within last 3 days    Negative: Traumatic Brain Injury (TBI) is suspected    Negative: Sinus pain of forehead and yellow or green nasal discharge    Negative: Pregnant    Negative: Unable to walk without falling    Negative: Stiff neck (can't touch chin to chest)    Negative: Possibility of carbon monoxide exposure    Negative: SEVERE headache, states 'worst headache' of life    Negative: SEVERE headache, sudden-onset (i.e., reaching maximum intensity within seconds to 1 hour)    Negative: Severe pain in one eye    Negative: Loss of vision or double vision (Exception: same as prior migraines)    Negative: Patient sounds very sick or weak to the triager    Negative: SEVERE headache (e.g., excruciating) and has had severe headaches before    Negative: SEVERE headache and not relieved by pain meds    Negative: SEVERE headache and vomiting    Negative: SEVERE headache and fever    Negative: New headache and weak immune system (e.g., HIV positive, cancer chemo, splenectomy, organ transplant, chronic steroids)    Negative: Fever present > 3 days (72 hours)    Protocols used:  HEADACHE-A-[place]

## 2022-08-03 ENCOUNTER — TELEPHONE (OUTPATIENT)
Dept: NEUROLOGY | Facility: CLINIC | Age: 32
End: 2022-08-03

## 2022-08-03 ENCOUNTER — VIRTUAL VISIT (OUTPATIENT)
Dept: NEUROLOGY | Facility: CLINIC | Age: 32
End: 2022-08-03
Payer: MEDICAID

## 2022-08-03 DIAGNOSIS — G43.711 INTRACTABLE CHRONIC MIGRAINE WITHOUT AURA AND WITH STATUS MIGRAINOSUS: Primary | ICD-10-CM

## 2022-08-03 DIAGNOSIS — G43.719 INTRACTABLE CHRONIC MIGRAINE WITHOUT AURA AND WITHOUT STATUS MIGRAINOSUS: ICD-10-CM

## 2022-08-03 PROCEDURE — 99214 OFFICE O/P EST MOD 30 MIN: CPT | Mod: 95 | Performed by: NURSE PRACTITIONER

## 2022-08-03 RX ORDER — MEPERIDINE HYDROCHLORIDE 25 MG/ML
25 INJECTION INTRAMUSCULAR; INTRAVENOUS; SUBCUTANEOUS EVERY 30 MIN PRN
Status: CANCELLED | OUTPATIENT
Start: 2022-08-10

## 2022-08-03 RX ORDER — ALBUTEROL SULFATE 0.83 MG/ML
2.5 SOLUTION RESPIRATORY (INHALATION)
Status: CANCELLED | OUTPATIENT
Start: 2022-08-10

## 2022-08-03 RX ORDER — HEPARIN SODIUM (PORCINE) LOCK FLUSH IV SOLN 100 UNIT/ML 100 UNIT/ML
5 SOLUTION INTRAVENOUS
Status: CANCELLED | OUTPATIENT
Start: 2022-08-10

## 2022-08-03 RX ORDER — METHYLPREDNISOLONE SODIUM SUCCINATE 125 MG/2ML
125 INJECTION, POWDER, LYOPHILIZED, FOR SOLUTION INTRAMUSCULAR; INTRAVENOUS
Status: CANCELLED
Start: 2022-08-10

## 2022-08-03 RX ORDER — HEPARIN SODIUM,PORCINE 10 UNIT/ML
5 VIAL (ML) INTRAVENOUS
Status: CANCELLED | OUTPATIENT
Start: 2022-08-10

## 2022-08-03 RX ORDER — EPINEPHRINE 1 MG/ML
0.3 INJECTION, SOLUTION, CONCENTRATE INTRAVENOUS EVERY 5 MIN PRN
Status: CANCELLED | OUTPATIENT
Start: 2022-08-10

## 2022-08-03 RX ORDER — DIPHENHYDRAMINE HYDROCHLORIDE 50 MG/ML
50 INJECTION INTRAMUSCULAR; INTRAVENOUS
Status: CANCELLED
Start: 2022-08-10

## 2022-08-03 RX ORDER — ALBUTEROL SULFATE 90 UG/1
1-2 AEROSOL, METERED RESPIRATORY (INHALATION)
Status: CANCELLED
Start: 2022-08-10

## 2022-08-03 ASSESSMENT — MIGRAINE DISABILITY ASSESSMENT (MIDAS)
HOW MANY DAYS DID YOU NOT DO HOUSEWORK BECAUSE OF HEADACHES: 45
HOW MANY DAYS WAS HOUSEWORK PRODUCTIVITY CUT IN HALF DUE TO HEADACHES: 45
HOW MANY DAYS WAS YOUR PRODUCTIVITY CUT IN HALF BECAUSE OF HEADACHES: 40
HOW MANY DAYS DID YOU MISS WORK OR SCHOOL BECAUSE OF HEADACHES: 40
HOW MANY DAYS IN THE PAST 3 MONTHS HAVE YOU HAD A HEADACHE: 50
HOW OFTEN WERE SOCIAL ACTIVITIES MISSED DUE TO HEADACHES: 5
TOTAL SCORE: 175
ON A SCALE FROM 0-10 ON AVERAGE HOW PAINFUL WERE HEADACHES: 8

## 2022-08-03 ASSESSMENT — HEADACHE IMPACT TEST (HIT 6)
HIT6 TOTAL SCORE: 65
WHEN YOU HAVE HEADACHES HOW OFTEN IS THE PAIN SEVERE: VERY OFTEN
HOW OFTEN DO HEADACHES LIMIT YOUR DAILY ACTIVITIES: VERY OFTEN
HOW OFTEN DID HEADACHS LIMIT CONCENTRATION ON WORK OR DAILY ACTIVITY: VERY OFTEN
HOW OFTEN HAVE YOU FELT TOO TIRED TO WORK BECAUSE OF YOUR HEADACHES: SOMETIMES
HOW OFTEN HAVE YOU FELT FED UP OR IRRITATED BECAUSE OF YOUR HEADACHES: VERY OFTEN
WHEN YOU HAVE A HEADACHE HOW OFTEN DO YOU WISH YOU COULD LIE DOWN: VERY OFTEN

## 2022-08-03 NOTE — PATIENT INSTRUCTIONS
Plan:  Vyepti IV infusion to help with migraine headache prevention-PA -will submitted. Side effects reviewed-allergic reaction, hypersensitivity reaction. Unknown side effects with a long term use.  Nurtrec as needed   Compazine +Bendryl as needed

## 2022-08-03 NOTE — LETTER
8/3/2022       RE: Kari Turcios  [address]  [facility] [address]     Dear Colleague,    Thank you for referring your patient, Kari Turcios, to the [facility] NEUROLOGY [facility] [place] at [facility]. Please see a copy of my visit note below.    [facility] follow up     Past medical  history of pineal tumor, seizures,  shunt, diabetes.  Today reports he has been feeling Ok since visit to the ED on 6/11/2021 just sensitivity to light daily.   Saw epilepsy at [facility] last Friday and keppra was increased and will be following up with [facility] for seizures management.   Patient reports that headaches towards the back of his head. Wears dark shadows and more where the shunt. Pain is sharp and wants to close his eyes and lay down. Pain 5 out 7 days per week for a few month. Before -more towards the front and now its more to the back.     Nurtec helps but takes 12 hours and takes it at least 48 hours.   Emgality once and no side effects -last and only injection in June 2021 and helped.   Patient reports that he is not comfortable with doing injection but someone administering.   Discussed need of headache prevention -optimize -options-Botox, Vyepti IV or Qulipta. Patient is not comfortable with trying Botox yeat or taking oral pills. He is comfortable with trying Vyepti or trying Ajovy   No seizures in a while and takes medications-Keppra and has been following up with [facility]    Acute headache treatment in the ED -last visit 6/11/2021. Before that -5/25/2021    Plan:  Vyepti IV infusion to help with migraine headache prevention-PA -will submitted. Side effects reviewed-allergic reaction, hypersensitivity reaction. Unknown side effects with a long term use.  Nurtrec as needed   Compazine +Bendryl as needed     Patient wonders of disability forms-inform patient that I don't do disabilities. We can provide with an FMLA if needed but do not  do long term disability or assessment of functional capacity    Would avoid triptans-head CT changes of possible ischemic changes vs glioses and possible complex migraine so would avoid triptans given patient history of surgery.     Patient is alert and no in apparent acute distress,  mentation appears normal, judgement and insight intact, normal speech, wears dark shades -photosensitivity and headache today    DATA reviewed  EXAM: CT HEAD W/O CONTRAST  LOCATION: [facility]  DATE/TIME: 7/8/2022 4:55 PM     INDICATION: HA,  shunt  COMPARISON: Head CT 05/25/2021, MRI brain 03/29/2019.  TECHNIQUE: Routine CT Head without IV contrast. Multiplanar reformats. Dose reduction techniques were used.     FINDINGS:  INTRACRANIAL CONTENTS: No evidence of acute intracranial hemorrhage or mass effect. Large developmental venous anomaly within the right occipital lobe, unchanged. Right frontal approach ventriculoperitoneal shunt catheter, tip within the right lateral   ventricle. Stable slitlike ventricles compared to head CT 05/25/2021. Brain attenuation and morphology otherwise normal. The basilar cisterns are patent.     VISUALIZED ORBITS/SINUSES/MASTOIDS: The globes are unremarkable. The partially imaged paranasal sinuses, mastoid air cells and middle ear cavities are unremarkable.      BONES/SOFT TISSUES: The visualized skull base and calvarium are unremarkable.                                                                      IMPRESSION:    1.  No evidence of acute intracranial hemorrhage or mass effect.  2.  Large developmental venous anomaly within the right occipital lobe, unchanged.  3.  Right frontal approach ventriculoperitoneal shunt catheter, tip within the right lateral ventricle. Stable slitlike ventricles compared to head CT 05/25/2021.    I discussed all my recommendations with Kari Turcios who verbalizes understanding and comfortable with the plan.  All of patient's questions  were answered from the best of my knowledge.  Patient is in agreement with the plan.     37 minutes spent on the date of the encounter doing video access, chart  review,  results review,  meds review, treatment plan, documentation and further activities as noted above      CASPER Black, CNP [facility]  Headache certified  [facility]

## 2022-08-03 NOTE — PROGRESS NOTES
Abelino is a 32 year old who is being evaluated via a billable video visit.      How would you like to obtain your AVS? [employer]  If the video visit is dropped, the invitation should be resent by: Text to cell phone: [phone]  Will anyone else be joining your video visit? No        Video-Visit Details    Video Start Time: 10:11 AM    Type of service:  Video Visit    Video End Time:10:41 AM    Originating Location (pt. Location): Home    Distant Location (provider location):  [facility] [place]     Platform used for Video Visit: [employer]     [facility] Head[facility] Clinic follow up   Past medical  history of pineal tumor, seizures,  shunt, diabetes.  Today reports he has been feeling Ok since visit to the ED on 6/11/2021 just sensitivity to light daily.   Saw epilepsy at [facility] last Friday and keppra was increased and will be following up with [facility] for seizures management.   Patient reports that headaches towards the back of his head. Wears dark shadows and more where the shunt. Pain is sharp and wants to close his eyes and lay down. Pain 5 out 7 days per week for a few month. Before -more towards the front and now its more to the back.     Nurtec helps but takes 12 hours and takes it at least 48 hours.   Emgality once and no side effects -last and only injection in June 2021 and helped.   Patient reports that he is not comfortable with doing injection but someone administering.   Discussed need of headache prevention -optimize -options-Botox, Vyepti IV or Qulipta. Patient is not comfortable with trying Botox yeat or taking oral pills. He is comfortable with trying Vyepti or trying Ajovy   No seizures in a while and takes medications-Keppra and has been following up with [facility]    Acute headache treatment in the ED -last visit 6/11/2021. Before that -5/25/2021    Plan:  Vyepti IV infusion to help with migraine headache prevention-PA -will submitted. Side effects reviewed-allergic reaction,  hypersensitivity reaction. Unknown side effects with a long term use.  Nurtrec as needed   Compazine +Bendryl as needed     Patient wonders of disability forms-inform patient that I don't do disabilities. We can provide with an FMLA if needed but do not do long term disability or assessment of functional capacity    Would avoid triptans-head CT changes of possible ischemic changes vs glioses and possible complex migraine so would avoid triptans given patient history of surgery.     Patient is alert and no in apparent acute distress,  mentation appears normal, judgement and insight intact, normal speech, wears dark shades -photosensitivity and headache today    DATA reviewed  EXAM: CT HEAD W/O CONTRAST  LOCATION: [facility]  DATE/TIME: 7/8/2022 4:55 PM     INDICATION: HA,  shunt  COMPARISON: Head CT 05/25/2021, MRI brain 03/29/2019.  TECHNIQUE: Routine CT Head without IV contrast. Multiplanar reformats. Dose reduction techniques were used.     FINDINGS:  INTRACRANIAL CONTENTS: No evidence of acute intracranial hemorrhage or mass effect. Large developmental venous anomaly within the right occipital lobe, unchanged. Right frontal approach ventriculoperitoneal shunt catheter, tip within the right lateral   ventricle. Stable slitlike ventricles compared to head CT 05/25/2021. Brain attenuation and morphology otherwise normal. The basilar cisterns are patent.     VISUALIZED ORBITS/SINUSES/MASTOIDS: The globes are unremarkable. The partially imaged paranasal sinuses, mastoid air cells and middle ear cavities are unremarkable.      BONES/SOFT TISSUES: The visualized skull base and calvarium are unremarkable.                                                                      IMPRESSION:    1.  No evidence of acute intracranial hemorrhage or mass effect.  2.  Large developmental venous anomaly within the right occipital lobe, unchanged.  3.  Right frontal approach ventriculoperitoneal shunt  catheter, tip within the right lateral ventricle. Stable slitlike ventricles compared to head CT 05/25/2021.    I discussed all my recommendations with Kari Turcios who verbalizes understanding and comfortable with the plan.  All of patient's questions were answered from the best of my knowledge.  Patient is in agreement with the plan.     37 minutes spent on the date of the encounter doing video access, chart  review,  results review,  meds review, treatment plan, documentation and further activities as noted above    CASPER Black, CNP [facility]  Headache certified  [facility]

## 2022-08-04 ENCOUNTER — TELEPHONE (OUTPATIENT)
Dept: NEUROLOGY | Facility: CLINIC | Age: 32
End: 2022-08-04

## 2022-08-04 DIAGNOSIS — R51.9 ACUTE INTRACTABLE HEADACHE, UNSPECIFIED HEADACHE TYPE: ICD-10-CM

## 2022-08-04 RX ORDER — PROCHLORPERAZINE MALEATE 5 MG
2.5-5 TABLET ORAL EVERY 8 HOURS PRN
Qty: 20 TABLET | Refills: 1 | Status: SHIPPED | OUTPATIENT
Start: 2022-08-04

## 2022-08-04 NOTE — TELEPHONE ENCOUNTER
I returned pt call to discuss his headaches. No answer. I left a voicemail we received his message. I wanted to check in on how his headache is now and if he has taken any of his headache medications this morning.     I will update Sybil on his message and asked he call back when free to discuss further.     Indira MARTIN

## 2022-08-04 NOTE — TELEPHONE ENCOUNTER
I called pt back to discuss his headache. This morning he has taken aleve and benadryl. He does not have any compazine available; I let him know we will send in a new prescription for him.     He took nurtec last evening so would like to wait the 24 hours between doses.     Reviewed he can take compazine as soon as he is able to pick it up. He can repeat benadryl and aleve later today with his nurtec. Try to rest and stay hydrated. Pt understands the plan.     Indira MARTIN

## 2022-08-04 NOTE — TELEPHONE ENCOUNTER
[facility] Call Center    Phone Message    May a detailed message be left on voicemail: yes     Reason for Call: Symptoms or Concerns     If patient has red-flag symptoms, warm transfer to triage line    Current symptom or concern: migraine 7-8/10, in the area of his shunt    Symptoms have been present for: about 1 hour    Has patient previously been seen for this? Yes    By : Sybil Dockery    Date: 8/3/22    Are there any new or worsening symptoms? Yes: Abelino stated that he woke up with this migraine this morning and just had a visit yesterday.    Action Taken: Message routed to:  [facility]    Travel Screening: Not Applicable

## 2022-08-04 NOTE — TELEPHONE ENCOUNTER
[facility]    Phone Message    May a detailed message be left on voicemail: yes     Reason for Call: Other: Patient is returning call from VERONICA Jacobson. Please contact patient when able.  Patient states may fall back asleep.    Action Taken: Message routed to:  [facility] Neurology    Travel Screening: Not Applicable

## 2022-08-05 ENCOUNTER — TELEPHONE (OUTPATIENT)
Dept: NEUROLOGY | Facility: CLINIC | Age: 32
End: 2022-08-05

## 2022-08-05 NOTE — TELEPHONE ENCOUNTER
PA Initiation    Medication: Vyepti Rx benefit  Insurance Company: [employer] Part D - Phone [phone] Fax [phone]  Pharmacy Filling the Rx: [facility] - [place], [place] - [facility] [address]  Filling Pharmacy Phone:    Filling Pharmacy Fax:    Start Date: 8/5/2022

## 2022-08-05 NOTE — TELEPHONE ENCOUNTER
I received a call from a [facility] [employer] needing to confirm the diagnosis as they needed to make a determination on the case urgently. The representative did not want to wait to relay a message. She stated that she would call the number on the request that was received.

## 2022-08-08 NOTE — TELEPHONE ENCOUNTER
Prior Authorization Approval    Authorization Effective Date: 1/1/2022  Authorization Expiration Date: 8/5/2023  Medication: Vyepti Rx benefit  Approved Dose/Quantity: 100mg/90ds  Reference #: [id number]   Insurance Company: [employer] Part D - Phone [phone] Fax [phone]  Expected CoPay: $4.00     CoPay Card Available:      Foundation Assistance Needed:    Which Pharmacy is filling the prescription (Not needed for infusion/clinic administered): [facility] - [place], [place] - [address]  Pharmacy Notified: Yes  Patient Notified:

## 2022-09-20 DIAGNOSIS — E23.2 DIABETES INSIPIDUS SECONDARY TO VASOPRESSIN DEFICIENCY (H): ICD-10-CM

## 2022-09-23 RX ORDER — DESMOPRESSIN ACETATE 0.2 MG/1
0.2 TABLET ORAL 2 TIMES DAILY
Qty: 60 TABLET | Refills: 0 | Status: SHIPPED | OUTPATIENT
Start: 2022-09-23 | End: 2022-10-17

## 2022-09-23 NOTE — TELEPHONE ENCOUNTER
desmopressin (DDAVP) 0.2 MG tablet      Last Written Prescription Date:  10-13-21  Last Fill Quantity: 180,   # refills: 3  Last Office Visit : 4-26-20  (RTC 1 Y)   Future Office visit:  None    Sodium   Date Value Ref Range Status   07/08/2022 133 133 - 144 mmol/L Final   06/11/2021 134 133 - 144 mmol/L Final     Routing refill request to provider for review/approval because:  Last appt >18 M    Scheduling has been notified to contact the pt for appointment.

## 2022-10-12 DIAGNOSIS — E23.2 DIABETES INSIPIDUS SECONDARY TO VASOPRESSIN DEFICIENCY (H): ICD-10-CM

## 2022-10-17 RX ORDER — DESMOPRESSIN ACETATE 0.2 MG/1
0.2 TABLET ORAL 2 TIMES DAILY
Qty: 180 TABLET | Refills: 0 | Status: SHIPPED | OUTPATIENT
Start: 2022-10-17 | End: 2022-11-15

## 2022-10-17 NOTE — TELEPHONE ENCOUNTER
Desmopressin Acetate 0.2 MG Oral Tablet      Last Written Prescription Date:  9/23/22  Last Fill Quantity: 60,   # refills: 0  Last Office Visit : 4/23/20  Future Office visit:  10/31/22    Routing refill request to provider for review/approval because:  Drug not on the Endocrine refill protocol   Thank-you, Diane FREGOSO RN Medication Refill Team

## 2022-11-01 ENCOUNTER — VIRTUAL VISIT (OUTPATIENT)
Dept: NEUROLOGY | Facility: CLINIC | Age: 32
End: 2022-11-01
Payer: MEDICAID

## 2022-11-01 DIAGNOSIS — Z98.2 S/P VP SHUNT: Primary | ICD-10-CM

## 2022-11-01 DIAGNOSIS — H53.149 PHOTOPHOBIA: ICD-10-CM

## 2022-11-01 DIAGNOSIS — R51.9 PERSISTENT HEADACHES: ICD-10-CM

## 2022-11-01 PROCEDURE — 99213 OFFICE O/P EST LOW 20 MIN: CPT | Mod: 95 | Performed by: NURSE PRACTITIONER

## 2022-11-01 ASSESSMENT — HEADACHE IMPACT TEST (HIT 6)
HOW OFTEN DID HEADACHS LIMIT CONCENTRATION ON WORK OR DAILY ACTIVITY: VERY OFTEN
WHEN YOU HAVE A HEADACHE HOW OFTEN DO YOU WISH YOU COULD LIE DOWN: VERY OFTEN
HOW OFTEN HAVE YOU FELT TOO TIRED TO WORK BECAUSE OF YOUR HEADACHES: VERY OFTEN
WHEN YOU HAVE HEADACHES HOW OFTEN IS THE PAIN SEVERE: VERY OFTEN
HOW OFTEN HAVE YOU FELT FED UP OR IRRITATED BECAUSE OF YOUR HEADACHES: VERY OFTEN
HOW OFTEN DO HEADACHES LIMIT YOUR DAILY ACTIVITIES: VERY OFTEN
HIT6 TOTAL SCORE: 66

## 2022-11-01 ASSESSMENT — MIGRAINE DISABILITY ASSESSMENT (MIDAS)
HOW MANY DAYS DID YOU MISS WORK OR SCHOOL BECAUSE OF HEADACHES: 5
HOW MANY DAYS WAS HOUSEWORK PRODUCTIVITY CUT IN HALF DUE TO HEADACHES: 5
HOW MANY DAYS DID YOU NOT DO HOUSEWORK BECAUSE OF HEADACHES: 5
HOW MANY DAYS WAS YOUR PRODUCTIVITY CUT IN HALF BECAUSE OF HEADACHES: 5
TOTAL SCORE: 25
HOW OFTEN WERE SOCIAL ACTIVITIES MISSED DUE TO HEADACHES: 5

## 2022-11-01 ASSESSMENT — PATIENT HEALTH QUESTIONNAIRE - PHQ9
SUM OF ALL RESPONSES TO PHQ QUESTIONS 1-9: 8
10. IF YOU CHECKED OFF ANY PROBLEMS, HOW DIFFICULT HAVE THESE PROBLEMS MADE IT FOR YOU TO DO YOUR WORK, TAKE CARE OF THINGS AT HOME, OR GET ALONG WITH OTHER PEOPLE: SOMEWHAT DIFFICULT
SUM OF ALL RESPONSES TO PHQ QUESTIONS 1-9: 8

## 2022-11-01 NOTE — PATIENT INSTRUCTIONS
Plan:  Schedule Vyepti   Continue Nurtec every other day   Updated Eye exam   Follow up with [facility] seizure management and their recommendations about the driving  Follow up in Headache Clinic after a round or two  of Vyepti or sooner if needed     History of seizures and new treatment started -follow up with [facility]

## 2022-11-01 NOTE — NURSING NOTE
Chief Complaint   Patient presents with     Video Visit     Headache/migraine Follow-up       Pt scored high on phq, but refused triage transfer.   Vickie Mckeon 11/1/22

## 2022-11-01 NOTE — PROGRESS NOTES
Abelino is a 32 year old who is being evaluated via a billable video visit.      How would you like to obtain your AVS? [employer]  If the video visit is dropped, the invitation should be resent by: Text to cell phone: [phone]  Will anyone else be joining your video visit? No        Video-Visit Details    Video Start Time: 8:06 AM    Type of service:  Video Visit    Video End Time:8:33 AM    Originating Location (pt. Location): Home      Distant Location (provider location):  Off-site    Platform used for Video Visit: La      Past medical  history of pineal tumor, seizures,  shunt, diabetes.  Ed visit for acute headache treatment 7/8/2022  Has been taking Nurtec every other day and helps the day when he takes Nurtec. 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 Doing staff starts getting headaches and reading small prints trigger headache. Always closes his left eye with a fine print.   Back of the head discomfort where the shunt is.    Emgality stopped because needle fear and not able to give a shot to himself.   Patient was approved for Vyepti and need set up appointment, side effects as reviewed.    Mother reports that he had seizure on October 12 and seizures where different -was standing up and mother helped. Did not get head injury or any injuries. Spells Usually a minute and this one lasted a long time and screaming loud and took a couple of day when was able to function and has been sleeping for two days and severe headache. Patient reports that he cannot recall anything. Bit his toungue per mother  Patient should not be driving and medications were up and started on oxcarbazepine  at [facility] In addition to keppra recently. Needs follow up.     Last ophthalmology exam a while ago     Plan:  Schedule Vyepti   Continue Nurtec every other day   Updated Eye exam   Follow up with [facility] seizure management and their recommendations about the driving  Follow up in Headache Clinic after a round or two  of Vyepti or sooner  if needed     History of seizures and new treatment started -follow up with [facility]    Patient appears alert and no in apparent acute distress,  mentation appears normal, judgement and insight intact, normal speech, wearing dark shaded glasses, no apparent weakness, facial expression symmetrical and symmetrical smile.     Last head CT 7/8/2022  IMPRESSION:    1.  No evidence of acute intracranial hemorrhage or mass effect.  2.  Large developmental venous anomaly within the right occipital lobe, unchanged.  3.  Right frontal approach ventriculoperitoneal shunt catheter, tip within the right lateral ventricle. Stable slitlike ventricles compared to head CT 05/25/2021.    I discussed all my recommendations with Kari Turcios who verbalizes understanding and comfortable with the plan.  All of patient's questions were answered from the best of my knowledge.  Patient is in agreement with the plan.     28 minutes spent on the date of the encounter doing video access, chart  review, exam, results review,  meds review, treatment plan, documentation and further activities as noted above    CASPER Black, CNP [facility]  Headache certified  [facility]

## 2022-11-01 NOTE — LETTER
11/1/2022       RE: Kari Turcios  [address]  [facility] [address]     Dear Colleague,    Thank you for referring your patient, Kari Turcios, to the [facility]UROLOG[facility] [place] at [facility]. Please see a copy of my visit note below.    Past medical  history of pineal tumor, seizures,  shunt, diabetes.  Ed visit for acute headache treatment 7/8/2022  Has been taking Nurtec every other day and helps the day when he takes Nurtec. 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 Doing staff starts getting headaches and reading small prints trigger headache. Always closes his left eye with a fine print.   Back of the head discomfort where the shunt is.    Emgality stopped because needle fear and not able to give a shot to himself.   Patient was approved for Vyepti and need set up appointment, side effects as reviewed.    Mother reports that he had seizure on October 12 and seizures where different -was standing up and mother helped. Did not get head injury or any injuries. Spells Usually a minute and this one lasted a long time and screaming loud and took a couple of day when was able to function and has been sleeping for two days and severe headache. Patient reports that he cannot recall anything. Bit his toungue per mother  Patient should not be driving and medications were up and started on oxcarbazepine  at [facility] In addition to keppra recently. Needs follow up.     Last ophthalmology exam a while ago     Plan:  Schedule Vyepti   Continue Nurtec every other day   Updated Eye exam   Follow up with [facility] seizure management and their recommendations about the driving  Follow up in Headache Clinic after a round or two  of Vyepti or sooner if needed     History of seizures and new treatment started -follow up with [facility]    Patient appears alert and no in apparent acute distress,  mentation appears normal, judgement and insight intact, normal  speech, wearing dark shaded glasses, no apparent weakness, facial expression symmetrical and symmetrical smile.     Last head CT 7/8/2022  IMPRESSION:    1.  No evidence of acute intracranial hemorrhage or mass effect.  2.  Large developmental venous anomaly within the right occipital lobe, unchanged.  3.  Right frontal approach ventriculoperitoneal shunt catheter, tip within the right lateral ventricle. Stable slitlike ventricles compared to head CT 05/25/2021.    I discussed all my recommendations with Kari Turcios who verbalizes understanding and comfortable with the plan.  All of patient's questions were answered from the best of my knowledge.  Patient is in agreement with the plan.     28 minutes spent on the date of the encounter doing video access, chart  review, exam, results review,  meds review, treatment plan, documentation and further activities as noted above    CASPER Black, CNP [facility]  Headache certified  [facility]

## 2022-11-02 ENCOUNTER — OFFICE VISIT (OUTPATIENT)
Dept: OPHTHALMOLOGY | Facility: CLINIC | Age: 32
End: 2022-11-02
Payer: MEDICAID

## 2022-11-02 DIAGNOSIS — G43.709 CHRONIC MIGRAINE WITHOUT AURA WITHOUT STATUS MIGRAINOSUS, NOT INTRACTABLE: ICD-10-CM

## 2022-11-02 DIAGNOSIS — H53.143 PHOTOPHOBIA OF BOTH EYES: Primary | ICD-10-CM

## 2022-11-02 DIAGNOSIS — H52.13 MYOPIA OF BOTH EYES: ICD-10-CM

## 2022-11-02 PROCEDURE — 92015 DETERMINE REFRACTIVE STATE: CPT | Performed by: OPTOMETRIST

## 2022-11-02 PROCEDURE — 92004 COMPRE OPH EXAM NEW PT 1/>: CPT | Performed by: OPTOMETRIST

## 2022-11-02 ASSESSMENT — TONOMETRY
IOP_METHOD: TONOPEN
OS_IOP_MMHG: 14
OD_IOP_MMHG: 14

## 2022-11-02 ASSESSMENT — EXTERNAL EXAM - LEFT EYE: OS_EXAM: NORMAL

## 2022-11-02 ASSESSMENT — REFRACTION_WEARINGRX
OS_CYLINDER: SPHERE
OS_SPHERE: -1.00
OD_CYLINDER: SPHERE
OD_SPHERE: -1.00
SPECS_TYPE: SVL

## 2022-11-02 ASSESSMENT — CONF VISUAL FIELD
OD_SUPERIOR_NASAL_RESTRICTION: 0
OS_SUPERIOR_TEMPORAL_RESTRICTION: 0
OS_INFERIOR_NASAL_RESTRICTION: 0
OS_NORMAL: 1
METHOD: COUNTING FINGERS
OD_INFERIOR_NASAL_RESTRICTION: 0
OS_SUPERIOR_NASAL_RESTRICTION: 0
OS_INFERIOR_TEMPORAL_RESTRICTION: 0
OD_SUPERIOR_TEMPORAL_RESTRICTION: 0
OD_NORMAL: 1
OD_INFERIOR_TEMPORAL_RESTRICTION: 0

## 2022-11-02 ASSESSMENT — REFRACTION_MANIFEST
OD_CYLINDER: SPHERE
OD_SPHERE: -1.00
OS_CYLINDER: SPHERE
OS_SPHERE: -1.50

## 2022-11-02 ASSESSMENT — VISUAL ACUITY
METHOD: SNELLEN - LINEAR
OS_PH_SC: 20/30
OD_SC: 20/40
OD_PH_SC: 20/30
OS_SC: 20/70

## 2022-11-02 ASSESSMENT — SLIT LAMP EXAM - LIDS: COMMENTS: 1+ MGD

## 2022-11-02 ASSESSMENT — EXTERNAL EXAM - RIGHT EYE: OD_EXAM: NORMAL

## 2022-11-02 NOTE — NURSING NOTE
Chief Complaints and History of Present Illnesses   Patient presents with     evaluation     Chief Complaint(s) and History of Present Illness(es)     evaluation           Comments    Patient was diagnosed with stage 4 pineal gland brain tumor in 2014. Patient states the tumor is gone and there is shunt in his brain now and that effects vision. Patient denies pain each eye but does states dryness, patient using AT. Patient states diplopia and closes left eye when experiencing that. Patient states photophobia with migraines that happen daily. Patient states an increase  to floaters with daily migraines. Patient does not remember last eye exam. Patient denies flashes if light each eye.     Karlee Salazar, COA November 2, 2022 3:02 PM

## 2022-11-02 NOTE — PROGRESS NOTES
Assessment/Plan   (H53.143) Photophobia of both eyes  (primary encounter diagnosis)  Comment: Following pineal gland tumor removal  Plan: Recommend patient try using FL-41 tinted glasses to help with indoor photosensitivity. Darker fitovers or separate sunglasses may be needed for outdoor use. Call or return to clinic with changes or persistent symptoms.     (G48.149) Chronic migraine without aura without status migrainosus, not intractable  Plan: See above note.     (H52.13) Myopia of both eyes  Plan: REFRACTION [[id number]]        Discussed findings with patient. New spectacle prescription dispensed to patient. Patient is welcome to return to clinic with prolonged adaptation difficulties.       Complete documentation of historical and exam elements from today's encounter can  be found in the full encounter summary report (not reduplicated in this progress  note). I personally obtained the chief complaint(s) and history of present illness. I  confirmed and edited as necessary the review of systems, past medical/surgical  history, family history, social history, and examination findings as documented by  others; and I examined the patient myself. I personally reviewed the relevant tests,  images, and reports as documented above. I formulated and edited as necessary the  assessment and plan and discussed the findings and management plan with the  patient and family.    Elier Beasley OD

## 2022-11-03 ENCOUNTER — INFUSION THERAPY VISIT (OUTPATIENT)
Dept: INFUSION THERAPY | Facility: CLINIC | Age: 32
End: 2022-11-03
Attending: NURSE PRACTITIONER
Payer: MEDICAID

## 2022-11-03 ENCOUNTER — TELEPHONE (OUTPATIENT)
Dept: NEUROLOGY | Facility: CLINIC | Age: 32
End: 2022-11-03

## 2022-11-03 VITALS
BODY MASS INDEX: 22.19 KG/M2 | DIASTOLIC BLOOD PRESSURE: 85 MMHG | RESPIRATION RATE: 14 BRPM | HEART RATE: 58 BPM | SYSTOLIC BLOOD PRESSURE: 122 MMHG | OXYGEN SATURATION: 100 % | WEIGHT: 137.5 LBS | TEMPERATURE: 98.7 F

## 2022-11-03 DIAGNOSIS — Z79.899 OTHER LONG TERM (CURRENT) DRUG THERAPY: ICD-10-CM

## 2022-11-03 DIAGNOSIS — T78.40XA ALLERGY, INITIAL ENCOUNTER: Primary | ICD-10-CM

## 2022-11-03 DIAGNOSIS — G43.711 INTRACTABLE CHRONIC MIGRAINE WITHOUT AURA AND WITH STATUS MIGRAINOSUS: Primary | ICD-10-CM

## 2022-11-03 LAB
DEPRECATED CALCIDIOL+CALCIFEROL SERPL-MC: 13 UG/L (ref 20–75)
SODIUM SERPL-SCNC: 138 MMOL/L (ref 136–145)

## 2022-11-03 PROCEDURE — 258N000003 HC RX IP 258 OP 636: Performed by: NURSE PRACTITIONER

## 2022-11-03 PROCEDURE — 96375 TX/PRO/DX INJ NEW DRUG ADDON: CPT

## 2022-11-03 PROCEDURE — 84295 ASSAY OF SERUM SODIUM: CPT

## 2022-11-03 PROCEDURE — 82306 VITAMIN D 25 HYDROXY: CPT

## 2022-11-03 PROCEDURE — 36415 COLL VENOUS BLD VENIPUNCTURE: CPT

## 2022-11-03 PROCEDURE — 250N000011 HC RX IP 250 OP 636: Performed by: NURSE PRACTITIONER

## 2022-11-03 PROCEDURE — 96365 THER/PROPH/DIAG IV INF INIT: CPT

## 2022-11-03 RX ORDER — METHYLPREDNISOLONE SODIUM SUCCINATE 125 MG/2ML
125 INJECTION, POWDER, LYOPHILIZED, FOR SOLUTION INTRAMUSCULAR; INTRAVENOUS
Status: CANCELLED
Start: 2023-02-01

## 2022-11-03 RX ORDER — ALBUTEROL SULFATE 0.83 MG/ML
2.5 SOLUTION RESPIRATORY (INHALATION)
Status: CANCELLED | OUTPATIENT
Start: 2023-02-01

## 2022-11-03 RX ORDER — MEPERIDINE HYDROCHLORIDE 25 MG/ML
25 INJECTION INTRAMUSCULAR; INTRAVENOUS; SUBCUTANEOUS EVERY 30 MIN PRN
Status: CANCELLED | OUTPATIENT
Start: 2023-02-01

## 2022-11-03 RX ORDER — DIPHENHYDRAMINE HYDROCHLORIDE 50 MG/ML
50 INJECTION INTRAMUSCULAR; INTRAVENOUS
Status: CANCELLED
Start: 2023-02-01

## 2022-11-03 RX ORDER — HEPARIN SODIUM (PORCINE) LOCK FLUSH IV SOLN 100 UNIT/ML 100 UNIT/ML
5 SOLUTION INTRAVENOUS
Status: CANCELLED | OUTPATIENT
Start: 2023-02-01

## 2022-11-03 RX ORDER — EPINEPHRINE 1 MG/ML
0.3 INJECTION, SOLUTION INTRAMUSCULAR; SUBCUTANEOUS EVERY 5 MIN PRN
Status: CANCELLED | OUTPATIENT
Start: 2023-02-01

## 2022-11-03 RX ORDER — EPINEPHRINE 0.3 MG/.3ML
0.3 INJECTION SUBCUTANEOUS PRN
Qty: 2 EACH | Refills: 0 | Status: SHIPPED | OUTPATIENT
Start: 2022-11-03

## 2022-11-03 RX ORDER — EPINEPHRINE 0.3 MG/.3ML
0.3 INJECTION SUBCUTANEOUS PRN
Qty: 2 EACH | Refills: 0 | Status: SHIPPED | OUTPATIENT
Start: 2022-11-03 | End: 2022-11-03

## 2022-11-03 RX ORDER — DIPHENHYDRAMINE HYDROCHLORIDE 50 MG/ML
50 INJECTION INTRAMUSCULAR; INTRAVENOUS
Status: COMPLETED | OUTPATIENT
Start: 2022-11-03 | End: 2022-11-03

## 2022-11-03 RX ORDER — ALBUTEROL SULFATE 90 UG/1
1-2 AEROSOL, METERED RESPIRATORY (INHALATION)
Status: CANCELLED
Start: 2023-02-01

## 2022-11-03 RX ORDER — HEPARIN SODIUM,PORCINE 10 UNIT/ML
5 VIAL (ML) INTRAVENOUS
Status: CANCELLED | OUTPATIENT
Start: 2023-02-01

## 2022-11-03 RX ADMIN — DIPHENHYDRAMINE HYDROCHLORIDE 50 MG: 50 INJECTION, SOLUTION INTRAMUSCULAR; INTRAVENOUS at 13:19

## 2022-11-03 RX ADMIN — EPTINEZUMAB-JJMR 100 MG: 100 INJECTION INTRAVENOUS at 12:55

## 2022-11-03 RX ADMIN — FAMOTIDINE 20 MG: 10 INJECTION, SOLUTION INTRAVENOUS at 13:28

## 2022-11-03 ASSESSMENT — PAIN SCALES - GENERAL: PAINLEVEL: SEVERE PAIN (7)

## 2022-11-03 NOTE — LETTER
"    11/3/2022         RE: Kari Turcios  [address]  [facility] [address]        Dear Colleague,    Thank you for referring your patient, Kari Turcios, to the [facility]. Please see a copy of my visit note below.    Infusion Nursing Note:  Kari Turcios presents today for first dose Vyepti.    Patient seen by provider today: No   present during visit today: Not Applicable.    Note:   Vyepti infused @ 222 ml/hr. Stopped after 60 ml infused due to hypersensitivity reaction.    Did patient have a hypersensitivity reaction? : Yes  Drug or Product name: Vyepti  Were pre-meds administered?: No     First or Subsequent treatment: First time receiving  Rate of infusion when patient had hypersensitivity reaction: 222 ml/hr  Time the hypersensitivity reaction was first recognized: 1315  Symptoms observed or reported (select all that apply): Itching;Nausea;Other: (Comment) (\"clammy\", palpitations)  Interventions/treatment following reaction: Infusion stopped;Hypersensitivity medications administered  What hypersensitivity medications were administered?: DiphendydrAMINE (benadryl);Famotidine(Pepcid)  Name of provider notified: Sybil BYNUM  Time provider notified: 1330  Type of notification (select all that apply): Paged/Phone  Was the patient re-challenged today?: No - no re-challenge today    Per Sybil BYNUM, no re-challenge. This medication was added to patient's allergy list and a prescription was filled for an Epi-Pen in the event of angioedema or anaphylaxis. Pt educated on the importance of taking Benadryl and Pepcid this evening around 6pm to combat any risk of long-acting effects of Vyepti. Patient educated on importance of seeking emergency services in the event any symptoms resurface this evening.     Intravenous Access:  Labs drawn without difficulty.  Peripheral IV placed.    Treatment Conditions:  Biological Infusion " Checklist:  ~~~ NOTE: If the patient answers yes to any of the questions below, hold the infusion and contact ordering provider or on-call provider.    1. Have you recently had an elevated temperature, fever, chills, productive cough, coughing for 3 weeks or longer or hemoptysis, abnormal vital signs, night sweats,  chest pain or have you noticed a decrease in your appetite, unexplained weight loss or fatigue? No  2. Do you have any open wounds or new incisions? No  3. Do you have any recent or upcoming hospitalizations, surgeries or dental procedures? No  4. Do you currently have or recently have had any signs of illness or infection or are you on any antibiotics? No  5. Have you had any new, sudden or worsening abdominal pain? No  6. Have you or anyone in your household received a live vaccination in the past 4 weeks? Please note:  No live vaccines while on biologic/chemotherapy until 6 months after the last treatment.  Patient can receive the flu vaccine (shot only) and the pneumovax.  It is optimal for the patient to get these vaccines mid cycle, but they can be given at any time as long as it is not on the day of the infusion. No  7. Have you recently been diagnosed with any new nervous system diseases (ie. Multiple sclerosis, Guillain [place], seizures, neurological changes) or cancer diagnosis? No  8. Are you on any form of radiation or chemotherapy? No  9. Are you pregnant or breast feeding or do you have plans of pregnancy in the future? No  10. Have you been having any signs of worsening depression or suicidal ideations?  (benlysta only) No  11. Have there been any other new onset medical symptoms? No  Administrations This Visit     eptinezumab-jjmr (VYEPTI) 100 mg in sodium chloride 0.9 % 111 mL infusion     Admin Date  11/03/2022 Action  New Bag Dose  100 mg Route  Intravenous Administered By  Danuta Cerda, VERONICA              /60 (BP Location: Left arm, Patient Position: Sitting, Cuff Size: Adult  Regular)   Pulse 60   Temp 98.7  F (37.1  C) (Oral)   Resp 14   Wt 62.4 kg (137 lb 8 oz)   SpO2 98%   BMI 22.19 kg/m      Post Infusion Assessment:  Patient observed for 60 minutes post hypersensitivity reaction.   Blood return noted pre and post infusion.  Site patent and intact, free from redness, edema or discomfort.  No evidence of extravasations.  Access discontinued per protocol.  Patient left the unit in usual health, asymptomatic for any hypersensitivity symptoms.     Discharge Plan:   Discharge instructions reviewed with: Patient and father.  Patient and/or family verbalized understanding of discharge instructions and all questions answered.  Copy of AVS reviewed with patient and/or family.  Patient discharged in stable condition accompanied by: father.  Departure Mode: Ambulatory.    Danuta Cerda RN                        Again, thank you for allowing me to participate in the care of your patient.        Sincerely,        No name on file

## 2022-11-03 NOTE — TELEPHONE ENCOUNTER
Received call from infusion center about patient's infusion.  When I called back 5 minutes later they state they have got a hold of the ordering provider and did not need my assistance.    Randy Acosta, DO

## 2022-11-03 NOTE — PROGRESS NOTES
"Infusion Nursing Note:  Kari ZENOBIA Tam presents today for first dose Vyepti.    Patient seen by provider today: No   present during visit today: Not Applicable.    Note:   Vyepti infused @ 222 ml/hr. Stopped after 60 ml infused due to hypersensitivity reaction.    Did patient have a hypersensitivity reaction? : Yes  Drug or Product name: Vyepti  Were pre-meds administered?: No     First or Subsequent treatment: First time receiving  Rate of infusion when patient had hypersensitivity reaction: 222 ml/hr  Time the hypersensitivity reaction was first recognized: 1315  Symptoms observed or reported (select all that apply): Itching;Nausea;Other: (Comment) (\"clammy\", palpitations)  Interventions/treatment following reaction: Infusion stopped;Hypersensitivity medications administered  What hypersensitivity medications were administered?: DiphendydrAMINE (benadryl);Famotidine(Pepcid)  Name of provider notified: Sybil BYNUM  Time provider notified: 1330  Type of notification (select all that apply): Paged/Phone  Was the patient re-challenged today?: No - no re-challenge today    Per Sybil BYNUM, no re-challenge. This medication was added to patient's allergy list and a prescription was filled for an Epi-Pen in the event of angioedema or anaphylaxis. Pt educated on the importance of taking Benadryl and Pepcid this evening around 6pm to combat any risk of long-acting effects of Vyepti. Patient educated on importance of seeking emergency services in the event any symptoms resurface this evening.     Intravenous Access:  Labs drawn without difficulty.  Peripheral IV placed.    Treatment Conditions:  Biological Infusion Checklist:  ~~~ NOTE: If the patient answers yes to any of the questions below, hold the infusion and contact ordering provider or on-call provider.    1. Have you recently had an elevated temperature, fever, chills, productive cough, coughing for 3 weeks or longer or hemoptysis, " abnormal vital signs, night sweats,  chest pain or have you noticed a decrease in your appetite, unexplained weight loss or fatigue? No  2. Do you have any open wounds or new incisions? No  3. Do you have any recent or upcoming hospitalizations, surgeries or dental procedures? No  4. Do you currently have or recently have had any signs of illness or infection or are you on any antibiotics? No  5. Have you had any new, sudden or worsening abdominal pain? No  6. Have you or anyone in your household received a live vaccination in the past 4 weeks? Please note:  No live vaccines while on biologic/chemotherapy until 6 months after the last treatment.  Patient can receive the flu vaccine (shot only) and the pneumovax.  It is optimal for the patient to get these vaccines mid cycle, but they can be given at any time as long as it is not on the day of the infusion. No  7. Have you recently been diagnosed with any new nervous system diseases (ie. Multiple sclerosis, Guillain [place], seizures, neurological changes) or cancer diagnosis? No  8. Are you on any form of radiation or chemotherapy? No  9. Are you pregnant or breast feeding or do you have plans of pregnancy in the future? No  10. Have you been having any signs of worsening depression or suicidal ideations?  (benlysta only) No  11. Have there been any other new onset medical symptoms? No  Administrations This Visit     eptinezumab-jjmr (VYEPTI) 100 mg in sodium chloride 0.9 % 111 mL infusion     Admin Date  11/03/2022 Action  New Bag Dose  100 mg Route  Intravenous Administered By  Danuta Cerda RN              /60 (BP Location: Left arm, Patient Position: Sitting, Cuff Size: Adult Regular)   Pulse 60   Temp 98.7  F (37.1  C) (Oral)   Resp 14   Wt 62.4 kg (137 lb 8 oz)   SpO2 98%   BMI 22.19 kg/m      Post Infusion Assessment:  Patient observed for 60 minutes post hypersensitivity reaction.   Blood return noted pre and post infusion.  Site patent and  intact, free from redness, edema or discomfort.  No evidence of extravasations.  Access discontinued per protocol.  Patient left the unit in usual health, asymptomatic for any hypersensitivity symptoms.     Discharge Plan:   Discharge instructions reviewed with: Patient and father.  Patient and/or family verbalized understanding of discharge instructions and all questions answered.  Copy of AVS reviewed with patient and/or family.  Patient discharged in stable condition accompanied by: father.  Departure Mode: Ambulatory.    Danuta Cerda RN

## 2022-11-03 NOTE — PATIENT INSTRUCTIONS
Dear Kari Turcios    Thank you for choosing [facility] ([facility]) for your infusion.  The following information is a summary of our appointment as well as important reminders.      Plan:  - Epi Pen, Diphenhydramine (Benadryl), Famotidine (Pepcid) and Claritin downstairs in the pharmacy. Sybil Dockery NP wants you to take benadryl and pepcid again around 5:30-6:30pm.   -The Epi Pen is to be used in the event of an emergency- if you have trouble breathing, if your throat feels tight, or any emergent symptoms. This medication works over a long period of time, so we don't want you to experience any rebound symptoms.  -A nurse will be calling you from the Neurology clinic tomorrow to follow up.  -If you have any reoccurrence of symptoms, utilize these medications, as listed above, and seek emergency services (085).     We look forward in seeing you on your next appointment here at [facility] ([facility]).  Please don t hesitate to call us at [phone] to reschedule any of your appointments or to speak with one of the [facility] registered nurses.  It was a pleasure taking care of you today.    Sincerely,    [facility]  [address] [place] [address]  [id number]  Phone:  [phone]

## 2022-11-03 NOTE — LETTER
Date:November 4, 2022      Provider requested that no letter be sent. Do not send.       [facility]

## 2022-11-03 NOTE — TELEPHONE ENCOUNTER
Per Karo from MS team, she received a call from our infusion team that pt is having infusion for migraine medication and itching started half way through the infusion. Karo stated pt was given Pepcid 20mg and Benedryl 50mg and itching has resolved.  Infusion team needs to know is it okay to restart infusion.  [phone] and needs a call back.  Informed Karo I will alert Sybil.

## 2022-11-03 NOTE — TELEPHONE ENCOUNTER
Spoke to infusion staff-Danuta MARTIN-Half into Vyepti infusion 60 ml patient developed itchiness, throat itchiness, nausea, heart palpitation and clammy feeling. BP from 110 up 130 and HR around 60 beats per min and O2 100%. Patient received pepsid 20 mg IV and benadryl 50 mg IV.   Will stop infusion with Vyepti.   Patient was prescribed Epi pen. Repeat benedryl and pepsid at home and may OTC antihistamine and monitor and if getting to the ED.

## 2022-11-04 ENCOUNTER — TELEPHONE (OUTPATIENT)
Dept: NEUROLOGY | Facility: CLINIC | Age: 32
End: 2022-11-04

## 2022-11-04 ENCOUNTER — NURSE TRIAGE (OUTPATIENT)
Dept: NURSING | Facility: CLINIC | Age: 32
End: 2022-11-04

## 2022-11-04 ASSESSMENT — CUP TO DISC RATIO
OD_RATIO: 0.5
OS_RATIO: 0.45

## 2022-11-04 NOTE — TELEPHONE ENCOUNTER
Called pt back and left message informing pt that if he is having numbness in throat, blurry vision, trouble breathing, sweaty, he should go to the ER at a hospital to be assessed and evaluated and then appropriate treatment can be given. Call back.  Clinic # given.

## 2022-11-04 NOTE — TELEPHONE ENCOUNTER
Per Sybil, called pt to see how he is doing.  Pt stated this was his first Vyepti infusion and he has never had this type of reaction before.  Pt stated he was very uncomfortable and does not want to have this type of reaction again and becomes sweaty just thinking about what happened yesterday.  Pt stated all his symptoms have subsided and yesterday he went home and rested and feels better today.  Going forward, he would like to know from Sybil what is the plan.  Informed him Sybil will be back in clinic on Monday to review and advise and he politely  requested a call from her; however, I explained that I may be the one getting back to him as I am the liason but I would give her the message and he verbally understood.

## 2022-11-04 NOTE — TELEPHONE ENCOUNTER
[facility] Call Center    Phone Message    May a detailed message be left on voicemail: yes     Reason for Call:     Patient is returning a call to nurse, per patient detailed message was not left on VM. Phone call is regarding her reaction to botox       Action Taken: Message routed to:  [facility] neurology    Travel Screening: Not Applicable

## 2022-11-04 NOTE — TELEPHONE ENCOUNTER
I called patient today to follow up about his Vyepti reaction and treatment plan. I left a VM- offered a visit - he can be added  to my clinic on Nov 9th at 4:30 pm -video or telephone visit so we can go over steps. I informed patient I won't be available in the afternoon and won't be able to talk to him until Monday. Thank you

## 2022-11-04 NOTE — TELEPHONE ENCOUNTER
Pt received Botox yesterday  Calling today with numbness throat,blurred vision and sweaty   Feels anxious and no hx of anxiety    Denies difficulty breathing or chest pain      Instructed to take a dose of benadryl     Rest and try to relax    Will forward to neuro team to call back asap   Reason for Disposition    Caller has URGENT medicine question about med that PCP or specialist prescribed and triager unable to answer question    Additional Information    Negative: Intentional drug overdose and suicidal thoughts or ideas    Negative: Drug overdose and triager unable to answer question    Negative: Caller requesting a renewal or refill of a medicine patient is currently taking    Negative: Caller requesting information unrelated to medicine    Negative: Caller requesting information about COVID-19 Vaccine    Negative: Caller requesting information about Emergency Contraception    Negative: Caller requesting information about Combined Birth Control Pills    Negative: Caller requesting information about Progestin Birth Control Pills    Negative: Caller requesting information about Post-Op pain or medicines    Negative: Caller requesting a prescription antibiotic (such as penicillin) for Strep throat and has a positive culture result    Negative: Caller requesting a prescription anti-viral med (such as Tamiflu) and has influenza (flu) symptoms    Negative: Immunization reaction suspected    Negative: Rash while taking a medicine or within 3 days of stopping it    Negative: Asthma and having symptoms of asthma (cough, wheezing, etc.)    Negative: Symptom of illness (e.g., headache, abdominal pain, earache, vomiting) that are more than mild    Negative: Breastfeeding questions about mother's medicines and diet    Negative: MORE THAN A DOUBLE DOSE of a prescription or over-the-counter (OTC) drug    Negative: DOUBLE DOSE (an extra dose or lesser amount) of prescription drug and any symptoms (e.g., dizziness, nausea,  "pain, sleepiness)    Negative: DOUBLE DOSE (an extra dose or lesser amount) of over-the-counter (OTC) drug and any symptoms (e.g., dizziness, nausea, pain, sleepiness)    Negative: Took another person's prescription drug    Negative: DOUBLE DOSE (an extra dose or lesser amount) of prescription drug and NO symptoms  (Exception: A double dose of antibiotics.)    Negative: Diabetes drug error or overdose (e.g., took wrong type of insulin or took extra dose)    Negative: Caller has medication question about med NOT prescribed by PCP and triager unable to answer question (e.g., compatibility with other med, storage)    Answer Assessment - Initial Assessment Questions  1. NAME of MEDICATION: \"What medicine are you calling about?\"      botox  New brand  2. QUESTION: \"What is your question?\" (e.g., double dose of medicine, side effect)      concerned having side effects  3. PRESCRIBING HCP: \"Who prescribed it?\" Reason: if prescribed by specialist, call should be referred to that group.      Neurology for Headaches  4. SYMPTOMS: \"Do you have any symptoms?\"      Throat feels numb   Blurred vision  sweaty  5. SEVERITY: If symptoms are present, ask \"Are they mild, moderate or severe?\"      moderate  6. PREGNANCY:  \"Is there any chance that you are pregnant?\" \"When was your last menstrual period?\"      no    Protocols used: MEDICATION QUESTION CALL-A-[place]      "

## 2022-11-07 ENCOUNTER — TELEPHONE (OUTPATIENT)
Dept: NEUROLOGY | Facility: CLINIC | Age: 32
End: 2022-11-07

## 2022-11-07 DIAGNOSIS — G43.711 INTRACTABLE CHRONIC MIGRAINE WITHOUT AURA AND WITH STATUS MIGRAINOSUS: Primary | ICD-10-CM

## 2022-11-07 RX ORDER — ATOGEPANT 60 MG/1
60 TABLET ORAL DAILY
Qty: 30 TABLET | Refills: 11 | Status: SHIPPED | OUTPATIENT
Start: 2022-11-07 | End: 2022-11-09

## 2022-11-07 NOTE — TELEPHONE ENCOUNTER
Called pt and he stated within the last 45 minutes or so he is having HA pain, towards the back corner of right ear where his shunt cord is located.  He stated the pain comes and goes and he has had  this type of HA pain before, but not this intense.  He stated sometimes he takes Nurtec for this and HA pain goes away.  He stated he has not tried Nurtec yet and is wondering what to do.  Pt stated also has a bit of nausea but able to stay hydrated and has eaten. Asked pt about botox and he stated he thought botox was the same thing as Vyepti.  I educated pt that botox is not the same as the Vyepti infusion he had and he verbally understood.  Pt thinking this HA pain is caused from the Vyepti infusion.   Informed him Sybil to review and advise.

## 2022-11-07 NOTE — TELEPHONE ENCOUNTER
[facility] Call Center    Phone Message    May a detailed message be left on voicemail: yes     Reason for Call: Symptoms or Concerns     If patient has red-flag symptoms, warm transfer to triage line    Current symptom or concern: Migraine    Symptoms have been present for: 1/2 hour    Has patient previously been seen for this? Yes    By Sybil Dockery    Are there any new or worsening symptoms? Yes: Pt called to report strong migraine that started up about 1/2 hour before call toward the back of his shunt. Pt is scheduled for virtual appt on 11/9/22, but is concerned that this may be another side effect from his botox.    Please call Pt back ASAP to discuss and advise.      Action Taken: Message routed to:  [facility] Neurology    Travel Screening: Not Applicable

## 2022-11-07 NOTE — TELEPHONE ENCOUNTER
May try Nurtec every 48 hours as needed and see if it helps with the pain. May try compazine and benadryl for nausea as needed.  We could try Qulipta daily pill for migraine prevention. Lets see if interested. Side effects-nausea, dizziness but its a pill form so if any side effects we can stop it.   I ordered and see if PA will be approved.   Should keep us updated.   Last head CT 7/8/2022 -we could wait with additional work up  Thank you

## 2022-11-07 NOTE — TELEPHONE ENCOUNTER
Called pt and left message informing pt  Sybil's note as written below:  May try Nurtec every 48 hours as needed and see if it helps with the pain. May try compazine and benadryl for nausea as needed.  We could try Qulipta daily pill for migraine prevention. Lets see if interested. Side effects-nausea, dizziness but its a pill form so if any side effects we can stop it.   I ordered and see if PA will be approved.   Should keep us updated. Advised pt to call us back and let us know if he would like us to move forward with Qulipta. Clinic # given or send a my chart message.

## 2022-11-07 NOTE — TELEPHONE ENCOUNTER
Called pt today and he stated that all the symptoms from the infusion had subsided on Friday.  Pt stated he did have HA pain in the back of his head but has rested all weekend and will discuss things further with Sybil on Wed.

## 2022-11-09 ENCOUNTER — VIRTUAL VISIT (OUTPATIENT)
Dept: NEUROLOGY | Facility: CLINIC | Age: 32
End: 2022-11-09
Payer: MEDICAID

## 2022-11-09 DIAGNOSIS — G43.711 INTRACTABLE CHRONIC MIGRAINE WITHOUT AURA AND WITH STATUS MIGRAINOSUS: Primary | ICD-10-CM

## 2022-11-09 DIAGNOSIS — Z98.2 S/P VP SHUNT: ICD-10-CM

## 2022-11-09 DIAGNOSIS — D44.5 PINEAL GERM CELL TUMOR (H): ICD-10-CM

## 2022-11-09 DIAGNOSIS — R51.9 PERSISTENT HEADACHES: ICD-10-CM

## 2022-11-09 PROCEDURE — 99212 OFFICE O/P EST SF 10 MIN: CPT | Mod: 95 | Performed by: NURSE PRACTITIONER

## 2022-11-09 ASSESSMENT — MIGRAINE DISABILITY ASSESSMENT (MIDAS)
TOTAL SCORE: 69
HOW MANY DAYS DID YOU NOT DO HOUSEWORK BECAUSE OF HEADACHES: 6
HOW OFTEN WERE SOCIAL ACTIVITIES MISSED DUE TO HEADACHES: 3
HOW MANY DAYS IN THE PAST 3 MONTHS HAVE YOU HAD A HEADACHE: 45
HOW MANY DAYS WAS YOUR PRODUCTIVITY CUT IN HALF BECAUSE OF HEADACHES: 45
ON A SCALE FROM 0-10 ON AVERAGE HOW PAINFUL WERE HEADACHES: 6
HOW MANY DAYS DID YOU MISS WORK OR SCHOOL BECAUSE OF HEADACHES: 9
HOW MANY DAYS WAS HOUSEWORK PRODUCTIVITY CUT IN HALF DUE TO HEADACHES: 6

## 2022-11-09 ASSESSMENT — HEADACHE IMPACT TEST (HIT 6)
WHEN YOU HAVE A HEADACHE HOW OFTEN DO YOU WISH YOU COULD LIE DOWN: ALWAYS
HOW OFTEN DID HEADACHS LIMIT CONCENTRATION ON WORK OR DAILY ACTIVITY: VERY OFTEN
HOW OFTEN HAVE YOU FELT FED UP OR IRRITATED BECAUSE OF YOUR HEADACHES: VERY OFTEN
HOW OFTEN HAVE YOU FELT TOO TIRED TO WORK BECAUSE OF YOUR HEADACHES: VERY OFTEN
HOW OFTEN DO HEADACHES LIMIT YOUR DAILY ACTIVITIES: VERY OFTEN
WHEN YOU HAVE HEADACHES HOW OFTEN IS THE PAIN SEVERE: VERY OFTEN
HIT6 TOTAL SCORE: 68

## 2022-11-09 NOTE — PROGRESS NOTES
Abelino is a 32 year old who is being evaluated via a billable video visit.      How would you like to obtain your AVS? Chewse  If the video visit is dropped, the invitation should be resent by: Text to cell phone: [phone]  Will anyone else be joining your video visit? No      Video-Visit Details    Video Start Time: 5:07 PM    Type of service:  Video Visit    Video End Time:5:20 PM    Originating Location (pt. Location): Home      Distant Location (provider location):  On-site    Platform used for Video Visit: WeGo[employer]     [facility] follow up   Last visit 11/1/2022, see visit for details  Past medical  history of pineal tumor, seizures,  shunt, diabetes.  Headache back and especially sharp the back of the shunt. Worsening headaches and lights sensitivity. Headaches worse with standing and better with laying down and closing eyes. Nausea.   Headaches almost every day for more than 3 months.   Nurtec every other day and helps. 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   Optimize headache prevention -a trial of Botox and side effects reviewed. Patient agreed to trial.   Continue Nurtec every other day   Prochlorperazine and naproxen or tylenol  as needed for rescue treatment   Follow up -once Botox approved.     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  ICD-10: Germ cell tumor of brain (H)     Comparison: 5/10/2018, 12/4/2017, and 3/19/2014.     Technique: Multiplanar T1-weighted, axial FLAIR, and susceptibility  images were obtained without intravenous  "contrast. Following  intravenous gadolinium-based contrast administration, axial  T2-weighted, diffusion, and T1-weighted images (in multiple planes)  were obtained.     Contrast: 7.5cc Gadavist Injected      Findings: Mild volume loss of the tectal plate again seen, without  recurrent mass or definite abnormal enhancement in the pineal region.  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 Tangled  vasculature in the interpeduncular fossa is also similar. No  hydrocephalus or acute intracranial hemorrhage. Right frontal approach  ventriculostomy catheter is seen, with decompressed ventricles that  are slightly smaller than the prior study.     Mild ethmoid mucosal thickening. Paranasal sinuses otherwise  relatively clear. Mastoid air cells clear as well.                                                                      Impression: No findings to suggest recurrent germinoma in the pineal  region.      MEME CHATTERJEE MD     Patient is alert and no in apparent acute distress,  mentation appears normal, judgement and insight intact, normal speech.    I discussed all my recommendations with Kari Turcios who verbalizes understanding and comfortable with the plan.  All of patient's questions were answered from the best of my knowledge.  Patient is in agreement with the plan.     17 minutes spent on the date of the encounter doing video access, chart  review,  results review,  meds review, treatment plan, documentation and further activities as noted above    CASPER Black, CNP [facility]  Headache certified  [facility]      ADDENDUM:  Repeat brain MRI on 12/7/2022 due to persistent headaches. Dr Sharp, neurosurgery was asked to review vickey's images  Per Dr Sharp, \" No recurrence of tumor. 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 Otherwise, it looks good. Depending on where headaches he could see him to discuss shunt revision. No urgency tho\"                "

## 2022-11-09 NOTE — LETTER
11/9/2022       RE: Kari Turcios  [address]  [place] [facility] [place] [facility] [address]     Dear Colleague,    Thank you for referring your patient, Kari Turcios, to the [facility] [place] at [facility]. Please see a copy of my visit note below.      [facility] follow up   Last visit 11/1/2022, see visit for details  Past medical  history of pineal tumor, seizures,  shunt, diabetes.  Headache back and especially sharp the back of the shunt. Worsening headaches and lights sensitivity. Headaches worse with standing and better with laying down and closing eyes. Nausea.   Headaches almost every day for more than 3 months.   Nurtec every other day and helps. 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   Optimize headache prevention -a trial of Botox and side effects reviewed. Patient agreed to trial.   Continue Nurtec every other day   Prochlorperazine and naproxen or tylenol  as needed for rescue treatment   Follow up -once Botox approved.     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  ICD-10: Germ cell tumor of brain (H)     Comparison: 5/10/2018, 12/4/2017, and 3/19/2014.     Technique: Multiplanar T1-weighted, axial FLAIR, and susceptibility  images were obtained without intravenous contrast. Following  intravenous gadolinium-based contrast administration, axial  T2-weighted, diffusion, and T1-weighted images (in multiple planes)  were obtained.     Contrast: 7.5cc Gadavist Injected       Findings: Mild volume loss of the tectal plate again seen, without  recurrent mass or definite abnormal enhancement in the pineal region.  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 Tangled  vasculature in the interpeduncular fossa is also similar. No  hydrocephalus or acute intracranial hemorrhage. Right frontal approach  ventriculostomy catheter is seen, with decompressed ventricles that  are slightly smaller than the prior study.     Mild ethmoid mucosal thickening. Paranasal sinuses otherwise  relatively clear. Mastoid air cells clear as well.                                                                      Impression: No findings to suggest recurrent germinoma in the pineal  region.      MEME CHATTERJEE MD       Patient is alert and no in apparent acute distress,  mentation appears normal, judgement and insight intact, normal speech.    I discussed all my recommendations with Kari Turcios who verbalizes understanding and comfortable with the plan.  All of patient's questions were answered from the best of my knowledge.  Patient is in agreement with the plan.     17 minutes spent on the date of the encounter doing video access, chart  review,  results review,  meds review, treatment plan, documentation and further activities as noted above    CASPER Black, CNP [facility]  Headache certified  [facility]

## 2022-11-10 ENCOUNTER — TELEPHONE (OUTPATIENT)
Dept: OPHTHALMOLOGY | Facility: CLINIC | Age: 32
End: 2022-11-10

## 2022-11-10 NOTE — TELEPHONE ENCOUNTER
Patient returned VM regarding scheduling a sooner appointment from the wait-list with another Provider, . Patient was able to reschedule as offered. Patient was provided appointment information over the phone [facility]

## 2022-11-10 NOTE — TELEPHONE ENCOUNTER
LVM for patient regarding scheduling a sooner appointment with another Provider, . Provided direct number for scheduling options.

## 2022-11-14 DIAGNOSIS — E23.2 DIABETES INSIPIDUS SECONDARY TO VASOPRESSIN DEFICIENCY (H): ICD-10-CM

## 2022-11-14 RX ORDER — DESMOPRESSIN ACETATE 0.2 MG/1
TABLET ORAL
Qty: 180 TABLET | Refills: 2 | OUTPATIENT
Start: 2022-11-14

## 2022-11-15 ENCOUNTER — VIRTUAL VISIT (OUTPATIENT)
Dept: ENDOCRINOLOGY | Facility: CLINIC | Age: 32
End: 2022-11-15
Payer: MEDICAID

## 2022-11-15 DIAGNOSIS — D44.5 PINEAL GERM CELL TUMOR (H): ICD-10-CM

## 2022-11-15 DIAGNOSIS — E23.2 DIABETES INSIPIDUS SECONDARY TO VASOPRESSIN DEFICIENCY (H): Primary | ICD-10-CM

## 2022-11-15 DIAGNOSIS — R63.4 WEIGHT LOSS: ICD-10-CM

## 2022-11-15 PROCEDURE — 99214 OFFICE O/P EST MOD 30 MIN: CPT | Mod: 95 | Performed by: INTERNAL MEDICINE

## 2022-11-15 RX ORDER — DESMOPRESSIN ACETATE 0.2 MG/1
0.2 TABLET ORAL 2 TIMES DAILY
Qty: 180 TABLET | Refills: 3 | Status: SHIPPED | OUTPATIENT
Start: 2022-11-15 | End: 2023-03-15

## 2022-11-15 NOTE — PROGRESS NOTES
Abelino is a 32 year old who is being evaluated via a billable video visit.      How would you like to obtain your AVS? [employer]  If the video visit is dropped, the invitation should be resent by: Send to e-mail at: [email or web address]  Will anyone else be joining your video visit? No      Video-Visit Details    Type of service:  Video Visit    Video Start Time (time video started): 2:04 am   Video End Time (time video stopped): 2:22 am     Originating Location (pt. Location): Home  Distant Location (provider location): Off-site  Mode of Communication:  Video Conference via [employer].   The patient was last seen in our clinic in 2020.  Abelino Turcios is a 30 year old male who was admitted on 3/19/14 after presenting to the emergency room with headache, nausea, 2 weeks of left side vision blurring.  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 He underwent an endoscopic 3rd ventriculostomy and EVD placement and surgery stealth assisted suboccipital craniotomy with the excision of the tumor on 3/26/14. 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 Focal mature teratoma (~5%) with mature squamous epithelial and mesenchymal elements (focal cartilage) were seen. Lesser components of yolk sac (~5%), (~2%) embryonal and (~3%) germinoma were present. 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   The patient developed polyuria and increased thirst a couple of months prior to his hospitalization. A diagnosis of DI was confirmed during hospitalization and the patient was discharged on desmopressin. The investigation of pituitary function postop revealed normal anterior pituitary function.  He completed the radiation therapy in October of 2014.  Prior to radiation therapy, he had sperm retrieved, for cryopreservation.  Current dose of desmopressin is 200  g PO in the morning (8:30 AM), 200   g around bedtime (8 PM; bedtime=9 PM).  He wakes up to use the restroom only once, in the early morning hours.  During daytime, he uses the restroom more frequently, up to 6 times a day.   Last year, he had 1 seizure event, in October of this year.  Most recent sodium normal was normal at 138, on 11/3/2022, at 12:37 PM.  In the past, the seizure episodes were not associated with hyponatremia.  Currently, he denies any specific complaints.  On questioning, he denies headaches, visual issues.  He does work from home and he finds that using computer glasses is very helpful for his vision.  At night, he has been using the restroom 0-1 times.  Denies increased urination during daytime.  Most recent brain MRI was done in March 2019.  A head CT was done in July 2022. Beta hCG was undetectable on follow-up labs.  Lab work from April 2019 revealed an undetectable beta hCG and alpha-fetoprotein of 3.2, slightly higher compared with the prior numbers.  The patient has not had a follow-up appointment scheduled with oncology since 2018.    Abelino continues to struggle with fatigue and daily migraines, associated with light intolerance, nausea, sometimes vomiting.  It is very hard for him to read fine print and he frequently gets nauseated.  In order to improve his vision, he frequently closes his left eye.  Due to the nausea, according to the patient, his appetite has decreased.  He states his weight is down 5 to 10 pounds.  His clothes are looser.  According to our records, his weight declined by 23 pounds since July this year, assuming the weight recorded in November this year is accurate.  With the migraines, he has been experiencing lightheadedness.  Otherwise, the lightheadedness is only present if he suddenly stands up from a sitting or lying position, which rarely happens.  Quite frequently, he wakes up tired in the morning.  He feels more tired if he spends more time in front of the computer, as he helps his mother with  the store.     Past Medical History:   Diagnosis Date     Hypertropia      Intractable chronic migraine without aura 8/3/2022     Myopia      Seizures (H)     2 months ago     Type 2 diabetes mellitus without complications (H)     Borderline   Encephalitis 2012  History of seizure disorders post encephalitis     Past Surgical History:   Procedure Laterality Date     ARTHROTOMY SHOULDER, BANKHART REPAIR, COMBINED  10/29/2012    Procedure: COMBINED ARTHROTOMY SHOULDER, BANKHART REPAIR;  RIGHT OPEN SHOULDER BANKART REPAIR;  Surgeon: Lemuel Ramírez MD;  Location: [facility]     OPTICAL TRACKING SYSTEM BIOPSY BRAIN  3/20/2014    Procedure: OPTICAL TRACKING SYSTEM BIOPSY BRAIN;  Right Frontal Approach For endoscopic Intraventricular Mass Biopsy, 3rd Ventriculostomy, Placement of right ventriculostomy catheter.;  Surgeon: Williams Clement MD;  Location: [facility] OR     OPTICAL TRACKING SYSTEM CRANIOTOMY, EXCISE TUMOR, COMBINED  3/26/2014    Procedure: COMBINED OPTICAL TRACKING SYSTEM CRANIOTOMY, EXCISE TUMOR;  Stealth Assisted Sub-Occiptial Craniotomy, Excise Tumor ;  Surgeon: Ann Sharp MD;  Location: [facility] OR     OPTICAL TRACKING SYSTEM IMPLANT SHUNT VENTRICULOPERITONEAL  4/8/2014    Procedure: OPTICAL TRACKING SYSTEM IMPLANT SHUNT VENTRICULOPERITONEAL;  Right Stealth Guided Ventricularperitoneal Shunt Placement ;  Surgeon: Ann Sharp MD;  Location: [facility] OR     RECESSION RESECTION (REPAIR STRABISMUS) BILATERAL Bilateral 8/4/2016    Procedure: RECESSION RESECTION (REPAIR STRABISMUS) BILATERAL;  Surgeon: Serafin Menendez MD;  Location: [facility] OR     VENTRICULOSTOMY  3/20/2014    Procedure: VENTRICULOSTOMY;;  Surgeon: Williams Clement MD;  Location: [facility] OR       Current Medications    Current Outpatient Medications:      ACETAMINOPHEN PO, Take 650 mg by mouth every 4 hours as needed for pain, Disp: , Rfl:      desmopressin (DDAVP) 0.2 MG tablet, Take 1 tablet (0.2 mg) by mouth 2 times  daily, Disp: 180 tablet, Rfl: 0     EPINEPHrine (ANY BX GENERIC EQUIV) 0.3 MG/0.3ML injection 2-pack, Inject 0.3 mLs (0.3 mg) into the muscle as needed for anaphylaxis May repeat one time in 5-15 minutes if response to initial dose is inadequate., Disp: 2 each, Rfl: 0     Glycerin-Hypromellose- (VISINE TEARS OP), Apply 1-2 drops to eye 2 times daily as needed (for dry eyes.), Disp: , Rfl:      levETIRAcetam (KEPPRA) 750 MG tablet, Take 1 tablet (750 mg) by mouth 2 times daily Take 2.5 tablets in the morning and 3 in the afternoon., Disp: 135 tablet, Rfl: 3     naproxen (NAPROSYN) 500 MG tablet, Take 1 tablet (500 mg) by mouth 2 times daily as needed for headaches, Disp: 60 tablet, Rfl: 0     OXcarbazepine (TRILEPTAL) 300 MG tablet, Take 300 mg by mouth 2 times daily, Disp: , Rfl:      prochlorperazine (COMPAZINE) 5 MG tablet, Take 0.5-1 tablets (2.5-5 mg) by mouth every 8 hours as needed for nausea or vomiting, Disp: 20 tablet, Rfl: 1     Rimegepant Sulfate 75 MG TBDP, Take 75 mg by mouth See Admin Instructions Take 75 mg orally every other day, Disp: 16 tablet, Rfl: 9  No current facility-administered medications for this visit.    Facility-Administered Medications Ordered in Other Visits:      gadobutrol (GADAVIST) injection 7.5 mL, 7.5 mL, Intravenous, Once, Ann Sharp MD    Family history  Denies family history of diabetes, thyroid disorders or cancer.    Social History  He denies smoking, drinking alcohol or using illicit drugs.  Quit [employer] in 8/2016.     Review of Systems   Systemic:              As above  Eye:                      Stable visual impairement   Jay-Laryngeal:     no dysphagia, no hoarseness, no cough   Breast:                  No breast symptoms  Cardiovascular:    No cardiovascular symptoms, no CP or palpitations   Pulmonary:           No SOB    Gastrointestinal:   No diarrhea or constipation   Genitourinary:       As above  Endocrine:            No heat or cold  intolerance. Denies night sweats or hot flashes.  No erectile dysfunction.  No changes of the facial hair.  Neurological:        no tremor, no numbness or tingling sensation  Musculoskeletal:  No musculoskeletal symptoms, no muscle or joint pain   Skin:                     Scalp eczema, no hair falling out   Psychological:      Denies depression or anxiety               Vital Signs     Previous Weights:    Wt Readings from Last 10 Encounters:   11/03/22 62.4 kg (137 lb 8 oz)   07/08/22 72.6 kg (160 lb)   06/11/21 72.6 kg (160 lb)   06/12/19 73.3 kg (161 lb 8 oz)   04/29/19 74.4 kg (164 lb)   04/10/19 74.8 kg (165 lb)   03/29/19 74.8 kg (165 lb)   03/29/19 74.8 kg (165 lb)   03/27/19 74 kg (163 lb 3.2 oz)   10/24/18 80.6 kg (177 lb 12.8 oz)          Labs:   I reviewed prior lab results documented in EPIC.     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 He is now s/p chemo and radiation therapy and there was no evidence of tumor recurrence on the most recent MRI from March 2019.  The pituitary gland function was reevaluated in October 2018.  AFP remained fairly stable and and Beta HCG was undetectable, from 2015 to 2019.    The patient does not endorse polyuria or polydipsia.  Recent sodium levels have been normal.  He does endorse longstanding fatigue and weight loss, which he attributes to worsening headaches and nausea.    Recommendations:  Check BMP after taking the morning dose of desmopressin  Check ACTH, cortisol and TFTs given the above symptoms.  The patient was instructed to have the lab work done 1 or 2 hours after waking up in the morning.  Follow-up alpha-fetoprotein and beta-hCG.  Pending these lab results, he might need to reestablish care in the oncology clinic.    The prescription for dexamethasone was refilled.    Orders Placed This Encounter   Procedures     HCG tumor marker     Alpha Fetoprotein CSF Tumor Marker:      Cortisol     Adrenal corticotropin     Basic metabolic panel     TSH     T4 free

## 2022-11-15 NOTE — LETTER
11/15/2022         RE: Kari Turcios  [address]  [facility] [address]        Dear Colleague,    Thank you for referring your patient, Kari Turcios, to the [facility]. Please see a copy of my visit note below.    Abelino is a 32 year old who is being evaluated via a billable video visit.      How would you like to obtain your AVS? MyChart  If the video visit is dropped, the invitation should be resent by: Send to e-mail at: [email or web address]  Will anyone else be joining your video visit? No      Video-Visit Details    Type of service:  Video Visit    Video Start Time (time video started): 2:04 am   Video End Time (time video stopped): 2:22 am     Originating Location (pt. Location): Home  Distant Location (provider location): Off-site  Mode of Communication:  Video Conference via [employer].   The patient was last seen in our clinic in 2020.  Abelino Turcios is a 30 year old male who was admitted on 3/19/14 after presenting to the emergency room with headache, nausea, 2 weeks of left side vision blurring.  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 He underwent an endoscopic 3rd ventriculostomy and EVD placement and surgery stealth assisted suboccipital craniotomy with the excision of the tumor on 3/26/14. 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 Focal mature teratoma (~5%) with mature squamous epithelial and mesenchymal elements (focal cartilage) were seen. Lesser components of yolk sac (~5%), (~2%) embryonal and (~3%) germinoma were present. 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   The patient developed polyuria and increased thirst a couple of months prior to his hospitalization. A diagnosis of DI was confirmed during hospitalization and the patient was discharged on desmopressin. The investigation of pituitary  function postop revealed normal anterior pituitary function.  He completed the radiation therapy in October of 2014.  Prior to radiation therapy, he had sperm retrieved, for cryopreservation.  Current dose of desmopressin is 200  g PO in the morning (8:30 AM), 200  g around bedtime (8 PM; bedtime=9 PM).  He wakes up to use the restroom only once, in the early morning hours.  During daytime, he uses the restroom more frequently, up to 6 times a day.   Last year, he had 1 seizure event, in October of this year.  Most recent sodium normal was normal at 138, on 11/3/2022, at 12:37 PM.  In the past, the seizure episodes were not associated with hyponatremia.  Currently, he denies any specific complaints.  On questioning, he denies headaches, visual issues.  He does work from home and he finds that using computer glasses is very helpful for his vision.  At night, he has been using the restroom 0-1 times.  Denies increased urination during daytime.  Most recent brain MRI was done in March 2019.  A head CT was done in July 2022. Beta hCG was undetectable on follow-up labs.  Lab work from April 2019 revealed an undetectable beta hCG and alpha-fetoprotein of 3.2, slightly higher compared with the prior numbers.  The patient has not had a follow-up appointment scheduled with oncology since 2018.    Abelino continues to struggle with fatigue and daily migraines, associated with light intolerance, nausea, sometimes vomiting.  It is very hard for him to read fine print and he frequently gets nauseated.  In order to improve his vision, he frequently closes his left eye.  Due to the nausea, according to the patient, his appetite has decreased.  He states his weight is down 5 to 10 pounds.  His clothes are looser.  According to our records, his weight declined by 23 pounds since July this year, assuming the weight recorded in November this year is accurate.  With the migraines, he has been experiencing lightheadedness.  Otherwise, the  lightheadedness is only present if he suddenly stands up from a sitting or lying position, which rarely happens.  Quite frequently, he wakes up tired in the morning.  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     Past Medical History:   Diagnosis Date     Hypertropia      Intractable chronic migraine without aura 8/3/2022     Myopia      Seizures (H)     2 months ago     Type 2 diabetes mellitus without complications (H)     Borderline   Encephalitis 2012  History of seizure disorders post encephalitis     Past Surgical History:   Procedure Laterality Date     ARTHROTOMY SHOULDER, BANKHART REPAIR, COMBINED  10/29/2012    Procedure: COMBINED ARTHROTOMY SHOULDER, BANKHART REPAIR;  RIGHT OPEN SHOULDER BANKART REPAIR;  Surgeon: Lemuel Ramírez MD;  [facility]     OPTICAL TRACKING SYSTEM BIOPSY BRAIN  3/20/2014    Procedure: OPTICAL TRACKING SYSTEM BIOPSY BRAIN;  Right Frontal Approach For endoscopic Intraventricular Mass Biopsy, 3rd Ventriculostomy, Placement of right ventriculostomy catheter.;  Surgeon: Williams Clement MD;  Location: [facility] OR     OPTICAL TRACKING SYSTEM CRANIOTOMY, EXCISE TUMOR, COMBINED  3/26/2014    Procedure: COMBINED OPTICAL TRACKING SYSTEM CRANIOTOMY, EXCISE TUMOR;  Stealth Assisted Sub-Occiptial Craniotomy, Excise Tumor ;  Surgeon: Ann Sharp MD;  Location: [facility] OR     OPTICAL TRACKING SYSTEM IMPLANT SHUNT VENTRICULOPERITONEAL  4/8/2014    Procedure: OPTICAL TRACKING SYSTEM IMPLANT SHUNT VENTRICULOPERITONEAL;  Right Stealth Guided Ventricularperitoneal Shunt Placement ;  Surgeon: Ann Sharp MD;  Location: [facility] OR     RECESSION RESECTION (REPAIR STRABISMUS) BILATERAL Bilateral 8/4/2016    Procedure: RECESSION RESECTION (REPAIR STRABISMUS) BILATERAL;  Surgeon: Serafin Menendez MD;  Location: [facility] OR     VENTRICULOSTOMY  3/20/2014    Procedure: VENTRICULOSTOMY;;  Surgeon: Urbano  Williams Paula MD;  Location: [facility] OR       Current Medications    Current Outpatient Medications:      ACETAMINOPHEN PO, Take 650 mg by mouth every 4 hours as needed for pain, Disp: , Rfl:      desmopressin (DDAVP) 0.2 MG tablet, Take 1 tablet (0.2 mg) by mouth 2 times daily, Disp: 180 tablet, Rfl: 0     EPINEPHrine (ANY BX GENERIC EQUIV) 0.3 MG/0.3ML injection 2-pack, Inject 0.3 mLs (0.3 mg) into the muscle as needed for anaphylaxis May repeat one time in 5-15 minutes if response to initial dose is inadequate., Disp: 2 each, Rfl: 0     Glycerin-Hypromellose- (VISINE TEARS OP), Apply 1-2 drops to eye 2 times daily as needed (for dry eyes.), Disp: , Rfl:      levETIRAcetam (KEPPRA) 750 MG tablet, Take 1 tablet (750 mg) by mouth 2 times daily Take 2.5 tablets in the morning and 3 in the afternoon., Disp: 135 tablet, Rfl: 3     naproxen (NAPROSYN) 500 MG tablet, Take 1 tablet (500 mg) by mouth 2 times daily as needed for headaches, Disp: 60 tablet, Rfl: 0     OXcarbazepine (TRILEPTAL) 300 MG tablet, Take 300 mg by mouth 2 times daily, Disp: , Rfl:      prochlorperazine (COMPAZINE) 5 MG tablet, Take 0.5-1 tablets (2.5-5 mg) by mouth every 8 hours as needed for nausea or vomiting, Disp: 20 tablet, Rfl: 1     Rimegepant Sulfate 75 MG TBDP, Take 75 mg by mouth See Admin Instructions Take 75 mg orally every other day, Disp: 16 tablet, Rfl: 9  No current facility-administered medications for this visit.    Facility-Administered Medications Ordered in Other Visits:      gadobutrol (GADAVIST) injection 7.5 mL, 7.5 mL, Intravenous, Once, Ann Sharp MD    Family history  Denies family history of diabetes, thyroid disorders or cancer.    Social History  He denies smoking, drinking alcohol or using illicit drugs.  Quit [employer] in 8/2016.     Review of Systems   Systemic:              As above  Eye:                      Stable visual impairement   Jay-Laryngeal:     no dysphagia, no hoarseness, no cough    Breast:                  No breast symptoms  Cardiovascular:    No cardiovascular symptoms, no CP or palpitations   Pulmonary:           No SOB    Gastrointestinal:   No diarrhea or constipation   Genitourinary:       As above  Endocrine:            No heat or cold intolerance. Denies night sweats or hot flashes.  No erectile dysfunction.  No changes of the facial hair.  Neurological:        no tremor, no numbness or tingling sensation  Musculoskeletal:  No musculoskeletal symptoms, no muscle or joint pain   Skin:                     Scalp eczema, no hair falling out   Psychological:      Denies depression or anxiety               Vital Signs     Previous Weights:    Wt Readings from Last 10 Encounters:   11/03/22 62.4 kg (137 lb 8 oz)   07/08/22 72.6 kg (160 lb)   06/11/21 72.6 kg (160 lb)   06/12/19 73.3 kg (161 lb 8 oz)   04/29/19 74.4 kg (164 lb)   04/10/19 74.8 kg (165 lb)   03/29/19 74.8 kg (165 lb)   03/29/19 74.8 kg (165 lb)   03/27/19 74 kg (163 lb 3.2 oz)   10/24/18 80.6 kg (177 lb 12.8 oz)          Labs:   I reviewed prior lab results documented in EPIC.     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 He is now s/p chemo and radiation therapy and there was no evidence of tumor recurrence on the most recent MRI from March 2019.  The pituitary gland function was reevaluated in October 2018.  AFP remained fairly stable and and Beta HCG was undetectable, from 2015 to 2019.    The patient does not endorse polyuria or polydipsia.  Recent sodium levels have been normal.  He does endorse longstanding fatigue and weight loss, which he attributes to worsening headaches and nausea.    Recommendations:  Check BMP after taking the morning dose of desmopressin  Check ACTH, cortisol and TFTs given the above symptoms.  The patient was instructed to have the lab work done 1 or 2 hours after waking up in the morning.  Follow-up  alpha-fetoprotein and beta-hCG.  Pending these lab results, he might need to reestablish care in the oncology clinic.    The prescription for dexamethasone was refilled.    Orders Placed This Encounter   Procedures     HCG tumor marker     Alpha Fetoprotein CSF Tumor Marker:     Cortisol     Adrenal corticotropin     Basic metabolic panel     TSH     T4 free                  Again, thank you for allowing me to participate in the care of your patient.        Sincerely,        Yessenia Ramires MD

## 2022-11-16 ENCOUNTER — TELEPHONE (OUTPATIENT)
Dept: ENDOCRINOLOGY | Facility: CLINIC | Age: 32
End: 2022-11-16

## 2022-11-20 ENCOUNTER — HEALTH MAINTENANCE LETTER (OUTPATIENT)
Age: 32
End: 2022-11-20

## 2022-11-21 ENCOUNTER — TELEPHONE (OUTPATIENT)
Dept: NEUROLOGY | Facility: CLINIC | Age: 32
End: 2022-11-21

## 2022-11-21 NOTE — TELEPHONE ENCOUNTER
We received a fax stating the recipient is eligible for Medicare Part B. Since we cannot guarantee coverage, the patient would need to sign an ARN waiver if he would like to proceed.

## 2022-11-21 NOTE — TELEPHONE ENCOUNTER
Informed pt note from your team as written below and explained to patient what an ARN is and that we can not guarantee coverage of his botox, so if he would like to proceed and Medicaid denies it that he would be responsible for the bill.  Pt stated he does not want to proceed with botox.

## 2022-12-06 ENCOUNTER — LAB (OUTPATIENT)
Dept: LAB | Facility: CLINIC | Age: 32
End: 2022-12-06
Payer: MEDICAID

## 2022-12-06 DIAGNOSIS — G40.219 LOCALZ-RLTD SYMPTOMATIC EPILEPSY W CMPLX PART SZ, INTRACT, WO STATUS (H): ICD-10-CM

## 2022-12-06 PROCEDURE — 99000 SPECIMEN HANDLING OFFICE-LAB: CPT

## 2022-12-06 PROCEDURE — 80183 DRUG SCRN QUANT OXCARBAZEPIN: CPT | Mod: 90

## 2022-12-06 PROCEDURE — 80177 DRUG SCRN QUAN LEVETIRACETAM: CPT

## 2022-12-06 PROCEDURE — 36415 COLL VENOUS BLD VENIPUNCTURE: CPT

## 2022-12-07 ENCOUNTER — ANCILLARY PROCEDURE (OUTPATIENT)
Dept: MRI IMAGING | Facility: CLINIC | Age: 32
End: 2022-12-07
Attending: NURSE PRACTITIONER
Payer: MEDICAID

## 2022-12-07 ENCOUNTER — OFFICE VISIT (OUTPATIENT)
Dept: NEUROSURGERY | Facility: CLINIC | Age: 32
End: 2022-12-07
Payer: MEDICAID

## 2022-12-07 VITALS
OXYGEN SATURATION: 100 % | WEIGHT: 140 LBS | RESPIRATION RATE: 16 BRPM | HEART RATE: 51 BPM | DIASTOLIC BLOOD PRESSURE: 74 MMHG | BODY MASS INDEX: 22.6 KG/M2 | SYSTOLIC BLOOD PRESSURE: 109 MMHG

## 2022-12-07 DIAGNOSIS — Z98.2 S/P VP SHUNT: ICD-10-CM

## 2022-12-07 DIAGNOSIS — R51.9 PERSISTENT HEADACHES: ICD-10-CM

## 2022-12-07 DIAGNOSIS — D44.5 PINEAL GERM CELL TUMOR (H): Primary | ICD-10-CM

## 2022-12-07 DIAGNOSIS — D44.5 PINEAL GERM CELL TUMOR (H): ICD-10-CM

## 2022-12-07 DIAGNOSIS — D18.00 CAVERNOMA: ICD-10-CM

## 2022-12-07 PROCEDURE — 99204 OFFICE O/P NEW MOD 45 MIN: CPT | Performed by: PHYSICIAN ASSISTANT

## 2022-12-07 PROCEDURE — 70553 MRI BRAIN STEM W/O & W/DYE: CPT | Mod: GC | Performed by: RADIOLOGY

## 2022-12-07 PROCEDURE — A9585 GADOBUTROL INJECTION: HCPCS | Performed by: RADIOLOGY

## 2022-12-07 RX ORDER — GADOBUTROL 604.72 MG/ML
0.1 INJECTION INTRAVENOUS ONCE
Status: COMPLETED | OUTPATIENT
Start: 2022-12-07 | End: 2022-12-07

## 2022-12-07 RX ORDER — EPTINEZUMAB-JJMR 100 MG/ML
INJECTION INTRAVENOUS
COMMUNITY
Start: 2022-11-03 | End: 2022-12-08 | Stop reason: SINTOL

## 2022-12-07 RX ADMIN — GADOBUTROL 6.5 ML: 604.72 INJECTION INTRAVENOUS at 12:03

## 2022-12-07 ASSESSMENT — PAIN SCALES - GENERAL: PAINLEVEL: MODERATE PAIN (4)

## 2022-12-07 NOTE — LETTER
"2022       RE: Kari Turcios  [address]  [facility] [address]     Dear Colleague,    Thank you for referring your patient, Kari Turcios, to the [facility] [place] [facility]. Please see a copy of my visit note below.      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 Sharp. His last follow up was 3/2019 with Marlyn Rascon CNP, for shunt check after MRI.  Prior to that he saw Dr. Sharp in 2016 where his shunt was dialed up from 1.5 to 2.0 due to new headaches and smaller ventricle size on imaging. He obtained today's MRI for Neurology who he just established with for headache management. He reports that his headaches are almost always in the same spot, over his shunt tubing just behind and above his right ear. It helps to \"massage\" the area. These occur frequently, almost daily. He tells me today he did have a Botox injection which helped but upon chart review it was vyepti and he had a reaction shortly after infusion started.      He follows with Dr. Ramires in Endocrinology for h/o DI.      Physical Exam:   /74   Pulse 51   Resp 16   Wt 63.5 kg (140 lb)   SpO2 100%   BMI 22.60 kg/m     General: No acute distress.    MSK: Moves all extremities.  No obvious deformity.  Neuro: " The patient is fully oriented. Speech is normal. Gait is normal.  Psych: Normal mood and affect. Behavior is normal.      Procedure:   After verbal consent his right frontal VPS was interrogated and found to be set to 2.0. Rechecked x 2. We left this setting to see if it helps with his headaches based on ventricle size today and past visits.     Imaging:  IMPRESSION:  1. Stable size/configuration of shunted ventricles. No MRI findings of  over shunting or increased intracranial hypertension.  2. 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 This is associated with an  developmental venous anomaly.   3. No MRI findings of recurrent pineal tumor.     I have personally reviewed the examination and initial interpretation  and I agree with the findings.     PRIYANKA GARVEY MD     Slitlike ventricles seen on imaging today.     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 We discussed leaving it at 2.0 as his ventricles are slitlike and was dialed up in 2016 due to headache then likely inadvertently dialed back down 2 years later, in 2018. Questionable relationship to headaches. He does have a right occipital lobe cavernoma suggestive of subacute hemorrhage when compared to 2019. This is small and presumably asymptomatic. We'll plan for follow up in one year to track this area along with his resection site and  shunt status. He was encouraged to continue headache management with Neurology and call us with any new concerns in the meantime.          Again, thank you for allowing me to participate in the care of your patient.      Sincerely,    Yen Ochoa PA-C

## 2022-12-07 NOTE — PROGRESS NOTES
"  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 Sharp. His last follow up was 3/2019 with Marlyn Rascon CNP, for shunt check after MRI.  Prior to that he saw Dr. Sharp in 2016 where his shunt was dialed up from 1.5 to 2.0 due to new headaches and smaller ventricle size on imaging. He obtained today's MRI for Neurology who he just established with for headache management. He reports that his headaches are almost always in the same spot, over his shunt tubing just behind and above his right ear. It helps to \"massage\" the area. These occur frequently, almost daily. He tells me today he did have a Botox injection which helped but upon chart review it was vyepti and he had a reaction shortly after infusion started.      He follows with Dr. Ramires in Endocrinology for h/o DI.      Physical Exam:   /74   Pulse 51   Resp 16   Wt 63.5 kg (140 lb)   SpO2 100%   BMI 22.60 kg/m     General: No acute distress.    MSK: Moves all extremities.  No obvious deformity.  Neuro: The patient is fully oriented. Speech is normal. Gait is normal.  Psych: Normal mood and affect. Behavior is normal.      Procedure:   After verbal consent his right frontal VPS was interrogated and found to be set to 2.0. Rechecked x 2. We left this setting to see if it helps with his headaches based on ventricle size today " and past visits.     Imaging:  IMPRESSION:  1. Stable size/configuration of shunted ventricles. No MRI findings of  over shunting or increased intracranial hypertension.  2. 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 This is associated with an  developmental venous anomaly.   3. No MRI findings of recurrent pineal tumor.     I have personally reviewed the examination and initial interpretation  and I agree with the findings.     PRIYANKA GARVEY MD     Slitlike ventricles seen on imaging today.     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 We discussed leaving it at 2.0 as his ventricles are slitlike and was dialed up in 2016 due to headache then likely inadvertently dialed back down 2 years later, in 2018. Questionable relationship to headaches. He does have a right occipital lobe cavernoma suggestive of subacute hemorrhage when compared to 2019. This is small and presumably asymptomatic. We'll plan for follow up in one year to track this area along with his resection site and  shunt status. He was encouraged to continue headache management with Neurology and call us with any new concerns in the meantime.      Yen Ochoa PA-C  Department of Neurosurgery  Center for Skull Base and Pituitary Surgery  [facility]

## 2022-12-08 LAB — 10OH-CARBAZEPINE SERPL-MCNC: 10 UG/ML

## 2022-12-08 NOTE — RESULT ENCOUNTER NOTE
Let me know once Dr Sharp reviews Abelino's images if any follow up needed. Thank you      Hi Abelino,   I reviewed your brain MRI results and it appears stable at this time in regards of shunt and no findings of recurrent tumor. You have right occipital lobe cavernoma with some changes suggestive of bleeding sometime ago.I'm checking with Dr Sharp to see if he sees any changes that need follow up. I'll let you know once I here from him.   Regarding headaches -we were discussing a trial of Botox. I'm checking to see whether it was approved.     Take care,   Sybil

## 2022-12-13 NOTE — PATIENT INSTRUCTIONS
Follow up with myself or Dr. Sharp in 1 year (in person, needs shunt check after MRI) with repeat MRI prior to appointment.

## 2022-12-14 LAB — LEVETIRACETAM SERPL-MCNC: 88.8 ΜG/ML (ref 10–40)

## 2022-12-23 ENCOUNTER — TELEPHONE (OUTPATIENT)
Dept: NEUROSURGERY | Facility: CLINIC | Age: 32
End: 2022-12-23

## 2023-01-17 ENCOUNTER — VIRTUAL VISIT (OUTPATIENT)
Dept: NEUROSURGERY | Facility: CLINIC | Age: 33
End: 2023-01-17
Payer: MEDICAID

## 2023-01-17 DIAGNOSIS — D18.00 CAVERNOMA: Primary | ICD-10-CM

## 2023-01-17 DIAGNOSIS — D44.5 PINEAL GERM CELL TUMOR (H): ICD-10-CM

## 2023-01-17 DIAGNOSIS — Z98.2 VP (VENTRICULOPERITONEAL) SHUNT STATUS: ICD-10-CM

## 2023-01-17 PROCEDURE — 99442 PR PHYSICIAN TELEPHONE EVALUATION 11-20 MIN: CPT | Mod: 93 | Performed by: NEUROLOGICAL SURGERY

## 2023-01-17 NOTE — LETTER
2023       RE: Kari Turcios  [address]  [facility] [address]     Dear Colleague,    Thank you for referring your patient, Kari Turcios, to the [facility] [place] [facility]. Please see a copy of my visit note below.    Headaches along shunt tract behind ear. Looking at fine print can trigger headaches. Works in his parents' store. Short term memory still poor. Will bring into clinic in the next 1-2 weeks and raise shunt valve to 2.5 and see if he can become shunt independent. See dictated note.  NANY Sharp MD    Service Date: 2023    Ila Cornell MD   [address]  [place] [address]     RE:  Kari Turcios  MRN: [id number]  : 1990    Dear Dr. Cornell:    We spoke to Mr. Turcios as part of a telephone followup in [facility] on 2023.  This is the first time I have spoken to him in a few years.  He has seen other members of our team more recently.      As you know, he has a complex neurosurgical history with an original presentation of a large pineal region tumor.  This proved to be a non-germinates germ cell tumor.  He also presented with hydrocephalus for which he has a ventriculoperitoneal shunt.    Mr. Turcios is independent, but he has some limitations.  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  He continues to have headaches.  He seems to have 2 kinds of headaches.  The first is pain along the shunt tract behind the ear.  He describes scalp sensitivity and pain and tries to avoid lying on the side of the right side of the head.  He also develops headaches that are triggered when he is trying to focus his vision, particularly when reading fine print.  He also describes cognitive deficits, most notably short-term memory deficits.    Over the phone, he initially sounded apathetic and perhaps even  despondent.  However, towards the end of our conversation, he sounded more upbeat.  His speech and language were normal.    REVIEW OF STUDIES:  We went over his MRI from 2022.  On a positive note, there is no recurrent pineal region tumor.  However, we see a cavernous malformation over right occipital pole with no surrounding edema.  His ventricles remain very small with a catheter in a stable position in the third ventricle.    IMPRESSION AND PLAN:  His shunt catheter-related headaches are severe enough that he is interested in pursuing any treatment.  We discussed raising the Strata shunt valve to 2.5 to see if he could become shunt independent.  Then we could conceivably remove the shunt altogether.  The alternative is moving the shunt, but with his slit ventricles, this carries higher risk.  He is interested in starting this process.  We will bring him in to clinic sometime in the next 1-2 weeks and raised the valve to 2.5 and see how he tolerates this setting.  We will keep you informed of his progress.    Sincerely,    Ann Sharp MD        D: 2023   T: 2023   MT: IRVING    Name:     PANDA WOODARDTenisha  MRN:      [id number]        Account:      [id number]   :      1990           Service Date: 2023       Document: [id number]

## 2023-01-17 NOTE — LETTER
1/17/2023       RE: Kari Turcios  [address]  [facility] [address]     Dear Colleague,    Thank you for referring your patient, Kari Turcios, to the [facility]OS[facility] [place] [facility]. Please see a copy of my visit note below.    Abelino is a 32 year old who is being evaluated via a billable telephone visit.      What phone number would you like to be contacted at? [phone]  How would you like to obtain your AVS? MyChart    Distant Location (provider location):  On-site  Phone call duration: 15 minutes    Headaches along shunt tract behind ear. Looking at fine print can trigger headaches. Works in his parents' store. Short term memory still poor. Will bring into clinic in the next 1-2 weeks and raise shunt valve to 2.5 and see if he can become shunt independent. See dictated note.  NANY Sharp MD      Again, thank you for allowing me to participate in the care of your patient.      Sincerely,    Ann Sharp MD

## 2023-01-17 NOTE — PROGRESS NOTES
Abelino is a 32 year old who is being evaluated via a billable telephone visit.      What phone number would you like to be contacted at? [phone]  How would you like to obtain your AVS? MyChart    Distant Location (provider location):  On-site  Phone call duration: 15 minutes    Headaches along shunt tract behind ear. Looking at fine print can trigger headaches. Works in his parents' store. Short term memory still poor. Will bring into clinic in the next 1-2 weeks and raise shunt valve to 2.5 and see if he can become shunt independent. See dictated note.  NANY Sharp MD

## 2023-01-17 NOTE — LETTER
2023      RE: Kari Turcios  [address]  [facility] [address]           Headaches along shunt tract behind ear. Looking at fine print can trigger headaches. Works in his parents' store. Short term memory still poor. Will bring into clinic in the next 1-2 weeks and raise shunt valve to 2.5 and see if he can become shunt independent. See dictated note.  NANY Sharp MD    Service Date: 2023    Ila Cornell MD   [address], [address]  [place] [address]     RE:  Kari Turcios  MRN: [id number]  : 1990    Dear Dr. Cornell:    We spoke to Mr. Turcios as part of a telephone followup in [facility] on 2023.  This is the first time I have spoken to him in a few years.  He has seen other members of our team more recently.      As you know, he has a complex neurosurgical history with an original presentation of a large pineal region tumor.  This proved to be a non-germinates germ cell tumor.  He also presented with hydrocephalus for which he has a ventriculoperitoneal shunt.    Mr. Turcios is independent, but he has some limitations.  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  He continues to have headaches.  He seems to have 2 kinds of headaches.  The first is pain along the shunt tract behind the ear.  He describes scalp sensitivity and pain and tries to avoid lying on the side of the right side of the head.  He also develops headaches that are triggered when he is trying to focus his vision, particularly when reading fine print.  He also describes cognitive deficits, most notably short-term memory deficits.    Over the phone, he initially sounded apathetic and perhaps even despondent.  However, towards the end of our conversation, he sounded more upbeat.  His speech and language were normal.    REVIEW OF STUDIES:  We went over his MRI from 2022.  On a positive note, there is no recurrent pineal  region tumor.  However, we see a cavernous malformation over right occipital pole with no surrounding edema.  His ventricles remain very small with a catheter in a stable position in the third ventricle.    IMPRESSION AND PLAN:  His shunt catheter-related headaches are severe enough that he is interested in pursuing any treatment.  We discussed raising the Strata shunt valve to 2.5 to see if he could become shunt independent.  Then we could conceivably remove the shunt altogether.  The alternative is moving the shunt, but with his slit ventricles, this carries higher risk.  He is interested in starting this process.  We will bring him in to clinic sometime in the next 1-2 weeks and raised the valve to 2.5 and see how he tolerates this setting.  We will keep you informed of his progress.    Sincerely,    Ann Sharp MD        D: 2023   T: 2023   MT: [place]    Name:     PANDA WOODARDTenisha  MRN:      [id number]        Account:      [id number]   :      1990           Service Date: 2023       Document: [id number]

## 2023-01-17 NOTE — LETTER
January 18, 2023       TO: Kari Turcios  [address]  [facility] [address]       DearMr.Tam,    We are writing to inform you of your test results.    {[facility] results letter list:[id number]}    No results found from the In Basket message.    ***

## 2023-01-18 NOTE — PATIENT INSTRUCTIONS
Follow up with Dr. Sharp in 1 year (in person, needs shunt check after MRI) with repeat MRI prior to appointment    Stroke & Endovascular RN Care Coordinators:     Christian Bobo, VERONICA Mckeon, RN, CNRN, SCRN     If you have any questions please contact the RN Care Coordinators at [phone], option 1.     After business hours call the  at [phone] and have the Neuro-Interventional Fellow paged.     Thank you for choosing [facility] for your health care needs.

## 2023-01-19 NOTE — PROGRESS NOTES
Service Date: 2023    lIa Cornell MD   [address], [address]  [place] [address]     RE:  Kari Turcios  MRN: [id number]  : 1990    Dear Dr. Cornell:    We spoke to Mr. Turcios as part of a telephone followup in [facility] on 2023.  This is the first time I have spoken to him in a few years.  He has seen other members of our team more recently.      As you know, he has a complex neurosurgical history with an original presentation of a large pineal region tumor.  This proved to be a non-germinates germ cell tumor.  He also presented with hydrocephalus for which he has a ventriculoperitoneal shunt.    Mr. Turcios is independent, but he has some limitations.  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  He continues to have headaches.  He seems to have 2 kinds of headaches.  The first is pain along the shunt tract behind the ear.  He describes scalp sensitivity and pain and tries to avoid lying on the side of the right side of the head.  He also develops headaches that are triggered when he is trying to focus his vision, particularly when reading fine print.  He also describes cognitive deficits, most notably short-term memory deficits.    Over the phone, he initially sounded apathetic and perhaps even despondent.  However, towards the end of our conversation, he sounded more upbeat.  His speech and language were normal.    REVIEW OF STUDIES:  We went over his MRI from 2022.  On a positive note, there is no recurrent pineal region tumor.  However, we see a cavernous malformation over right occipital pole with no surrounding edema.  His ventricles remain very small with a catheter in a stable position in the third ventricle.    IMPRESSION AND PLAN:  His shunt catheter-related headaches are severe enough that he is interested in pursuing any treatment.  We discussed raising the Strata shunt valve to 2.5 to see if  he could become shunt independent.  Then we could conceivably remove the shunt altogether.  The alternative is moving the shunt, but with his slit ventricles, this carries higher risk.  He is interested in starting this process.  We will bring him in to clinic sometime in the next 1-2 weeks and raised the valve to 2.5 and see how he tolerates this setting.  We will keep you informed of his progress.    Sincerely,    Ann Sharp MD        D: 2023   T: 2023   MT: [place]    Name:     PANDA WOODARD  MRN:      [id number]        Account:      [id number]   :      1990           Service Date: 2023       Document: [id number]

## 2023-01-20 ENCOUNTER — OFFICE VISIT (OUTPATIENT)
Dept: NEUROSURGERY | Facility: CLINIC | Age: 33
End: 2023-01-20
Payer: MEDICAID

## 2023-01-20 VITALS
OXYGEN SATURATION: 99 % | WEIGHT: 144 LBS | DIASTOLIC BLOOD PRESSURE: 66 MMHG | BODY MASS INDEX: 22.6 KG/M2 | HEIGHT: 67 IN | HEART RATE: 57 BPM | SYSTOLIC BLOOD PRESSURE: 102 MMHG

## 2023-01-20 DIAGNOSIS — D44.5 PINEAL GERM CELL TUMOR (H): Primary | ICD-10-CM

## 2023-01-20 DIAGNOSIS — Z98.2 VP (VENTRICULOPERITONEAL) SHUNT STATUS: ICD-10-CM

## 2023-01-20 PROCEDURE — 99213 OFFICE O/P EST LOW 20 MIN: CPT | Mod: 25 | Performed by: PHYSICIAN ASSISTANT

## 2023-01-20 PROCEDURE — 62252 CSF SHUNT REPROGRAM: CPT | Performed by: PHYSICIAN ASSISTANT

## 2023-01-20 ASSESSMENT — PAIN SCALES - GENERAL: PAINLEVEL: MILD PAIN (3)

## 2023-01-20 NOTE — LETTER
"2023       RE: Kari Turcios  [address]  [facility] [address]     Dear Colleague,    Thank you for referring your patient, Kari Turcios, to the [facility] NEUROSURGERY CLINIC [place] at [facility]. Please see a copy of my visit note below.      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 Sharp along with VPS placed for hydrocephalus who is seen today for shunt adjustment. He spoke with Dr. Sharp this week by phone and describes ongoing headaches including pain along the shunt tract behind his right ear. He also has headaches triggered by trying to focus and read. At the telephone visit they discussed a trial of dialing his shunt up to 2.5 to see if removal in the future was feasible. He's her for this today. He's accompanied by his father. Feels reasonably well, is nervous about potential side effects with changing his shunt setting.    Physical Exam:   /66   Pulse 57   Ht 1.702 m (5' 7\")   Wt 65.3 kg (144 lb)   SpO2 99%   BMI 22.55 kg/m     General: No acute distress.    Eyes: Conjunctivae are normal.  MSK: Moves all extremities.  No obvious deformity.  Neuro: The patient is fully oriented. Speech is normal. Gait is normal.  Psych: Normal mood and affect. Behavior is normal.      Procedure:  With the patient's verbal permission his Strata valve was interrogated and found to be set to 2.0. Rechecked x 2. Using the magnet, this was dialed up today to 2.5. Rechecked x 2. Patient tolerated this " well.    Imaging:  No new imaging to review today     Assessment:  1. Pineal region germ cell tumor s/p craniotomy for resection 2014 and right VPS (Strata set to 2.5), follow up visit  2. Cavernoma, right occipital lobe    Plan:  We discussed follow up in 2-4 weeks for shunt check and head CT; he'd like to be seen on the sooner side which is fine. He was encouraged to reach out with any questions or concerns in the meantime.       Yen Ochoa PA-C  Department of Neurosurgery

## 2023-01-20 NOTE — PROGRESS NOTES
"  [facility]  Department of Neurosurgery  Center for Skull Base and Pituitary Surgery    Name: Kari Turcios  MRN: [id number]  Age: 32 year old  : 2023      Chief Complaint:   Pineal region germ cell tumor s/p craniotomy for resection  and right VPS (Strata set to 2), follow up visit    History of Present Illness:   Kari Turcios is a 32 year old male with a history of a large germ cell pineal tumor which was resected in  by Dr. Sharp along with VPS placed for hydrocephalus who is seen today for shunt adjustment. He spoke with Dr. Sharp this week by phone and describes ongoing headaches including pain along the shunt tract behind his right ear. He also has headaches triggered by trying to focus and read. At the telephone visit they discussed a trial of dialing his shunt up to 2.5 to see if removal in the future was feasible. He's her for this today. He's accompanied by his father. Feels reasonably well, is nervous about potential side effects with changing his shunt setting.    Physical Exam:   /66   Pulse 57   Ht 1.702 m (5' 7\")   Wt 65.3 kg (144 lb)   SpO2 99%   BMI 22.55 kg/m     General: No acute distress.    Eyes: Conjunctivae are normal.  MSK: Moves all extremities.  No obvious deformity.  Neuro: The patient is fully oriented. Speech is normal. Gait is normal.  Psych: Normal mood and affect. Behavior is normal.      Procedure:  With the patient's verbal permission his Strata valve was interrogated and found to be set to 2.0. Rechecked x 2. Using the magnet, this was dialed up today to 2.5. Rechecked x 2. Patient tolerated this well.    Imaging:  No new imaging to review today     Assessment:  1. Pineal region germ cell tumor s/p craniotomy for resection  and right VPS (Strata set to 2.5), follow up visit  2. Cavernoma, right occipital lobe    Plan:  We discussed follow up in 2-4 weeks for shunt check and head CT; he'd like to be seen on the " sooner side which is fine. He was encouraged to reach out with any questions or concerns in the meantime.       Yen Ochoa PA-C  Department of Neurosurgery

## 2023-01-24 ENCOUNTER — TELEPHONE (OUTPATIENT)
Dept: NEUROSURGERY | Facility: CLINIC | Age: 33
End: 2023-01-24
Payer: MEDICAID

## 2023-01-25 ENCOUNTER — TELEPHONE (OUTPATIENT)
Dept: NEUROSURGERY | Facility: CLINIC | Age: 33
End: 2023-01-25
Payer: MEDICAID

## 2023-02-03 ENCOUNTER — OFFICE VISIT (OUTPATIENT)
Dept: NEUROSURGERY | Facility: CLINIC | Age: 33
End: 2023-02-03
Payer: MEDICAID

## 2023-02-03 ENCOUNTER — TELEPHONE (OUTPATIENT)
Dept: NEUROLOGY | Facility: CLINIC | Age: 33
End: 2023-02-03

## 2023-02-03 ENCOUNTER — ANCILLARY PROCEDURE (OUTPATIENT)
Dept: CT IMAGING | Facility: CLINIC | Age: 33
End: 2023-02-03
Attending: PHYSICIAN ASSISTANT
Payer: MEDICAID

## 2023-02-03 VITALS
WEIGHT: 147 LBS | HEART RATE: 57 BPM | SYSTOLIC BLOOD PRESSURE: 112 MMHG | DIASTOLIC BLOOD PRESSURE: 66 MMHG | BODY MASS INDEX: 23.02 KG/M2 | OXYGEN SATURATION: 97 %

## 2023-02-03 DIAGNOSIS — Z98.2 VP (VENTRICULOPERITONEAL) SHUNT STATUS: ICD-10-CM

## 2023-02-03 DIAGNOSIS — D44.5 PINEAL GERM CELL TUMOR (H): Primary | ICD-10-CM

## 2023-02-03 PROCEDURE — 99212 OFFICE O/P EST SF 10 MIN: CPT | Mod: 25 | Performed by: PHYSICIAN ASSISTANT

## 2023-02-03 PROCEDURE — 70450 CT HEAD/BRAIN W/O DYE: CPT | Mod: GC | Performed by: RADIOLOGY

## 2023-02-03 PROCEDURE — 62252 CSF SHUNT REPROGRAM: CPT | Performed by: PHYSICIAN ASSISTANT

## 2023-02-03 RX ORDER — DESMOPRESSIN ACETATE 0.2 MG/1
1 TABLET ORAL SEE ADMIN INSTRUCTIONS
COMMUNITY
End: 2023-03-15

## 2023-02-03 NOTE — PROGRESS NOTES
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 Sharp along with VPS placed for hydrocephalus who is seen today for shunt check and follow up. We saw him 2 weeks ago after he spoke with Dr. Sharp about his desire to become shunt independent. His valve was dialed up to 2.5. He's here with his father. Is feeling well. He reports that he's had to get up to urinate at night more than normal recently, but denies incontinence. No new headaches, nausea, vomiting, dizziness, or gait instability.    Physical Exam:   /66   Pulse 57   Wt 66.7 kg (147 lb)   SpO2 97%   BMI 23.02 kg/m     General: No acute distress.    Psych: Normal mood and affect. Behavior is normal.      Procedure:   With the patient's verbal permission his right sided Strata valve was interrogated, found to be set between the 2.0 and 2.5 position. Rechecked x 2. This was dialed to virtual off today. Rechecked x 2.    Imaging:  Head CT performed today reveals stable ventricle size without new abnormality.     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 I'll discuss with Dr. Sharp regarding next steps as well and we'll be in contact with him with a plan. He was encouraged to reach out with new concerns.       Yen Ochoa PA-C  Department of Neurosurgery      "

## 2023-02-03 NOTE — LETTER
2/3/2023       RE: Kari Turcios  [address]  [facility] [address]     Dear Colleague,    Thank you for referring your patient, Kari Turcios, to the [facility] NEUROSURGERY CLINIC [place] at [facility]. Please see a copy of my visit note below.      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 Sharp along with VPS placed for hydrocephalus who is seen today for shunt check and follow up. We saw him 2 weeks ago after he spoke with Dr. Sharp about his desire to become shunt independent. His valve was dialed up to 2.5. He's here with his father. Is feeling well. He reports that he's had to get up to urinate at night more than normal recently, but denies incontinence. No new headaches, nausea, vomiting, dizziness, or gait instability.    Physical Exam:   /66   Pulse 57   Wt 66.7 kg (147 lb)   SpO2 97%   BMI 23.02 kg/m     General: No acute distress.    Psych: Normal mood and affect. Behavior is normal.      Procedure:   With the patient's verbal permission his right sided Strata valve was interrogated, found to be set between the 2.0 and 2.5 position. Rechecked x 2. This was dialed to virtual off today. Rechecked x 2.    Imaging:  Head CT performed today reveals stable ventricle size without new abnormality.     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 I'll discuss with Dr. Sharp regarding next steps as well and we'll be in contact with him with a plan. He was encouraged to reach out with new concerns.       Yen Ochoa PA-C  Department of Neurosurgery  "

## 2023-02-04 NOTE — TELEPHONE ENCOUNTER
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 Sharp along with VPS placed for hydrocephalus who was seen today in clinic for shunt check and follow up. His Strata valve was dialed up to 2.5 to evaluate for shunt independence.     He calls this evening with concern for new left arm pain since shunt change this morning. He describes the pain as cramping/tingling in left upper arm just below shoulder. It is intermittent, and he does not recall doing any activity to injure the arm. He otherwise denies headache, dizziness, vision changes, neck pain, weakness, numbness, or other sensory changes.     We discussed that his symptoms are not at this time concerning for effect from shunt setting change. I explained that this is a new symptom for him, and he should monitor it. If it does not resolve, if it worsens, or if he is concerned, he should present to ED to have it evaluated. I also said he should call back with any new concerns or symptoms or call the clinic Monday morning for an update. I relayed that it was ok to try Tylenol or NSAIDs for relief. Patient was in agreement with the plan.    Carlita Cazares MD, PhD  PGY-2 Neurosurgery  Please page NSGY on call with questions

## 2023-02-14 ENCOUNTER — TELEPHONE (OUTPATIENT)
Dept: NEUROSURGERY | Facility: CLINIC | Age: 33
End: 2023-02-14
Payer: MEDICAID

## 2023-02-14 DIAGNOSIS — Z98.2 VP (VENTRICULOPERITONEAL) SHUNT STATUS: ICD-10-CM

## 2023-02-14 DIAGNOSIS — D44.5 PINEAL GERM CELL TUMOR (H): Primary | ICD-10-CM

## 2023-02-14 NOTE — TELEPHONE ENCOUNTER
I spoke with Abelino. He has urinary frequency. No incontinence. He denies new headaches, vomiting, dizziness or gait instability. He also has left arm pain which is new today. I recommended he be seen in Urgent care or Primary Care to evaluate for UTI and to eval his left arm pain, as I think they're unlikely to be related to his  shunt at this time. He'll call to schedule. I'll see him in another few weeks with repeat head CT.

## 2023-02-14 NOTE — TELEPHONE ENCOUNTER
Writer routed to Yen Ochoa PA-C  *Shunt patient with question on symptoms     Theodora Altamirano LPN  Neurosurgery

## 2023-02-14 NOTE — TELEPHONE ENCOUNTER
[facility] Call Center    Phone Message    May a detailed message be left on voicemail: yes     Reason for Call: Other: Abelino calling to request a call back regarding his shunt reprogramming. He stated that he woke up constantly last night to use the bathroom. He woke up at 5:00 this morining and stated his left arm is sore. Abelino is inquiring if this is normal after his reprogramming. Please call Abelino at your earliest convenience to discuss.     Action Taken: Message routed to:  [facility] ([facility]): [facility] NEUROSURGERY    Travel Screening: Not Applicable

## 2023-02-20 ENCOUNTER — TELEPHONE (OUTPATIENT)
Dept: NEUROSURGERY | Facility: CLINIC | Age: 33
End: 2023-02-20
Payer: MEDICAID

## 2023-02-20 NOTE — TELEPHONE ENCOUNTER
[facility] Call Center    Phone Message    May a detailed message be left on voicemail: yes     Reason for Call: Pt is requesting to talk to Dr. Sharp's nurse.  He said that he has a bad headache where his shunt is placed.  Thanks.

## 2023-02-21 NOTE — TELEPHONE ENCOUNTER
Left message:  Callback to Abelino, please call [place] RN with Dr Sharp regarding head discomfort   Thank you

## 2023-02-27 ENCOUNTER — APPOINTMENT (OUTPATIENT)
Dept: OPTOMETRY | Facility: CLINIC | Age: 33
End: 2023-02-27
Payer: MEDICAID

## 2023-02-27 PROCEDURE — 92340 FIT SPECTACLES MONOFOCAL: CPT | Performed by: OPTOMETRIST

## 2023-03-03 ENCOUNTER — OFFICE VISIT (OUTPATIENT)
Dept: NEUROSURGERY | Facility: CLINIC | Age: 33
End: 2023-03-03
Payer: MEDICAID

## 2023-03-03 ENCOUNTER — ANCILLARY PROCEDURE (OUTPATIENT)
Dept: CT IMAGING | Facility: CLINIC | Age: 33
End: 2023-03-03
Attending: PHYSICIAN ASSISTANT
Payer: MEDICAID

## 2023-03-03 VITALS — OXYGEN SATURATION: 97 % | HEART RATE: 67 BPM | DIASTOLIC BLOOD PRESSURE: 70 MMHG | SYSTOLIC BLOOD PRESSURE: 113 MMHG

## 2023-03-03 DIAGNOSIS — Z98.2 VP (VENTRICULOPERITONEAL) SHUNT STATUS: ICD-10-CM

## 2023-03-03 DIAGNOSIS — D44.5 PINEAL GERM CELL TUMOR (H): ICD-10-CM

## 2023-03-03 DIAGNOSIS — D44.5 PINEAL GERM CELL TUMOR (H): Primary | ICD-10-CM

## 2023-03-03 PROCEDURE — 70450 CT HEAD/BRAIN W/O DYE: CPT | Mod: GC | Performed by: STUDENT IN AN ORGANIZED HEALTH CARE EDUCATION/TRAINING PROGRAM

## 2023-03-03 PROCEDURE — 99213 OFFICE O/P EST LOW 20 MIN: CPT | Performed by: PHYSICIAN ASSISTANT

## 2023-03-03 ASSESSMENT — PAIN SCALES - GENERAL: PAINLEVEL: SEVERE PAIN (6)

## 2023-03-03 NOTE — LETTER
3/3/2023       RE: Kari Turcios  [address]  [facility] [address]     Dear Colleague,    Thank you for referring your patient, Kari Turcios, to the [facility] NEUROSURGERY CLINIC [place] at [facility]. Please see a copy of my visit note below.      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 Sharp along with VPS placed for hydrocephalus who is seen today for follow up after his Strata valve was dialed up to highest setting of 2.5 four weeks ago. Today he endorses continued headaches which he describes as constant and not related to his position, ongoing since his surgeries. He reports intermittent nausea and urinating at night frequently. He states that these symptoms have not worsened since his shunt change but have remained relatively the same. In regards to his frequent urination, he states that this occurs throughout the day, however is most bothersome at night when he is trying to sleep. He reports that when he urinates there is a large volume of output. He is on desmopressin, which was started following his surgery in .      Review of Systems:   Pertinent items are noted in HPI or as in patient entered ROS below, remainder of complete ROS is negative.     Physical Exam:   /70   Pulse 67   SpO2 97%    General: No acute distress.    MSK: Moves all extremities.  No obvious deformity.  Neuro: The patient is fully oriented. Speech is normal. Baseline upward gaze palsy. Facial sensation is intact  in V1, V2, V3 distributions. Facial nerve function is normal, rated as a House Brackmann 1.  Shoulders are full strength. There is no pronator drift. Full strength in all extremities. Sensation intact throughout. No dysmetria with finger-nose-finger testing.   Psych: Normal mood and affect. Behavior is normal.        Imaging:  Head CT obtained today and reviewed with patient. Ventricular size is stable without signs of hydrocephalus. No new abnormality.     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 Turcios were to decide to trial being shunt independent. Given his lack of symptom improvement with the shunt change, he was apprehensive to this option. We decided to trial a 3 month period at his current setting and have him back in the clinic at this time to discuss his options further with Dr. Sharp. He was encouraged to reach out sooner if his symptoms worsen. Additionally, he was encouraged to reach out to both Primary Care and Endocrinology regarding his ongoing urinary frequency and will discuss this with Dr. Sharp as well.       Ester Hernandez PA-C  Neurosurgery Department      I saw the patient today with Ester Hernandez PA-C, and agree with the above note and plan.    Yen Ochoa PA-C  Department of Neurosurgery  Center for Skull Base and Pituitary Surgery  [facility]

## 2023-03-03 NOTE — PROGRESS NOTES
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 Sharp along with VPS placed for hydrocephalus who is seen today for follow up after his Strata valve was dialed up to highest setting of 2.5 four weeks ago. Today he endorses continued headaches which he describes as constant and not related to his position, ongoing since his surgeries. He reports intermittent nausea and urinating at night frequently. He states that these symptoms have not worsened since his shunt change but have remained relatively the same. In regards to his frequent urination, he states that this occurs throughout the day, however is most bothersome at night when he is trying to sleep. He reports that when he urinates there is a large volume of output. He is on desmopressin, which was started following his surgery in .      Review of Systems:   Pertinent items are noted in HPI or as in patient entered ROS below, remainder of complete ROS is negative.     Physical Exam:   /70   Pulse 67   SpO2 97%    General: No acute distress.    MSK: Moves all extremities.  No obvious deformity.  Neuro: The patient is fully oriented. Speech is normal. Baseline upward gaze palsy. Facial sensation is intact in V1, V2, V3 distributions. Facial nerve function is normal, rated as a House Brackmann 1.  Shoulders are full strength. There is no pronator drift. Full strength in all extremities. Sensation intact throughout. No dysmetria with finger-nose-finger testing.   Psych: Normal mood and affect. Behavior is normal.         Imaging:  Head CT obtained today and reviewed with patient. Ventricular size is stable without signs of hydrocephalus. No new abnormality.     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 Turcios were to decide to trial being shunt independent. Given his lack of symptom improvement with the shunt change, he was apprehensive to this option. We decided to trial a 3 month period at his current setting and have him back in the clinic at this time to discuss his options further with Dr. Sharp. He was encouraged to reach out sooner if his symptoms worsen. Additionally, he was encouraged to reach out to both Primary Care and Endocrinology regarding his ongoing urinary frequency and will discuss this with Dr. Sharp as well.       Ester Hernandez PA-C  Neurosurgery Department      I saw the patient today with Ester Hernandez PA-C, and agree with the above note and plan.    Yen Ochoa PA-C  Department of Neurosurgery  [facility]  [facility]

## 2023-03-08 ENCOUNTER — TELEPHONE (OUTPATIENT)
Dept: NEUROSURGERY | Facility: CLINIC | Age: 33
End: 2023-03-08
Payer: MEDICAID

## 2023-03-08 ENCOUNTER — CARE COORDINATION (OUTPATIENT)
Dept: NEUROSURGERY | Facility: CLINIC | Age: 33
End: 2023-03-08
Payer: MEDICAID

## 2023-03-08 NOTE — TELEPHONE ENCOUNTER
Writer routed to Neurosurgery Nurse Pool   Attn: Seble Bruce, RNCC for Dr. Sharp  *Patient with shunt reports symptoms.     Theodora Altamirano LPN  Neurosurgery

## 2023-03-08 NOTE — TELEPHONE ENCOUNTER
Phone call to patient at requested time.  No answer at listed number.  Will attempt call next clinical day to continue assessment.

## 2023-03-08 NOTE — PROGRESS NOTES
"Phone call to patient in response to telephone call reporting arm pain and frequent nocturnal urination.  When called,  Patient reported discomfort behind left ear and neck \"where the shunt goes\" since 1030 this AM.  Denies elevated temp,  redness or swelling along tubing tract.  Patient cut the call short at that point because he was currently driving.  Requested call back in 30 minutes when he would arrive home.      Patient was last seen in clinic 3/3 by BAUDILIO Ochoa with c/o arm pain and frequent nocturnal urination.  Imaging at that time unchanged.  Has Strata  shunt set at 2.5 since 2014.    " Port Dimensions-X Axis In Cm: 1.7

## 2023-03-08 NOTE — TELEPHONE ENCOUNTER
[facility] Call Center    Phone Message    May a detailed message be left on voicemail: yes     Reason for Call: Symptoms or Concerns     If patient has red-flag symptoms, warm transfer to triage line    Current symptom or concern: Soreness in left arm and needing to urinate through the night    Symptoms have been present for:  2 week(s)    Has patient previously been seen for this? Yes    By Dr. Xiao    Are there any new or worsening symptoms? Yes: Pt report soreness in his left arm - like before when shunt needed adjusting. He also states he is needing to get up through the night to urinate at least once usually more times. He states he is not getting good sleep.    Please call pt back at [phone] to discuss.      Action Taken: Message routed to:  [facility] ([facility] Neurosurgery    Travel Screening: Not Applicable

## 2023-03-08 NOTE — TELEPHONE ENCOUNTER
LOV 3/3/23 with CAROL ASTUDILLO  Plan: Plan:  We discussed next steps of shunt ligation if Mr. Turcios were to decide to trial being shunt independent. Given his lack of symptom improvement with the shunt change, he was apprehensive to this option. We decided to trial a 3 month period at his current setting and have him back in the clinic at this time to discuss his options further with Dr. Sharp.  Strata set to 2.5

## 2023-03-15 ENCOUNTER — OFFICE VISIT (OUTPATIENT)
Dept: ENDOCRINOLOGY | Facility: CLINIC | Age: 33
End: 2023-03-15
Payer: MEDICAID

## 2023-03-15 VITALS
HEART RATE: 55 BPM | SYSTOLIC BLOOD PRESSURE: 86 MMHG | DIASTOLIC BLOOD PRESSURE: 42 MMHG | BODY MASS INDEX: 22.26 KG/M2 | WEIGHT: 142.1 LBS | OXYGEN SATURATION: 100 %

## 2023-03-15 DIAGNOSIS — E23.2 DIABETES INSIPIDUS SECONDARY TO VASOPRESSIN DEFICIENCY (H): Primary | ICD-10-CM

## 2023-03-15 DIAGNOSIS — R63.4 WEIGHT LOSS: ICD-10-CM

## 2023-03-15 DIAGNOSIS — D44.5 PINEAL GERM CELL TUMOR (H): ICD-10-CM

## 2023-03-15 PROCEDURE — 99214 OFFICE O/P EST MOD 30 MIN: CPT | Performed by: INTERNAL MEDICINE

## 2023-03-15 RX ORDER — DESMOPRESSIN ACETATE 0.2 MG/1
0.2 TABLET ORAL 2 TIMES DAILY
Qty: 180 TABLET | Refills: 3 | Status: SHIPPED | OUTPATIENT
Start: 2023-03-15 | End: 2024-03-20

## 2023-03-15 NOTE — PATIENT INSTRUCTIONS
Please have labs scheduled 2 hrs after taking the morning dose of desmopressin, around 9:30-10 am.

## 2023-03-15 NOTE — PROGRESS NOTES
The patient was last seen in our clinic in 11/2022.  Abelino Turcios is a 33 year old male who was admitted on 3/19/14 after presenting to the emergency room with headache, nausea, 2 weeks of left side vision blurring.  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 He underwent an endoscopic 3rd ventriculostomy and EVD placement and surgery stealth assisted suboccipital craniotomy with the excision of the tumor on 3/26/14. 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 Focal mature teratoma (~5%) with mature squamous epithelial and mesenchymal elements (focal cartilage) were seen. Lesser components of yolk sac (~5%), (~2%) embryonal and (~3%) germinoma were present. 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   The patient developed polyuria and increased thirst a couple of months prior to his hospitalization. A diagnosis of DI was confirmed during hospitalization and the patient was discharged on desmopressin. The investigation of pituitary function postop revealed normal anterior pituitary function.  He completed the radiation therapy in October of 2014.  Prior to radiation therapy, he had sperm retrieved, for cryopreservation.  Current dose of desmopressin is 200  g PO in the morning (8:30 AM), 200  g around bedtime (8 PM; bedtime=9 PM).  He doesn't wake up during the night if he takes it correctly. If he takes the evening dose earlier, he does wake up at night to use the restroom.   The headaches are present almost daily, 3 x 4 intensity, localized in the back of the head, where the shunt is located.  Most recent sodium normal was normal at 138, on 11/3/2022, at 12:37 PM.  His last seizure event was in October 2022.  In the past, the seizure episodes were not associated with hyponatremia.  A brain MRI was done in December 2022.  There was no evidence of recurrence of the germ  cell tumor.  I reviewed the images.  The pituitary gland is unremarkable.    Abelino continues to complain of persistent fatigue.  His weight is down 23 pounds since the pandemic.  The appetite is decreased.  Denies experiencing any GI symptoms.  No lightheadedness, intermittent episodes of dizziness occurring 2 times a month, short-lived and relieved by movement.     ROS  Systemic symptoms: as above   Eye symptoms: No eye symptoms.  Otolaryngeal symptoms: No otolaryngeal symptoms.  Breast symptoms: No breast symptoms.  Cardiovascular symptoms: No cardiovascular symptoms.    Pulmonary symptoms: No pulmonary symptoms.  Gastrointestinal symptoms: No gastrointestinal symptoms.   Genitourinary symptoms: No genitourinary symptoms.  Endocrine symptoms: No endocrine symptoms, no ED   Hematologic symptoms: No hematologic symptoms.  Musculoskeletal symptoms: No musculoskeletal symptoms.  Neurological symptoms: muscle pain in his arms present occasionally, relates them to dehydration; some back neck pain   Psychological symptoms: No psychological symptoms.    Skin symptoms: No skin symptoms.       Past Medical History:   Diagnosis Date     Hypertropia      Intractable chronic migraine without aura 8/3/2022     Myopia      Seizures (H)     2 months ago     Type 2 diabetes mellitus without complications (H)     Borderline   Encephalitis 2012  History of seizure disorders post encephalitis     Past Surgical History:   Procedure Laterality Date     ARTHROTOMY SHOULDER, BANKHART REPAIR, COMBINED  10/29/2012    Procedure: COMBINED ARTHROTOMY SHOULDER, BANKHART REPAIR;  RIGHT OPEN SHOULDER BANKART REPAIR;  Surgeon: Lemuel Ramírez MD;  Location: [facility]     OPTICAL TRACKING SYSTEM BIOPSY BRAIN  3/20/2014    Procedure: OPTICAL TRACKING SYSTEM BIOPSY BRAIN;  Right Frontal Approach For endoscopic Intraventricular Mass Biopsy, 3rd Ventriculostomy, Placement of right ventriculostomy catheter.;  Surgeon: Williams Clement MD;  Location:  [facility] OR     OPTICAL TRACKING SYSTEM CRANIOTOMY, EXCISE TUMOR, COMBINED  3/26/2014    Procedure: COMBINED OPTICAL TRACKING SYSTEM CRANIOTOMY, EXCISE TUMOR;  Stealth Assisted Sub-Occiptial Craniotomy, Excise Tumor ;  Surgeon: Ann Sharp MD;  Location: [facility] OR     OPTICAL TRACKING SYSTEM IMPLANT SHUNT VENTRICULOPERITONEAL  4/8/2014    Procedure: OPTICAL TRACKING SYSTEM IMPLANT SHUNT VENTRICULOPERITONEAL;  Right Stealth Guided Ventricularperitoneal Shunt Placement ;  Surgeon: Ann Sharp MD;  Location: [facility] OR     RECESSION RESECTION (REPAIR STRABISMUS) BILATERAL Bilateral 8/4/2016    Procedure: RECESSION RESECTION (REPAIR STRABISMUS) BILATERAL;  Surgeon: Serafin Menendez MD;  Location: [facility] OR     VENTRICULOSTOMY  3/20/2014    Procedure: VENTRICULOSTOMY;;  Surgeon: Williams Clement MD;  Location: [facility] OR       Current Medications    Current Outpatient Medications:      ACETAMINOPHEN PO, Take 650 mg by mouth every 4 hours as needed for pain, Disp: , Rfl:      desmopressin (DDAVP) 0.2 MG tablet, Take 1 tablet by mouth See Admin Instructions, Disp: , Rfl:      desmopressin (DDAVP) 0.2 MG tablet, Take 1 tablet (0.2 mg) by mouth 2 times daily, Disp: 180 tablet, Rfl: 3     EPINEPHrine (ANY BX GENERIC EQUIV) 0.3 MG/0.3ML injection 2-pack, Inject 0.3 mLs (0.3 mg) into the muscle as needed for anaphylaxis May repeat one time in 5-15 minutes if response to initial dose is inadequate. (Patient not taking: Reported on 1/17/2023), Disp: 2 each, Rfl: 0     Glycerin-Hypromellose- (VISINE TEARS OP), Apply 1-2 drops to eye 2 times daily as needed (for dry eyes.), Disp: , Rfl:      levETIRAcetam (KEPPRA) 750 MG tablet, Take 1 tablet (750 mg) by mouth 2 times daily Take 2.5 tablets in the morning and 3 in the afternoon., Disp: 135 tablet, Rfl: 3     naproxen (NAPROSYN) 500 MG tablet, Take 1 tablet (500 mg) by mouth 2 times daily as needed for headaches, Disp: 60 tablet, Rfl: 0      OXCARBAZEPINE PO, Take 450 mg by mouth 2 times daily, Disp: , Rfl:      prochlorperazine (COMPAZINE) 5 MG tablet, Take 0.5-1 tablets (2.5-5 mg) by mouth every 8 hours as needed for nausea or vomiting, Disp: 20 tablet, Rfl: 1     Rimegepant Sulfate 75 MG TBDP, Take 75 mg by mouth See Admin Instructions Take 75 mg orally every other day (Patient not taking: Reported on 11/15/2022), Disp: 16 tablet, Rfl: 9    Current Facility-Administered Medications:      Botulinum Toxin Type A (BOTOX) 200 units injection 155 Units, 155 Units, Intramuscular, See Admin Instructions, Sybil Dockery APRN CNP    Facility-Administered Medications Ordered in Other Visits:      gadobutrol (GADAVIST) injection 7.5 mL, 7.5 mL, Intravenous, Once, Ann Sharp MD    Family history  Denies family history of diabetes, thyroid disorders or cancer.    Social History  He denies smoking, drinking alcohol or using illicit drugs. Smokes marihuana, a couple of times a day.                Vital Signs     Previous Weights:    Wt Readings from Last 10 Encounters:   03/15/23 64.5 kg (142 lb 1.6 oz)   02/03/23 66.7 kg (147 lb)   01/20/23 65.3 kg (144 lb)   12/07/22 63.5 kg (140 lb)   11/03/22 62.4 kg (137 lb 8 oz)   07/08/22 72.6 kg (160 lb)   06/11/21 72.6 kg (160 lb)   06/12/19 73.3 kg (161 lb 8 oz)   04/29/19 74.4 kg (164 lb)   04/10/19 74.8 kg (165 lb)        BP (!) 86/42 (BP Location: Left arm, Patient Position: Sitting, Cuff Size: Adult Regular)   Pulse 55   Wt 64.5 kg (142 lb 1.6 oz)   SpO2 100%   BMI 22.26 kg/m      BP on recheck:   108/67 pulse 56     Labs:   I reviewed prior lab results documented in EPIC.     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 He is now s/p chemo and radiation therapy and there was no evidence of tumor recurrence on the most recent MRI from 2022.  The pituitary gland function was reevaluated in October  2018.  AFP remained fairly stable and and Beta HCG was undetectable, from 2015 to 2019.    Clinically, the patient presents with significant weight loss, of unclear etiology.  Might be related to marijuana use and the patient agreed to taper it down.    Recommendations:  Check cortisol and TFTs.  The lab work should be done in the morning.  Follow-up CMP    The prescription for dexamethasone was refilled.    Orders Placed This Encounter   Procedures     TSH     T4 free     Cortisol     Comprehensive metabolic panel

## 2023-03-15 NOTE — LETTER
3/15/2023         RE: Kari Turcios  [address]  [facility] [address]        Dear Colleague,    Thank you for referring your patient, Kari Turcios, to the [facility]. Please see a copy of my visit note below.    The patient was last seen in our clinic in 11/2022.  Abelino Turcios is a 33 year old male who was admitted on 3/19/14 after presenting to the emergency room with headache, nausea, 2 weeks of left side vision blurring.  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 He underwent an endoscopic 3rd ventriculostomy and EVD placement and surgery stealth assisted suboccipital craniotomy with the excision of the tumor on 3/26/14. 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 Focal mature teratoma (~5%) with mature squamous epithelial and mesenchymal elements (focal cartilage) were seen. Lesser components of yolk sac (~5%), (~2%) embryonal and (~3%) germinoma were present. 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   The patient developed polyuria and increased thirst a couple of months prior to his hospitalization. A diagnosis of DI was confirmed during hospitalization and the patient was discharged on desmopressin. The investigation of pituitary function postop revealed normal anterior pituitary function.  He completed the radiation therapy in October of 2014.  Prior to radiation therapy, he had sperm retrieved, for cryopreservation.  Current dose of desmopressin is 200  g PO in the morning (8:30 AM), 200  g around bedtime (8 PM; bedtime=9 PM).  He doesn't wake up during the night if he takes it correctly. If he takes the evening dose earlier, he does wake up at night to use the restroom.   The headaches are present almost daily, 3 x 4 intensity, localized in the back of the head, where the shunt is located.  Most recent sodium  normal was normal at 138, on 11/3/2022, at 12:37 PM.  His last seizure event was in October 2022.  In the past, the seizure episodes were not associated with hyponatremia.  A brain MRI was done in December 2022.  There was no evidence of recurrence of the germ cell tumor.  I reviewed the images.  The pituitary gland is unremarkable.    Abelino continues to complain of persistent fatigue.  His weight is down 23 pounds since the pandemic.  The appetite is decreased.  Denies experiencing any GI symptoms.  No lightheadedness, intermittent episodes of dizziness occurring 2 times a month, short-lived and relieved by movement.     ROS  Systemic symptoms: as above   Eye symptoms: No eye symptoms.  Otolaryngeal symptoms: No otolaryngeal symptoms.  Breast symptoms: No breast symptoms.  Cardiovascular symptoms: No cardiovascular symptoms.    Pulmonary symptoms: No pulmonary symptoms.  Gastrointestinal symptoms: No gastrointestinal symptoms.   Genitourinary symptoms: No genitourinary symptoms.  Endocrine symptoms: No endocrine symptoms, no ED   Hematologic symptoms: No hematologic symptoms.  Musculoskeletal symptoms: No musculoskeletal symptoms.  Neurological symptoms: muscle pain in his arms present occasionally, relates them to dehydration; some back neck pain   Psychological symptoms: No psychological symptoms.    Skin symptoms: No skin symptoms.       Past Medical History:   Diagnosis Date     Hypertropia      Intractable chronic migraine without aura 8/3/2022     Myopia      Seizures (H)     2 months ago     Type 2 diabetes mellitus without complications (H)     Borderline   Encephalitis 2012  History of seizure disorders post encephalitis     Past Surgical History:   Procedure Laterality Date     ARTHROTOMY SHOULDER, BANKHART REPAIR, COMBINED  10/29/2012    Procedure: COMBINED ARTHROTOMY SHOULDER, BANKHART REPAIR;  RIGHT OPEN SHOULDER BANKART REPAIR;  Surgeon: Lemuel Ramírez MD;  [facility]     [facility]  SYSTEM BIOPSY BRAIN  3/20/2014    Procedure: OPTICAL TRACKING SYSTEM BIOPSY BRAIN;  Right Frontal Approach For endoscopic Intraventricular Mass Biopsy, 3rd Ventriculostomy, Placement of right ventriculostomy catheter.;  Surgeon: Williams Clement MD;  Location: [facility] OR     OPTICAL TRACKING SYSTEM CRANIOTOMY, EXCISE TUMOR, COMBINED  3/26/2014    Procedure: COMBINED OPTICAL TRACKING SYSTEM CRANIOTOMY, EXCISE TUMOR;  Stealth Assisted Sub-Occiptial Craniotomy, Excise Tumor ;  Surgeon: Ann Sharp MD;  Location: [facility] OR     OPTICAL TRACKING SYSTEM IMPLANT SHUNT VENTRICULOPERITONEAL  4/8/2014    Procedure: OPTICAL TRACKING SYSTEM IMPLANT SHUNT VENTRICULOPERITONEAL;  Right Stealth Guided Ventricularperitoneal Shunt Placement ;  Surgeon: Ann Sharp MD;  Location: [facility] OR     RECESSION RESECTION (REPAIR STRABISMUS) BILATERAL Bilateral 8/4/2016    Procedure: RECESSION RESECTION (REPAIR STRABISMUS) BILATERAL;  Surgeon: Serafin Menendez MD;  Location: [facility] OR     VENTRICULOSTOMY  3/20/2014    Procedure: VENTRICULOSTOMY;;  Surgeon: Williams Clement MD;  Location: [facility] OR       Current Medications    Current Outpatient Medications:      ACETAMINOPHEN PO, Take 650 mg by mouth every 4 hours as needed for pain, Disp: , Rfl:      desmopressin (DDAVP) 0.2 MG tablet, Take 1 tablet by mouth See Admin Instructions, Disp: , Rfl:      desmopressin (DDAVP) 0.2 MG tablet, Take 1 tablet (0.2 mg) by mouth 2 times daily, Disp: 180 tablet, Rfl: 3     EPINEPHrine (ANY BX GENERIC EQUIV) 0.3 MG/0.3ML injection 2-pack, Inject 0.3 mLs (0.3 mg) into the muscle as needed for anaphylaxis May repeat one time in 5-15 minutes if response to initial dose is inadequate. (Patient not taking: Reported on 1/17/2023), Disp: 2 each, Rfl: 0     Glycerin-Hypromellose- (VISINE TEARS OP), Apply 1-2 drops to eye 2 times daily as needed (for dry eyes.), Disp: , Rfl:      levETIRAcetam (KEPPRA) 750 MG tablet, Take 1  tablet (750 mg) by mouth 2 times daily Take 2.5 tablets in the morning and 3 in the afternoon., Disp: 135 tablet, Rfl: 3     naproxen (NAPROSYN) 500 MG tablet, Take 1 tablet (500 mg) by mouth 2 times daily as needed for headaches, Disp: 60 tablet, Rfl: 0     OXCARBAZEPINE PO, Take 450 mg by mouth 2 times daily, Disp: , Rfl:      prochlorperazine (COMPAZINE) 5 MG tablet, Take 0.5-1 tablets (2.5-5 mg) by mouth every 8 hours as needed for nausea or vomiting, Disp: 20 tablet, Rfl: 1     Rimegepant Sulfate 75 MG TBDP, Take 75 mg by mouth See Admin Instructions Take 75 mg orally every other day (Patient not taking: Reported on 11/15/2022), Disp: 16 tablet, Rfl: 9    Current Facility-Administered Medications:      Botulinum Toxin Type A (BOTOX) 200 units injection 155 Units, 155 Units, Intramuscular, See Admin Instructions, Sybil Dockery APRN CNP    Facility-Administered Medications Ordered in Other Visits:      gadobutrol (GADAVIST) injection 7.5 mL, 7.5 mL, Intravenous, Once, Ann Sharp MD    Family history  Denies family history of diabetes, thyroid disorders or cancer.    Social History  He denies smoking, drinking alcohol or using illicit drugs. Smokes marihuana, a couple of times a day.                Vital Signs     Previous Weights:    Wt Readings from Last 10 Encounters:   03/15/23 64.5 kg (142 lb 1.6 oz)   02/03/23 66.7 kg (147 lb)   01/20/23 65.3 kg (144 lb)   12/07/22 63.5 kg (140 lb)   11/03/22 62.4 kg (137 lb 8 oz)   07/08/22 72.6 kg (160 lb)   06/11/21 72.6 kg (160 lb)   06/12/19 73.3 kg (161 lb 8 oz)   04/29/19 74.4 kg (164 lb)   04/10/19 74.8 kg (165 lb)        BP (!) 86/42 (BP Location: Left arm, Patient Position: Sitting, Cuff Size: Adult Regular)   Pulse 55   Wt 64.5 kg (142 lb 1.6 oz)   SpO2 100%   BMI 22.26 kg/m      BP on recheck:   108/67 pulse 56     Labs:   I reviewed prior lab results documented in EPIC.     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 He is now s/p chemo and radiation therapy and there was no evidence of tumor recurrence on the most recent MRI from 2022.  The pituitary gland function was reevaluated in October 2018.  AFP remained fairly stable and and Beta HCG was undetectable, from 2015 to 2019.    Clinically, the patient presents with significant weight loss, of unclear etiology.  Might be related to marijuana use and the patient agreed to taper it down.    Recommendations:  Check cortisol and TFTs.  The lab work should be done in the morning.  Follow-up CMP    The prescription for dexamethasone was refilled.    Orders Placed This Encounter   Procedures     TSH     T4 free     Cortisol     Comprehensive metabolic panel                Again, thank you for allowing me to participate in the care of your patient.        Sincerely,        Yessenia Ramires MD

## 2023-03-15 NOTE — NURSING NOTE
Kari Turcios's goals for this visit include:   Chief Complaint   Patient presents with     Endocrine Problem     Diabetes insipidus - waking to urinate     He requests these members of his care team be copied on today's visit information: No    PCP: Ila Cornell    Referring Provider:  No referring provider defined for this encounter.    BP (!) 86/42 (BP Location: Left arm, Patient Position: Sitting, Cuff Size: Adult Regular)   Pulse 55   Wt 64.5 kg (142 lb 1.6 oz)   SpO2 100%   BMI 22.26 kg/m      Do you need any medication refills at today's visit? No

## 2023-03-16 ENCOUNTER — LAB (OUTPATIENT)
Dept: LAB | Facility: CLINIC | Age: 33
End: 2023-03-16
Payer: MEDICAID

## 2023-03-16 DIAGNOSIS — R63.4 WEIGHT LOSS: ICD-10-CM

## 2023-03-16 LAB
ALBUMIN SERPL BCG-MCNC: 4.7 G/DL (ref 3.5–5.2)
ALP SERPL-CCNC: 77 U/L (ref 40–129)
ALT SERPL W P-5'-P-CCNC: 28 U/L (ref 10–50)
ANION GAP SERPL CALCULATED.3IONS-SCNC: 9 MMOL/L (ref 7–15)
AST SERPL W P-5'-P-CCNC: 22 U/L (ref 10–50)
BILIRUB SERPL-MCNC: 0.4 MG/DL
BUN SERPL-MCNC: 10.6 MG/DL (ref 6–20)
CALCIUM SERPL-MCNC: 8.9 MG/DL (ref 8.6–10)
CHLORIDE SERPL-SCNC: 102 MMOL/L (ref 98–107)
CORTIS SERPL-MCNC: 6.8 UG/DL
CREAT SERPL-MCNC: 0.91 MG/DL (ref 0.67–1.17)
DEPRECATED HCO3 PLAS-SCNC: 27 MMOL/L (ref 22–29)
GFR SERPL CREATININE-BSD FRML MDRD: >90 ML/MIN/1.73M2
GLUCOSE SERPL-MCNC: 88 MG/DL (ref 70–99)
POTASSIUM SERPL-SCNC: 4.4 MMOL/L (ref 3.4–5.3)
PROT SERPL-MCNC: 7.4 G/DL (ref 6.4–8.3)
SODIUM SERPL-SCNC: 138 MMOL/L (ref 136–145)
T4 FREE SERPL-MCNC: 1.34 NG/DL (ref 0.9–1.7)
TSH SERPL DL<=0.005 MIU/L-ACNC: 0.64 UIU/ML (ref 0.3–4.2)

## 2023-03-16 PROCEDURE — 84443 ASSAY THYROID STIM HORMONE: CPT

## 2023-03-16 PROCEDURE — 82533 TOTAL CORTISOL: CPT

## 2023-03-16 PROCEDURE — 36415 COLL VENOUS BLD VENIPUNCTURE: CPT

## 2023-03-16 PROCEDURE — 80053 COMPREHEN METABOLIC PANEL: CPT

## 2023-03-16 PROCEDURE — 84439 ASSAY OF FREE THYROXINE: CPT

## 2023-04-03 ENCOUNTER — TELEPHONE (OUTPATIENT)
Dept: NEUROSURGERY | Facility: CLINIC | Age: 33
End: 2023-04-03
Payer: MEDICAID

## 2023-04-15 ENCOUNTER — HEALTH MAINTENANCE LETTER (OUTPATIENT)
Age: 33
End: 2023-04-15

## 2023-04-27 ENCOUNTER — TELEPHONE (OUTPATIENT)
Dept: NEUROLOGY | Facility: CLINIC | Age: 33
End: 2023-04-27

## 2023-04-27 ENCOUNTER — VIRTUAL VISIT (OUTPATIENT)
Dept: NEUROLOGY | Facility: CLINIC | Age: 33
End: 2023-04-27
Payer: MEDICAID

## 2023-04-27 DIAGNOSIS — G43.719 INTRACTABLE CHRONIC MIGRAINE WITHOUT AURA AND WITHOUT STATUS MIGRAINOSUS: ICD-10-CM

## 2023-04-27 PROCEDURE — 99214 OFFICE O/P EST MOD 30 MIN: CPT | Mod: VID | Performed by: NURSE PRACTITIONER

## 2023-04-27 RX ORDER — AMITRIPTYLINE HYDROCHLORIDE 10 MG/1
TABLET ORAL
Qty: 60 TABLET | Refills: 6 | Status: SHIPPED | OUTPATIENT
Start: 2023-04-27 | End: 2024-02-13

## 2023-04-27 ASSESSMENT — MIGRAINE DISABILITY ASSESSMENT (MIDAS)
HOW MANY DAYS WAS YOUR PRODUCTIVITY CUT IN HALF BECAUSE OF HEADACHES: 0
TOTAL SCORE: 18
HOW MANY DAYS DID YOU NOT DO HOUSEWORK BECAUSE OF HEADACHES: 6
HOW MANY DAYS WAS HOUSEWORK PRODUCTIVITY CUT IN HALF DUE TO HEADACHES: 6
ON A SCALE FROM 0-10 ON AVERAGE HOW PAINFUL WERE HEADACHES: 7
HOW OFTEN WERE SOCIAL ACTIVITIES MISSED DUE TO HEADACHES: 6
HOW MANY DAYS DID YOU MISS WORK OR SCHOOL BECAUSE OF HEADACHES: 0

## 2023-04-27 ASSESSMENT — HEADACHE IMPACT TEST (HIT 6)
WHEN YOU HAVE A HEADACHE HOW OFTEN DO YOU WISH YOU COULD LIE DOWN: ALWAYS
HOW OFTEN HAVE YOU FELT TOO TIRED TO WORK BECAUSE OF YOUR HEADACHES: VERY OFTEN
HOW OFTEN DO HEADACHES LIMIT YOUR DAILY ACTIVITIES: VERY OFTEN
HOW OFTEN HAVE YOU FELT FED UP OR IRRITATED BECAUSE OF YOUR HEADACHES: VERY OFTEN
HIT6 TOTAL SCORE: 68
HOW OFTEN DID HEADACHS LIMIT CONCENTRATION ON WORK OR DAILY ACTIVITY: VERY OFTEN
WHEN YOU HAVE HEADACHES HOW OFTEN IS THE PAIN SEVERE: VERY OFTEN

## 2023-04-27 NOTE — TELEPHONE ENCOUNTER
Prior Authorization Retail Medication Request    Medication/Dose: rimegepant (NURTEC) 75 MG ODT tablet 8 tablet   ICD code (if different than what is on RX):    Previously Tried and Failed: see chart  Rationale:  Using Nurtec, no side effects Needs to continue Nurtec     Insurance Name: Medicaid   Insurance ID:  [id number]       Pharmacy Information (if different than what is on RX)  Name:    Phone:

## 2023-04-27 NOTE — PATIENT INSTRUCTIONS
Plan:  Headache prevention -retrial of amitriptyline 10 mg -2 hours before bedtime or at bedtime for a week than may try 20 mg at bedtime or 2 hours before bedtime if tolerated. Stop if any mood changes. Side effects-dryness, drowsiness, constipation, increased appetite.   Rescue treatment -continue with Nurtec as needed.   Follow up in 3-4 months or sooner if needed     
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    Hyperlipidemia, unspecified hyperlipidemia type    Pulmonary emphysema, unspecified emphysema type    Bipolar disorder, current episode mixed, mild    Dupuytren&#x27;s contracture of left hand    Inguinal hernia without obstruction or gangrene, recurrence not specified, unspecified laterality    Accessory polyp of sinus      ALLERGIES:  No Known Allergies    HOME MEDICATIONS:  divalproex sodium 500 mg oral tablet, extended release: 1 tab(s) orally once a day  escitalopram 20 mg oral tablet: 1 tab(s) orally once a day  fenofibric acid 135 mg oral delayed release capsule: 1 cap(s) orally once a day  gabapentin 600 mg oral tablet: 600 milligram(s) orally once a day  Protonix 40 mg oral delayed release tablet: 1 tab(s) orally once a day    AICD/PPM: [ ] yes   [ ] no    PERTINENT LAB DATA:                      PHYSICAL EXAMINATION:    T(C): --  HR: --  BP: --  RR: --  SpO2: --    Constitutional: NAD  HEENT: PERRLA, EOMI,    Neck:  No JVD  Respiratory: CTAB/L  Cardiovascular: S1 and S2  Gastrointestinal: BS+, soft, NT/ND  Extremities: No peripheral edema  Neurological: A/O x 3, no focal deficits  Psychiatric: Normal mood, normal affect  Skin: No rashes    ASA Class: I [ ]  II [ ]  III [ ]  IV [ ]    COMMENTS:    The patient is a suitable candidate for the planned procedure unless box checked [ ]  No, explain:

## 2023-04-27 NOTE — PROGRESS NOTES
"Abelino is a 33 year old who is being evaluated via a billable video visit.      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 Sharp along with VPS placed for hydrocephalus, seizures with last seizure October 12, 2022 and on keppra. No recent seizures.   Video Visit (Follow up headaches )  Neurosurgery ramón in March and note reviewed. Patient is frightened with even thinking about shunt removal.   Headaches are still bad and around the cord -right occipital/behind right ear.   Headaches are not positional but feels like something is pulling.   New glasses with added tint and helps with light sensitivity.   Moderate -severe headaches every other day and losing weight and appetite. Nausea. Denies anxiety.   Depression is concerning. No SIs.   Patient reports that Nurtec helps as needed and every couple days. No side effects.    Patient is Ok with current treatment plan. Would like to keep Botox as an option and still hesitant.   Patient lost a lot of weight and does not eat very much. Patient does not want any medications would suppress his appetite.   Tried Vyepti and allergic reaction   Emgality tried but problems with self-injections.     Discussed headache management options -re-trial  amitriptyline    Plan:  Headache prevention -retrial of amitriptyline 10 mg -2 hours before bedtime or at bedtime for a week than may try 20 mg at bedtime or 2 hours before bedtime if tolerated. Stop if any mood changes. Side effects-dryness, drowsiness, constipation, increased appetite.   Rescue treatment -continue with Nurtec as needed.   Follow up in 3-4 months or sooner if needed           Objective    Vitals - Patient Reported  Weight (Patient Reported): 68 kg (150 lb)  Height (Patient Reported): 170.2 cm (5' 7\")  BMI (Based on Pt Reported Ht/Wt): 23.49  Pain Score: Moderate Pain (4)  Pain Loc: Head        Physical Exam "   Patient is alert and no in apparent acute distress,  mentation appears normal, judgement and insight intact, normal speech.    I discussed all my recommendations with Kari Turcios who verbalizes understanding and comfortable with the plan.  All of patient's questions were answered from the best of my knowledge.    31 minutes spent on the date of the encounter doing video access, chart  review, exam, results review,  meds review, treatment plan, documentation and further activities as noted above    CASPER Black, CNP [facility]  Headache certified  [facility]        Video-Visit Details    Type of service:  Video Visit   Originating Location (pt. Location): Home  Distant Location (provider location):  Off-site  Platform used for Video Visit: La

## 2023-04-27 NOTE — LETTER
4/27/2023       RE: Kari Turcios  [address]  [place] [facility] [address]       Dear Colleague,    Thank you for referring your patient, Kari Turcios, to the [facility]UR[facility] [place] at [facility]. Please see a copy of my visit note below.    Abelino is a 33 year old who is being evaluated via a billable video visit.      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 Sharp along with VPS placed for hydrocephalus, seizures with last seizure October 12, 2022 and on keppra. No recent seizures.   Video Visit (Follow up headaches )  Neurosurgery ramón in March and note reviewed. Patient is frightened with even thinking about shunt removal.   Headaches are still bad and around the cord -right occipital/behind right ear.   Headaches are not positional but feels like something is pulling.   New glasses with added tint and helps with light sensitivity.   Moderate -severe headaches every other day and losing weight and appetite. Nausea. Denies anxiety.   Depression is concerning. No SIs.   Patient reports that Nurtec helps as needed and every couple days. No side effects.    Patient is Ok with current treatment plan. Would like to keep Botox as an option and still hesitant.   Patient lost a lot of weight and does not eat very much. Patient does not want any medications would suppress his appetite.   Tried Vyepti and allergic reaction   Emgality tried but problems with self-injections.     Discussed headache management options -re-trial  amitriptyline    Plan:  Headache prevention -retrial of amitriptyline 10 mg -2 hours before bedtime or at bedtime for a week than may try 20 mg at bedtime or 2 hours before bedtime if tolerated. Stop if any mood changes. Side effects-dryness, drowsiness, constipation, increased appetite.   Rescue treatment  "-continue with Nurtec as needed.   Follow up in 3-4 months or sooner if needed          Objective    Vitals - Patient Reported  Weight (Patient Reported): 68 kg (150 lb)  Height (Patient Reported): 170.2 cm (5' 7\")  BMI (Based on Pt Reported Ht/Wt): 23.49  Pain Score: Moderate Pain (4)  Pain Loc: Head        Physical Exam   Patient is alert and no in apparent acute distress,  mentation appears normal, judgement and insight intact, normal speech.    I discussed all my recommendations with Kari Turcios who verbalizes understanding and comfortable with the plan.  All of patient's questions were answered from the best of my knowledge.    31 minutes spent on the date of the encounter doing video access, chart  review, exam, results review,  meds review, treatment plan, documentation and further activities as noted above        Again, thank you for allowing me to participate in the care of your patient.      Sincerely,    CASPER Jones CNP      "

## 2023-05-01 NOTE — TELEPHONE ENCOUNTER
Central Prior Authorization Team   Phone: [phone]      PA Initiation    Medication: rimegepant (NURTEC) 75 MG ODT tablet 8 tablet   Insurance Company: Benny Manuel - Phone [phone] Fax [phone]  Pharmacy Filling the Rx: [id number] [place], [place] - [address]  Filling Pharmacy Phone: [phone]  Filling Pharmacy Fax:    Start Date: 5/1/2023

## 2023-05-02 NOTE — TELEPHONE ENCOUNTER
Prior Authorization Approval    Authorization Effective Date: 1/1/2023  Authorization Expiration Date: 4/30/2024  Medication: rimegepant (NURTEC) 75 MG ODT tablet 8 tablet   Approved Dose/Quantity:   Reference #:     Insurance Company: Benny Camilomichael - Phone [phone] Fax [phone]  Expected CoPay:       CoPay Card Available:      Foundation Assistance Needed:    Which Pharmacy is filling the prescription (Not needed for infusion/clinic administered): [id number] - [place], [place] - [address]  Pharmacy Notified: Yes  Patient Notified: No

## 2023-05-29 DIAGNOSIS — G43.719 INTRACTABLE CHRONIC MIGRAINE WITHOUT AURA AND WITHOUT STATUS MIGRAINOSUS: ICD-10-CM

## 2023-05-30 RX ORDER — AMITRIPTYLINE HYDROCHLORIDE 10 MG/1
TABLET ORAL
Qty: 60 TABLET | Refills: 6 | OUTPATIENT
Start: 2023-05-30

## 2023-06-01 NOTE — Clinical Note
-- DO NOT REPLY / DO NOT REPLY ALL --  -- Message is from Engagement Center Operations (ECO) --    General Patient Message:   Patient is needing his labs for his CPE extended.  Patient rescheduled in next available which is 1-9-24     Caller Information       Type Contact Phone/Fax    06/01/2023 11:34 AM CDT Phone (Incoming) John Awad (Self) [phone] (M)        Alternative phone number:     Can a detailed message be left? No    Message Turnaround: [place]:    Refer to site's KB page for routing instructions    Please give this turnaround time to the caller:   \"You can expect to receive a response 2-3 business days after your provider's clinical team reviews the message\"               Patient left - please call

## 2023-06-06 ENCOUNTER — OFFICE VISIT (OUTPATIENT)
Dept: NEUROSURGERY | Facility: CLINIC | Age: 33
End: 2023-06-06
Payer: MEDICAID

## 2023-06-06 ENCOUNTER — ANCILLARY PROCEDURE (OUTPATIENT)
Dept: MRI IMAGING | Facility: CLINIC | Age: 33
End: 2023-06-06
Attending: NEUROLOGICAL SURGERY
Payer: MEDICAID

## 2023-06-06 VITALS
SYSTOLIC BLOOD PRESSURE: 113 MMHG | WEIGHT: 153 LBS | DIASTOLIC BLOOD PRESSURE: 72 MMHG | OXYGEN SATURATION: 100 % | HEART RATE: 54 BPM | HEIGHT: 67 IN | BODY MASS INDEX: 24.01 KG/M2

## 2023-06-06 DIAGNOSIS — D44.5 PINEAL GERM CELL TUMOR (H): ICD-10-CM

## 2023-06-06 DIAGNOSIS — D44.5 PINEAL GERM CELL TUMOR (H): Primary | ICD-10-CM

## 2023-06-06 DIAGNOSIS — D18.00 CAVERNOMA: ICD-10-CM

## 2023-06-06 PROCEDURE — A9585 GADOBUTROL INJECTION: HCPCS | Performed by: RADIOLOGY

## 2023-06-06 PROCEDURE — 70553 MRI BRAIN STEM W/O & W/DYE: CPT | Mod: GC | Performed by: RADIOLOGY

## 2023-06-06 PROCEDURE — 99213 OFFICE O/P EST LOW 20 MIN: CPT | Performed by: NEUROLOGICAL SURGERY

## 2023-06-06 RX ORDER — GADOBUTROL 604.72 MG/ML
7.5 INJECTION INTRAVENOUS ONCE
Status: COMPLETED | OUTPATIENT
Start: 2023-06-06 | End: 2023-06-06

## 2023-06-06 RX ADMIN — GADOBUTROL 6.5 ML: 604.72 INJECTION INTRAVENOUS at 12:12

## 2023-06-06 ASSESSMENT — PAIN SCALES - GENERAL: PAINLEVEL: SEVERE PAIN (7)

## 2023-06-06 NOTE — PROGRESS NOTES
Service Date: 2023    Ila Cornell MD   [facility]  [address], [address]  [place] [address]    RE:      Kari Turcios  MRN:  [id number]  :   1990    Dear Dr. Cornell:      We saw Mr. 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  He also has a ventriculoperitoneal shunt for obstructive hydrocephalus.  He was accompanied by his father.    Mr. Turcios is about 9 years out from his presentation and resection.  He has continuous pain along the shunt tract and along the right frontoparietal convexity.  He has been using the Nurtec device with good relief.  The pain is tolerable.  He also follows with Dr. Menendez for his diplopia.  He currently wears tinted glasses with prisms and this resolves the diplopia.  He continues to require desmopressin and follows with Dr. Ramires in Endocrinology.  His energy levels are reasonably good.  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    PHYSICAL EXAMINATION:    GENERAL APPEARANCE:  Good.  He seemed to be in good spirits.    NEUROLOGIC:  His speech, language and phonation are normal.  Unlike before, he is not constantly squinting.    EXTREMITIES:  He is moving all extremities with good strength and coordination.  He has a slight right shoulder girdle droop.    REVIEW OF STUDIES:  We reviewed his MRI from today and compared it to previous studies including his preoperative MRI from 9 years ago.  We do not see any obvious recurrent or residual tumor in the pineal region.  His ventricles are slit-like.    We checked the Strata valve on his ventriculoperitoneal shunt following the MRI.  The setting was changed by the MRI and we reprogrammed it back to 2.5.    IMPRESSION AND PLAN:  Based on his clinical and radiographic stability, we will  repeat an MRI in 1 year.  After that, we can switch to biannual studies.  Please do not hesitate to contact us with questions.    Sincerely,    Ann Sharp MD        D: 2023   T: 2023   MT: bobby    Name:     PANDA WOODARDTenisha  MRN:      [id number]        Account:      [id number]   :      1990           Service Date: 2023       Document: [id number]

## 2023-06-06 NOTE — PATIENT INSTRUCTIONS
Repeat MRI brain with contrast in one year    Follow up in one year same day as MRI    Shunt check at office visit    Call Seble MARTIN  Neurosurgery Care Coordinator with questions/concerns     Thank you for using [facility]

## 2023-06-06 NOTE — LETTER
2023       RE: Kari Turcios  [address]  [place] [facility] [address]     Dear Colleague,    Thank you for referring your patient, Kari Turcios, to the [facility]OSURGERY CLINIC [place] at [facility]. Please see a copy of my visit note below.    Service Date: 2023    Ila Cornell MD   [facility]  [address], [address]  [place] [address]    RE:      Kari Turcios  MRN:  [id number]  :   1990    Dear Dr. Cornell:      We saw Mr. 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  He also has a ventriculoperitoneal shunt for obstructive hydrocephalus.  He was accompanied by his father.    Mr. Turcios is about 9 years out from his presentation and resection.  He has continuous pain along the shunt tract and along the right frontoparietal convexity.  He has been using the Nurtec device with good relief.  The pain is tolerable.  He also follows with Dr. Menendez for his diplopia.  He currently wears tinted glasses with prisms and this resolves the diplopia.  He continues to require desmopressin and follows with Dr. Ramires in Endocrinology.  His energy levels are reasonably good.  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    PHYSICAL EXAMINATION:    GENERAL APPEARANCE:  Good.  He seemed to be in good spirits.    NEUROLOGIC:  His speech, language and phonation are normal.  Unlike before, he is not constantly squinting.    EXTREMITIES:  He is moving all extremities with good strength and coordination.  He has a slight right shoulder girdle droop.    REVIEW OF STUDIES:  We reviewed his MRI from today and compared it to previous studies including his preoperative MRI from 9 years ago.  We do not see any  obvious recurrent or residual tumor in the pineal region.  His ventricles are slit-like.    We checked the Strata valve on his ventriculoperitoneal shunt following the MRI.  The setting was changed by the MRI and we reprogrammed it back to 2.5.    IMPRESSION AND PLAN:  Based on his clinical and radiographic stability, we will repeat an MRI in 1 year.  After that, we can switch to biannual studies.  Please do not hesitate to contact us with questions.    Sincerely,    Ann Sharp MD        D: 2023   T: 2023   MT: bobby    Name:     DERECKMARIANNA PANDA TAMAYO  MRN:      [id number]        Account:      [id number]   :      1990           Service Date: 2023       Document: [id number]

## 2023-06-06 NOTE — LETTER
2023      RE: Kari Turcios  [address]  [place] [facility] [address]       Service Date: 2023    Ila Cornell MD   [facility]  [address]  [place] [address]    RE:      Kari Turcios  MRN:  [id number]  :   1990    Dear Dr. Cornell:      We saw Mr. 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  He also has a ventriculoperitoneal shunt for obstructive hydrocephalus.  He was accompanied by his father.    Mr. Turcios is about 9 years out from his presentation and resection.  He has continuous pain along the shunt tract and along the right frontoparietal convexity.  He has been using the Nurtec device with good relief.  The pain is tolerable.  He also follows with Dr. Menendez for his diplopia.  He currently wears tinted glasses with prisms and this resolves the diplopia.  He continues to require desmopressin and follows with Dr. Ramires in Endocrinology.  His energy levels are reasonably good.  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    PHYSICAL EXAMINATION:    GENERAL APPEARANCE:  Good.  He seemed to be in good spirits.    NEUROLOGIC:  His speech, language and phonation are normal.  Unlike before, he is not constantly squinting.    EXTREMITIES:  He is moving all extremities with good strength and coordination.  He has a slight right shoulder girdle droop.    REVIEW OF STUDIES:  We reviewed his MRI from today and compared it to previous studies including his preoperative MRI from 9 years ago.  We do not see any obvious recurrent or residual tumor in the pineal region.  His ventricles are slit-like.    We checked the Strata valve on his ventriculoperitoneal shunt following the MRI.  The setting was changed by the MRI and we reprogrammed it back to  2.5.    IMPRESSION AND PLAN:  Based on his clinical and radiographic stability, we will repeat an MRI in 1 year.  After that, we can switch to biannual studies.  Please do not hesitate to contact us with questions.    Sincerely,    Ann Sharp MD        D: 2023   T: 2023   MT: bobby    Name:     PANDA WOODARD  MRN:      [id number]        Account:      [id number]   :      1990           Service Date: 2023       Document: [id number]     Ann Sharp MD

## 2023-07-20 DIAGNOSIS — G40.219 LOCALIZATION-RELATED EPILEPSY WITH COMPLEX PARTIAL SEIZURES WITH INTRACTABLE EPILEPSY (H): Primary | ICD-10-CM

## 2023-08-03 ENCOUNTER — VIRTUAL VISIT (OUTPATIENT)
Dept: NEUROLOGY | Facility: CLINIC | Age: 33
End: 2023-08-03
Payer: MEDICAID

## 2023-08-03 VITALS — HEIGHT: 67 IN | BODY MASS INDEX: 24.01 KG/M2 | WEIGHT: 153 LBS

## 2023-08-03 DIAGNOSIS — G43.711 INTRACTABLE CHRONIC MIGRAINE WITHOUT AURA AND WITH STATUS MIGRAINOSUS: Primary | ICD-10-CM

## 2023-08-03 PROCEDURE — 99212 OFFICE O/P EST SF 10 MIN: CPT | Mod: VID | Performed by: NURSE PRACTITIONER

## 2023-08-03 ASSESSMENT — HEADACHE IMPACT TEST (HIT 6)
HOW OFTEN HAVE YOU FELT TOO TIRED TO WORK BECAUSE OF YOUR HEADACHES: SOMETIMES
HOW OFTEN HAVE YOU FELT FED UP OR IRRITATED BECAUSE OF YOUR HEADACHES: RARELY
HIT6 TOTAL SCORE: 58
HOW OFTEN DO HEADACHES LIMIT YOUR DAILY ACTIVITIES: SOMETIMES
WHEN YOU HAVE HEADACHES HOW OFTEN IS THE PAIN SEVERE: SOMETIMES
HOW OFTEN DID HEADACHS LIMIT CONCENTRATION ON WORK OR DAILY ACTIVITY: SOMETIMES
WHEN YOU HAVE A HEADACHE HOW OFTEN DO YOU WISH YOU COULD LIE DOWN: SOMETIMES

## 2023-08-03 ASSESSMENT — PAIN SCALES - GENERAL: PAINLEVEL: NO PAIN (0)

## 2023-08-03 NOTE — PROGRESS NOTES
Abelino is a 33 year old who is being evaluated via a billable video visit.      Subjective   Abelino is a 33 year old, presenting for the following health issues: headache   Follow Up  history of a large germ cell pineal tumor which was resected in 2014 by Dr. Sharp along with VPS placed for hydrocephalus, seizures with last seizure October 12, 2022 and on keppra. No recent seizures.    Doing better headache wise and more to the shunt area when gets them.   Nurtec every other day and has been very helpful. No recent ED visits for headaches. Nurtec helps with reducing severity and frequency of headaches. Has been working for patient.   Amitriptyline 10 mg at bedtime and no side effects. Everything pretty stable and steady    Plan:  No changes   Nurtec every other day and amitriptyline at bedtime for headache prevention.   Follow up in 9-12 months or sooner if needed     Per last visit note,  history of a large germ cell pineal tumor which was resected in 2014 by Dr. Sharp along with VPS placed for hydrocephalus, seizures with last seizure October 12, 2022 and on keppra. No recent seizures.   Video Visit (Follow up headaches )  Neurosurgery ramón in March and note reviewed. Patient is frightened with even thinking about shunt removal.   Headaches are still bad and around the cord -right occipital/behind right ear.   Headaches are not positional but feels like something is pulling.   New glasses with added tint and helps with light sensitivity.   Moderate -severe headaches every other day and losing weight and appetite. Nausea. Denies anxiety.   Depression is concerning. No SIs.   Patient reports that Nurtec helps as needed and every couple days. No side effects.    Patient is Ok with current treatment plan. Would like to keep Botox as an option and still hesitant.   Patient lost a lot of weight and does not eat very much. Patient does not want any medications would suppress his appetite.   Tried Vyepti and allergic  reaction   Emgality tried but problems with self-injections.     Objective    Vitals - Patient Reported  Pain Score: No Pain (0)    Physical Exam   GENERAL: Healthy, alert and no distress  EYES: Eyes grossly normal to inspection. RESP: No audible wheeze, cough, or visible cyanosis.  No visible retractions or increased work of breathing.    NEURO: Cranial nerves grossly intact.  Mentation and speech appropriate for age.  PSYCH: Mentation appears normal, affect normal, judgement and insight intact, normal speech and appearance well-groomed.    I discussed all my recommendations with Kari Turcios who verbalizes understanding and comfortable with the plan.      11 minutes spent on the date of the encounter doing video access, chart  review,  meds review, treatment plan, documentation and further activities as noted above    CASPER Black, CNP [facility]  Headache certified  [facility]      Video-Visit Details    Type of service:  Video Visit   Originating Location (pt. Location): [facility]  Distant Location (provider location):  Off-site  Platform used for Video Visit: La

## 2023-08-03 NOTE — PATIENT INSTRUCTIONS
Plan:  No changes   Nurtec every other day and amitriptyline at bedtime for headache prevention.   Follow up in 9-12 months or sooner if needed

## 2023-08-03 NOTE — NURSING NOTE
Is the patient currently in the state of [place]? YES    Visit mode:VIDEO    If the visit is dropped, the patient can be reconnected by: VIDEO VISIT: Send to e-mail at: [email or web address]    Will anyone else be joining the visit? NO      How would you like to obtain your AVS? MyChart    Are changes needed to the allergy or medication list? NO    Reason for visit: Follow Up

## 2023-08-03 NOTE — LETTER
8/3/2023       RE: Kari Turcios  [address]  [facility] [address]       Dear Colleague,    Thank you for referring your patient, Kari Turcios, to the [facility] NEUROLOGY CLINIC [place] at [facility]. Please see a copy of my visit note below.    Abelino is a 33 year old who is being evaluated via a billable video visit.      Subjective  Abelino is a 33 year old, presenting for the following health issues: headache   Follow Up  history of a large germ cell pineal tumor which was resected in 2014 by Dr. Sharp along with VPS placed for hydrocephalus, seizures with last seizure October 12, 2022 and on keppra. No recent seizures.    Doing better headache wise and more to the shunt area when gets them.   Nurtec every other day and has been very helpful. No recent ED visits for headaches. Nurtec helps with reducing severity and frequency of headaches. Has been working for patient.   Amitriptyline 10 mg at bedtime and no side effects. Everything pretty stable and steady    Plan:  No changes   Nurtec every other day and amitriptyline at bedtime for headache prevention.   Follow up in 9-12 months or sooner if needed     Per last visit note,  history of a large germ cell pineal tumor which was resected in 2014 by Dr. Sharp along with VPS placed for hydrocephalus, seizures with last seizure October 12, 2022 and on keppra. No recent seizures.   Video Visit (Follow up headaches )  Neurosurgery ramón in March and note reviewed. Patient is frightened with even thinking about shunt removal.   Headaches are still bad and around the cord -right occipital/behind right ear.   Headaches are not positional but feels like something is pulling.   New glasses with added tint and helps with light sensitivity.   Moderate -severe headaches every other day and losing weight and appetite. Nausea. Denies anxiety.   Depression is concerning. No SIs.   Patient reports that  Nurtec helps as needed and every couple days. No side effects.    Patient is Ok with current treatment plan. Would like to keep Botox as an option and still hesitant.   Patient lost a lot of weight and does not eat very much. Patient does not want any medications would suppress his appetite.   Tried Vyepti and allergic reaction   Emgality tried but problems with self-injections.     Objective   Vitals - Patient Reported  Pain Score: No Pain (0)    Physical Exam   GENERAL: Healthy, alert and no distress  EYES: Eyes grossly normal to inspection. RESP: No audible wheeze, cough, or visible cyanosis.  No visible retractions or increased work of breathing.    NEURO: Cranial nerves grossly intact.  Mentation and speech appropriate for age.  PSYCH: Mentation appears normal, affect normal, judgement and insight intact, normal speech and appearance well-groomed.    I discussed all my recommendations with Kari Turcios who verbalizes understanding and comfortable with the plan.      11 minutes spent on the date of the encounter doing video access, chart  review,  meds review, treatment plan, documentation and further activities as noted above          Again, thank you for allowing me to participate in the care of your patient.      Sincerely,    CASPER Jones CNP

## 2023-10-03 NOTE — LETTER
6/21/2021       RE: Kari Turcios  [address]  [facility] [address]     Dear Colleague,    Thank you for referring your patient, Kari Turcios, to the [facility] NEUROLOGY CLINIC [place] at [facility]. Please see a copy of my visit note below.      Anne Marie Roca is a 31 year old who presents for the following health issues -ED visit follow up and headaches.  Recall history of pineal tumor, seizures,  shunt, diabetes.  Today reports he has been feeling Ok since visit to the ED on 6/11/2021 just sensitivity to light daily.   Saw epilepsy at [facility] last Friday and keppra was increased and will be following up with [facility] for seizures management.   Patient reports right hand numbness with migraine headaches but resolves. No speech problems. Vision is blurry.   Patient was on topiramate, amitriptyline in the past. Not on any medication for chronic migraine headache prevention at this time.  ED visit note reviewed.  Patient was seen in ED on 6/11/2021 for headache and chills.  Patient reported sensitivity to light, nausea and numbness in the left leg.  History of shunt and x-ray shunt malfunction [employer] showed that shunt is intact without kinking or disruption the tip is directed in the left side of the abdomen.  No significant acute findings identified.  Midline/paramedian posterior fossa craniotomy.  Head CT no acute process was identified, no intracranial mass, but chronic ischemic changes and torsional glucoses above the right frontal  shunt.  Stable ventricular morphology from previous MR in 2019 with decompression of the lateral and third ventricles and normal caliber of the fourth ventricle.  No hemorrhage or acute infarction was identified imaging report.  Normal neurological exam recorded in the ED.  Patient received sodium chloride, Compazine and Benadryl and was discharged home.  Keppra level was checked and stable.  Would avoid  Continue antihypertensive medications with hold parameters triptans-head CT changes of possible ischemic changes vs glioses and possible complex migraine so would avoid triptans given patient history of surgery.     Patient is vaccinated for COVID-19.    Past medical history  Seizures, type 2 diabetes, pineal germ cell tumor, depression, anxiety, anemia, PE, ventriculostomy and  shunt    Plan reviewed with the patient:  Acute migraine headache treatment-recommended a trial of Nurtec  75 mg sublingual every 24 hours as needed or every other day.   Headache prevention-a trial of Emgality monthly for chronic migraine prevention  Follow up in 3 months or sooner if needed after starting Emgality    Patient received Emgality from pharmacy with a saving card and self injected after demonstration a loading dose without any concerns.     [facility] HR at [id number] claim #[id number] -[facility]   Patient to signed FAROOQ.       Review of Systems   Constitutional, HEENT, cardiovascular, pulmonary, gi and gu systems reviewed, except as otherwise noted.      Objective    /86   Pulse 88   SpO2 100%   There is no height or weight on file to calculate BMI.  Physical Exam   Headache today 7-8/10 today, worse dark shades  GENERAL: healthy, alert and moderate  Distress due to headache  EYES: Eyes grossly normal to inspection, PERRL and conjunctivae and sclerae normal  NECK: no adenopathy, no asymmetry, masses, or scars and thyroid normal to palpation  RESP: lungs clear to auscultation - no rales, rhonchi or wheezes  CV: regular rate and rhythm, normal S1 S2, no S3 or S4, no murmur, click or rub, no peripheral edema and peripheral pulses strong  NEURO: Normal strength and tone, sensory exam grossly normal, mentation intact, speech normal, cranial nerves 2-12 intact, DTR's normal and symmetric and normal casual gait    I discussed all my recommendations with Kari Turcios who verbalizes understanding and comfortable with the plan.  All of patient's questions were answered from the best of my  knowledge.  Patient is in agreement with the plan.     48 minutes spent on the date of the encounter doing chart review, history and exam, documentation and further activities as noted above    CASPER Black, CNP [facility]  Headache certified  [facility]              Again, thank you for allowing me to participate in the care of your patient.      Sincerely,    CASPER Jones CNP

## 2023-11-01 ENCOUNTER — TELEPHONE (OUTPATIENT)
Dept: NEUROLOGY | Facility: CLINIC | Age: 33
End: 2023-11-01
Payer: MEDICAID

## 2023-11-01 NOTE — TELEPHONE ENCOUNTER
Left Voicemail (1st Attempt) and Sent Mychart (1st Attempt) for the patient to call back and schedule the following:    Appointment type: Return headache  Provider: Sybil Dockery  Return date: 9-12mo follow-up  Specialty phone number: [phone]  Additional appointment(s) needed: N/A  Additonal Notes: N/A    Lizzette Arias on 11/1/2023 at 11:12 AM

## 2023-12-26 NOTE — TELEPHONE ENCOUNTER
Remote reviewed  Presenting AF  AF burden 27%  Patient has persistent AF since late November   Available EGMs show tachy episodes as short intermittent bursts of AF with RVR  He continues with Xarelto   Patient currently in [place] and following with local cardiologist.      Writer routed to Stroke/Neurovascular Nurse Pool.     Theodora Altamirano LPN  Neurosurgery

## 2024-02-11 DIAGNOSIS — G43.719 INTRACTABLE CHRONIC MIGRAINE WITHOUT AURA AND WITHOUT STATUS MIGRAINOSUS: ICD-10-CM

## 2024-02-13 DIAGNOSIS — G43.719 INTRACTABLE CHRONIC MIGRAINE WITHOUT AURA AND WITHOUT STATUS MIGRAINOSUS: ICD-10-CM

## 2024-02-13 NOTE — TELEPHONE ENCOUNTER
RX Authorization    Medication: Amitriptyline (ELAVIL) 10 MG tablet     Date last refill ordered: 4/27/2023    Quantity ordered: 60    # refills: 6     Date of last clinic visit with ordering provider: 8/3/2023    Date of next clinic visit with ordering provider: 8/7/2024    All pertinent protocol data (lab date/result):    Include pertinent information from patients message:

## 2024-02-14 NOTE — TELEPHONE ENCOUNTER
RX Authorization    Medication: amitriptyline (ELAVIL) 10 MG tablet    Date last refill ordered: 4/27/23    Quantity ordered: 60 tablet    # refills: 6    Date of last clinic visit with ordering provider: 8/3/23    Date of next clinic visit with ordering provider: 8/7/24

## 2024-02-21 RX ORDER — AMITRIPTYLINE HYDROCHLORIDE 10 MG/1
TABLET ORAL
Qty: 180 TABLET | Refills: 2 | OUTPATIENT
Start: 2024-02-21

## 2024-02-21 RX ORDER — AMITRIPTYLINE HYDROCHLORIDE 10 MG/1
TABLET ORAL
Qty: 60 TABLET | Refills: 9 | Status: SHIPPED | OUTPATIENT
Start: 2024-02-21

## 2024-03-20 ENCOUNTER — OFFICE VISIT (OUTPATIENT)
Dept: ENDOCRINOLOGY | Facility: CLINIC | Age: 34
End: 2024-03-20

## 2024-03-20 VITALS
BODY MASS INDEX: 24.9 KG/M2 | SYSTOLIC BLOOD PRESSURE: 116 MMHG | RESPIRATION RATE: 16 BRPM | OXYGEN SATURATION: 97 % | WEIGHT: 159 LBS | HEART RATE: 55 BPM | DIASTOLIC BLOOD PRESSURE: 71 MMHG

## 2024-03-20 DIAGNOSIS — E23.2 DIABETES INSIPIDUS SECONDARY TO VASOPRESSIN DEFICIENCY (H): ICD-10-CM

## 2024-03-20 LAB
ANION GAP SERPL CALCULATED.3IONS-SCNC: 8 MMOL/L (ref 7–15)
BUN SERPL-MCNC: 11 MG/DL (ref 6–20)
CALCIUM SERPL-MCNC: 9.5 MG/DL (ref 8.6–10)
CHLORIDE SERPL-SCNC: 103 MMOL/L (ref 98–107)
CREAT SERPL-MCNC: 1.17 MG/DL (ref 0.67–1.17)
DEPRECATED HCO3 PLAS-SCNC: 30 MMOL/L (ref 22–29)
EGFRCR SERPLBLD CKD-EPI 2021: 84 ML/MIN/1.73M2
GLUCOSE SERPL-MCNC: 87 MG/DL (ref 70–99)
POTASSIUM SERPL-SCNC: 4.3 MMOL/L (ref 3.4–5.3)
SODIUM SERPL-SCNC: 141 MMOL/L (ref 135–145)

## 2024-03-20 PROCEDURE — 80048 BASIC METABOLIC PNL TOTAL CA: CPT | Performed by: INTERNAL MEDICINE

## 2024-03-20 PROCEDURE — 99214 OFFICE O/P EST MOD 30 MIN: CPT | Performed by: INTERNAL MEDICINE

## 2024-03-20 PROCEDURE — 36415 COLL VENOUS BLD VENIPUNCTURE: CPT | Performed by: INTERNAL MEDICINE

## 2024-03-20 RX ORDER — DESMOPRESSIN ACETATE 0.2 MG/1
TABLET ORAL
Qty: 135 TABLET | Refills: 3 | Status: SHIPPED | OUTPATIENT
Start: 2024-03-20 | End: 2024-08-14

## 2024-03-20 NOTE — PROGRESS NOTES
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 He is now s/p chemo and radiation therapy and there was no evidence of tumor recurrence on the most recent MRI from 2022.  The pituitary gland function was reevaluated in October 2018.  AFP remained fairly stable and and Beta HCG was undetectable, from 2015 to 2019.   It appears that the morning dose of desmopressin wears off around 3 to 4 PM.  Recommended to try to take half a tablet of desmopressin around that time.  If he continues to use the restroom frequently late afternoon evening, he can even take 1 tablet after lunch and then move the bedtime desmopressin dose to 10 PM.  Discussed about signs and symptoms of hyponatremia.  The plan is to check the BMP level today and in 2 weeks.  Recommended to have lab work done a couple of hours after taking the morning dose of desmopressin, in 2 weeks.  The prescription for dexamethasone was refilled.  Orders Placed This Encounter   Procedures    Basic metabolic panel    Basic metabolic panel   ========================================================================================  The patient was last seen in our clinic in 11/2022.  Abelino Turcios is a 34 year old male who was admitted on 3/19/14 after presenting to the emergency room with headache, nausea, 2 weeks of left side vision blurring.  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 He underwent an endoscopic 3rd ventriculostomy and EVD placement and surgery stealth assisted suboccipital craniotomy with the excision of the tumor on 3/26/14. 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 Focal mature teratoma (~5%) with mature squamous epithelial and mesenchymal  elements (focal cartilage) were seen. Lesser components of yolk sac (~5%), (~2%) embryonal and (~3%) germinoma were present. 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   The patient developed polyuria and increased thirst a couple of months prior to his hospitalization. A diagnosis of DI was confirmed during hospitalization and the patient was discharged on desmopressin. The investigation of pituitary function postop revealed normal anterior pituitary function.  He completed the radiation therapy in October of 2014.  Prior to radiation therapy, he had sperm retrieved, for cryopreservation.  Current dose of desmopressin is 200  g PO in the morning (8:30 AM), 200  g around bedtime (7-8 PM; bedtime=9-10 PM).  He uses the restroom 0-2 times a night, mostly none. He doesn't wake up during the night if he takes it correctly. If he takes the evening dose earlier, he does wake up at night to use the restroom.  In general, he feels like the morning dose of desmopressin wears off shortly after lunch, around 3-4 PM, when he starts using the restroom every hour. This is the reason why he sometimes takes the evening dose earlier.  On average, he reports drinking approximately 2 L of fluids daily.  No lightheadedness.  The headaches are now present once a week, generally short-lived and present in the area where the shunt is positioned.  Reports experiencing episodes of nausea once a month.  Continues to smoke marijuana once a day.  Denies vomiting.  Overall, his weight has been fairly stable.  A brain MRI was done in December 2022.  There was no evidence of recurrence of the germ cell tumor.       Past Medical History:   Diagnosis Date    Hypertropia     Intractable chronic migraine without aura 8/3/2022    Myopia     Seizures (H)     2 months ago    Type 2 diabetes mellitus without complications (H)     Borderline   Encephalitis 2012  History of seizure disorders post encephalitis     Past Surgical History:   Procedure  Laterality Date    ARTHROTOMY SHOULDER, BANKHART REPAIR, COMBINED  10/29/2012    Procedure: COMBINED ARTHROTOMY SHOULDER, BANKHART REPAIR;  RIGHT OPEN SHOULDER BANKART REPAIR;  Surgeon: Lemuel Ramírez MD;  Location:  [facility]    OPTICAL TRACKING SYSTEM BIOPSY BRAIN  3/20/2014    Procedure: OPTICAL TRACKING SYSTEM BIOPSY BRAIN;  Right Frontal Approach For endoscopic Intraventricular Mass Biopsy, 3rd Ventriculostomy, Placement of right ventriculostomy catheter.;  Surgeon: Williams Clement MD;  Location: [facility] OR    OPTICAL TRACKING SYSTEM CRANIOTOMY, EXCISE TUMOR, COMBINED  3/26/2014    Procedure: COMBINED OPTICAL TRACKING SYSTEM CRANIOTOMY, EXCISE TUMOR;  Stealth Assisted Sub-Occiptial Craniotomy, Excise Tumor ;  Surgeon: Ann Sharp MD;  Location: [facility] OR    OPTICAL TRACKING SYSTEM IMPLANT SHUNT VENTRICULOPERITONEAL  4/8/2014    Procedure: OPTICAL TRACKING SYSTEM IMPLANT SHUNT VENTRICULOPERITONEAL;  Right Stealth Guided Ventricularperitoneal Shunt Placement ;  Surgeon: Ann Sharp MD;  Location: [facility] OR    RECESSION RESECTION (REPAIR STRABISMUS) BILATERAL Bilateral 8/4/2016    Procedure: RECESSION RESECTION (REPAIR STRABISMUS) BILATERAL;  Surgeon: Serafin Menendez MD;  Location: [facility] OR    VENTRICULOSTOMY  3/20/2014    Procedure: VENTRICULOSTOMY;;  Surgeon: Williams Clement MD;  Location: [facility] OR       Current Medications    Current Outpatient Medications:     ACETAMINOPHEN PO, Take 650 mg by mouth every 4 hours as needed for pain, Disp: , Rfl:     amitriptyline (ELAVIL) 10 MG tablet, Take  two tabs at bedtime, Disp: 60 tablet, Rfl: 9    desmopressin (DDAVP) 0.2 MG tablet, Take 1 tablet (0.2 mg) by mouth 2 times daily, Disp: 180 tablet, Rfl: 3    EPINEPHrine (ANY BX GENERIC EQUIV) 0.3 MG/0.3ML injection 2-pack, Inject 0.3 mLs (0.3 mg) into the muscle as needed for anaphylaxis May repeat one time in 5-15 minutes if response to initial dose is inadequate., Disp: 2 each, Rfl:  0    Glycerin-Hypromellose- (VISINE TEARS OP), Apply 1-2 drops to eye 2 times daily as needed (for dry eyes.), Disp: , Rfl:     levETIRAcetam (KEPPRA) 750 MG tablet, Take 1 tablet (750 mg) by mouth 2 times daily Take 2.5 tablets in the morning and 3 in the afternoon. (Patient taking differently: Take 2.5 tablets in the morning and 3 in the afternoon.), Disp: 135 tablet, Rfl: 3    naproxen (NAPROSYN) 500 MG tablet, Take 1 tablet (500 mg) by mouth 2 times daily as needed for headaches, Disp: 60 tablet, Rfl: 0    OXCARBAZEPINE PO, Take 450 mg by mouth 2 times daily, Disp: , Rfl:     prochlorperazine (COMPAZINE) 5 MG tablet, Take 0.5-1 tablets (2.5-5 mg) by mouth every 8 hours as needed for nausea or vomiting, Disp: 20 tablet, Rfl: 1    rimegepant (NURTEC) 75 MG ODT tablet, Take 1 tablet (75 mg) by mouth See Admin Instructions Take 75 mg orally every other day, Disp: 8 tablet, Rfl: 12    Family history  Denies family history of diabetes, thyroid disorders or cancer.    Social History  He denies smoking, drinking alcohol or using illicit drugs. Smokes marihuana, once a day.                ROS  Systemic symptoms: no fatigue   Eye symptoms: No eye symptoms.  Otolaryngeal symptoms: No otolaryngeal symptoms.  Breast symptoms: No breast symptoms.  Cardiovascular symptoms: No cardiovascular symptoms.    Pulmonary symptoms: No pulmonary symptoms.  Gastrointestinal symptoms:  as above   Genitourinary symptoms: as above   Endocrine symptoms: No endocrine symptoms.    Hematologic symptoms: No hematologic symptoms.  Musculoskeletal symptoms: No musculoskeletal symptoms.  Neurological symptoms: No neurological symptoms.  Psychological symptoms: No psychological symptoms.    Skin symptoms: No skin symptoms.    Vital Signs     Previous Weights:    Wt Readings from Last 10 Encounters:   03/20/24 72.1 kg (159 lb)   08/03/23 69.4 kg (153 lb)   06/06/23 69.4 kg (153 lb)   03/15/23 64.5 kg (142 lb 1.6 oz)   02/03/23 66.7 kg (147 lb)    01/20/23 65.3 kg (144 lb)   12/07/22 63.5 kg (140 lb)   11/03/22 62.4 kg (137 lb 8 oz)   07/08/22 72.6 kg (160 lb)   06/11/21 72.6 kg (160 lb)        /71 (BP Location: Left arm, Patient Position: Sitting, Cuff Size: Adult Regular)   Pulse 55   Resp 16   Wt 72.1 kg (159 lb)   SpO2 97%   BMI 24.90 kg/m      Physical Exam  General Appearance: he is well developed, well nourished and in no distress     Eyes:  conjutivae are normal                                    extraocular movements - impaired upward gaze left eye   HEENT:   oropharynx clear and moist, no JVD, no bruits      no thyromegaly, no palpable nodules  Cardiovascular:  regular rhythm, no murmurs, distal pulse palpable, no edema  Respiratory:        chest clear, no rales, no rhonchi   Gastrointestinal:  abdomen soft, non-tender, non-distended, normal bowel sounds,    no organomegaly  Musculoskeletal:  normal tone and strength  Psychological:          affect and judgment normal  Skin:  normal male pattern   Neurological:  reflexes normal and symmetric, no resting tremor.     31 minutes spent on the date of the encounter doing chart review, history and exam, documentation and further activities as noted above.

## 2024-03-20 NOTE — PATIENT INSTRUCTIONS
Welcome to the [facility] Endocrinology and Diabetes Clinics     Our Endocrinology Clinics are here to provide you with a team-based, collaborative approach in the diagnosis and treatment of patients with diabetes and endocrine disorders. The team is made up of Physicians, Physician Assistants, Certified Diabetes Educators, Registered Nurses, Medical Assistants, Emergency Medical Technicians, and many others, all of whom have the unified goal of providing our patients with high quality care.     Please see below for some helpful tips to best navigate and use the [facility] Endocrinology clinic:     [place] Respect: At [facility], we are committed to a respectful and safe space for all patients, visitors, and staff.  We believe that mutual respect between patients and their care team is the foundation of quality care.  It is our expectation that you will be treated with respect by your care team.  In turn, we ask that all communication with the care team (written and verbal) be respectful and free from profanity, threatening, or abusive language.  Disrespectful communication undermines our therapeutic relationship with you and may result in us being unable to continue to provide your care.    Refills: A provider must see you at least annually to prescribe and refill medications. This is to ensure your safety as well as meet insurance and compliance regulations.    Scheduling: Many of our Providers offer both in-person or video visits. Please call to schedule any needed follow ups as soon as possible because our provider schedules fill up very quickly. Our care team has the right to require an in-person visit when they believe that it is medically necessary. Please remember that for any virtual visits, you must be in the [facility] at the time of the visit, otherwise we are unable to see you and you will need to be rescheduled.    Missed Appointments: If you need to cancel or miss your  scheduled appointment, please call the clinic at [phone] to reschedule.  Please note if you repeatedly miss appointments or repeatedly miss appointments without calling to inform us ahead of time (no-show), the clinic may elect to not allow you to reschedule without speaking to a manager, may require a Partnership In Care Agreement prior to rescheduling, or could result in you no longer being able to receive care from the clinic. Providing the clinic with timely notification if you have to miss an appointment, allows us to better serve the needs of all of our patients.    Primary Care Provider: Our Endocrinologists are Specialists in their field. We expect you to have a Primary Care Provider established to handle any needs outside of your diabetes and endocrine care.  We would be happy to assist you find a Primary Care Provider, if you do not have one.    Moon[employer]erk: [employer] is a wonderful resource that allows you access to your Care Team via online or the ramón. Please ask a member of the team if you would like help creating an account. Please note that it may take up to 2 business days for a response. [employer]erk messages are not reviewed on weekends or after business hours.  Emergent or urgent care needs should never be communicated via [employer]erk.  If you experience a medical emergency call 911 or go to the nearest emergency room.    Labs: It is recommended that you stay within the [facility] for labs but you are welcome to obtain ordered labs (with some exceptions) from any location of your choice as long as they are able to complete and process the needed labs. If you need us to fax orders to your preferred lab, please provide us the name and fax number of the lab you would like to go to so we can fax the orders. If your labs are drawn outside of the [facility], please have them fax the results to [phone] ([place]) or [phone] ([place]) or via [facility] It is your  responsibility to ensure that outside lab results are sent to us.    We look forward to working with you. Please do not hesitate to reach out with any questions.    Thank you,    The Endocrine Team    [facility] Address:   [place] Address:     [address] [place] [address]    Phone: [phone]  Fax: [phone] [address] [place] [address]    Phone: [phone]  Fax: [phone]     [facility] Cost Estimate Phone Number: [phone]    General Lab and Imaging Scheduling Phone Number: [phone]

## 2024-03-20 NOTE — NURSING NOTE
Kari Turcios's goals for this visit include:   Chief Complaint   Patient presents with    Follow Up     DIABETES INSIPIDUS       He requests these members of his care team be copied on today's visit information: YES    PCP: Ila Cornell    Referring Provider:  No referring provider defined for this encounter.    /71 (BP Location: Left arm, Patient Position: Sitting, Cuff Size: Adult Regular)   Pulse 55   Resp 16   Wt 72.1 kg (159 lb)   SpO2 97%   BMI 24.90 kg/m      Do you need any medication refills at today's visit? NONE    Addi Parry, EMT

## 2024-03-20 NOTE — LETTER
April 15, 2024      Abelino FREGOSO Tam  [address] [facility] [address]        Dear ,    We are writing to inform you of your test results.      The labs are normal.       Lab on 03/16/2023   Component Date Value Ref Range Status    TSH 03/16/2023 0.64  0.30 - 4.20 uIU/mL Final    Free T4 03/16/2023 1.34  0.90 - 1.70 ng/dL Final    Cortisol 03/16/2023 6.8    ug/dL Final    6 months and older:  8 AM Cortisol Reference Range:  4-22 ug/dL   4 PM Cortisol Reference Range:  3-17 ug/dL    8 hrs post 1 mg dexamethasone given at midnight: < 5  g/dL    Sodium 03/16/2023 138  136 - 145 mmol/L Final    Potassium 03/16/2023 4.4  3.4 - 5.3 mmol/L Final    Chloride 03/16/2023 102  98 - 107 mmol/L Final    Carbon Dioxide (CO2) 03/16/2023 27  22 - 29 mmol/L Final    Anion Gap 03/16/2023 9  7 - 15 mmol/L Final    Urea Nitrogen 03/16/2023 10.6  6.0 - 20.0 mg/dL Final    Creatinine 03/16/2023 0.91  0.67 - 1.17 mg/dL Final    Calcium 03/16/2023 8.9  8.6 - 10.0 mg/dL Final    Glucose 03/16/2023 88  70 - 99 mg/dL Final    Alkaline Phosphatase 03/16/2023 77  40 - 129 U/L Final    AST 03/16/2023 22  10 - 50 U/L Final    ALT 03/16/2023 28  10 - 50 U/L Final    Protein Total 03/16/2023 7.4  6.4 - 8.3 g/dL Final    Albumin 03/16/2023 4.7  3.5 - 5.2 g/dL Final    Bilirubin Total 03/16/2023 0.4  <=1.2 mg/dL Final    GFR Estimate 03/16/2023 >90  >60 mL/min/1.73m2 Final    eGFR calculated using 2021 CKD-EPI equation.       If you have any questions or concerns, please call the clinic at the number listed above.       Sincerely,  Dr. Yessenia Ramires/ss

## 2024-03-20 NOTE — LETTER
3/20/2024         RE: Kari Turcios  [address]  [place] [address]        Dear Colleague,    Thank you for referring your patient, Kari Turcios, to the [facility]. Please see a copy of my visit note below.    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 He is now s/p chemo and radiation therapy and there was no evidence of tumor recurrence on the most recent MRI from 2022.  The pituitary gland function was reevaluated in October 2018.  AFP remained fairly stable and and Beta HCG was undetectable, from 2015 to 2019.   It appears that the morning dose of desmopressin wears off around 3 to 4 PM.  Recommended to try to take half a tablet of desmopressin around that time.  If he continues to use the restroom frequently late afternoon evening, he can even take 1 tablet after lunch and then move the bedtime desmopressin dose to 10 PM.  Discussed about signs and symptoms of hyponatremia.  The plan is to check the BMP level today and in 2 weeks.  Recommended to have lab work done a couple of hours after taking the morning dose of desmopressin, in 2 weeks.  The prescription for dexamethasone was refilled.  Orders Placed This Encounter   Procedures     Basic metabolic panel     Basic metabolic panel   ========================================================================================  The patient was last seen in our clinic in 11/2022.  Abelino Turcios is a 34 year old male who was admitted on 3/19/14 after presenting to the emergency room with headache, nausea, 2 weeks of left side vision blurring.  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 He underwent an endoscopic 3rd ventriculostomy and EVD placement and surgery stealth assisted suboccipital craniotomy with the  excision of the tumor on 3/26/14. 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 Focal mature teratoma (~5%) with mature squamous epithelial and mesenchymal elements (focal cartilage) were seen. Lesser components of yolk sac (~5%), (~2%) embryonal and (~3%) germinoma were present. 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   The patient developed polyuria and increased thirst a couple of months prior to his hospitalization. A diagnosis of DI was confirmed during hospitalization and the patient was discharged on desmopressin. The investigation of pituitary function postop revealed normal anterior pituitary function.  He completed the radiation therapy in October of 2014.  Prior to radiation therapy, he had sperm retrieved, for cryopreservation.  Current dose of desmopressin is 200  g PO in the morning (8:30 AM), 200  g around bedtime (7-8 PM; bedtime=9-10 PM).  He uses the restroom 0-2 times a night, mostly none. He doesn't wake up during the night if he takes it correctly. If he takes the evening dose earlier, he does wake up at night to use the restroom.  In general, he feels like the morning dose of desmopressin wears off shortly after lunch, around 3-4 PM, when he starts using the restroom every hour. This is the reason why he sometimes takes the evening dose earlier.  On average, he reports drinking approximately 2 L of fluids daily.  No lightheadedness.  The headaches are now present once a week, generally short-lived and present in the area where the shunt is positioned.  Reports experiencing episodes of nausea once a month.  Continues to smoke marijuana once a day.  Denies vomiting.  Overall, his weight has been fairly stable.  A brain MRI was done in December 2022.  There was no evidence of recurrence of the germ cell tumor.       Past Medical History:   Diagnosis Date     Hypertropia       Intractable chronic migraine without aura 8/3/2022     Myopia      Seizures (H)     2 months ago     Type 2 diabetes mellitus without complications (H)     Borderline   Encephalitis 2012  History of seizure disorders post encephalitis     Past Surgical History:   Procedure Laterality Date     ARTHROTOMY SHOULDER, BANKHART REPAIR, COMBINED  10/29/2012    Procedure: COMBINED ARTHROTOMY SHOULDER, BANKHART REPAIR;  RIGHT OPEN SHOULDER BANKART REPAIR;  Surgeon: Lemuel Ramírez MD;  [facility]     OPTICAL TRACKING SYSTEM BIOPSY BRAIN  3/20/2014    Procedure: OPTICAL TRACKING SYSTEM BIOPSY BRAIN;  Right Frontal Approach For endoscopic Intraventricular Mass Biopsy, 3rd Ventriculostomy, Placement of right ventriculostomy catheter.;  Surgeon: Williams Clement MD;  Location: [facility] OR     OPTICAL TRACKING SYSTEM CRANIOTOMY, EXCISE TUMOR, COMBINED  3/26/2014    Procedure: COMBINED OPTICAL TRACKING SYSTEM CRANIOTOMY, EXCISE TUMOR;  Stealth Assisted Sub-Occiptial Craniotomy, Excise Tumor ;  Surgeon: Ann Sharp MD;  Location: [facility] OR     OPTICAL TRACKING SYSTEM IMPLANT SHUNT VENTRICULOPERITONEAL  4/8/2014    Procedure: OPTICAL TRACKING SYSTEM IMPLANT SHUNT VENTRICULOPERITONEAL;  Right Stealth Guided Ventricularperitoneal Shunt Placement ;  Surgeon: Ann Sharp MD;  Location: [facility] OR     RECESSION RESECTION (REPAIR STRABISMUS) BILATERAL Bilateral 8/4/2016    Procedure: RECESSION RESECTION (REPAIR STRABISMUS) BILATERAL;  Surgeon: Serafin Menendez MD;  Location: [facility] OR     VENTRICULOSTOMY  3/20/2014    Procedure: VENTRICULOSTOMY;;  Surgeon: Williams Clement MD;  Location: [facility] OR       Current Medications    Current Outpatient Medications:      ACETAMINOPHEN PO, Take 650 mg by mouth every 4 hours as needed for pain, Disp: , Rfl:      amitriptyline (ELAVIL) 10 MG tablet, Take  two tabs at bedtime, Disp: 60 tablet, Rfl: 9     desmopressin (DDAVP) 0.2 MG tablet, Take 1 tablet (0.2 mg)  by mouth 2 times daily, Disp: 180 tablet, Rfl: 3     EPINEPHrine (ANY BX GENERIC EQUIV) 0.3 MG/0.3ML injection 2-pack, Inject 0.3 mLs (0.3 mg) into the muscle as needed for anaphylaxis May repeat one time in 5-15 minutes if response to initial dose is inadequate., Disp: 2 each, Rfl: 0     Glycerin-Hypromellose- (VISINE TEARS OP), Apply 1-2 drops to eye 2 times daily as needed (for dry eyes.), Disp: , Rfl:      levETIRAcetam (KEPPRA) 750 MG tablet, Take 1 tablet (750 mg) by mouth 2 times daily Take 2.5 tablets in the morning and 3 in the afternoon. (Patient taking differently: Take 2.5 tablets in the morning and 3 in the afternoon.), Disp: 135 tablet, Rfl: 3     naproxen (NAPROSYN) 500 MG tablet, Take 1 tablet (500 mg) by mouth 2 times daily as needed for headaches, Disp: 60 tablet, Rfl: 0     OXCARBAZEPINE PO, Take 450 mg by mouth 2 times daily, Disp: , Rfl:      prochlorperazine (COMPAZINE) 5 MG tablet, Take 0.5-1 tablets (2.5-5 mg) by mouth every 8 hours as needed for nausea or vomiting, Disp: 20 tablet, Rfl: 1     rimegepant (NURTEC) 75 MG ODT tablet, Take 1 tablet (75 mg) by mouth See Admin Instructions Take 75 mg orally every other day, Disp: 8 tablet, Rfl: 12    Family history  Denies family history of diabetes, thyroid disorders or cancer.    Social History  He denies smoking, drinking alcohol or using illicit drugs. Smokes marihuana, once a day.                ROS  Systemic symptoms: no fatigue   Eye symptoms: No eye symptoms.  Otolaryngeal symptoms: No otolaryngeal symptoms.  Breast symptoms: No breast symptoms.  Cardiovascular symptoms: No cardiovascular symptoms.    Pulmonary symptoms: No pulmonary symptoms.  Gastrointestinal symptoms:  as above   Genitourinary symptoms: as above   Endocrine symptoms: No endocrine symptoms.    Hematologic symptoms: No hematologic symptoms.  Musculoskeletal symptoms: No musculoskeletal symptoms.  Neurological symptoms: No neurological symptoms.  Psychological  symptoms: No psychological symptoms.    Skin symptoms: No skin symptoms.    Vital Signs     Previous Weights:    Wt Readings from Last 10 Encounters:   03/20/24 72.1 kg (159 lb)   08/03/23 69.4 kg (153 lb)   06/06/23 69.4 kg (153 lb)   03/15/23 64.5 kg (142 lb 1.6 oz)   02/03/23 66.7 kg (147 lb)   01/20/23 65.3 kg (144 lb)   12/07/22 63.5 kg (140 lb)   11/03/22 62.4 kg (137 lb 8 oz)   07/08/22 72.6 kg (160 lb)   06/11/21 72.6 kg (160 lb)        /71 (BP Location: Left arm, Patient Position: Sitting, Cuff Size: Adult Regular)   Pulse 55   Resp 16   Wt 72.1 kg (159 lb)   SpO2 97%   BMI 24.90 kg/m      Physical Exam  General Appearance: he is well developed, well nourished and in no distress     Eyes:  conjutivae are normal                                    extraocular movements - impaired upward gaze left eye   HEENT:   oropharynx clear and moist, no JVD, no bruits      no thyromegaly, no palpable nodules  Cardiovascular:  regular rhythm, no murmurs, distal pulse palpable, no edema  Respiratory:        chest clear, no rales, no rhonchi   Gastrointestinal:  abdomen soft, non-tender, non-distended, normal bowel sounds,    no organomegaly  Musculoskeletal:  normal tone and strength  Psychological:          affect and judgment normal  Skin:  normal male pattern   Neurological:  reflexes normal and symmetric, no resting tremor.     31 minutes spent on the date of the encounter doing chart review, history and exam, documentation and further activities as noted above.      Again, thank you for allowing me to participate in the care of your patient.        Sincerely,        Yessenia Ramires MD

## 2024-04-03 ENCOUNTER — LAB (OUTPATIENT)
Dept: LAB | Facility: CLINIC | Age: 34
End: 2024-04-03

## 2024-04-03 DIAGNOSIS — E23.2 DIABETES INSIPIDUS SECONDARY TO VASOPRESSIN DEFICIENCY (H): ICD-10-CM

## 2024-04-03 LAB
ANION GAP SERPL CALCULATED.3IONS-SCNC: 15 MMOL/L (ref 7–15)
BUN SERPL-MCNC: 13.7 MG/DL (ref 6–20)
CALCIUM SERPL-MCNC: 9.4 MG/DL (ref 8.6–10)
CHLORIDE SERPL-SCNC: 105 MMOL/L (ref 98–107)
CREAT SERPL-MCNC: 0.87 MG/DL (ref 0.67–1.17)
DEPRECATED HCO3 PLAS-SCNC: 22 MMOL/L (ref 22–29)
EGFRCR SERPLBLD CKD-EPI 2021: >90 ML/MIN/1.73M2
GLUCOSE SERPL-MCNC: 99 MG/DL (ref 70–99)
POTASSIUM SERPL-SCNC: 4.1 MMOL/L (ref 3.4–5.3)
SODIUM SERPL-SCNC: 142 MMOL/L (ref 135–145)

## 2024-04-03 PROCEDURE — 36415 COLL VENOUS BLD VENIPUNCTURE: CPT

## 2024-04-03 PROCEDURE — 80048 BASIC METABOLIC PNL TOTAL CA: CPT

## 2024-06-16 ENCOUNTER — HEALTH MAINTENANCE LETTER (OUTPATIENT)
Age: 34
End: 2024-06-16

## 2024-08-08 DIAGNOSIS — E23.2 DIABETES INSIPIDUS SECONDARY TO VASOPRESSIN DEFICIENCY (H): ICD-10-CM

## 2024-08-14 RX ORDER — DESMOPRESSIN ACETATE 0.2 MG/1
TABLET ORAL
Qty: 270 TABLET | Refills: 3 | Status: SHIPPED | OUTPATIENT
Start: 2024-08-14

## 2024-08-14 NOTE — TELEPHONE ENCOUNTER
DESMOPRESSIN ACETATE 0.2 MG TB       TAKE 1 TABLET IN THE MORNING, 1 TAB AROUND 3-4 PM AND 1 TABLET AT BEDTIME   Last Written Prescription Date:  3-20-24  Last Fill Quantity: 135,   # refills: 3  Last Office Visit : 3-20-24  Future Office visit:  3-25-24        Sodium   Date Value Ref Range Status   04/03/2024 142 135 - 145 mmol/L Final     Comment:     Reference intervals for this test were updated on 09/26/2023 to more accurately reflect our healthy population. There may be differences in the flagging of prior results with similar values performed with this method. Interpretation of those prior results can be made in the context of the updated reference intervals.    06/11/2021 134 133 - 144 mmol/L Final        Last clinic note:It appears that the morning dose of desmopressin wears off around 3 to 4 PM.  Recommended to try to take half a tablet of desmopressin around that time.  If he continues to use the restroom frequently late afternoon evening, he can even take 1 tablet after lunch and then move the bedtime desmopressin dose to 10 PM.  Discussed about signs and symptoms of hyponatremia.  The plan is to check the BMP level today and in 2 weeks.  Recommended to have lab work done a couple of hours after taking the morning dose of desmopressin, in 2 weeks.       Routing refill request to provider for review/approval because:  Current rx does not match clinic note

## 2024-08-15 ENCOUNTER — TELEPHONE (OUTPATIENT)
Dept: NEUROSURGERY | Facility: CLINIC | Age: 34
End: 2024-08-15
Payer: COMMERCIAL

## 2024-08-15 NOTE — TELEPHONE ENCOUNTER
Left Voicemail (1st Attempt) and Sent Mychart (1st Attempt) for the patient to call back and schedule the following:    Appointment type: Return Adult Neurosurg  Provider: Yen Ochoa  Return date: next available  Specialty phone number: [phone]  Additional appointment(s) needed: MRI Brain  Additonal Notes: 2 yr follow up, MRI prior, in person    Mony Maxwell on 8/15/2024 at 6:36 PM

## 2024-08-20 ENCOUNTER — TRANSFERRED RECORDS (OUTPATIENT)
Dept: HEALTH INFORMATION MANAGEMENT | Facility: CLINIC | Age: 34
End: 2024-08-20

## 2024-08-22 NOTE — TELEPHONE ENCOUNTER
Left Voicemail (2nd Attempt) for the patient to call back and schedule the following:    Appointment type: Return Adult Neurosurg  Provider: Yen Ochoa  Return date: next available  Specialty phone number: [phone]  Additional appointment(s) needed: MRI Brain  Additonal Notes: 2 yr follow up, MRI prior, in person     Mony Maxwell on 8/22/2024 at 11:34 AM

## 2024-08-23 ENCOUNTER — TELEPHONE (OUTPATIENT)
Dept: NEUROSURGERY | Facility: CLINIC | Age: 34
End: 2024-08-23
Payer: COMMERCIAL

## 2024-08-23 NOTE — TELEPHONE ENCOUNTER
[facility] Call Center    Phone Message    May a detailed message be left on voicemail: yes     Reason for Call: Other: Patient called to schedule a shunt check after his MRI on 924/2024, patient would like to go to [place] for the check,     Action Taken: Message routed to:  [facility]     Travel Screening: Not Applicable     Date of Service:

## 2024-09-24 ENCOUNTER — OFFICE VISIT (OUTPATIENT)
Dept: NEUROSURGERY | Facility: CLINIC | Age: 34
End: 2024-09-24
Payer: COMMERCIAL

## 2024-09-24 ENCOUNTER — HOSPITAL ENCOUNTER (OUTPATIENT)
Dept: MRI IMAGING | Facility: CLINIC | Age: 34
Discharge: HOME | End: 2024-09-24
Attending: NEUROLOGICAL SURGERY | Admitting: NEUROLOGICAL SURGERY
Payer: COMMERCIAL

## 2024-09-24 VITALS
RESPIRATION RATE: 16 BRPM | OXYGEN SATURATION: 98 % | SYSTOLIC BLOOD PRESSURE: 120 MMHG | DIASTOLIC BLOOD PRESSURE: 72 MMHG | HEART RATE: 67 BPM

## 2024-09-24 DIAGNOSIS — D44.5 PINEAL GERM CELL TUMOR (H): ICD-10-CM

## 2024-09-24 DIAGNOSIS — D44.5 PINEAL GERM CELL TUMOR (H): Primary | ICD-10-CM

## 2024-09-24 PROCEDURE — 70553 MRI BRAIN STEM W/O & W/DYE: CPT | Mod: 26 | Performed by: STUDENT IN AN ORGANIZED HEALTH CARE EDUCATION/TRAINING PROGRAM

## 2024-09-24 PROCEDURE — 99213 OFFICE O/P EST LOW 20 MIN: CPT | Performed by: NURSE PRACTITIONER

## 2024-09-24 PROCEDURE — 70553 MRI BRAIN STEM W/O & W/DYE: CPT

## 2024-09-24 PROCEDURE — 255N000002 HC RX 255 OP 636: Performed by: NEUROLOGICAL SURGERY

## 2024-09-24 PROCEDURE — A9585 GADOBUTROL INJECTION: HCPCS | Performed by: NEUROLOGICAL SURGERY

## 2024-09-24 RX ORDER — GADOBUTROL 604.72 MG/ML
7.5 INJECTION INTRAVENOUS ONCE
Status: COMPLETED | OUTPATIENT
Start: 2024-09-24 | End: 2024-09-24

## 2024-09-24 RX ADMIN — GADOBUTROL 7.5 ML: 604.72 INJECTION INTRAVENOUS at 08:15

## 2024-09-24 ASSESSMENT — PAIN SCALES - GENERAL: PAINLEVEL: NO PAIN (0)

## 2024-09-24 NOTE — PROGRESS NOTES
[facility]  Department of Neurosurgery      Name: Kari Turcios  MRN: [id number]  Age: 34 year old  : 1990  Referring provider: Ann Swift*  2024      Chief Complaint:   Pineal region germ cell tumor s/p craniotomy for resection  and right VPS (Strata set to 2.5)  Shunt check after MRI    History of Present Illness:   Kari Turcios is a 34 year old male with a history of a large germ cell pineal tumor which was resected in  by Dr. Sharp along with VPS placed for hydrocephalus who is seen today for shunt check after MRI.     Most recently seen in clinic by Dr. Sharp in 2023. His MRI showed no evidence of recurrent pineal mass. One year follow-up MRI was recommended by Dr. Sharp.     Today, patient presents for the evaluation with his dad. Overall, he has been doing well. He reports some increased headaches after the MRI today. He has continuous pain along the shunt tract and along the right frontoparietal convexity. This is tolerable. He uses Tylenol (few times a week) and Nurtec (once a months prn) as needed. He follows up with Ms. Dockery in our Headache clinic. No other neurological concerns.       Review of Systems:   Pertinent items are noted in HPI or as in patient entered ROS below, remainder of complete ROS is negative.        No data to display                 Physical Exam:   /72 (BP Location: Right arm, Patient Position: Sitting)   Pulse 67   Resp 16   SpO2 98%    General: No acute distress.    Neuro: The patient is fully oriented. Speech is normal. Gait is normal.  Psych: Normal mood and affect. Behavior is normal.        Imagin2024 MRI Brain:  IMPRESSION:     1. No recurrent pineal mass.  2. Compared with the most recent MRI from 2023, there is decreased  size of right occipital cavernoma with likely resolved previous  internal bleeding.   3. 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   4. Stable  shunted ventricular system.    Procedures:  We checked the Strata valve on his ventriculoperitoneal shunt following the MRI. The setting was changed to 2.0 by the MRI and this was reprogrammed back to 2.5. verified x 3.     Assessment:  Pineal region germ cell tumor s/p craniotomy for resection 2014 and right VPS (Strata set to 2.5)  Shunt check after MRI    Plan:  Patient here for shunt check after MRI.  His MRI showed stable findings.  Will review with Dr. Sharp and will contact the patient with follow-up recommendations.       I spent 20 minutes on patient care activities related to this encounter on the date of service, including time spent reviewing the chart, obtaining history and examination and in counseling the patient, and in documentation in the electronic medical record.      Deisy SHIRLEY CNP  Department of Neurosurgery

## 2024-09-24 NOTE — LETTER
2024       RE: Kari Turcios  [address]  [place] [address]     Dear Colleague,    Thank you for referring your patient, Kari Turcios, to the [facility] [place] [facility]. Please see a copy of my visit note below.    [facility]  Department of Neurosurgery      Name: Kari Turcios  MRN: [id number]  Age: 34 year old  : 1990  Referring provider: Ann Swift*  2024      Chief Complaint:   Pineal region germ cell tumor s/p craniotomy for resection  and right VPS (Strata set to 2.5)  Shunt check after MRI    History of Present Illness:   Kari Turcios is a 34 year old male with a history of a large germ cell pineal tumor which was resected in  by Dr. Sharp along with VPS placed for hydrocephalus who is seen today for shunt check after MRI.     Most recently seen in clinic by Dr. Sharp in 2023. His MRI showed no evidence of recurrent pineal mass. One year follow-up MRI was recommended by Dr. Sharp.     Today, patient presents for the evaluation with his dad. Overall, he has been doing well. He reports some increased headaches after the MRI today. He has continuous pain along the shunt tract and along the right frontoparietal convexity. This is tolerable. He uses Tylenol (few times a week) and Nurtec (once a months prn) as needed. He follows up with Ms. Dockery in our Headache clinic. No other neurological concerns.       Review of Systems:   Pertinent items are noted in HPI or as in patient entered ROS below, remainder of complete ROS is negative.        No data to display                 Physical Exam:   /72 (BP Location: Right arm, Patient Position: Sitting)   Pulse 67   Resp 16   SpO2 98%    General: No acute distress.    Neuro: The patient is fully oriented. Speech is normal. Gait is normal.  Psych: Normal mood and affect. Behavior is normal.         Imagin2024 MRI Brain:  IMPRESSION:     1. No recurrent pineal mass.  2. Compared with the most recent MRI from 2023, there is decreased  size of right occipital cavernoma with likely resolved previous  internal bleeding.   3. 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   4. Stable shunted ventricular system.    Procedures:  We checked the Strata valve on his ventriculoperitoneal shunt following the MRI. The setting was changed to 2.0 by the MRI and this was reprogrammed back to 2.5. verified x 3.     Assessment:  Pineal region germ cell tumor s/p craniotomy for resection  and right VPS (Strata set to 2.5)  Shunt check after MRI    Plan:  Patient here for shunt check after MRI.  His MRI showed stable findings.  Will review with Dr. Sharp and will contact the patient with follow-up recommendations.       I spent 20 minutes on patient care activities related to this encounter on the date of service, including time spent reviewing the chart, obtaining history and examination and in counseling the patient, and in documentation in the electronic medical record.      Deisy SHIRLEY CNP  Department of Neurosurgery      Again, thank you for allowing me to participate in the care of your patient.      Sincerely,    CASPER Lincoln CNP

## 2024-09-24 NOTE — NURSING NOTE
Chief Complaint   Patient presents with    Procedure     Shunt check         Yarely Shaikh NRP

## 2024-09-30 ENCOUNTER — LAB (OUTPATIENT)
Dept: LAB | Facility: CLINIC | Age: 34
End: 2024-09-30
Payer: COMMERCIAL

## 2024-09-30 DIAGNOSIS — Z79.899 OTHER LONG TERM (CURRENT) DRUG THERAPY: ICD-10-CM

## 2024-09-30 DIAGNOSIS — G40.219 LOCALIZATION-RELATED (FOCAL) (PARTIAL) SYMPTOMATIC EPILEPSY AND EPILEPTIC SYNDROMES WITH COMPLEX PARTIAL SEIZURES, INTRACTABLE, WITHOUT STATUS EPILEPTICUS (H): ICD-10-CM

## 2024-09-30 LAB
ALBUMIN SERPL BCG-MCNC: 4.6 G/DL (ref 3.5–5.2)
ALP SERPL-CCNC: 76 U/L (ref 40–150)
ALT SERPL W P-5'-P-CCNC: 23 U/L (ref 0–70)
ANION GAP SERPL CALCULATED.3IONS-SCNC: 12 MMOL/L (ref 7–15)
AST SERPL W P-5'-P-CCNC: 25 U/L (ref 0–45)
BASOPHILS # BLD AUTO: 0 10E3/UL (ref 0–0.2)
BASOPHILS NFR BLD AUTO: 1 %
BILIRUB SERPL-MCNC: 0.5 MG/DL
BUN SERPL-MCNC: 11.5 MG/DL (ref 6–20)
CALCIUM SERPL-MCNC: 8.8 MG/DL (ref 8.8–10.4)
CHLORIDE SERPL-SCNC: 101 MMOL/L (ref 98–107)
CREAT SERPL-MCNC: 0.95 MG/DL (ref 0.67–1.17)
EGFRCR SERPLBLD CKD-EPI 2021: >90 ML/MIN/1.73M2
EOSINOPHIL # BLD AUTO: 0.1 10E3/UL (ref 0–0.7)
EOSINOPHIL NFR BLD AUTO: 1 %
ERYTHROCYTE [DISTWIDTH] IN BLOOD BY AUTOMATED COUNT: 13.2 % (ref 10–15)
GLUCOSE SERPL-MCNC: 105 MG/DL (ref 70–99)
HCO3 SERPL-SCNC: 24 MMOL/L (ref 22–29)
HCT VFR BLD AUTO: 43.2 % (ref 40–53)
HGB BLD-MCNC: 14.3 G/DL (ref 13.3–17.7)
IMM GRANULOCYTES # BLD: 0 10E3/UL
IMM GRANULOCYTES NFR BLD: 0 %
LEVETIRACETAM SERPL-MCNC: 32.3 ΜG/ML (ref 10–40)
LYMPHOCYTES # BLD AUTO: 1.1 10E3/UL (ref 0.8–5.3)
LYMPHOCYTES NFR BLD AUTO: 23 %
MCH RBC QN AUTO: 27.3 PG (ref 26.5–33)
MCHC RBC AUTO-ENTMCNC: 33.1 G/DL (ref 31.5–36.5)
MCV RBC AUTO: 82 FL (ref 78–100)
MONOCYTES # BLD AUTO: 0.4 10E3/UL (ref 0–1.3)
MONOCYTES NFR BLD AUTO: 8 %
NEUTROPHILS # BLD AUTO: 3.1 10E3/UL (ref 1.6–8.3)
NEUTROPHILS NFR BLD AUTO: 67 %
NRBC # BLD AUTO: 0 10E3/UL
NRBC BLD AUTO-RTO: 0 /100
PLATELET # BLD AUTO: 228 10E3/UL (ref 150–450)
POTASSIUM SERPL-SCNC: 3.9 MMOL/L (ref 3.4–5.3)
PROT SERPL-MCNC: 7.4 G/DL (ref 6.4–8.3)
RBC # BLD AUTO: 5.24 10E6/UL (ref 4.4–5.9)
SODIUM SERPL-SCNC: 137 MMOL/L (ref 135–145)
WBC # BLD AUTO: 4.6 10E3/UL (ref 4–11)

## 2024-09-30 PROCEDURE — 80177 DRUG SCRN QUAN LEVETIRACETAM: CPT

## 2024-09-30 PROCEDURE — 80053 COMPREHEN METABOLIC PANEL: CPT

## 2024-09-30 PROCEDURE — 80183 DRUG SCRN QUANT OXCARBAZEPIN: CPT

## 2024-09-30 PROCEDURE — 85025 COMPLETE CBC W/AUTO DIFF WBC: CPT

## 2024-09-30 PROCEDURE — 36415 COLL VENOUS BLD VENIPUNCTURE: CPT

## 2024-10-04 LAB — 10OH-CARBAZEPINE SERPL-MCNC: 17 UG/ML

## 2025-02-19 ASSESSMENT — MIGRAINE DISABILITY ASSESSMENT (MIDAS)
HOW MANY DAYS IN THE PAST 3 MONTHS HAVE YOU HAD A HEADACHE: 0
HOW MANY DAYS WAS YOUR PRODUCTIVITY CUT IN HALF BECAUSE OF HEADACHES: 0
HOW MANY DAYS WAS HOUSEWORK PRODUCTIVITY CUT IN HALF DUE TO HEADACHES: 0
HOW MANY DAYS DID YOU MISS WORK OR SCHOOL BECAUSE OF HEADACHES: 0
HOW MANY DAYS DID YOU NOT DO HOUSEWORK BECAUSE OF HEADACHES: 0
HOW OFTEN WERE SOCIAL ACTIVITIES MISSED DUE TO HEADACHES: 0
ON A SCALE FROM 0-10 ON AVERAGE HOW PAINFUL WERE HEADACHES: 0
TOTAL SCORE: 0

## 2025-02-19 ASSESSMENT — HEADACHE IMPACT TEST (HIT 6)
HOW OFTEN HAVE YOU FELT TOO TIRED TO WORK BECAUSE OF YOUR HEADACHES: NEVER
HOW OFTEN HAVE YOU FELT FED UP OR IRRITATED BECAUSE OF YOUR HEADACHES: NEVER
WHEN YOU HAVE HEADACHES HOW OFTEN IS THE PAIN SEVERE: RARELY
HOW OFTEN DID HEADACHS LIMIT CONCENTRATION ON WORK OR DAILY ACTIVITY: NEVER
WHEN YOU HAVE A HEADACHE HOW OFTEN DO YOU WISH YOU COULD LIE DOWN: RARELY
HOW OFTEN DO HEADACHES LIMIT YOUR DAILY ACTIVITIES: RARELY
HIT6 TOTAL SCORE: 42

## 2025-02-21 ENCOUNTER — VIRTUAL VISIT (OUTPATIENT)
Dept: NEUROLOGY | Facility: CLINIC | Age: 35
End: 2025-02-21
Payer: COMMERCIAL

## 2025-02-21 DIAGNOSIS — G43.719 INTRACTABLE CHRONIC MIGRAINE WITHOUT AURA AND WITHOUT STATUS MIGRAINOSUS: ICD-10-CM

## 2025-02-21 PROCEDURE — 98005 SYNCH AUDIO-VIDEO EST LOW 20: CPT | Performed by: NURSE PRACTITIONER

## 2025-02-21 ASSESSMENT — PAIN SCALES - GENERAL: PAINLEVEL_OUTOF10: NO PAIN (0)

## 2025-02-21 NOTE — PROGRESS NOTES
Virtual Visit Details    Type of service:  Video Visit   Originating Location (pt. Location): Home  Distant Location (provider location):  Off-site  Platform used for Video Visit: [facility]    Headache Neurology Progress Note  February 21, 2025      Assessment/Plan:   Kari Turcios is a 35 year old   Headache stable infrequent  Acute Treatment:  -For acute treatment of mild headache, take acetaminophen  as needed. Do not exceed more than 14 days per month to avoid medication overuse.  -For acute treatment of moderate to severe headache, take nurtec at the onset of headache, with a repeat dose in two hours if needed. Do not exceed more than 9 days per month to avoid medication overuse.  -For headache related nausea, or as a rescue medicine for headache, take not  needed.     Preventive Treatment:  -For headache prevention, stay hydrated and keep sleep routine    Follow up in a year or sooner as needed     11 minutes spent on the date of the encounter doing video  access, chart  review,  results review,  meds review, treatment plan, documentation and further activities as noted above    CASPER Black, CNP [facility]      Subjective:    Kari Turcios returns for follow up of headaches   History of Pineal region germ cell tumor s/p craniotomy for resection 2014 and right VPS   Headaches are stable and tylenol once per week.   Stopped nurtec every other day a while ago but would like to have nurtec as needed.   No longer on amitriptyline.   Patient is on keppra and oxcarbazepine last seizure June 3d of 2024. Sees Dr Sanchez at [facility].   Stress is decreased since stopping working at the [employer].     Objective:    Vitals: There were no vitals taken for this visit.  General: Cooperative, NAD  Neurologic:  Mental Status: Fully alert, attentive and oriented. Speech clear and fluent.   Cranial Nerves: Facial movements symmetric.   Motor: No abnormal  movements.      Pertinent Investigations:            4/27/2023     7:03 AM 8/3/2023     7:57 AM 2/19/2025     7:35 AM   HIT-6   When you have headaches, how often is the pain severe 11 10 8   How often do headaches limit your ability to do usual daily activities including household work, work, school, or social activities? 11 10 8   When you have a headache, how often do you wish you could lie down? 13 10 8   In the past 4 weeks, how often have you felt too tired to do work or daily activities because of your headaches 11 10 6   In the past 4 weeks, how often have you felt fed up or irritated because of your headaches 11 8 6   In the past 4 weeks, how often did headaches limit your ability to concentrate on work or daily activities 11 10 6   HIT-6 Total Score 68 58 42        Patient-reported           4/27/2023     7:18 AM 8/3/2023     8:04 AM 2/19/2025     7:37 AM   MIDAS - in the past three months:   On how many days did you miss work or school because of your headaches? 0 0 0   How many days was your productivity at work or school reduced by half or more because of your headaches? 0 0 0   On how many days did you not do household work because of your headaches? 6 0 0   How many days was your productivity in household work reduced by half or more because of your headaches? 6 0 0   On how many days did you miss family, social, or leisure activities because of your headaches? 6 0 0   On how many days did you have a headache?   0   On a scale of 0-10, on average how painful were these headaches? 7  0   MIDAS Score 18 (III - Moderate Disability) 0 (I - Little or No Disability) 0 (I - Little or No Disability)

## 2025-02-21 NOTE — LETTER
2/21/2025       RE: Kari Turcios  [address]  [facility] [address]     Dear Colleague,    Thank you for referring your patient, Kari Turcios, to the [facility] [place] at [facility]. Please see a copy of my visit note below.    Virtual Visit Details    Type of service:  Video Visit   Originating Location (pt. Location): Home  Distant Location (provider location):  Off-site  Platform used for Video Visit: [facility]    Headache Neurology Progress Note  February 21, 2025      Assessment/Plan:   Kari Turcios is a 35 year old   Headache stable infrequent  Acute Treatment:  -For acute treatment of mild headache, take acetaminophen  as needed. Do not exceed more than 14 days per month to avoid medication overuse.  -For acute treatment of moderate to severe headache, take nurtec at the onset of headache, with a repeat dose in two hours if needed. Do not exceed more than 9 days per month to avoid medication overuse.  -For headache related nausea, or as a rescue medicine for headache, take not  needed.     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   Stopped nurtec every other day a while ago but would like to have nurtec as needed.   No longer on amitriptyline.   Patient is on keppra and oxcarbazepine last seizure June 3d of 2024. Sees Dr Sanchez at  [facility].   Stress is decreased since stopping working at [employer].     Objective:    Vitals: There were no vitals taken for this visit.  General: Cooperative, NAD  Neurologic:  Mental Status: Fully alert, attentive and oriented. Speech clear and fluent.   Cranial Nerves: Facial movements symmetric.   Motor: No abnormal movements.      Pertinent Investigations:            4/27/2023     7:03 AM 8/3/2023     7:57 AM 2/19/2025     7:35 AM   HIT-6   When you have headaches, how often is the pain severe 11 10 8   How often do headaches limit your ability to do usual daily activities including household work, work, school, or social activities? 11 10 8   When you have a headache, how often do you wish you could lie down? 13 10 8   In the past 4 weeks, how often have you felt too tired to do work or daily activities because of your headaches 11 10 6   In the past 4 weeks, how often have you felt fed up or irritated because of your headaches 11 8 6   In the past 4 weeks, how often did headaches limit your ability to concentrate on work or daily activities 11 10 6   HIT-6 Total Score 68 58 42        Patient-reported           4/27/2023     7:18 AM 8/3/2023     8:04 AM 2/19/2025     7:37 AM   MIDAS - in the past three months:   On how many days did you miss work or school because of your headaches? 0 0 0   How many days was your productivity at work or school reduced by half or more because of your headaches? 0 0 0   On how many days did you not do household work because of your headaches? 6 0 0   How many days was your productivity in household work reduced by half or more because of your headaches? 6 0 0   On how many days did you miss family, social, or leisure activities because of your headaches? 6 0 0   On how many days did you have a headache?   0   On a scale of 0-10, on average how painful were these headaches? 7  0   MIDAS Score 18 (III - Moderate Disability) 0 (I - Little or No Disability) 0 (I - Little or No  Disability)            Again, thank you for allowing me to participate in the care of your patient.      Sincerely,    CASPER Jones CNP

## 2025-02-21 NOTE — NURSING NOTE
Current patient location: [id number] [facility] [address]    Is the patient currently in the state of [place]? YES    Visit mode: VIDEO    If the visit is dropped, the patient can be reconnected by:VIDEO VISIT: Text to cell phone:   Telephone Information:   Mobile [phone]       Will anyone else be joining the visit? NO  (If patient encounters technical issues they should call [phone]    Are changes needed to the allergy or medication list? No    Are refills needed on medications prescribed by this physician? NO    Rooming Documentation:  Questionnaire(s) completed    Reason for visit: Follow Up    Denisse HAMMOND

## 2025-06-21 ENCOUNTER — HEALTH MAINTENANCE LETTER (OUTPATIENT)
Age: 35
End: 2025-06-21